# Patient Record
Sex: FEMALE | Race: WHITE | Employment: OTHER | URBAN - METROPOLITAN AREA
[De-identification: names, ages, dates, MRNs, and addresses within clinical notes are randomized per-mention and may not be internally consistent; named-entity substitution may affect disease eponyms.]

---

## 2017-01-30 RX ORDER — DULOXETIN HYDROCHLORIDE 60 MG/1
CAPSULE, DELAYED RELEASE ORAL
Qty: 90 CAPSULE | Refills: 2 | Status: SHIPPED | OUTPATIENT
Start: 2017-01-30 | End: 2017-11-01 | Stop reason: SDUPTHER

## 2017-04-18 DIAGNOSIS — N32.81 OAB (OVERACTIVE BLADDER): ICD-10-CM

## 2017-04-18 RX ORDER — TOLTERODINE 4 MG/1
CAPSULE, EXTENDED RELEASE ORAL
Qty: 30 CAPSULE | Refills: 1 | Status: SHIPPED | OUTPATIENT
Start: 2017-04-18 | End: 2017-06-19 | Stop reason: SDUPTHER

## 2017-06-19 DIAGNOSIS — N32.81 OAB (OVERACTIVE BLADDER): ICD-10-CM

## 2017-06-19 RX ORDER — TOLTERODINE 4 MG/1
CAPSULE, EXTENDED RELEASE ORAL
Qty: 30 CAPSULE | Refills: 3 | Status: SHIPPED | OUTPATIENT
Start: 2017-06-19 | End: 2017-11-06 | Stop reason: SDUPTHER

## 2017-10-23 ENCOUNTER — NURSE ONLY (OUTPATIENT)
Dept: FAMILY MEDICINE CLINIC | Age: 67
End: 2017-10-23

## 2017-10-23 ENCOUNTER — TELEPHONE (OUTPATIENT)
Dept: FAMILY MEDICINE CLINIC | Age: 67
End: 2017-10-23

## 2017-10-23 DIAGNOSIS — R30.0 DYSURIA: Primary | ICD-10-CM

## 2017-10-23 LAB
BILIRUBIN, POC: NORMAL
BLOOD URINE, POC: NORMAL
CLARITY, POC: NORMAL
COLOR, POC: NORMAL
GLUCOSE URINE, POC: NORMAL
KETONES, POC: NORMAL
LEUKOCYTE EST, POC: NORMAL
NITRITE, POC: NORMAL
PH, POC: 5.5
PROTEIN, POC: NORMAL
SPECIFIC GRAVITY, POC: 1.02
UROBILINOGEN, POC: 0.2

## 2017-10-23 PROCEDURE — 81002 URINALYSIS NONAUTO W/O SCOPE: CPT | Performed by: FAMILY MEDICINE

## 2017-10-23 NOTE — TELEPHONE ENCOUNTER
The patient know it's okay for her to drop off a urine sample.  I will make an exception in her case

## 2017-10-25 LAB — URINE CULTURE, ROUTINE: NORMAL

## 2017-11-01 RX ORDER — DULOXETIN HYDROCHLORIDE 60 MG/1
CAPSULE, DELAYED RELEASE ORAL
Qty: 90 CAPSULE | Refills: 0 | Status: SHIPPED | OUTPATIENT
Start: 2017-11-01 | End: 2017-12-01 | Stop reason: SDUPTHER

## 2017-11-06 DIAGNOSIS — N32.81 OAB (OVERACTIVE BLADDER): ICD-10-CM

## 2017-11-06 RX ORDER — TOLTERODINE 4 MG/1
CAPSULE, EXTENDED RELEASE ORAL
Qty: 30 CAPSULE | Refills: 2 | Status: SHIPPED | OUTPATIENT
Start: 2017-11-06 | End: 2018-05-17 | Stop reason: ALTCHOICE

## 2017-11-06 NOTE — TELEPHONE ENCOUNTER
Bud Sarkar is requesting refill(s)   Last OV 11-28-16 (pertaining to medication)  LR 6-19-17 (per medication requested)  Next office visit scheduled or attempted Yes   If no, reason:  12-1-17

## 2017-12-01 ENCOUNTER — OFFICE VISIT (OUTPATIENT)
Dept: FAMILY MEDICINE CLINIC | Age: 67
End: 2017-12-01

## 2017-12-01 VITALS
WEIGHT: 126 LBS | HEIGHT: 65 IN | SYSTOLIC BLOOD PRESSURE: 120 MMHG | DIASTOLIC BLOOD PRESSURE: 70 MMHG | BODY MASS INDEX: 20.99 KG/M2

## 2017-12-01 DIAGNOSIS — G62.9 NEUROPATHY: ICD-10-CM

## 2017-12-01 DIAGNOSIS — G20 PARKINSON'S DISEASE (HCC): ICD-10-CM

## 2017-12-01 DIAGNOSIS — N32.81 OAB (OVERACTIVE BLADDER): ICD-10-CM

## 2017-12-01 DIAGNOSIS — F32.A DEPRESSION, UNSPECIFIED DEPRESSION TYPE: ICD-10-CM

## 2017-12-01 DIAGNOSIS — Z00.00 ROUTINE GENERAL MEDICAL EXAMINATION AT A HEALTH CARE FACILITY: Primary | ICD-10-CM

## 2017-12-01 DIAGNOSIS — R06.02 SOB (SHORTNESS OF BREATH): ICD-10-CM

## 2017-12-01 LAB
A/G RATIO: 1.8 (ref 1.1–2.2)
ALBUMIN SERPL-MCNC: 4.5 G/DL (ref 3.4–5)
ALP BLD-CCNC: 76 U/L (ref 40–129)
ALT SERPL-CCNC: <5 U/L (ref 10–40)
ANION GAP SERPL CALCULATED.3IONS-SCNC: 16 MMOL/L (ref 3–16)
AST SERPL-CCNC: 19 U/L (ref 15–37)
BASOPHILS ABSOLUTE: 0.1 K/UL (ref 0–0.2)
BASOPHILS RELATIVE PERCENT: 1.2 %
BILIRUB SERPL-MCNC: 0.4 MG/DL (ref 0–1)
BUN BLDV-MCNC: 20 MG/DL (ref 7–20)
CALCIUM SERPL-MCNC: 9.9 MG/DL (ref 8.3–10.6)
CHLORIDE BLD-SCNC: 103 MMOL/L (ref 99–110)
CHOLESTEROL, TOTAL: 252 MG/DL (ref 0–199)
CO2: 25 MMOL/L (ref 21–32)
CREAT SERPL-MCNC: 0.7 MG/DL (ref 0.6–1.2)
EOSINOPHILS ABSOLUTE: 0.1 K/UL (ref 0–0.6)
EOSINOPHILS RELATIVE PERCENT: 1.4 %
GFR AFRICAN AMERICAN: >60
GFR NON-AFRICAN AMERICAN: >60
GLOBULIN: 2.5 G/DL
GLUCOSE BLD-MCNC: 95 MG/DL (ref 70–99)
HCT VFR BLD CALC: 43.5 % (ref 36–48)
HDLC SERPL-MCNC: 60 MG/DL (ref 40–60)
HEMOGLOBIN: 14.3 G/DL (ref 12–16)
LDL CHOLESTEROL CALCULATED: 165 MG/DL
LYMPHOCYTES ABSOLUTE: 1.5 K/UL (ref 1–5.1)
LYMPHOCYTES RELATIVE PERCENT: 23.4 %
MCH RBC QN AUTO: 30.3 PG (ref 26–34)
MCHC RBC AUTO-ENTMCNC: 32.8 G/DL (ref 31–36)
MCV RBC AUTO: 92.2 FL (ref 80–100)
MONOCYTES ABSOLUTE: 0.5 K/UL (ref 0–1.3)
MONOCYTES RELATIVE PERCENT: 8.7 %
NEUTROPHILS ABSOLUTE: 4.1 K/UL (ref 1.7–7.7)
NEUTROPHILS RELATIVE PERCENT: 65.3 %
PDW BLD-RTO: 13.5 % (ref 12.4–15.4)
PLATELET # BLD: 230 K/UL (ref 135–450)
PMV BLD AUTO: 9.1 FL (ref 5–10.5)
POTASSIUM SERPL-SCNC: 4.5 MMOL/L (ref 3.5–5.1)
RBC # BLD: 4.71 M/UL (ref 4–5.2)
SODIUM BLD-SCNC: 144 MMOL/L (ref 136–145)
TOTAL PROTEIN: 7 G/DL (ref 6.4–8.2)
TRIGL SERPL-MCNC: 134 MG/DL (ref 0–150)
VLDLC SERPL CALC-MCNC: 27 MG/DL
WBC # BLD: 6.3 K/UL (ref 4–11)

## 2017-12-01 PROCEDURE — 36415 COLL VENOUS BLD VENIPUNCTURE: CPT | Performed by: FAMILY MEDICINE

## 2017-12-01 PROCEDURE — 99397 PER PM REEVAL EST PAT 65+ YR: CPT | Performed by: FAMILY MEDICINE

## 2017-12-01 PROCEDURE — 93000 ELECTROCARDIOGRAM COMPLETE: CPT | Performed by: FAMILY MEDICINE

## 2017-12-01 RX ORDER — DULOXETIN HYDROCHLORIDE 60 MG/1
CAPSULE, DELAYED RELEASE ORAL
Qty: 90 CAPSULE | Refills: 3 | Status: SHIPPED | OUTPATIENT
Start: 2017-12-01 | End: 2018-02-02 | Stop reason: SDUPTHER

## 2017-12-01 RX ORDER — TOLTERODINE 4 MG/1
CAPSULE, EXTENDED RELEASE ORAL
Qty: 30 CAPSULE | Refills: 2 | Status: CANCELLED | OUTPATIENT
Start: 2017-12-01

## 2017-12-01 RX ORDER — LORAZEPAM 1 MG/1
TABLET ORAL
Qty: 3 TABLET | Refills: 0 | Status: SHIPPED | OUTPATIENT
Start: 2017-12-01 | End: 2018-05-17 | Stop reason: ALTCHOICE

## 2017-12-01 NOTE — PROGRESS NOTES
[facility])           Pituitary adenoma [facility])      Past Surgical History:   Procedure Laterality Date    COLONOSCOPY  6/02    due 6/12    COLONOSCOPY  8/12    due 8/17    FINGER TRIGGER RELEASE      right hand    HAMMER TOE SURGERY  2005    LAPAROSCOPY      TONSILLECTOMY AND ADENOIDECTOMY  14 yo     Family History   Problem Relation Age of Onset    Heart Disease Mother     Stroke Father     Cancer Father      prostate    Diabetes Sister     Heart Disease Sister      quadruple bypass     Social History     Social History    Marital status:      Spouse name: Alaina Dotson Number of children: 2    Years of education: N/A     Occupational History    homemaker      retired nurse     Social History Main Topics    Smoking status: Never Smoker    Smokeless tobacco: Never Used    Alcohol use No    Drug use: Unknown    Sexual activity: Not on file     Other Topics Concern    Not on file     Social History Narrative    Retired, cares for grand son, exercises regularly, healthy diet       Review of Systems:  A comprehensive review of systems was negative except for what was noted in the HPI. Physical Exam:   Vitals:    12/01/17 0906 12/01/17 0946   BP: 100/68 120/70   Site: Left Arm    Position: Sitting    Cuff Size: Medium Adult    Weight: 126 lb (57.2 kg)    Height: 5' 5\" (1.651 m)      Body mass index is 20.97 kg/m². Constitutional: She is oriented to person, place, and time. She appears well-developed and well-nourished. No distress. HEENT:   Head: Normocephalic and atraumatic. Right Ear: Cerumen impaction, after irrigation Tympanic membrane, external ear and ear canal normal.   Left Ear: Cerumen impaction, after irrigation Tympanic membrane, external ear and ear canal normal.   Nose: Nose normal.   Mouth/Throat: Oropharynx is clear and moist, and mucous membranes are normal.  There is no cervical adenopathy. Eyes: Conjunctivae  normal. Pupils are equal, round, and reactive to light. Neck: Neck supple. No JVD present. Carotid bruit is not present. No mass and no thyromegaly present. Cardiovascular: Normal rate, regular rhythm, normal heart sounds and intact distal pulses. Exam reveals no gallop and no friction rub. No murmur heard. Pulmonary/Chest: Effort normal and breath sounds normal. No respiratory distress. She has no wheezes, rhonchi or rales. Abdominal: Soft, non-tender. Bowel sounds and aorta are normal. She exhibits no organomegaly, mass or bruit. .Musculoskeletal:  She exhibits no edema. Neurological: She is alert and oriented to person, place, and time. Lova Mean No cranial nerve deficit. Tremor still present  Skin: Skin is warm and dry. There is no rash or erythema. No suspicious lesions noted. Psychiatric: She has a normal mood and affect. Her speech is normal and behavior is normal. Judgment, cognition and memory are normal.       EKG Interpretation: RBBB. Lab Review:   Lab Results   Component Value Date     11/20/2015    K 4.1 11/20/2015     11/20/2015    CO2 30 11/20/2015    BUN 18 11/20/2015    CREATININE 0.7 11/20/2015    GLUCOSE 87 11/20/2015    CALCIUM 9.6 11/20/2015     Lab Results   Component Value Date    WBC 7.4 11/20/2015    HGB 13.5 11/20/2015    HCT 41.9 11/20/2015    MCV 92.3 11/20/2015     11/20/2015     Lab Results   Component Value Date    CHOL 214 11/28/2016    TRIG 187 11/28/2016    HDL 58 11/28/2016        Assessment/Plan:    Natacha was seen today for annual exam.    Diagnoses and all orders for this visit:    Routine general medical examination at a health care facility  -     Lipid Panel  -     Comprehensive Metabolic Panel  -     CBC Auto Differential    OAB (overactive bladder), Given her recent dizziness we will DC her Detrol LA. We will see what happens to the dizziness and also her urinary frequency. She continues to have atrophic vaginitis symptoms, she can see her gynecologist for possible laser treatment.     SOB

## 2017-12-14 ENCOUNTER — HOSPITAL ENCOUNTER (OUTPATIENT)
Dept: MAMMOGRAPHY | Age: 67
Discharge: HOME | End: 2017-12-14
Admitting: FAMILY MEDICINE

## 2017-12-14 DIAGNOSIS — Z12.39 BREAST CANCER SCREENING: ICD-10-CM

## 2017-12-15 ENCOUNTER — OFFICE VISIT (OUTPATIENT)
Dept: ORTHOPEDIC SURGERY | Age: 67
End: 2017-12-15

## 2017-12-15 VITALS — HEIGHT: 65 IN | BODY MASS INDEX: 21.01 KG/M2 | WEIGHT: 126.1 LBS

## 2017-12-15 DIAGNOSIS — S52.531D CLOSED COLLES' FRACTURE OF RIGHT RADIUS WITH ROUTINE HEALING, SUBSEQUENT ENCOUNTER: ICD-10-CM

## 2017-12-15 DIAGNOSIS — M19.031 ARTHRITIS OF WRIST, RIGHT: ICD-10-CM

## 2017-12-15 DIAGNOSIS — M25.531 RIGHT WRIST PAIN: Primary | ICD-10-CM

## 2017-12-15 DIAGNOSIS — M77.8 RIGHT WRIST TENDINITIS: ICD-10-CM

## 2017-12-15 PROCEDURE — 3014F SCREEN MAMMO DOC REV: CPT | Performed by: ORTHOPAEDIC SURGERY

## 2017-12-15 PROCEDURE — G8400 PT W/DXA NO RESULTS DOC: HCPCS | Performed by: ORTHOPAEDIC SURGERY

## 2017-12-15 PROCEDURE — 99213 OFFICE O/P EST LOW 20 MIN: CPT | Performed by: ORTHOPAEDIC SURGERY

## 2017-12-15 PROCEDURE — 1123F ACP DISCUSS/DSCN MKR DOCD: CPT | Performed by: ORTHOPAEDIC SURGERY

## 2017-12-15 PROCEDURE — G8420 CALC BMI NORM PARAMETERS: HCPCS | Performed by: ORTHOPAEDIC SURGERY

## 2017-12-15 PROCEDURE — G8484 FLU IMMUNIZE NO ADMIN: HCPCS | Performed by: ORTHOPAEDIC SURGERY

## 2017-12-15 PROCEDURE — 1090F PRES/ABSN URINE INCON ASSESS: CPT | Performed by: ORTHOPAEDIC SURGERY

## 2017-12-15 PROCEDURE — 1036F TOBACCO NON-USER: CPT | Performed by: ORTHOPAEDIC SURGERY

## 2017-12-15 PROCEDURE — 4040F PNEUMOC VAC/ADMIN/RCVD: CPT | Performed by: ORTHOPAEDIC SURGERY

## 2017-12-15 PROCEDURE — 3017F COLORECTAL CA SCREEN DOC REV: CPT | Performed by: ORTHOPAEDIC SURGERY

## 2017-12-15 PROCEDURE — 20550 NJX 1 TENDON SHEATH/LIGAMENT: CPT | Performed by: ORTHOPAEDIC SURGERY

## 2017-12-15 PROCEDURE — G8427 DOCREV CUR MEDS BY ELIG CLIN: HCPCS | Performed by: ORTHOPAEDIC SURGERY

## 2017-12-15 NOTE — PROGRESS NOTES
Injection administration details:  Date & Time: 12/15/17 10:46 AM  Site & Comments:RIGHT WRIST administered by Dr Ulises Childers    Betamethasone (30mg/mL)  # of cc: 2  [facility]:1335-8801-93   Lot number: [id number]  EXP: 02/2019    1% Lidocaine (10mg/ mL)  # of cc:2  [facility]: 3459-2591-98  Lot number: [id number]  EXP: 9/1/2018
55°    Strength:  Normal    Special Tests:  Positive ECU Synergy sign. DRUJ is stable and minimally tender. Skin: There are no additional worrisome rashes, ulcerations or lesions. Sensation: normal    Circulation normal    Additional Comments:     Additional Examinations:  Left Upper Extremity: Examination of the left upper extremity does not show any tenderness, deformity or injury. Range of motion is unremarkable. There is no gross instability. There are no rashes, ulcerations or lesions. Strength and tone are normal.    Radiology:     X-rays obtained and reviewed in office:  Views 3 views  Location right wrist  Impression healed right distal radius fracture with now perhaps 2 mm positive ulnar variance. There is widespread degenerative change along the wrist mostly centered along the STT joint. Diagnostic Test Findings:        Assessment:  Right wrist arthritis but now some newer signs of ECU tendinopathy as well    Impression:  Encounter Diagnoses   Name Primary?  Right wrist pain Yes    Closed Colles' fracture of right radius with routine healing, subsequent encounter     Right wrist tendinitis     Arthritis of wrist, right        Office Procedures:  Orders Placed This Encounter   Procedures    XR WRIST RIGHT (MIN 3 VIEWS)     12069    ND BETAMETHASONE ACET&SOD PHOSP    ND INJECT TENDON SHEATH/LIGAMENT       Treatment Plan:  I certainly believe she has some widespread arthritic change in the wrist but based on my exam I think there is a new area of specific discomfort along the ECU tendon. I talked with her about topical treatments for the wrist, over-the-counter anti-inflammatories with appropriate precautions, activity changes, and use of her wrist wrap. I also gave her the option of a cortisone injection along this region in addition to some formalized hand therapy.     Under sterile conditions and with 1% plain lidocaine I then injected 2 mL of Celestone along the course of the

## 2017-12-19 ENCOUNTER — HOSPITAL ENCOUNTER (OUTPATIENT)
Dept: OCCUPATIONAL THERAPY | Age: 67
Discharge: HOME | End: 2017-12-31
Admitting: ORTHOPAEDIC SURGERY

## 2018-01-01 ENCOUNTER — HOSPITAL ENCOUNTER (OUTPATIENT)
Dept: OCCUPATIONAL THERAPY | Age: 68
Discharge: HOME | End: 2018-01-31
Attending: ORTHOPAEDIC SURGERY | Admitting: ORTHOPAEDIC SURGERY

## 2018-01-31 ENCOUNTER — TELEPHONE (OUTPATIENT)
Dept: FAMILY MEDICINE CLINIC | Age: 68
End: 2018-01-31

## 2018-01-31 NOTE — TELEPHONE ENCOUNTER
I received a faxed form, from [facility]. They were requesting copies of medical record, etc, and authorization to release it. I have faxed back documents containing recent office visit notes and demographic information. Attached is confirmation of facility receiving information.

## 2018-02-02 DIAGNOSIS — F32.A DEPRESSION, UNSPECIFIED DEPRESSION TYPE: ICD-10-CM

## 2018-02-02 RX ORDER — DULOXETIN HYDROCHLORIDE 60 MG/1
CAPSULE, DELAYED RELEASE ORAL
Qty: 90 CAPSULE | Refills: 0 | Status: SHIPPED | OUTPATIENT
Start: 2018-02-02 | End: 2019-06-25

## 2018-02-02 NOTE — TELEPHONE ENCOUNTER
Ramirez Maurer is requesting refill(s)   Last OV 12/1/17 (pertaining to medication)  LR 12/1/17 (per medication requested)  Next office visit scheduled or attempted No   If no, reason:  Was in recently   Wants refill of this medicine as she is in [place] for awhile and wants refill there.

## 2018-03-30 ENCOUNTER — HOSPITAL ENCOUNTER (OUTPATIENT)
Dept: MAMMOGRAPHY | Age: 68
Discharge: HOME | End: 2018-03-30
Admitting: FAMILY MEDICINE

## 2018-03-30 DIAGNOSIS — M81.0 AGE-RELATED OSTEOPOROSIS WITHOUT CURRENT PATHOLOGICAL FRACTURE: ICD-10-CM

## 2018-03-30 DIAGNOSIS — Z13.820 SCREENING FOR OSTEOPOROSIS: ICD-10-CM

## 2018-05-21 ENCOUNTER — OFFICE VISIT (OUTPATIENT)
Dept: FAMILY MEDICINE CLINIC | Age: 68
End: 2018-05-21

## 2018-05-21 VITALS
OXYGEN SATURATION: 97 % | DIASTOLIC BLOOD PRESSURE: 60 MMHG | HEIGHT: 65 IN | WEIGHT: 127.6 LBS | HEART RATE: 91 BPM | BODY MASS INDEX: 21.26 KG/M2 | SYSTOLIC BLOOD PRESSURE: 120 MMHG

## 2018-05-21 DIAGNOSIS — M54.42 ACUTE LEFT-SIDED LOW BACK PAIN WITH LEFT-SIDED SCIATICA: Primary | ICD-10-CM

## 2018-05-21 PROCEDURE — 1090F PRES/ABSN URINE INCON ASSESS: CPT | Performed by: PHYSICIAN ASSISTANT

## 2018-05-21 PROCEDURE — G8399 PT W/DXA RESULTS DOCUMENT: HCPCS | Performed by: PHYSICIAN ASSISTANT

## 2018-05-21 PROCEDURE — 4040F PNEUMOC VAC/ADMIN/RCVD: CPT | Performed by: PHYSICIAN ASSISTANT

## 2018-05-21 PROCEDURE — G8427 DOCREV CUR MEDS BY ELIG CLIN: HCPCS | Performed by: PHYSICIAN ASSISTANT

## 2018-05-21 PROCEDURE — G8420 CALC BMI NORM PARAMETERS: HCPCS | Performed by: PHYSICIAN ASSISTANT

## 2018-05-21 PROCEDURE — 3017F COLORECTAL CA SCREEN DOC REV: CPT | Performed by: PHYSICIAN ASSISTANT

## 2018-05-21 PROCEDURE — 1123F ACP DISCUSS/DSCN MKR DOCD: CPT | Performed by: PHYSICIAN ASSISTANT

## 2018-05-21 PROCEDURE — 99213 OFFICE O/P EST LOW 20 MIN: CPT | Performed by: PHYSICIAN ASSISTANT

## 2018-05-21 PROCEDURE — 1036F TOBACCO NON-USER: CPT | Performed by: PHYSICIAN ASSISTANT

## 2018-05-21 RX ORDER — ALENDRONATE SODIUM 70 MG/1
70 TABLET ORAL
COMMUNITY
Start: 2018-04-05 | End: 2019-12-18

## 2018-05-21 RX ORDER — HYDROCODONE BITARTRATE AND ACETAMINOPHEN 5; 325 MG/1; MG/1
1 TABLET ORAL EVERY 6 HOURS PRN
Qty: 28 TABLET | Refills: 0 | Status: SHIPPED | OUTPATIENT
Start: 2018-05-21 | End: 2018-05-28

## 2018-05-21 ASSESSMENT — PATIENT HEALTH QUESTIONNAIRE - PHQ9
1. LITTLE INTEREST OR PLEASURE IN DOING THINGS: 0
SUM OF ALL RESPONSES TO PHQ9 QUESTIONS 1 & 2: 0
SUM OF ALL RESPONSES TO PHQ QUESTIONS 1-9: 0
2. FEELING DOWN, DEPRESSED OR HOPELESS: 0

## 2018-05-21 ASSESSMENT — ENCOUNTER SYMPTOMS
RESPIRATORY NEGATIVE: 1
BACK PAIN: 1

## 2018-05-22 ENCOUNTER — TELEPHONE (OUTPATIENT)
Dept: FAMILY MEDICINE CLINIC | Age: 68
End: 2018-05-22

## 2018-05-22 RX ORDER — LORAZEPAM 1 MG/1
TABLET ORAL
Qty: 3 TABLET | Refills: 0 | OUTPATIENT
Start: 2018-05-22 | End: 2018-06-22

## 2018-05-29 ENCOUNTER — OFFICE VISIT (OUTPATIENT)
Dept: FAMILY MEDICINE CLINIC | Age: 68
End: 2018-05-29

## 2018-05-29 VITALS
WEIGHT: 125.2 LBS | SYSTOLIC BLOOD PRESSURE: 112 MMHG | OXYGEN SATURATION: 96 % | DIASTOLIC BLOOD PRESSURE: 70 MMHG | HEART RATE: 101 BPM | BODY MASS INDEX: 20.86 KG/M2 | HEIGHT: 65 IN

## 2018-05-29 DIAGNOSIS — M54.42 ACUTE LEFT-SIDED LOW BACK PAIN WITH LEFT-SIDED SCIATICA: Primary | ICD-10-CM

## 2018-05-29 PROCEDURE — 1123F ACP DISCUSS/DSCN MKR DOCD: CPT | Performed by: PHYSICIAN ASSISTANT

## 2018-05-29 PROCEDURE — 1090F PRES/ABSN URINE INCON ASSESS: CPT | Performed by: PHYSICIAN ASSISTANT

## 2018-05-29 PROCEDURE — G8420 CALC BMI NORM PARAMETERS: HCPCS | Performed by: PHYSICIAN ASSISTANT

## 2018-05-29 PROCEDURE — 4040F PNEUMOC VAC/ADMIN/RCVD: CPT | Performed by: PHYSICIAN ASSISTANT

## 2018-05-29 PROCEDURE — 99213 OFFICE O/P EST LOW 20 MIN: CPT | Performed by: PHYSICIAN ASSISTANT

## 2018-05-29 PROCEDURE — 3017F COLORECTAL CA SCREEN DOC REV: CPT | Performed by: PHYSICIAN ASSISTANT

## 2018-05-29 PROCEDURE — 1036F TOBACCO NON-USER: CPT | Performed by: PHYSICIAN ASSISTANT

## 2018-05-29 PROCEDURE — G8427 DOCREV CUR MEDS BY ELIG CLIN: HCPCS | Performed by: PHYSICIAN ASSISTANT

## 2018-05-29 PROCEDURE — G8399 PT W/DXA RESULTS DOCUMENT: HCPCS | Performed by: PHYSICIAN ASSISTANT

## 2018-05-29 RX ORDER — NABUMETONE 500 MG/1
500 TABLET, FILM COATED ORAL 2 TIMES DAILY
Qty: 30 TABLET | Refills: 0 | Status: SHIPPED | OUTPATIENT
Start: 2018-05-29 | End: 2018-12-06

## 2018-05-29 ASSESSMENT — ENCOUNTER SYMPTOMS
BACK PAIN: 1
RESPIRATORY NEGATIVE: 1

## 2018-06-01 ENCOUNTER — HOSPITAL ENCOUNTER (OUTPATIENT)
Dept: PHYSICAL THERAPY | Age: 68
Discharge: HOME | End: 2018-06-30
Attending: PHYSICIAN ASSISTANT | Admitting: PHYSICIAN ASSISTANT

## 2018-06-05 ENCOUNTER — HOSPITAL ENCOUNTER (OUTPATIENT)
Dept: PHYSICAL THERAPY | Age: 68
Discharge: HOME | End: 2018-06-06
Admitting: PHYSICIAN ASSISTANT

## 2018-06-05 ENCOUNTER — HOSPITAL ENCOUNTER (OUTPATIENT)
Dept: PHYSICAL THERAPY | Age: 68
Discharge: HOME | End: 2018-05-31
Admitting: PHYSICIAN ASSISTANT

## 2018-06-06 ENCOUNTER — TELEPHONE (OUTPATIENT)
Dept: FAMILY MEDICINE CLINIC | Age: 68
End: 2018-06-06

## 2018-06-07 ENCOUNTER — HOSPITAL ENCOUNTER (OUTPATIENT)
Dept: PHYSICAL THERAPY | Age: 68
Discharge: HOME | End: 2018-06-08
Admitting: PHYSICIAN ASSISTANT

## 2018-06-11 ENCOUNTER — OFFICE VISIT (OUTPATIENT)
Dept: ORTHOPEDIC SURGERY | Age: 68
End: 2018-06-11

## 2018-06-11 VITALS
HEART RATE: 97 BPM | DIASTOLIC BLOOD PRESSURE: 64 MMHG | WEIGHT: 125.22 LBS | BODY MASS INDEX: 20.86 KG/M2 | SYSTOLIC BLOOD PRESSURE: 97 MMHG | HEIGHT: 65 IN

## 2018-06-11 DIAGNOSIS — M54.16 LUMBAR RADICULOPATHY: Primary | ICD-10-CM

## 2018-06-11 DIAGNOSIS — M51.36 DDD (DEGENERATIVE DISC DISEASE), LUMBAR: ICD-10-CM

## 2018-06-11 PROCEDURE — 1090F PRES/ABSN URINE INCON ASSESS: CPT | Performed by: PHYSICAL MEDICINE & REHABILITATION

## 2018-06-11 PROCEDURE — 99203 OFFICE O/P NEW LOW 30 MIN: CPT | Performed by: PHYSICAL MEDICINE & REHABILITATION

## 2018-06-11 PROCEDURE — G8420 CALC BMI NORM PARAMETERS: HCPCS | Performed by: PHYSICAL MEDICINE & REHABILITATION

## 2018-06-11 PROCEDURE — 3017F COLORECTAL CA SCREEN DOC REV: CPT | Performed by: PHYSICAL MEDICINE & REHABILITATION

## 2018-06-11 PROCEDURE — G8427 DOCREV CUR MEDS BY ELIG CLIN: HCPCS | Performed by: PHYSICAL MEDICINE & REHABILITATION

## 2018-06-11 RX ORDER — METHYLPREDNISOLONE 4 MG/1
TABLET ORAL
Qty: 1 KIT | Refills: 0 | Status: SHIPPED | OUTPATIENT
Start: 2018-06-11 | End: 2018-06-17

## 2018-06-12 ENCOUNTER — HOSPITAL ENCOUNTER (OUTPATIENT)
Dept: PHYSICAL THERAPY | Age: 68
Discharge: HOME | End: 2018-06-13
Admitting: PHYSICIAN ASSISTANT

## 2018-06-14 ENCOUNTER — HOSPITAL ENCOUNTER (OUTPATIENT)
Dept: PHYSICAL THERAPY | Age: 68
Discharge: HOME | End: 2018-06-15
Admitting: PHYSICIAN ASSISTANT

## 2018-06-19 ENCOUNTER — HOSPITAL ENCOUNTER (OUTPATIENT)
Dept: PHYSICAL THERAPY | Age: 68
Discharge: HOME | End: 2018-06-20
Admitting: PHYSICIAN ASSISTANT

## 2018-06-21 ENCOUNTER — HOSPITAL ENCOUNTER (OUTPATIENT)
Dept: PHYSICAL THERAPY | Age: 68
Discharge: HOME | End: 2018-06-22
Admitting: PHYSICIAN ASSISTANT

## 2018-06-22 DIAGNOSIS — M54.42 ACUTE LEFT-SIDED LOW BACK PAIN WITH LEFT-SIDED SCIATICA: Primary | ICD-10-CM

## 2018-06-26 ENCOUNTER — HOSPITAL ENCOUNTER (OUTPATIENT)
Dept: PHYSICAL THERAPY | Age: 68
Discharge: HOME | End: 2018-06-27
Admitting: PHYSICIAN ASSISTANT

## 2018-06-28 ENCOUNTER — HOSPITAL ENCOUNTER (OUTPATIENT)
Dept: PHYSICAL THERAPY | Age: 68
Discharge: HOME | End: 2018-06-29
Admitting: PHYSICIAN ASSISTANT

## 2018-07-01 ENCOUNTER — HOSPITAL ENCOUNTER (OUTPATIENT)
Dept: PHYSICAL THERAPY | Age: 68
Discharge: HOME | End: 2018-07-01
Attending: PHYSICIAN ASSISTANT | Admitting: PHYSICIAN ASSISTANT

## 2018-07-12 ENCOUNTER — HOSPITAL ENCOUNTER (OUTPATIENT)
Dept: PHYSICAL THERAPY | Age: 68
Discharge: HOME | End: 2018-07-13
Admitting: PHYSICIAN ASSISTANT

## 2018-07-12 NOTE — FLOWSHEET NOTE
[facility], [address] [place] [address]  Phone: [phone]  Fax [phone]        Physical Therapy Daily Treatment Note  Date:  2018    Patient Name:  Feliciano Sow    :  1950  MRN: [id number]       Date of Onset:4 weeks  Date of Evaluation: 18    Medical/Treatment Diagnosis Information:  · Diagnosis: M54.42 (ICD-10-CM) - Acute left-sided low back pain with left-sided sciatica  · Treatment Diagnosis: M54.42 (ICD-10-CM) - Acute left-sided low back pain with left-sided sciatica  Insurance/Certification information:  PT Insurance Information: [facility]  Physician Information:  Referring Practitioner: Monika COHEN  Plan of care signed (Y/N): Y    Date of Patient follow up with Physician: Lindsey Reardon NP 18    G-Code (if applicable):      Date G-Code Applied:  18       POC Due: 18  OP NOTE Due: if seen before 18  10th VISIT NOTE :visit 10       Latex Allergy:  [x]NO      []YES  Preferred Language for Healthcare:   [x]English       []other:     Visit # Insurance Allowable Requires auth   [facility]    [x]no        []yes:      Pain level: 3-4/10     SUBJECTIVE:  No increased soreness after last visit and TDN. OBJECTIVE:   Observation:   Decreased tightness in lumbar paraspinals. Test measurements:  None this visit. RESTRICTIONS/PRECAUTIONS: Parkinson's, osteoporosis      18 PT script for TDN entered in patient's chart under \"Orders\"  18 CONSENT FORM READ, SIGNED, AND SCANNED INTO PATIENT'S CHART.     Exercises/Interventions:      Muscle  Needle Size Technique Notes IES   Site 1 B IC Lumborum 0.30 x 40mm [] Pistoning / [x]  Threading  []  Winding/Coning LTR []    Site 2 B longissimus x2 0.30 x 40mm [] Pistoning / [x]  Threading  []  Winding/Coning  []    Site 3 B GMed 0.35 x 75mm [x] Pistoning / []  Threading  []  Winding/Coning R LTR []    Site 4                    [] Pistoning / []  Threading  []  Winding/Coning  []    Site 5                    [] Pistoning / []  Threading  []  Winding/Coning  []    Site 6                    [] Pistoning / []  Threading  []  Winding/Coning  []    Site 7                    [] Pistoning / []  Threading  []  Winding/Coning  []    Site 8                    [] Pistoning / []  Threading  []  Winding/Coning  []    Site 9                    [] Pistoning / []  Threading  []  Winding/Coning  []    Site 10                    [] Pistoning / []  Threading  []  Winding/Coning  []      **The above techniques were used to restore functional range of motion, reduce muscle spasm, and induce healing in the corresponding musculature by means of intramuscular mobilization. Clean Technique was utilized today while applying the Dry needling treatment. The treatment sites where cleaned with 70% solution of isopropyl alcohol.**          ** Educated patient on anatomy, trigger point etiology, expectations for TDN (bruising, soreness, etc), outcomes, and recommendations for exercise.  **      Therapeutic Ex Sets/Reps/Sec Notes   HEP   As far as able to keep core stable   HEP   HEP   HEP   HEP      SB LTR R/L 15x3\"       SB FLX rolls to hips and lumbar [id number]\" 15x       trustretch Hamstring stretch and lunging stretch R/L 5x15\" each Requires cues   Resume NV   1x30\" each    2x15\" each    10x each    R/L 10x each        2R 20x         AT See AT noteJW ATC   Manual Intervention                 L low back STM , GM STM after TDN 10'    TDN 20'    NMR re-education                                       Therapeutic Exercise and NMR EXR  [x] (29615) Provided verbal/tactile cueing for activities related to strengthening, flexibility, endurance, ROM for improvements in LE, proximal hip, and core control with self care, mobility, lifting, ambulation.  [] (85251) Provided verbal/tactile cueing for activities related to improving balance, coordination, kinesthetic sense, posture, motor skill, proprioception  to assist with LE, proximal hip, and core control in self care, mobility, lifting, ambulation and eccentric single leg control. NMR and Therapeutic Activities:    [] (51900 or 67047) Provided verbal/tactile cueing for activities related to improving balance, coordination, kinesthetic sense, posture, motor skill, proprioception and motor activation to allow for proper function of core, proximal hip and LE with self care and ADLs  [] (06821) Gait Re-education- Provided training and instruction to the patient for proper LE, core and proximal hip recruitment and positioning and eccentric body weight control with ambulation re-education including up and down stairs     Home Exercise Program:    [x] (51672) Reviewed/Progressed HEP activities related to strengthening, flexibility, endurance, ROM of core, proximal hip and LE for functional self-care, mobility, lifting and ambulation/stair navigation   [] (87583)Reviewed/Progressed HEP activities related to improving balance, coordination, kinesthetic sense, posture, motor skill, proprioception of core, proximal hip and LE for self care, mobility, lifting, and ambulation/stair navigation      Manual Treatments:  PROM / STM / Oscillations-Mobs:  G-I, II, III, IV (PA's, Inf., Post.)  [x] (83709) Provided manual therapy to mobilize LE, proximal hip and/or LS spine soft tissue/joints for the purpose of modulating pain, promoting relaxation,  increasing ROM, reducing/eliminating soft tissue swelling/inflammation/restriction, improving soft tissue extensibility and allowing for proper ROM for normal function with self care, mobility, lifting and ambulation.      Modalities:  HP LB prior to TDN x5'    Charges:  Timed Code Treatment Minutes: 42   Total Treatment Minutes: 42     [] EVAL (LOW) 69495 (typically 20 minutes face-to-face)  [] EVAL (MOD) 30052 (typically 30 minutes face-to-face)  [] EVAL (HIGH) 28223 (typically 45 minutes face-to-face)  [] RE-EVAL [x] MT(10537) x  1   [] IONTO  [] NMR (36936) x      [] VASO  [x] Manual (82013) x  2    [] Other:US  [] TA x       [] Mech Traction (39031)  [] ES(attended) (84527)      [] ES (un) (03174):     GOALS:   Patient stated goal: To be \"regular\"     Therapist goals for Patient:   Short Term Goals: To be achieved in: 2 weeks  1. Independent in HEP and progression per patient tolerance, in order to prevent re-injury. 2. Patient will have a decrease in pain to facilitate improvement in movement, function, and ADLs as indicated by Functional Deficits.     Long Term Goals: To be achieved in: 4 weeks  1. Disability index score of 50% or less for the Oswestry  to assist with reaching prior level of function. 2. Patient will demonstrate increased AROM to WNL, good LS mobility, good hip ROM to allow for proper joint functioning as indicated by patients Functional Deficits. 3. Patient will demonstrate an increase in Strength to good proximal hip and core activation to allow for proper functional mobility as indicated by patients Functional Deficits. 4. Patient will return to daily household functional activities without increased symptoms or restriction. 5. Pt will be able to cook a meal by herself by standing long enough to do so. (patient specific functional goal)       Progression Towards Functional goals:  [x] Patient is progressing as expected towards functional goals listed. [] Progression is slowed due to complexities listed. [] Progression has been slowed due to co-morbidities. [] Plan just implemented, too soon to assess goals progression  [] Other:     ASSESSMENT:   Good in clinic response to TDN, good tolerance. Lumbar mm relaxed. Overall more relaxed posture this visit.      Treatment/Activity Tolerance:  [x] Patient tolerated treatment well [] Patient limited by fatique  [] Patient limited by pain  [] Patient limited by other medical complications  [] Other:     Prognosis: [x] Good [] Fair  []

## 2018-07-17 ENCOUNTER — HOSPITAL ENCOUNTER (OUTPATIENT)
Dept: PHYSICAL THERAPY | Age: 68
Setting detail: THERAPIES SERIES
Discharge: HOME | End: 2018-07-17
Payer: MEDICARE

## 2018-07-17 PROCEDURE — 97140 MANUAL THERAPY 1/> REGIONS: CPT | Performed by: PHYSICAL THERAPIST

## 2018-07-17 PROCEDURE — 97110 THERAPEUTIC EXERCISES: CPT | Performed by: PHYSICAL THERAPIST

## 2018-07-17 NOTE — FLOWSHEET NOTE
Winding/Coning  []    Site 3 R GMed x2 0.35 x 75mm [x] Pistoning / []  Threading  []  Winding/Coning LTRx1 []    Site 4 R TFL x2 0.30 x 75mm [x] Pistoning / []  Threading  []  Winding/Coning Very tight and sore []    Site 5 R VL x4 0.30 x 60mm [x] Pistoning / []  Threading  []  Winding/Coning LTR x2 very tight, TTP []    Site 6                    [] Pistoning / []  Threading  []  Winding/Coning  []    Site 7                    [] Pistoning / []  Threading  []  Winding/Coning  []    Site 8                    [] Pistoning / []  Threading  []  Winding/Coning  []    Site 9                    [] Pistoning / []  Threading  []  Winding/Coning  []    Site 10                    [] Pistoning / []  Threading  []  Winding/Coning  []      **The above techniques were used to restore functional range of motion, reduce muscle spasm, and induce healing in the corresponding musculature by means of intramuscular mobilization. Clean Technique was utilized today while applying the Dry needling treatment. The treatment sites where cleaned with 70% solution of isopropyl alcohol.**          ** Educated patient on anatomy, trigger point etiology, expectations for TDN (bruising, soreness, etc), outcomes, and recommendations for exercise.  **      Therapeutic Ex Sets/Reps/Sec Notes   HEP   As far as able to keep core stable   HEP   HEP   HEP   HEP      SB LTR R/L assisted 20x3\"       SB FLX rolls to hips and lumbar [id number]\" 15x       trustretch Hamstring stretch and lunging stretch R/L 5x15\" each Requires cues   Resume NV   1x30\" each    2x15\" each    10x each    R/L 10x each        2R 20x    Recumbent Bike Full ROM 5'    AT See AT noteJW ATC   Manual Intervention              PROM B hips supine 6'    L low back STM , GM STM after TDN 10'    TDN 20'    NMR re-education                                       Therapeutic Exercise and NMR EXR  [x] (86599) Provided verbal/tactile cueing for activities related to strengthening, flexibility, endurance, ROM for improvements in LE, proximal hip, and core control with self care, mobility, lifting, ambulation.  [] (92593) Provided verbal/tactile cueing for activities related to improving balance, coordination, kinesthetic sense, posture, motor skill, proprioception  to assist with LE, proximal hip, and core control in self care, mobility, lifting, ambulation and eccentric single leg control. NMR and Therapeutic Activities:    [] (34972 or 20217) Provided verbal/tactile cueing for activities related to improving balance, coordination, kinesthetic sense, posture, motor skill, proprioception and motor activation to allow for proper function of core, proximal hip and LE with self care and ADLs  [] (88579) Gait Re-education- Provided training and instruction to the patient for proper LE, core and proximal hip recruitment and positioning and eccentric body weight control with ambulation re-education including up and down stairs     Home Exercise Program:    [x] (81821) Reviewed/Progressed HEP activities related to strengthening, flexibility, endurance, ROM of core, proximal hip and LE for functional self-care, mobility, lifting and ambulation/stair navigation   [] (74321)Reviewed/Progressed HEP activities related to improving balance, coordination, kinesthetic sense, posture, motor skill, proprioception of core, proximal hip and LE for self care, mobility, lifting, and ambulation/stair navigation      Manual Treatments:  PROM / STM / Oscillations-Mobs:  G-I, II, III, IV (PA's, Inf., Post.)  [x] (17701) Provided manual therapy to mobilize LE, proximal hip and/or LS spine soft tissue/joints for the purpose of modulating pain, promoting relaxation,  increasing ROM, reducing/eliminating soft tissue swelling/inflammation/restriction, improving soft tissue extensibility and allowing for proper ROM for normal function with self care, mobility, lifting and ambulation.      Modalities:  HP LB/R ITB post to TDN

## 2018-07-17 NOTE — FLOWSHEET NOTE
[facility], [address] [place]  Phone: [phone]  Fax [phone]    ATHLETIC TRAINING [address]   Date:  2018    Patient Name:  Delicia Willis    :  1950  MRN: [id number]  Restrictions/Precautions:  Parkinson's, Osteoporosis  Medical/Treatment Diagnosis Information:  ·  Acute L sided LBP w/L sided sciatica   ·  LBP w/sciatica  Physician Information:   VICKI Amezquita                                 Activity Log                                                               DOS/DOI:                                                             Date: 18   ATC Commun Difficulty isolating core  Gave info for [employer] for PD patients Feels like she has less endurance today. Tremors are more apparent today Better today, try tandem or wide tandem stance NV for trunk rotations. Fatigued at end of session   Bike      Airdyne      Elliptical      Treadmill            Hamstring stretch      Quad stretch      Hip flexor stretch      Piriformis stretch      Gastroc stretch      Soleus stretch            SLS  RTB B ext w/R/L slow march 10x RTB B ext w/R/L slow march 10x RTB B ext w/R/L slow march 10x         Leg press  Bilat. Unilat. Knee ext. Knee flex. Squats   Mini                     Wall                     [facility]            Step   Up                 Up and Over                 Lat. Down                       [facility] [place]  Flex. ABd Gliders R/L 10x ea Gliders R/L 10x ea Gliders R/L 10x ea             ADd                Ext Gliders R/L 10x ea Gliders R/L 10x ea Gliders R/L 10x ea         Cable Column/Theraband    Rows                                                    Ext.                                                     Lat. pulldown                                                    Chops Trunk Rot.  YTB R/L stand feet together 15x ea RTB R/L stand feet together 10x ea RTB R/L stand feet together 15x ea         Modality MHP 15' declined declined   [facility]   Time spent with PT assistant

## 2018-07-19 ENCOUNTER — HOSPITAL ENCOUNTER (OUTPATIENT)
Dept: PHYSICAL THERAPY | Age: 68
Setting detail: THERAPIES SERIES
Discharge: HOME | End: 2018-07-19
Payer: MEDICARE

## 2018-07-19 PROCEDURE — 97110 THERAPEUTIC EXERCISES: CPT | Performed by: PHYSICAL THERAPIST

## 2018-07-19 PROCEDURE — 97140 MANUAL THERAPY 1/> REGIONS: CPT | Performed by: PHYSICAL THERAPIST

## 2018-07-19 PROCEDURE — G8979 MOBILITY GOAL STATUS: HCPCS | Performed by: PHYSICAL THERAPIST

## 2018-07-19 PROCEDURE — G8978 MOBILITY CURRENT STATUS: HCPCS | Performed by: PHYSICAL THERAPIST

## 2018-07-19 NOTE — FLOWSHEET NOTE
patient's chart under \"Orders\"  6/28/18 CONSENT FORM READ, SIGNED, AND SCANNED INTO PATIENT'S CHART. Exercises/Interventions:      Muscle  Needle Size Technique Notes IES   Site 1 R L5-S1 multifidusx1 0.30 x 60mm [] Pistoning / []  Threading  [x]  Winding/Coning Unable to get to lamina due to tightness, relaxation response []    Site 2 R longissimusx1 0.30 x 40mm [] Pistoning / [x]  Threading  []  Winding/Coning No sig response []    Site 3 L L5-S1x1 0.30 x 60mm [] Pistoning / []  Threading  [x]  Winding/Coning Unable to get to lamina due to tightness, LTR x3 and good relaxation []    Site 4 L longissimus x2 0.30 x 40mm [] Pistoning / [x]  Threading  []  Winding/Coning LTR x1 []    Site 5 R ITB x4 0.35 x 75mm [x] Pistoning / []  Threading  []  Winding/Coning LTR x 4 painful throughout []    Site 6                    [] Pistoning / []  Threading  []  Winding/Coning  []    Site 7                    [] Pistoning / []  Threading  []  Winding/Coning  []    Site 8                    [] Pistoning / []  Threading  []  Winding/Coning  []    Site 9                    [] Pistoning / []  Threading  []  Winding/Coning  []    Site 10                    [] Pistoning / []  Threading  []  Winding/Coning  []      **The above techniques were used to restore functional range of motion, reduce muscle spasm, and induce healing in the corresponding musculature by means of intramuscular mobilization. Clean Technique was utilized today while applying the Dry needling treatment. The treatment sites where cleaned with 70% solution of isopropyl alcohol.**          ** Educated patient on anatomy, trigger point etiology, expectations for TDN (bruising, soreness, etc), outcomes, and recommendations for exercise.  **      Therapeutic Ex Sets/Reps/Sec Notes                                                                             AT See AT noteJW ATC  reformer   Manual Intervention     TDN (see above chart) 25'    STM following TDN 15' NMR re-education                                       Therapeutic Exercise and NMR EXR  [x] (94191) Provided verbal/tactile cueing for activities related to strengthening, flexibility, endurance, ROM for improvements in LE, proximal hip, and core control with self care, mobility, lifting, ambulation.  [] (06023) Provided verbal/tactile cueing for activities related to improving balance, coordination, kinesthetic sense, posture, motor skill, proprioception  to assist with LE, proximal hip, and core control in self care, mobility, lifting, ambulation and eccentric single leg control.      NMR and Therapeutic Activities:    [] (08001 or 21722) Provided verbal/tactile cueing for activities related to improving balance, coordination, kinesthetic sense, posture, motor skill, proprioception and motor activation to allow for proper function of core, proximal hip and LE with self care and ADLs  [] (64455) Gait Re-education- Provided training and instruction to the patient for proper LE, core and proximal hip recruitment and positioning and eccentric body weight control with ambulation re-education including up and down stairs     Home Exercise Program:    [x] (70220) Reviewed/Progressed HEP activities related to strengthening, flexibility, endurance, ROM of core, proximal hip and LE for functional self-care, mobility, lifting and ambulation/stair navigation   [] (13252)Reviewed/Progressed HEP activities related to improving balance, coordination, kinesthetic sense, posture, motor skill, proprioception of core, proximal hip and LE for self care, mobility, lifting, and ambulation/stair navigation      Manual Treatments:  PROM / STM / Oscillations-Mobs:  G-I, II, III, IV (PA's, Inf., Post.)  [x] (97429) Provided manual therapy to mobilize LE, proximal hip and/or LS spine soft tissue/joints for the purpose of modulating pain, promoting relaxation,  increasing ROM, reducing/eliminating soft tissue

## 2018-07-19 NOTE — PLAN OF CARE
[facility], [address]     Physical Therapy 10th Visit Progress Note    Date: 2018        Patient Name:  Delicia Willis    :  1950  MRN: [id number]  Referring Physician: Vivek COHEN  Diagnosis:Acute L sided LBP with L sided sciatica             ICD Code:M54.42  Therapy Diagnosis/Practice Pattern:   Acute L sided LBP with L sided sciatica  M54.42/F  Total number of visits: 10   Reporting Period:   Beginning Date:18   End Date:18    OBJECTIVE  Test used Initial score Current Score   Pain Summary 0-10/10 2-8/10 3-4/10   Functional questionnaire Oswestry 70% 35%   Functional Testing            ROM Lumbar FLX 25% 25-50%    RSB   LSB 10%/50% 25%/50%   Strength KE R   L 4+/5   4+/5 4+/5   4+/5    HF R   L 4+/5   4+/5 4+/5   4/5    KF R   L 4-/5   4+/5 4+/5   4/5        Functional Limitation G-Code (if applicable):         PT G-Codes  Functional Assessment Tool Used: Oswestry  Score: (17) 35%  Functional Limitation: Mobility: Walking and moving around  Mobility: Walking and Moving Around Current Status (): At least 20 percent but less than 40 percent impaired, limited or restricted  Mobility: Walking and Moving Around Goal Status ():  At least 40 percent but less than 60 percent impaired, limited or restricted   Test/tests used to determine % limitation:Oswestry score  Actual Score used to drive % [id number]:(08)    Treatment to date:  [x] Therapeutic Exercise    [] Modalities:  [] Therapeutic Activity             []Ultrasound            []Electrical Stimulation  [] Gait Training     []Cervical Traction    [] Lumbar Traction  [x] Neuromuscular Re-education [x] Cold/hotpack         []Iontophoresis  [x] Instruction in HEP      Other:  [x] Manual Therapy                   []                       ?           []  Assessment:  [x] Improvement noted relative to goals:  Pt is responding very well to TDN with good release and overall pain and tightness decrease. Recommend that patient see Kaylie Roach for focused balance and exercises and be seen 1x week for dry needling.      [] No Improvement noted related to goals:/Patient's response to treatment/Additional assessment:    New or Updated Goals (if applicable):  [x] No change to goals established upon initial eval/last progress note:  New Goals:    Electronically signed by:  ZACKERY [id number]

## 2018-07-26 ENCOUNTER — HOSPITAL ENCOUNTER (OUTPATIENT)
Dept: PHYSICAL THERAPY | Age: 68
Setting detail: THERAPIES SERIES
Discharge: HOME | End: 2018-07-26
Payer: MEDICARE

## 2018-07-26 PROCEDURE — 97140 MANUAL THERAPY 1/> REGIONS: CPT | Performed by: PHYSICAL THERAPIST

## 2018-07-26 PROCEDURE — 97110 THERAPEUTIC EXERCISES: CPT | Performed by: PHYSICAL THERAPIST

## 2018-07-26 NOTE — FLOWSHEET NOTE
[facility], [address] [place] [address]  Phone: [phone]  Fax [phone]        Physical Therapy Daily Treatment Note  Date:  2018    Patient Name:  Rayne Donovan    :  1950  MRN: [id number]       Date of Onset:4 weeks  Date of Evaluation: 18    Medical/Treatment Diagnosis Information:  · Diagnosis: M54.42 (ICD-10-CM) - Acute left-sided low back pain with left-sided sciatica  · Treatment Diagnosis: M54.42 (ICD-10-CM) - Acute left-sided low back pain with left-sided sciatica  Insurance/Certification information:  PT Insurance Information: [facility]  Physician Information:  Referring Practitioner: Corina COHEN  Plan of care signed (Y/N): Y    Date of Patient follow up with Physician: none scheduled, did not FU with Dr Javi Jacques for NP appt    G-Code (if applicable):      Date G-Code Applied:  18       POC Due: 18  OP NOTE Due: if seen before 18  10th VISIT NOTE :visit 20 (10th visit done 18)       Latex Allergy:  [x]NO      []YES  Preferred Language for Healthcare:   [x]English       []other:     Visit # Insurance Allowable Requires auth   [place]    [x]no        []yes:      Pain level: 3/10     SUBJECTIVE:  \"I am seeing [employer] 1x week when she gets back from vacation. I feel better overall, some soreness in my R back. \"    OBJECTIVE:    Observation:  Overall decreased tightness. R lumbar paraspinals L4-S1 tight, R GMed and TFL tight. (All muscles tight comparatively today due to significant uncontrolled movement)   Test measurements:  None this visit     RESTRICTIONS/PRECAUTIONS: Parkinson's, osteoporosis      18 PT script for TDN entered in patient's chart under \"Orders\"  18 CONSENT FORM READ, SIGNED, AND SCANNED INTO PATIENT'S CHART.     Exercises/Interventions:      Muscle  Needle Size Technique Notes IES   Site 1 R L5-S1 multifidusx1 0.30 x 60mm [] Pistoning / []  Threading  [x]  Winding/Coning Unable to get to lamina due to tightness, relaxation response []    Site 2 R longissimusx1 0.30 x 40mm [] Pistoning / [x]  Threading  []  Winding/Coning Painful []    Site 3                   [] Pistoning / []  Threading  []  Winding/Coning  []    Site 4  0.30 x 40mm [] Pistoning / []  Threading  []  Winding/Coning  []    Site 5                    [x] Pistoning / []  Threading  []  Winding/Coning  []    Site 6 R TFL x4 0.30 x 60mm [x] Pistoning / []  Threading  []  Winding/Coning Painful throughout causing raidating pain  []    Site 7                    [] Pistoning / []  Threading  []  Winding/Coning  []    Site 8                    [] Pistoning / []  Threading  []  Winding/Coning  []    Site 9                    [] Pistoning / []  Threading  []  Winding/Coning  []    Site 10                    [] Pistoning / []  Threading  []  Winding/Coning  []      **The above techniques were used to restore functional range of motion, reduce muscle spasm, and induce healing in the corresponding musculature by means of intramuscular mobilization. Clean Technique was utilized today while applying the Dry needling treatment. The treatment sites where cleaned with 70% solution of isopropyl alcohol.**          ** Educated patient on anatomy, trigger point etiology, expectations for TDN (bruising, soreness, etc), outcomes, and recommendations for exercise.  **      Therapeutic Ex Sets/Reps/Sec Notes                                                                        recumbent 6'    Manual Intervention     TDN (see above chart) 25'    STM following TDN 10'                      NMR re-education                                       Therapeutic Exercise and NMR EXR  [x] (91536) Provided verbal/tactile cueing for activities related to strengthening, flexibility, endurance, ROM for improvements in LE, proximal hip, and core control with self care, mobility, lifting, ambulation.  [] (58578) Provided (MOD) 38792 (typically 30 minutes face-to-face)  [] EVAL (HIGH) 93638 (typically 45 minutes face-to-face)  [] RE-EVAL     [x] TORIBIO(88148) x  1   [] IONTO  [] NMR (40550) x      [] VASO  [x] Manual (11835) x  2    [] Other:US  [] TA x       [] Mech Traction (74430)  [] ES(attended) (15790)      [] ES (un) (98016):     GOALS:   Patient stated goal: To be \"regular\"     Therapist goals for Patient:   Short Term Goals: To be achieved in: 2 weeks  1. Independent in HEP and progression per patient tolerance, in order to prevent re-injury. MET   2. Patient will have a decrease in pain to facilitate improvement in movement, function, and ADLs as indicated by Functional Deficits. MET     Long Term Goals: To be achieved in: 4 weeks  1. Disability index score of 50% or less for the Oswestry  to assist with reaching prior level of function. MET  2. Patient will demonstrate increased AROM to WNL, good LS mobility, good hip ROM to allow for proper joint functioning as indicated by patients Functional Deficits. 50% met  3. Patient will demonstrate an increase in Strength to good proximal hip and core activation to allow for proper functional mobility as indicated by patients Functional Deficits. 75% met  4. Patient will return to daily household functional activities without increased symptoms or restriction. 5. Pt will be able to cook a meal by herself by standing long enough to do so. (patient specific functional goal)  Pt able to cook and prepare most of a meal.     Progression Towards Functional goals:  [x] Patient is progressing as expected towards functional goals listed. [] Progression is slowed due to complexities listed. [] Progression has been slowed due to co-morbidities. [] Plan just implemented, too soon to assess goals progression  [] Other:     ASSESSMENT:   Very difficult to needle this visit as patient was moving constantly and having spasms.   This also cause TDN to be more painful for the

## 2018-07-30 ENCOUNTER — HOSPITAL ENCOUNTER (OUTPATIENT)
Dept: PHYSICAL THERAPY | Age: 68
Setting detail: THERAPIES SERIES
Discharge: HOME | End: 2018-07-30
Payer: MEDICARE

## 2018-07-30 PROCEDURE — 97140 MANUAL THERAPY 1/> REGIONS: CPT | Performed by: PHYSICAL THERAPIST

## 2018-07-30 PROCEDURE — 97110 THERAPEUTIC EXERCISES: CPT | Performed by: PHYSICAL THERAPIST

## 2018-07-30 NOTE — FLOWSHEET NOTE
[facility], [address] [facility]  [address] [place] [address]  Phone: [phone]  Fax [phone]        Physical Therapy Daily Treatment Note  Date:  2018    Patient Name:  Judie Castleman    :  1950  MRN: [id number]       Date of Onset:4 weeks  Date of Evaluation: 18    Medical/Treatment Diagnosis Information:  · Diagnosis: M54.42 (ICD-10-CM) - Acute left-sided low back pain with left-sided sciatica  · Treatment Diagnosis: M54.42 (ICD-10-CM) - Acute left-sided low back pain with left-sided sciatica  Insurance/Certification information:  PT Insurance Information: [facility]  Physician Information:  Referring Practitioner: Coby COHEN  Plan of care signed (Y/N): Y    Date of Patient follow up with Physician: none scheduled, did not FU with Dr Brianna Painter for NP appt    G-Code (if applicable):      Date G-Code Applied:  18       POC Due: 18  OP NOTE Due: if seen before 18  10th VISIT NOTE :visit 20 (10th visit done 18)       Latex Allergy:  [x]NO      []YES  Preferred Language for Healthcare:   [x]English       []other:     Visit # Insurance Allowable Requires auth   [address] [place] [address] Nw    [x]no        []yes:      Pain level: 3-4/10     SUBJECTIVE:  \"Still tight on the right but better. I was sore for only the rest of the day last time from needling. \"    OBJECTIVE:    Observation:  Overall decreased tightness. R lumbar paraspinals L4-S1 tight, R quadratus, R GMed and TFL tight. (All muscles tight comparatively today due to significant uncontrolled movement) Multiple TPs present in R VL/ITB area. Test measurements:  None this visit     RESTRICTIONS/PRECAUTIONS: Parkinson's, osteoporosis      18 PT script for TDN entered in patient's chart under \"Orders\"  18 CONSENT FORM READ, SIGNED, AND SCANNED INTO PATIENT'S CHART.     Exercises/Interventions:      Muscle  Needle Size Technique Notes IES   Site 1 0.30 x spasms 5'              NMR re-education                                       Therapeutic Exercise and NMR EXR  [x] (79185) Provided verbal/tactile cueing for activities related to strengthening, flexibility, endurance, ROM for improvements in LE, proximal hip, and core control with self care, mobility, lifting, ambulation.  [] (92800) Provided verbal/tactile cueing for activities related to improving balance, coordination, kinesthetic sense, posture, motor skill, proprioception  to assist with LE, proximal hip, and core control in self care, mobility, lifting, ambulation and eccentric single leg control.      NMR and Therapeutic Activities:    [x] (13620 or 40504) Provided verbal/tactile cueing for activities related to improving balance, coordination, kinesthetic sense, posture, motor skill, proprioception and motor activation to allow for proper function of core, proximal hip and LE with self care and ADLs  [] (48594) Gait Re-education- Provided training and instruction to the patient for proper LE, core and proximal hip recruitment and positioning and eccentric body weight control with ambulation re-education including up and down stairs     Home Exercise Program:    [x] (02980) Reviewed/Progressed HEP activities related to strengthening, flexibility, endurance, ROM of core, proximal hip and LE for functional self-care, mobility, lifting and ambulation/stair navigation   [] (78520)Reviewed/Progressed HEP activities related to improving balance, coordination, kinesthetic sense, posture, motor skill, proprioception of core, proximal hip and LE for self care, mobility, lifting, and ambulation/stair navigation      Manual Treatments:  PROM / STM / Oscillations-Mobs:  G-I, II, III, IV (PA's, Inf., Post.)  [x] (32836) Provided manual therapy to mobilize LE, proximal hip and/or LS spine soft tissue/joints for the purpose of modulating pain, promoting relaxation,  increasing ROM, reducing/eliminating soft tissue slowed due to complexities listed. [] Progression has been slowed due to co-morbidities. [] Plan just implemented, too soon to assess goals progression  [] Other:     ASSESSMENT:   Very difficult to needle this visit as patient was moving constantly and having spasms. This also cause TDN to be more painful for the patient. Pt had to stop treatment before getting to the lower VL/ITB. We will start there next time as it is tight and has many TPs. Had to utilize quick needling technique with minimal pistons this visit. Treatment/Activity Tolerance:  [x] Patient tolerated treatment well [] Patient limited by fatique  [] Patient limited by pain  [] Patient limited by other medical complications  [] Other:     Prognosis: [x] Good [] Fair  [] Poor    Patient Requires Follow-up: [x] Yes  [] No    PLAN: See eval for full POC. Cont 1x week for TDN, 1x week with [employer]. If a \"calm\" day follow with exercises, if not TDN and STM only.      _ Continue per plan of care [] Alter current plan (see comments)  [] Plan of care initiated [] Hold pending MD visit [] Discharge    Electronically signed by: Berry Lawrence, Renu Bernal

## 2018-09-19 ENCOUNTER — OFFICE VISIT (OUTPATIENT)
Dept: ORTHOPEDIC SURGERY | Age: 68
End: 2018-09-19

## 2018-09-19 VITALS
DIASTOLIC BLOOD PRESSURE: 70 MMHG | SYSTOLIC BLOOD PRESSURE: 111 MMHG | HEART RATE: 88 BPM | BODY MASS INDEX: 20.83 KG/M2 | HEIGHT: 65 IN | WEIGHT: 125 LBS

## 2018-09-19 DIAGNOSIS — M47.816 SPONDYLOSIS WITHOUT MYELOPATHY OR RADICULOPATHY, LUMBAR REGION: Primary | ICD-10-CM

## 2018-09-19 PROCEDURE — G8399 PT W/DXA RESULTS DOCUMENT: HCPCS | Performed by: PHYSICAL MEDICINE & REHABILITATION

## 2018-09-19 PROCEDURE — 1090F PRES/ABSN URINE INCON ASSESS: CPT | Performed by: PHYSICAL MEDICINE & REHABILITATION

## 2018-09-19 PROCEDURE — 4040F PNEUMOC VAC/ADMIN/RCVD: CPT | Performed by: PHYSICAL MEDICINE & REHABILITATION

## 2018-09-19 PROCEDURE — 1036F TOBACCO NON-USER: CPT | Performed by: PHYSICAL MEDICINE & REHABILITATION

## 2018-09-19 PROCEDURE — G8427 DOCREV CUR MEDS BY ELIG CLIN: HCPCS | Performed by: PHYSICAL MEDICINE & REHABILITATION

## 2018-09-19 PROCEDURE — 1101F PT FALLS ASSESS-DOCD LE1/YR: CPT | Performed by: PHYSICAL MEDICINE & REHABILITATION

## 2018-09-19 PROCEDURE — 3017F COLORECTAL CA SCREEN DOC REV: CPT | Performed by: PHYSICAL MEDICINE & REHABILITATION

## 2018-09-19 PROCEDURE — 99214 OFFICE O/P EST MOD 30 MIN: CPT | Performed by: PHYSICAL MEDICINE & REHABILITATION

## 2018-09-19 PROCEDURE — G8420 CALC BMI NORM PARAMETERS: HCPCS | Performed by: PHYSICAL MEDICINE & REHABILITATION

## 2018-09-19 PROCEDURE — 1123F ACP DISCUSS/DSCN MKR DOCD: CPT | Performed by: PHYSICAL MEDICINE & REHABILITATION

## 2018-09-19 NOTE — LETTER
[facility]  Surgery Precert & Billing Form:    DEMOGRAPHICS:                                                                                                       Patient Name:  Yolanda Moore  Patient :  1950   Patient SS#:      Patient Phone:  [phone] (home) [phone] (work) Alt.  Patient Phone:    Patient Address:  [address] [place] [address] [address] [address]    PCP:  Cam Marquez MD  Insurance: [facility]    DIAGNOSIS & PROCEDURE:                                                                                      Diagnosis: M47.816  Operation: right L3, L4, L5 MBB    SURGERY  INFORMATION  Date of Surgery:   10/1/18  Location:    [facility]  Type:    Outpatient  23 hour hold:  No  Surgeon:          Sumi Hayes MD  18     BILLING INFORMATION:                                                                                                Physician Procedure                                            CPT Codes                      PA, or Fellow Procedure                                      CPT Codes                      Precert information:

## 2018-09-19 NOTE — PROGRESS NOTES
extension. Mild left foraminal narrowing. Similar to    09/22/2009 MRI. 4. L1-2 shallow posterior disc protrusion with left inferior foraminal extension; near to left    L2 nerve root. Displaced disc mildly larger than per 09/22/2009 MRI. 5. L5-S1 trace retrolisthesis; shallow protrusion near to right-greater-than-left S1 nerve    roots. Similar to 09/22/2009 MRI.       She has disc degeneration L4 5 and bulging disc L3 4 and L2-3    Impression:    4 months right mechanical back pain , Clinical course not improved  History of L4 5 laminectomy Dr. Kylee Boudreaux  Parkinson's, pituitary tumor    Plan:     Right L3 4 5 medial branch blocks #1 of 2, discussed radiofrequency neurotomy     She reports a one time history of reaction after lidocaine dental injection but has had several injections including her hand which I reviewed the medical record with lidocaine without incident

## 2018-09-19 NOTE — LETTER
PLEASE READ COMPLETE INSTRUCTIONS     Your outpatient injection is scheduled for 10/1/18 with Dr. Stephenie Ziegler at the [facility]. **PLEASE ARRIVE AT: 815AM     **EPIDURAL STEROID INJECTIONS MAY TAKE UP TO 2 WEEKS TO PROVIDE RELIEF AND CAN CAUSE INCREASED PAIN FOR THE FIRST FEW DAYS AFTER THE INJECTION**    1. Please do not have anything to eat or drink for 2 hours prior to your injection time. 2.  For cervical (neck) injections you are receiving planned sedation, please do not eat or drink for 3 hours prior to your injection time. 3. **Please continue taking any daily routine prescribed medications as directed by your physician. **    4. PLEASE HAVE A  AVAILABLE TO TAKE YOU HOME. Please have an adult stay with you for 4-6 hours following your procedure. 5. If you develop a fever or any type of infections prior to your scheduled injection, please contact our office. 6. If you are on antibiotics (i.e. For urinary tract infection, bronchitis, etc.) the antibiotic must be completed and must be symptom free prior to your scheduled procedure. 7.  If you are taking Aspirin, please stop and anti-inflammatory medication, i.e. Advil, Aleve, Ibuprofen, Celebrex, Meloxicam  or Naprosyn, please stop for 3 DAYS FOR LUMBAR PROCEDURES AND 5 DAYS FOR CERVICAL PROCEDURES. 8. If you are taking any blood thinners, you must obtain clearance for your prescribing physician prior to stopping and have a note faxed to our office to fax# [phone]: Coumadin: 6 days, Plavix: 7 days, Xarelto: 4 days, Pradaxa: 72 hours, Trental: 4 days, Eliquis: 3 days, Brillinta 5 days, Pletal 2 days, & Effient 7 days.      9. Please advise our office if you have Glaucoma, you will need to obtain clearance from your eye doctor prior to having an Epidural Steroid Injection AND HAVE A NOTE FAXED [phone]. **PLEASE BRING A LIST OF YOUR CURRENT MEDICATIONS TO YOUR PROCEDURE**    Insurance Information: Our office will contact your insurance company to complete any prior authorization notification that needs to be completed prior to your procedure. Please make sure we are notified of any insurance changes prior to your procedure. If you have any questions, please feel free to contact me at [phone] ext. [phone]    Thank you,  Dahiana Stearns LPN    **YOUR FOLLOW UP APPOINTMENT FOR AFTER YOU PROCEDURE WITH AS JOSÉ IS SCHEDULED AT [facility] OFFICE IN [place] [address] ON [place] [address] . [address]  YISSEL AtwoodAntoine Kim [address]  [phone] [facility]                     ________________________________________________________________________________________      [address] [place]      1. Admit to preop. 2. Start IV 1000 ml LR at [facility] or _____ml/hr for planned conscious sedation     3. May inject 1 % Lidocaine 0.1 ml Intradermal to numb IV site     4. Protime/INR if patient is on Coumadin     5. Urine Pregnancy Test (females only) - 12 -50 years     6. Accu Check Glucose if diabetic. Notify physician if <80 or >250.      7. Sedate all neurotomies        ________________________________________________________________________________________      POST-OPERATIVE ORDERS - DR. ULLOA      1. Admit to Post Op Phase 2     2. Implement Standards of Care for Phase 2 Post Op     3. Check Site - May discharge when site is free of bleeding     4. Discharge to home after meets Phase 2 criteria     5. Discharge cervical patients after 30 minutes and when meets Phase 2 criteria. 6. Give discharge instruction sheet     7. For Diabetic patient, if blood sugar less than 80 in preop,          Recheck blood sugar in Post Op.      8. Discontinue IV

## 2018-09-19 NOTE — LETTER
[facility]    Please Schedule the following with: Dr. Carlotta Knutson    Date:  09/19/18     Patient: Travon Kelley     YOB: 1950    Patient Home Phone: [phone] (home)    Diagnosis: Lumbar spondylosis without radiculopathy M47.816    []LT     [x]RT     []JENSEN     []Midline    Levels: Right L3 4 5 medial branch blocks #1 of 2    []Cervical STACY 04911, 30726  [x]L-MBB 46112, 99772  []SI Joint 21136   []C-FACET 70100, 84547, 82897  []L-FACET 58539, 32842  []Interlaminar STACY 12106     []HIP 27204    []C-MBB 44912, 19847, 34390  []Transforaminal STACY 47423  []Neurotomy 50619, 13606, 47837    Attending Physician: Nicole Brooks    Injection Schedule for: At: [facility]    First Insurance: [address] #:  Second Insurance:                 Pre-cert #:    Comments: Reports lidocaine allergy but has had various lidocaine injection since then without incident        [] Blood Thinner:                 []Diabetic           []Antibiotic:               []Glaucoma:    [] Current Open Wounds, Lacerations or Sores     Allergies:    Allergies   Allergen Reactions    Anesthetics, Tracy Other (See Comments)     Hypotension episode with ER visit    Celecoxib Other (See Comments)     Abdominal discomfort    Lidocaine Hcl      hypotension       Past Medical History:   Diagnosis Date    Corticobasal degeneration     Dyskinesia     Hyperreflexia     Neuropathy     corticobasal degeneration    Parkinson's disease (HCC)           [facility]           Current Outpatient Prescriptions:     nabumetone (RELAFEN) 500 MG tablet, Take 1 tablet by mouth 2 times daily, Disp: 30 tablet, Rfl: 0    alendronate (FOSAMAX) 70 MG tablet, Take 70 mg by mouth, Disp: , Rfl:     levETIRAcetam (KEPPRA) 500 MG tablet, Take 500 mg by mouth, Disp: , Rfl:     DULoxetine (CYMBALTA) 60 MG extended release capsule, TAKE ONE CAPSULE BY MOUTH DAILY, Disp: 90 capsule, Rfl: 0    Omega-3 Fatty Acids (FISH OIL PO), Take by mouth, Disp: , Rfl:     Multiple Vitamins-Minerals (THERAPEUTIC MULTIVITAMIN-MINERALS) tablet, Take 1 tablet by mouth daily, Disp: , Rfl:     carbidopa-levodopa (SINEMET)  MG per tablet, Take 3 tablets by mouth 4 times daily.  (Patient taking differently: Take 2 tablets by mouth 3 times daily.), Disp: 360 tablet, Rfl: 0

## 2018-09-20 ENCOUNTER — OFFICE VISIT (OUTPATIENT)
Dept: ORTHOPEDIC SURGERY | Age: 68
End: 2018-09-20

## 2018-09-20 VITALS — BODY MASS INDEX: 19.84 KG/M2 | HEIGHT: 67 IN | WEIGHT: 126.4 LBS

## 2018-09-20 DIAGNOSIS — M77.8 LEFT WRIST TENDINITIS: ICD-10-CM

## 2018-09-20 DIAGNOSIS — M19.032 PRIMARY OSTEOARTHRITIS OF LEFT WRIST: ICD-10-CM

## 2018-09-20 DIAGNOSIS — M25.532 LEFT WRIST PAIN: Primary | ICD-10-CM

## 2018-09-20 PROCEDURE — L3918 METACARP FX ORTHOSIS PRE OTS: HCPCS | Performed by: ORTHOPAEDIC SURGERY

## 2018-09-20 PROCEDURE — 1101F PT FALLS ASSESS-DOCD LE1/YR: CPT | Performed by: ORTHOPAEDIC SURGERY

## 2018-09-20 PROCEDURE — 99213 OFFICE O/P EST LOW 20 MIN: CPT | Performed by: ORTHOPAEDIC SURGERY

## 2018-09-20 PROCEDURE — 20550 NJX 1 TENDON SHEATH/LIGAMENT: CPT | Performed by: ORTHOPAEDIC SURGERY

## 2018-09-20 PROCEDURE — G8420 CALC BMI NORM PARAMETERS: HCPCS | Performed by: ORTHOPAEDIC SURGERY

## 2018-09-20 PROCEDURE — 1036F TOBACCO NON-USER: CPT | Performed by: ORTHOPAEDIC SURGERY

## 2018-09-20 PROCEDURE — 1090F PRES/ABSN URINE INCON ASSESS: CPT | Performed by: ORTHOPAEDIC SURGERY

## 2018-09-20 PROCEDURE — 3017F COLORECTAL CA SCREEN DOC REV: CPT | Performed by: ORTHOPAEDIC SURGERY

## 2018-09-20 PROCEDURE — 1123F ACP DISCUSS/DSCN MKR DOCD: CPT | Performed by: ORTHOPAEDIC SURGERY

## 2018-09-20 PROCEDURE — G8399 PT W/DXA RESULTS DOCUMENT: HCPCS | Performed by: ORTHOPAEDIC SURGERY

## 2018-09-20 PROCEDURE — 4040F PNEUMOC VAC/ADMIN/RCVD: CPT | Performed by: ORTHOPAEDIC SURGERY

## 2018-09-20 PROCEDURE — G8427 DOCREV CUR MEDS BY ELIG CLIN: HCPCS | Performed by: ORTHOPAEDIC SURGERY

## 2018-09-20 NOTE — PROGRESS NOTES
INJECTION ADMINISTRATION DETAILS:    Date & Time:  9/20/18 1:42 PM  Site & Comments: LEFT WRIST  Administered by Dr Gregory Neff    Betamethasone (30mg/mL)  #of CC:2  NDC #: 7462-7158-69  Lot #: [id number]  EXP: 2/20    1% Lidocaine ( 10mg/mL)  # of CC : 2  NDC #: 9271-2808-80  LOT# :[id number]  EXP :9/21

## 2018-09-28 ENCOUNTER — TELEPHONE (OUTPATIENT)
Dept: ORTHOPEDIC SURGERY | Age: 68
End: 2018-09-28

## 2018-12-06 ENCOUNTER — OFFICE VISIT (OUTPATIENT)
Dept: FAMILY MEDICINE CLINIC | Age: 68
End: 2018-12-06
Payer: MEDICARE

## 2018-12-06 VITALS
DIASTOLIC BLOOD PRESSURE: 72 MMHG | BODY MASS INDEX: 22.06 KG/M2 | OXYGEN SATURATION: 97 % | HEART RATE: 91 BPM | WEIGHT: 132.4 LBS | HEIGHT: 65 IN | TEMPERATURE: 97.3 F | SYSTOLIC BLOOD PRESSURE: 102 MMHG

## 2018-12-06 VITALS
HEIGHT: 65 IN | BODY MASS INDEX: 22.13 KG/M2 | OXYGEN SATURATION: 98 % | WEIGHT: 132.8 LBS | SYSTOLIC BLOOD PRESSURE: 102 MMHG | DIASTOLIC BLOOD PRESSURE: 72 MMHG | HEART RATE: 91 BPM | TEMPERATURE: 97.3 F

## 2018-12-06 DIAGNOSIS — N32.81 OAB (OVERACTIVE BLADDER): Primary | ICD-10-CM

## 2018-12-06 DIAGNOSIS — R35.0 URINARY FREQUENCY: ICD-10-CM

## 2018-12-06 DIAGNOSIS — Z00.00 ROUTINE GENERAL MEDICAL EXAMINATION AT A HEALTH CARE FACILITY: ICD-10-CM

## 2018-12-06 DIAGNOSIS — Z12.12 SCREENING FOR COLORECTAL CANCER: ICD-10-CM

## 2018-12-06 DIAGNOSIS — Z12.11 SCREENING FOR COLORECTAL CANCER: ICD-10-CM

## 2018-12-06 LAB
APPEARANCE FLUID: NORMAL
BILIRUBIN, POC: NORMAL
BLOOD URINE, POC: NORMAL
CLARITY, POC: CLEAR
COLOR, POC: YELLOW
GLUCOSE URINE, POC: NORMAL
KETONES, POC: NORMAL
LEUKOCYTE EST, POC: NORMAL
NITRITE, POC: NORMAL
PH, POC: 5.5
PROTEIN, POC: NORMAL
SPECIFIC GRAVITY, POC: 1.01
UROBILINOGEN, POC: 0.2

## 2018-12-06 PROCEDURE — G8482 FLU IMMUNIZE ORDER/ADMIN: HCPCS | Performed by: PHYSICIAN ASSISTANT

## 2018-12-06 PROCEDURE — 4040F PNEUMOC VAC/ADMIN/RCVD: CPT | Performed by: INTERNAL MEDICINE

## 2018-12-06 PROCEDURE — 1101F PT FALLS ASSESS-DOCD LE1/YR: CPT | Performed by: PHYSICIAN ASSISTANT

## 2018-12-06 PROCEDURE — G0438 PPPS, INITIAL VISIT: HCPCS | Performed by: INTERNAL MEDICINE

## 2018-12-06 PROCEDURE — G8399 PT W/DXA RESULTS DOCUMENT: HCPCS | Performed by: PHYSICIAN ASSISTANT

## 2018-12-06 PROCEDURE — 4040F PNEUMOC VAC/ADMIN/RCVD: CPT | Performed by: PHYSICIAN ASSISTANT

## 2018-12-06 PROCEDURE — G8420 CALC BMI NORM PARAMETERS: HCPCS | Performed by: PHYSICIAN ASSISTANT

## 2018-12-06 PROCEDURE — 1090F PRES/ABSN URINE INCON ASSESS: CPT | Performed by: PHYSICIAN ASSISTANT

## 2018-12-06 PROCEDURE — 1123F ACP DISCUSS/DSCN MKR DOCD: CPT | Performed by: PHYSICIAN ASSISTANT

## 2018-12-06 PROCEDURE — 1036F TOBACCO NON-USER: CPT | Performed by: PHYSICIAN ASSISTANT

## 2018-12-06 PROCEDURE — G8427 DOCREV CUR MEDS BY ELIG CLIN: HCPCS | Performed by: PHYSICIAN ASSISTANT

## 2018-12-06 PROCEDURE — 99213 OFFICE O/P EST LOW 20 MIN: CPT | Performed by: PHYSICIAN ASSISTANT

## 2018-12-06 PROCEDURE — 3017F COLORECTAL CA SCREEN DOC REV: CPT | Performed by: PHYSICIAN ASSISTANT

## 2018-12-06 PROCEDURE — 81002 URINALYSIS NONAUTO W/O SCOPE: CPT | Performed by: PHYSICIAN ASSISTANT

## 2018-12-06 PROCEDURE — G8482 FLU IMMUNIZE ORDER/ADMIN: HCPCS | Performed by: INTERNAL MEDICINE

## 2018-12-06 RX ORDER — TOLTERODINE 4 MG/1
4 CAPSULE, EXTENDED RELEASE ORAL DAILY
Qty: 30 CAPSULE | Refills: 5 | Status: SHIPPED | OUTPATIENT
Start: 2018-12-06 | End: 2019-12-18

## 2018-12-06 ASSESSMENT — LIFESTYLE VARIABLES: HOW OFTEN DO YOU HAVE A DRINK CONTAINING ALCOHOL: 0

## 2018-12-06 ASSESSMENT — ANXIETY QUESTIONNAIRES: GAD7 TOTAL SCORE: 2

## 2018-12-06 ASSESSMENT — PATIENT HEALTH QUESTIONNAIRE - PHQ9
SUM OF ALL RESPONSES TO PHQ QUESTIONS 1-9: 1
SUM OF ALL RESPONSES TO PHQ QUESTIONS 1-9: 1

## 2018-12-06 NOTE — PATIENT INSTRUCTIONS
example, this would mean 60 grams of fat or less per day. Saturated fat and trans fat in your diet raises your blood cholesterol the most, much more than dietary cholesterol does. For this reason, on a heart-healthy diet, less than 7% of your calories should come from saturated fat and ideally 0% from trans fat. On an 1800-calorie diet, this translates into less than 14 grams of saturated fat per day, leaving 46 grams of fat to come from mono- and polyunsaturated fats.    Food Choices on a Heart Healthy Diet   Food Category   Foods Recommended   Foods to Avoid   Grains   Breads and rolls without salted tops Most dry and cooked cereals Unsalted crackers and breadsticks Low-sodium or homemade breadcrumbs or stuffing All rice and pastas   Breads, rolls, and crackers with salted tops High-fat baked goods (eg, muffins, donuts, pastries) Quick breads, self-rising flour, and biscuit mixes Regular bread crumbs Instant hot cereals Commercially prepared rice, pasta, or stuffing mixes   Vegetables   Most fresh, frozen, and low-sodium canned vegetables Low-sodium and salt-free vegetable juices Canned vegetables if unsalted or rinsed   Regular canned vegetables and juices, including sauerkraut and pickled vegetables Frozen vegetables with sauces Commercially prepared potato and vegetable mixes   Fruits   Most fresh, frozen, and canned fruits All fruit juices   Fruits processed with salt or sodium   Milk   Nonfat or low-fat (1%) milk Nonfat or low-fat yogurt Cottage cheese, low-fat ricotta, cheeses labeled as low-fat and low-sodium   Whole milk Reduced-fat (2%) milk Malted and chocolate milk Full fat yogurt Most cheeses (unless low-fat and low salt) Buttermilk (no more than 1 cup per week)   Meats and Beans   Lean cuts of fresh or frozen beef, veal, lamb, or pork (look for the word loin) Fresh or frozen poultry without the skin Fresh or frozen fish and some shellfish Egg whites and egg substitutes (Limit whole eggs to three per and cholesterol amounts. For products low in sodium, look for sodium free, very low sodium, low sodium, no added salt, and unsalted   Skip the salt when cooking or at the table; if food needs more flavor, get creative and try out different herbs and spices. Garlic and onion also add substantial flavor to foods. Trim any visible fat off meat and poultry before cooking, and drain the fat off after castro. Use cooking methods that require little or no added fat, such as grilling, boiling, baking, poaching, broiling, roasting, steaming, stir-frying, and sauting. Avoid fast food and convenience food. They tend to be high in saturated and trans fat and have a lot of added salt. Talk to a registered dietitian for individualized diet advice. Last Reviewed: March 2011 Justine Maddox MS, MPH, RD   Updated: 3/29/2011   ·     Preventing Osteoporosis: After Your Visit  Your Care Instructions  Osteoporosis means the bones are weak and thin enough that they can break easily. The older you are, the more likely you are to get osteoporosis. But with plenty of calcium, vitamin D, and exercise, you can help prevent osteoporosis. The preteen and teen years are a key time for bone building. With the help of calcium, vitamin D, and exercise in those early years and beyond, the bones reach their peak density and strength by age 27. After age 27, your bones naturally start to thin and weaken. The stronger your bones are at around age 27, the lower your risk for osteoporosis. But no matter what your age and risk are, your bones still need calcium, vitamin D, and exercise to stay strong. Also avoid smoking, and limit alcohol. Smoking and heavy alcohol use can make your bones thinner. Talk to your doctor about any special risks you might have, such as having a close relative with osteoporosis or taking a medicine that can weaken bones. Your doctor can tell you the best ways to protect your bones from thinning.   Follow-up fireplace and other fuel-burning appliances yearly. Helping Hands   Baby boomers entering the kincaid years will continue to see the development of new products to help older adults live safely and independently in spite of age-related changes. Making Life More Livable  , by Magdiel Amin, lists over 1,000 products for \"living well in the mature years,\" such as bathing and mobility aids, household security devices, ergonomically designed knives and peelers, and faucet valves and knobs for temperature control. Medical supply stores and organizations are good sources of information about products that improve your quality of life and insure your safety.      Last Reviewed: November 2009 Edgar Dutton MD   Updated: 3/7/2011     ·

## 2018-12-06 NOTE — PROGRESS NOTES
[facility] COMPLAINT  Chief Complaint   Patient presents with    Urinary Tract Infection       HISTORY OF PRESENT  ILLNESS  Napoleon Trotter is a 70 y.o.  female   C/o 3 months of increased freq and urgency to urinate. No incontinence yet. Able to completely evacuate. Urinates 8-10x per waking day and about 2-3x per night. Has parkinson's disease, diagnosed 15 years ago. Unchanged pituitary adenoma unchanged in over 10 years. Known to have trace hematuia for years, has been evaluated. Denies GYN issues and is scheduled for appt soon. No GI issues. No fever or abd pains. Has used Detrol LA and it worked but taken off med due to dizziness last year. The dizziness remains. Pregnant with her anything that was surgically related defer to her surgeon so I called the person back on Dr. Ely Re care of her pelvis and so called Dr. Demario Ceballos as HOURS as extended while living  ROS    Remaining are reviewed and otherwise negative for other constitutional, neurologic, EENT, cardiac, pulmonary, GI, , musculoskeletal or extremity complaints. PAST MEDICAL/SURGICAL, SOCIAL, &  FAMILY HISTORY:  Reviewed and updated accordingly. ALLERGIES :   Anesthetics, suzanne; Celecoxib; and Lidocaine hcl    MEDICATIONS:  Current Outpatient Prescriptions   Medication Sig Dispense Refill    tolterodine (DETROL LA) 4 MG extended release capsule Take 1 capsule by mouth daily 30 capsule 5    alendronate (FOSAMAX) 70 MG tablet Take 70 mg by mouth      DULoxetine (CYMBALTA) 60 MG extended release capsule TAKE ONE CAPSULE BY MOUTH DAILY 90 capsule 0    Omega-3 Fatty Acids (FISH OIL PO) Take by mouth      Multiple Vitamins-Minerals (THERAPEUTIC MULTIVITAMIN-MINERALS) tablet Take 1 tablet by mouth daily      carbidopa-levodopa (SINEMET)  MG per tablet Take 3 tablets by mouth 4 times daily.  (Patient taking differently: Take 2 tablets by mouth 3 times daily.) 360 tablet 0     No

## 2019-03-18 ENCOUNTER — HOSPITAL ENCOUNTER (OUTPATIENT)
Dept: MAMMOGRAPHY | Age: 69
Discharge: HOME | End: 2019-03-18
Payer: MEDICARE

## 2019-03-18 DIAGNOSIS — Z12.39 BREAST CANCER SCREENING: ICD-10-CM

## 2019-03-18 PROCEDURE — 77067 SCR MAMMO BI INCL CAD: CPT

## 2019-04-04 ENCOUNTER — OFFICE VISIT (OUTPATIENT)
Dept: ORTHOPEDIC SURGERY | Age: 69
End: 2019-04-04
Payer: MEDICARE

## 2019-04-04 VITALS — WEIGHT: 132.5 LBS | BODY MASS INDEX: 22.08 KG/M2 | HEIGHT: 65 IN

## 2019-04-04 DIAGNOSIS — M25.562 PAIN IN JOINT OF LEFT KNEE: ICD-10-CM

## 2019-04-04 DIAGNOSIS — M17.12 PRIMARY OSTEOARTHRITIS OF LEFT KNEE: Primary | ICD-10-CM

## 2019-04-04 PROCEDURE — G8427 DOCREV CUR MEDS BY ELIG CLIN: HCPCS | Performed by: ORTHOPAEDIC SURGERY

## 2019-04-04 PROCEDURE — 3017F COLORECTAL CA SCREEN DOC REV: CPT | Performed by: ORTHOPAEDIC SURGERY

## 2019-04-04 PROCEDURE — 1090F PRES/ABSN URINE INCON ASSESS: CPT | Performed by: ORTHOPAEDIC SURGERY

## 2019-04-04 PROCEDURE — L1812 KO ELASTIC W/JOINTS PRE OTS: HCPCS | Performed by: ORTHOPAEDIC SURGERY

## 2019-04-04 PROCEDURE — 4040F PNEUMOC VAC/ADMIN/RCVD: CPT | Performed by: ORTHOPAEDIC SURGERY

## 2019-04-04 PROCEDURE — 1123F ACP DISCUSS/DSCN MKR DOCD: CPT | Performed by: ORTHOPAEDIC SURGERY

## 2019-04-04 PROCEDURE — 99213 OFFICE O/P EST LOW 20 MIN: CPT | Performed by: ORTHOPAEDIC SURGERY

## 2019-04-04 PROCEDURE — 1036F TOBACCO NON-USER: CPT | Performed by: ORTHOPAEDIC SURGERY

## 2019-04-04 PROCEDURE — G8420 CALC BMI NORM PARAMETERS: HCPCS | Performed by: ORTHOPAEDIC SURGERY

## 2019-04-04 PROCEDURE — G8399 PT W/DXA RESULTS DOCUMENT: HCPCS | Performed by: ORTHOPAEDIC SURGERY

## 2019-04-04 NOTE — PROGRESS NOTES
SURGERY  2005    LAPAROSCOPY      TONSILLECTOMY AND ADENOIDECTOMY  14 yo     Current Medications:     Current Outpatient Medications:     DULoxetine (CYMBALTA) 60 MG extended release capsule, TAKE ONE CAPSULE BY MOUTH DAILY, Disp: 90 capsule, Rfl: 1    alendronate (FOSAMAX) 70 MG tablet, Take 70 mg by mouth, Disp: , Rfl:     DULoxetine (CYMBALTA) 60 MG extended release capsule, TAKE ONE CAPSULE BY MOUTH DAILY, Disp: 90 capsule, Rfl: 0    Omega-3 Fatty Acids (FISH OIL PO), Take by mouth, Disp: , Rfl:     Multiple Vitamins-Minerals (THERAPEUTIC MULTIVITAMIN-MINERALS) tablet, Take 1 tablet by mouth daily, Disp: , Rfl:     carbidopa-levodopa (SINEMET)  MG per tablet, Take 3 tablets by mouth 4 times daily. (Patient taking differently: Take 2 tablets by mouth 3 times daily.), Disp: 360 tablet, Rfl: 0    tolterodine (DETROL LA) 4 MG extended release capsule, Take 1 capsule by mouth daily, Disp: 30 capsule, Rfl: 5  Allergies:  Anesthetics, suzanne; Celecoxib; and Lidocaine hcl  Social History:    reports that she has never smoked. She has never used smokeless tobacco. She reports that she does not drink alcohol. Family History:   Family History   Problem Relation Age of Onset    Heart Disease Mother     Stroke Father     Cancer Father         prostate    Diabetes Sister     Heart Disease Sister         quadruple bypass       REVIEW OF SYSTEMS:   For new problems, a full review of systems will be found scanned in the patient's chart. CONSTITUTIONAL: Denies unexplained weight loss, fevers, chills   NEUROLOGICAL: Denies unsteady gait or progressive weakness  SKIN: Denies skin changes, delayed healing, rash, itching       PHYSICAL EXAM:    Vitals: Height 5' 5\" (1.651 m), weight 132 lb 7.9 oz (60.1 kg), not currently breastfeeding. GENERAL EXAM:  · General Apparence: Patient is adequately groomed with no evidence of malnutrition. · Orientation: The patient is oriented to time, place and person.    · Mood & Affect:The patient's mood and affect are appropriate       Left knee PHYSICAL EXAMINATION:  · Inspection:  No visible deformity. No significant edema, erythema or ecchymosis. · Palpation:  Tenderness to palpation along the medial aspect of the knee and medial tibial plateau      · Range of Motion: No significant limitations with range of motion however, terminal extension and extreme flexion are painful    · Strength: No gross strength deficits are noted    · Special Tests:  Varus and valgus stress testing are normal.  Negative Lockman testing. Negative Homans testing. · Skin:  There are no rashes, ulcerations or lesions. · There are no dysvascular changes     Gait & station: She is no wheelchair today      Additional Examinations:        Right Lower Extremity: Examination of the right lower extremity does not show any tenderness, deformity or injury. Range of motion is unremarkable. There is no gross instability. There are no rashes, ulcerations or lesions. Strength and tone are normal.      Diagnostic Testing: The following x rays were read and interpreted by myself      1.  3 x-rays of the left knee were taken today and reveal significant hypertrophic department osteoarthritis with advanced pseudogout formation to the medial and lateral compartments    Orders     Orders Placed This Encounter   Procedures    XR KNEE LEFT (3 VIEWS)     Standing Status:   Future     Number of Occurrences:   1     Standing Expiration Date:   5/4/2019    US ARTHR/ASP/INJ MAJOR JNT/BURSA LEFT     Order Specific Question:   Reason for exam:     Answer:   pain    OR METHYLPREDNISOLONE 40 MG INJ    Economy Hinged Knee Wrap Around     Patient was prescribed a Breg Economy Hinged Wrap Around Knee Brace. The left knee will require stabilization / immobilization from this semi-rigid / rigid orthosis to improve their function.   The orthosis will assist in protecting the affected area, provide functional support and facilitate healing. The patient was educated and fit by a healthcare professional with expert knowledge and specialization in brace application while under the direct supervision of the treating physician. Verbal and written instructions for the use of and application of this item were provided. They were instructed to contact the office immediately should the brace result in increased pain, decreased sensation, increased swelling or worsening of the condition. Assessment / Treatment Plan:     1. Left knee osteoarthritis with pseudogout    I discussed with the patient the nature of osteoarthritis of the knee. We talked about treatment of arthritis and the various options that are involved with this. The patient understands that the treatments can vary from essentially doing nothing to a total joint replacement arthroplasty for arthritis. I then went on to describe the utilization of glucosamine and chondroitin sulfate as a joint nutrition product. We talked about the fact that this is essentially a joint vitamin with typically minimal side effects. We also talked about utilization of prescription or over-the-counter anti-inflammatory medications as the next option. We also discussed the possibility of brace wear or orthotic wear if the patient has significant varus alignment. We then went on to discuss the possibility of Visco supplementation with hyaluronate products. We talked about the typical course of this type of treatment and the fact that often times in the treatment for significant arthritis, this is successful less than half the time. We also talked about the corticosteroid injections and the fact that this can give a brief window of relief, but does not cure the problem; in fact, the pain often has a rebound effect in 6-10 weeks after the steroid has worn off.  We also discussed arthroscopy surgery in attempts to debride the joint, but the fact that this is relatively unreliable treatment in patient and greater than 50% of that time was spent counseling/coordinating care for the above-stated diagnosis and treatment. I have personally performed and/or participated in the history, exam and medical decision making and agree with all pertinent clinical information. I have also reviewed and agree with the past medical, family and social history unless otherwise noted. This dictation was performed with a verbal recognition program (DRAGON) and it was checked for errors. It is possible that there are still dictated errors within this office note. If so, please bring any errors to my attention for an addendum. All efforts were made to ensure that this office note is accurate.           Malaika Harris MD

## 2019-05-16 ENCOUNTER — OFFICE VISIT (OUTPATIENT)
Dept: ORTHOPEDIC SURGERY | Age: 69
End: 2019-05-16
Payer: MEDICARE

## 2019-05-16 ENCOUNTER — HOSPITAL ENCOUNTER (OUTPATIENT)
Dept: OCCUPATIONAL THERAPY | Age: 69
Setting detail: THERAPIES SERIES
Discharge: HOME | End: 2019-05-16
Payer: MEDICARE

## 2019-05-16 VITALS — HEIGHT: 65 IN | WEIGHT: 132.5 LBS | BODY MASS INDEX: 22.08 KG/M2 | RESPIRATION RATE: 16 BRPM

## 2019-05-16 DIAGNOSIS — M79.641 RIGHT HAND PAIN: Primary | ICD-10-CM

## 2019-05-16 DIAGNOSIS — S62.646A CLOSED NONDISPLACED FRACTURE OF PROXIMAL PHALANX OF RIGHT LITTLE FINGER, INITIAL ENCOUNTER: ICD-10-CM

## 2019-05-16 PROCEDURE — 4040F PNEUMOC VAC/ADMIN/RCVD: CPT | Performed by: ORTHOPAEDIC SURGERY

## 2019-05-16 PROCEDURE — G8427 DOCREV CUR MEDS BY ELIG CLIN: HCPCS | Performed by: ORTHOPAEDIC SURGERY

## 2019-05-16 PROCEDURE — 1123F ACP DISCUSS/DSCN MKR DOCD: CPT | Performed by: ORTHOPAEDIC SURGERY

## 2019-05-16 PROCEDURE — 99214 OFFICE O/P EST MOD 30 MIN: CPT | Performed by: ORTHOPAEDIC SURGERY

## 2019-05-16 PROCEDURE — G8420 CALC BMI NORM PARAMETERS: HCPCS | Performed by: ORTHOPAEDIC SURGERY

## 2019-05-16 PROCEDURE — 3017F COLORECTAL CA SCREEN DOC REV: CPT | Performed by: ORTHOPAEDIC SURGERY

## 2019-05-16 PROCEDURE — 1036F TOBACCO NON-USER: CPT | Performed by: ORTHOPAEDIC SURGERY

## 2019-05-16 PROCEDURE — 1090F PRES/ABSN URINE INCON ASSESS: CPT | Performed by: ORTHOPAEDIC SURGERY

## 2019-05-16 PROCEDURE — L3913 HFO W/O JOINTS CF: HCPCS | Performed by: OCCUPATIONAL THERAPIST

## 2019-05-16 PROCEDURE — G8399 PT W/DXA RESULTS DOCUMENT: HCPCS | Performed by: ORTHOPAEDIC SURGERY

## 2019-05-16 RX ORDER — DIAZEPAM 5 MG/1
TABLET ORAL
COMMUNITY
Start: 2019-03-25 | End: 2020-03-11

## 2019-05-16 RX ORDER — CARBIDOPA 25 MG/1
1 TABLET ORAL
COMMUNITY
End: 2019-06-25

## 2019-05-16 NOTE — PROGRESS NOTES
pain Yes    Closed nondisplaced fracture of proximal phalanx of right little finger, initial encounter          Treatment Plan:  I talked with the patient about this injury and I would like to try to treat this with conservative measures if at all reasonable. Today we will have her see the hand therapy team for a hand-based ulnar gutter thermoplastic splint involving the ring and small fingers out to the tips. This will be in the safe position. She can come out of the splint for hand washing and bathing and completely pain-free gentle range of motion. I will see her back in about 7-10 days for repeat evaluation and x-rays. If at any juncture during the healing process she fails to have satisfactory clinical or radiographic alignment, surgical fixation can always be discussed. Please note that this transcription was created using voice recognition software. Any errors are unintentional and may be due to voice recognition transcription.

## 2019-05-16 NOTE — PLAN OF CARE
[place] [address] [facility], [address] [place]  Phone: [phone]  Fax [phone]    Patient: Jakub Chilel   : 1950  MRN: [id number]  Referring Physician: Referring Practitioner: Tash Hurd MD    Evaluation Date: 2019      Medical Diagnosis Information:  Diagnosis: M79.641 (ICD-10-CM) - Right hand pain; needs R hand based ulnar gutter ring and small included, fell on 19 in her home, tripped over a rug         Occupational Therapy Splint Certification Form  Dear Referring Practitioner: Tash Hurd MD ,  The following patient has been evaluated for occupational therapy services for fabrication of a custom orthosis. Insurance requires the referring physician to review the treatment plan. Please review the attached evaluation and/or summary of the patient's plan of care, and verify that you agree by signing the attached document and sending it back to our office. Plan of Care/Treatment to date:  [x] Fabrication of custom orthosis-  ( custom hand finger orthosis, ring and small fingers included out to tips)  [] Instruction on splint use, care and wearing schedule      [] Follow up as needed for splint modifications          Frequency/Duration:  [] One time visit for splint fabrication and instructions. Follow up as needed for splint modifcations      [] Splint fabricated, patient to return for full evaluation.     Rehab Potential: [] good [] fair  [] poor     SPLINT EVALUATION    Date: 2019  Name: Jakub Chilel            : 1950      Medical/Treatment Diagnosis Information:  · Diagnosis: M79.641 (ICD-10-CM) - Right hand pain; needs R hand based ulnar gutter ring and small included, 15 yr history of parkinsons  Insurance/Certification information:  OT Insurance Information: [employer]  Physician Information:  Referring Practitioner: Tash Hudr MD       Next MD Appointment: 7 -10 days    Subjective  History of Injury/ Mechanism of Injury: fell on 5/11/19 in her home, tripped over a rug, has parkinson's and that impacts her balance. Onset/Surgery Date: 5/11/19-non operative  Dominant Hand:    [x] Right []Left  Occupational/Vocational Status: retired  Progress of any previous OT/PT: the patient []has/ [x]has not received OT/PT previously for this diagnosis. Pain: 1/10    Objective Findings as appropriate:  ROM, strength, edema, wound/ scar appearance, function:  Moderate edema noted, stiffness into finger flex per patient report, did not have her flex fingers    Type of splint:   custom hand finger orthosis, ring and small fingers included out to tips)  Splint protocol utilization:   Full time, off for skin care  Splint Purpose: [x]Immobilize or protect [x]Promote healing of P1 fx   []Relieve pain  []Provide support for improved hand function []Maximize joint motion    Treatment:   [x]Splint provided ([x]Customized/ []Prefabricated), and splint rationale explained. [x]Patient instructed in [x]wear/ [x]care of splint and educated regarding diagnosis. []Patient instructed in symptom reduction techniques   []HEP instruction    []Discussed ADL assistive device    Written Information Distributed: []HEP  [x]Splint care and wearing protocol    Patient response to evaluation and instructions:  [x]Attentive/interested   []Asked questions/ retained info  []Appeared disinterested  []Poor retention of information  []Appeared anxious/ fearful    Assessment and Plan:  Goals: [x]Patient will be able to verbalize rationale for, and demonstrate proper wearing     of splint. [x]Splint will provide proper fit and function. []Patient will be able to verbalize 2-3 ways to prevent further symptoms. []Patient will be able to don and doff independently. []Patient will be independent with HEP    Goals met:  [x]yes []no    Plan:  [x]Splint completed with good fit and function.   Hand Therapy to follow up for     splint modifications as needed    []Splint completed; OT/PT evaluation initiated. Patient to return for further     treatment.     Luana Suero, OTR/L, [facility]

## 2019-05-23 ENCOUNTER — OFFICE VISIT (OUTPATIENT)
Dept: ORTHOPEDIC SURGERY | Age: 69
End: 2019-05-23
Payer: MEDICARE

## 2019-05-23 VITALS — HEIGHT: 65 IN | BODY MASS INDEX: 22.08 KG/M2 | RESPIRATION RATE: 16 BRPM | WEIGHT: 132.5 LBS

## 2019-05-23 DIAGNOSIS — S62.646D CLOSED NONDISPLACED FRACTURE OF PROXIMAL PHALANX OF RIGHT LITTLE FINGER WITH ROUTINE HEALING, SUBSEQUENT ENCOUNTER: ICD-10-CM

## 2019-05-23 DIAGNOSIS — M79.641 RIGHT HAND PAIN: Primary | ICD-10-CM

## 2019-05-23 PROCEDURE — 1090F PRES/ABSN URINE INCON ASSESS: CPT | Performed by: ORTHOPAEDIC SURGERY

## 2019-05-23 PROCEDURE — 1036F TOBACCO NON-USER: CPT | Performed by: ORTHOPAEDIC SURGERY

## 2019-05-23 PROCEDURE — G8427 DOCREV CUR MEDS BY ELIG CLIN: HCPCS | Performed by: ORTHOPAEDIC SURGERY

## 2019-05-23 PROCEDURE — 3017F COLORECTAL CA SCREEN DOC REV: CPT | Performed by: ORTHOPAEDIC SURGERY

## 2019-05-23 PROCEDURE — 99213 OFFICE O/P EST LOW 20 MIN: CPT | Performed by: ORTHOPAEDIC SURGERY

## 2019-05-23 PROCEDURE — G8420 CALC BMI NORM PARAMETERS: HCPCS | Performed by: ORTHOPAEDIC SURGERY

## 2019-05-23 PROCEDURE — 4040F PNEUMOC VAC/ADMIN/RCVD: CPT | Performed by: ORTHOPAEDIC SURGERY

## 2019-05-23 PROCEDURE — G8399 PT W/DXA RESULTS DOCUMENT: HCPCS | Performed by: ORTHOPAEDIC SURGERY

## 2019-05-23 PROCEDURE — 1123F ACP DISCUSS/DSCN MKR DOCD: CPT | Performed by: ORTHOPAEDIC SURGERY

## 2019-05-23 NOTE — PROGRESS NOTES
Chief Complaint:  Hand Pain (CK RT HAND: RT SMALL FINGER FX DOI: 5/11/19, NEW XR TODAY)      History of Present of Illness: The patient is now about 2 weeks out from her right small finger proximal phalanx fracture which we have been immobilizing in a thermoplastic splint. She finds the splint helpful as it protects her from repeat injury. Review of Systems  Pertinent items are noted in HPI  Denies fever, chills, confusion, bowel/bladder active change. Review of systems reviewed from Patient History Form dated on 4/4/2019 and available in the patient's chart under the Media tab. Examination:  On exam today again she is in a wheelchair and she has a windswept ulnar deviation of all of her fingers to the right hand. However, at rest there is no clinical malalignment of the digits. She has good perfusion and good baseline sensation and with gentle flexion of the fingers her fingers are able to demonstrate an overall very satisfactory alignment without critical crossover. Radiology:     X-rays obtained and reviewed in office:  Views 3  Location right hand  Impression x-rays demonstrate ulnar deviation at the MP level of all fingers and on the lateral view there is a very slightly displaced fracture of the proximal phalanx at the MP joint remains congruent and there is a slight apex volar angulation but without significant change in position    Orders Placed This Encounter   Procedures    XR HAND RIGHT (MIN 3 VIEWS)     Standing Status:   Future     Number of Occurrences:   1     Standing Expiration Date:   5/23/2020       Impression:  Encounter Diagnoses   Name Primary?  Right hand pain Yes    Closed nondisplaced fracture of proximal phalanx of right little finger with routine healing, subsequent encounter          Treatment Plan:  I believe we can continue to treat her fracture with nonsurgical management. She will continue with the current thermoplastic splint.   She may come out of the splint for very careful hand washing and bathing and simple motions within her comfort level. I will see her back in 2 weeks. At that juncture we will have new x-rays of the hand including a good lateral view of the small finger. If there is further healing and clinical improvement we will consider transitioning at times to simple yaritza taping. Please note that this transcription was created using voice recognition software. Any errors are unintentional and may be due to voice recognition transcription.

## 2019-06-06 ENCOUNTER — OFFICE VISIT (OUTPATIENT)
Dept: ORTHOPEDIC SURGERY | Age: 69
End: 2019-06-06
Payer: MEDICARE

## 2019-06-06 VITALS — RESPIRATION RATE: 17 BRPM | BODY MASS INDEX: 22.08 KG/M2 | WEIGHT: 132.5 LBS | HEIGHT: 65 IN

## 2019-06-06 DIAGNOSIS — S62.646D CLOSED NONDISPLACED FRACTURE OF PROXIMAL PHALANX OF RIGHT LITTLE FINGER WITH ROUTINE HEALING, SUBSEQUENT ENCOUNTER: ICD-10-CM

## 2019-06-06 DIAGNOSIS — M79.644 PAIN OF FINGER OF RIGHT HAND: Primary | ICD-10-CM

## 2019-06-06 PROCEDURE — G8399 PT W/DXA RESULTS DOCUMENT: HCPCS | Performed by: ORTHOPAEDIC SURGERY

## 2019-06-06 PROCEDURE — 4040F PNEUMOC VAC/ADMIN/RCVD: CPT | Performed by: ORTHOPAEDIC SURGERY

## 2019-06-06 PROCEDURE — G8427 DOCREV CUR MEDS BY ELIG CLIN: HCPCS | Performed by: ORTHOPAEDIC SURGERY

## 2019-06-06 PROCEDURE — 1036F TOBACCO NON-USER: CPT | Performed by: ORTHOPAEDIC SURGERY

## 2019-06-06 PROCEDURE — 1123F ACP DISCUSS/DSCN MKR DOCD: CPT | Performed by: ORTHOPAEDIC SURGERY

## 2019-06-06 PROCEDURE — G8420 CALC BMI NORM PARAMETERS: HCPCS | Performed by: ORTHOPAEDIC SURGERY

## 2019-06-06 PROCEDURE — 1090F PRES/ABSN URINE INCON ASSESS: CPT | Performed by: ORTHOPAEDIC SURGERY

## 2019-06-06 PROCEDURE — 99213 OFFICE O/P EST LOW 20 MIN: CPT | Performed by: ORTHOPAEDIC SURGERY

## 2019-06-06 PROCEDURE — 3017F COLORECTAL CA SCREEN DOC REV: CPT | Performed by: ORTHOPAEDIC SURGERY

## 2019-06-06 NOTE — PROGRESS NOTES
Chief Complaint:  Hand Pain (CK RT HAND: RT SMALL FINGER FX DOI: 5/11/19, NEW XR TODAY)      History of Present of Illness: The patient returns for close follow-up of her right small finger proximal phalanx fracture. She reports that she has been using the thermoplastic splint for protection. She does not report any other recent new injury. Review of Systems  Pertinent items are noted in HPI  Denies fever, chills, confusion, bowel/bladder active change. Review of systems reviewed from Patient History Form dated on 4/4/2019 and available in the patient's chart under the Media tab. Examination:  On exam today there is some very mild tenderness over the proximal phalanx but much less swelling. As she initiates gentle flexion, she is able to demonstrate an overall congruent alignment of the fingers. She does have a somewhat ulnar deviated or windswept posture to the digits, but she initiates a fist there is no critical malalignment compared to the other digits. There may be very slight overlap of the small and ring fingers but similar to the opposite left hand. Fingers are nicely perfused and with baseline sensation. Radiology:     X-rays obtained and reviewed in office:  Views 3  Location right hand  Impression x-rays demonstrate maintained overall alignment with slight impaction and apex volar angulation but congruent MP joint. Orders Placed This Encounter   Procedures    XR HAND RIGHT (MIN 3 VIEWS)     Standing Status:   Future     Number of Occurrences:   1     Standing Expiration Date:   7/6/2019       Impression:  Encounter Diagnoses   Name Primary?  Pain of finger of right hand Yes    Closed nondisplaced fracture of proximal phalanx of right little finger with routine healing, subsequent encounter          Treatment Plan:  I have recommended that we continue with nonsurgical care is I believe that with this injury she will go on to have excellent functional and clinical outcome.   She will now start coming out of the splint and working on gentle range of motion activities. She will use the splint when she is getting around in her wheelchair her at risk for significant injury. I will plan to see her back in 4 weeks with x-rays. If at that juncture she is having considerable problems with stiffness formalized hand therapy could be considered. Please note that this transcription was created using voice recognition software. Any errors are unintentional and may be due to voice recognition transcription.

## 2019-06-25 ENCOUNTER — OFFICE VISIT (OUTPATIENT)
Dept: FAMILY MEDICINE CLINIC | Age: 69
End: 2019-06-25
Payer: MEDICARE

## 2019-06-25 VITALS
DIASTOLIC BLOOD PRESSURE: 58 MMHG | HEIGHT: 65 IN | OXYGEN SATURATION: 96 % | HEART RATE: 93 BPM | WEIGHT: 130 LBS | BODY MASS INDEX: 21.66 KG/M2 | SYSTOLIC BLOOD PRESSURE: 108 MMHG

## 2019-06-25 DIAGNOSIS — R35.0 URINARY FREQUENCY: ICD-10-CM

## 2019-06-25 DIAGNOSIS — G20 PARKINSON'S DISEASE (HCC): ICD-10-CM

## 2019-06-25 DIAGNOSIS — D35.2 PITUITARY ADENOMA (HCC): ICD-10-CM

## 2019-06-25 DIAGNOSIS — N32.81 OAB (OVERACTIVE BLADDER): Primary | ICD-10-CM

## 2019-06-25 DIAGNOSIS — F32.A DEPRESSION, UNSPECIFIED DEPRESSION TYPE: ICD-10-CM

## 2019-06-25 LAB
BILIRUBIN, POC: NEGATIVE
BLOOD URINE, POC: NEGATIVE
CLARITY, POC: NORMAL
COLOR, POC: NORMAL
GLUCOSE URINE, POC: NEGATIVE
KETONES, POC: NORMAL
LEUKOCYTE EST, POC: NEGATIVE
NITRITE, POC: NEGATIVE
PH, POC: 6
PROTEIN, POC: NORMAL
SPECIFIC GRAVITY, POC: 1.02
UROBILINOGEN, POC: 0.2

## 2019-06-25 PROCEDURE — 1090F PRES/ABSN URINE INCON ASSESS: CPT | Performed by: FAMILY MEDICINE

## 2019-06-25 PROCEDURE — 81002 URINALYSIS NONAUTO W/O SCOPE: CPT | Performed by: FAMILY MEDICINE

## 2019-06-25 PROCEDURE — 1123F ACP DISCUSS/DSCN MKR DOCD: CPT | Performed by: FAMILY MEDICINE

## 2019-06-25 PROCEDURE — 4040F PNEUMOC VAC/ADMIN/RCVD: CPT | Performed by: FAMILY MEDICINE

## 2019-06-25 PROCEDURE — G8420 CALC BMI NORM PARAMETERS: HCPCS | Performed by: FAMILY MEDICINE

## 2019-06-25 PROCEDURE — G8399 PT W/DXA RESULTS DOCUMENT: HCPCS | Performed by: FAMILY MEDICINE

## 2019-06-25 PROCEDURE — 3017F COLORECTAL CA SCREEN DOC REV: CPT | Performed by: FAMILY MEDICINE

## 2019-06-25 PROCEDURE — G8427 DOCREV CUR MEDS BY ELIG CLIN: HCPCS | Performed by: FAMILY MEDICINE

## 2019-06-25 PROCEDURE — 99214 OFFICE O/P EST MOD 30 MIN: CPT | Performed by: FAMILY MEDICINE

## 2019-06-25 PROCEDURE — 1036F TOBACCO NON-USER: CPT | Performed by: FAMILY MEDICINE

## 2019-06-25 ASSESSMENT — ENCOUNTER SYMPTOMS: ABDOMINAL PAIN: 0

## 2019-06-25 NOTE — PROGRESS NOTES
medications for this visit. Patients past medical history, surgical history, family history, medications and allergies were all reviewed and updated as appropriate today. Review of Systems   Constitutional: Negative for fever. Gastrointestinal: Negative for abdominal pain. Genitourinary: Positive for frequency and urgency. Negative for difficulty urinating, dysuria, flank pain and hematuria. Neurological: Positive for tremors. Psychiatric/Behavioral: Negative for dysphoric mood. Physical Exam   Cardiovascular: Normal rate and regular rhythm. Pulmonary/Chest: Effort normal and breath sounds normal.   Neurological: She is alert. She exhibits abnormal muscle tone. Coordination abnormal.   Vitals reviewed. Vitals:    06/25/19 1105   BP: (!) 108/58   Pulse: 93   SpO2: 96%   Weight: 130 lb (59 kg)   Height: 5' 5\" (1.651 m)       Assessment/Plan:   Natacha was seen today for urinary frequency. Diagnoses and all orders for this visit:    OAB (overactive bladder), patient will continue with Detrol LA 4 mg. This is the maximum dose. I called and spoke with her nurse practitioner at [facility] neurology. I asked them for a reference for urology. Chet Alvarado was recommended. He specializes in neurogenic bladder and Parkinson's disease.   Patient was given the phone number to call she will call today and schedule an appointment    Urinary frequency urinalysis was unremarkable no evidence of UTI    Pituitary adenoma [facility]), continue surveillance by neurosurgery    [facility] per neurology    Depression, unspecified depression type, stable on Cymbalta
Information could not be obtained

## 2019-07-08 ENCOUNTER — OFFICE VISIT (OUTPATIENT)
Dept: ORTHOPEDIC SURGERY | Age: 69
End: 2019-07-08
Payer: MEDICARE

## 2019-07-08 VITALS — HEIGHT: 65 IN | BODY MASS INDEX: 21.67 KG/M2 | WEIGHT: 130.07 LBS | RESPIRATION RATE: 11 BRPM

## 2019-07-08 DIAGNOSIS — S62.646D CLOSED NONDISPLACED FRACTURE OF PROXIMAL PHALANX OF RIGHT LITTLE FINGER WITH ROUTINE HEALING, SUBSEQUENT ENCOUNTER: Primary | ICD-10-CM

## 2019-07-08 PROCEDURE — G8427 DOCREV CUR MEDS BY ELIG CLIN: HCPCS | Performed by: ORTHOPAEDIC SURGERY

## 2019-07-08 PROCEDURE — G8399 PT W/DXA RESULTS DOCUMENT: HCPCS | Performed by: ORTHOPAEDIC SURGERY

## 2019-07-08 PROCEDURE — 1123F ACP DISCUSS/DSCN MKR DOCD: CPT | Performed by: ORTHOPAEDIC SURGERY

## 2019-07-08 PROCEDURE — 1036F TOBACCO NON-USER: CPT | Performed by: ORTHOPAEDIC SURGERY

## 2019-07-08 PROCEDURE — 99213 OFFICE O/P EST LOW 20 MIN: CPT | Performed by: ORTHOPAEDIC SURGERY

## 2019-07-08 PROCEDURE — 1090F PRES/ABSN URINE INCON ASSESS: CPT | Performed by: ORTHOPAEDIC SURGERY

## 2019-07-08 PROCEDURE — 3017F COLORECTAL CA SCREEN DOC REV: CPT | Performed by: ORTHOPAEDIC SURGERY

## 2019-07-08 PROCEDURE — 4040F PNEUMOC VAC/ADMIN/RCVD: CPT | Performed by: ORTHOPAEDIC SURGERY

## 2019-07-08 PROCEDURE — G8420 CALC BMI NORM PARAMETERS: HCPCS | Performed by: ORTHOPAEDIC SURGERY

## 2019-07-08 NOTE — PROGRESS NOTES
Chief Complaint:  Follow-up      History of Present of Illness: The patient returns for close follow-up of her right small finger proximal phalanx fracture. She is now approximately 2 months out from the original injury and has been using her hand fairly freely without any significant complaints. Review of Systems  Pertinent items are noted in HPI  Denies fever, chills, confusion, bowel/bladder active change. Review of systems reviewed from Patient History Form dated on 4/4/2019 and available in the patient's chart under the Media tab. Examination:  On exam today she demonstrates a fairly free and full range of motion of the finger with no signs of any crossover or malalignment. She has a slight windswept posture of her hand at rest but the finger completes the normal cascade of the digits. She has good perfusion and good sensation. There is no tenderness if I palpate firmly over the proximal phalanx. Radiology:     X-rays obtained and reviewed in office:  Views 3 views  Location right small finger  Impression x-rays today demonstrate a healed fracture with congruent MP joint alignment. There is no sign of interval collapse    Orders Placed This Encounter   Procedures    XR FINGER RIGHT (MIN 2 VIEWS)     Standing Status:   Future     Number of Occurrences:   1     Standing Expiration Date:   8/8/2019       Impression:  Encounter Diagnosis   Name Primary?  Closed nondisplaced fracture of proximal phalanx of right little finger with routine healing, subsequent encounter Yes         Treatment Plan:  I am very pleased with the functional and clinical improvement. She may continue to advance her activity level and use of the hand as comfort allows. I will see her back as needed    Please note that this transcription was created using voice recognition software. Any errors are unintentional and may be due to voice recognition transcription.

## 2019-07-10 ENCOUNTER — HOSPITAL ENCOUNTER (OUTPATIENT)
Dept: PHYSICAL THERAPY | Age: 69
Setting detail: THERAPIES SERIES
Discharge: HOME | End: 2019-07-10
Payer: MEDICARE

## 2019-07-10 PROCEDURE — 97162 PT EVAL MOD COMPLEX 30 MIN: CPT

## 2019-07-10 PROCEDURE — 97110 THERAPEUTIC EXERCISES: CPT

## 2019-07-10 NOTE — PROGRESS NOTES
Physical Therapy  Initial Assessment  Date: 7/10/2019  Patient Name: Elenita Kelly  MRN: [id number]  : 1950         Subjective   General  Chart Reviewed: Yes  Patient assessed for rehabilitation services?: Yes  Additional Pertinent Hx: Parkinson's, hypotension, back surgery 4 years ago, R 5th finger Fx, R wrist Fx  Family / Caregiver Present: No  Referring Practitioner: Soha Espinal MD  Referral Date : 19  Diagnosis: M41.9 scoliosis, M54.16 R lumbar radiculopathy  General Comment  Comments: PLOF:  chronic pain. Tries to stay active. PT Visit Information  Onset Date: 10/01/18  PT Insurance Information: [employer] Gold Plus  Subjective  Subjective: Pt c/o 9 mos of LBP and RLE pain. Thought it was getting better until she pulled a weed last spring, which exacerbated her pain. Had B STACY twice (last April and May) without relief. No relief with meds either. Has a Hx of LBP and RLE pain and had surgery 4 years ago (discectomy?) which \"didn't make it worse\". RLE pain radiates to lateral lower LE, and is intermittent. Also gets this pain on her LLE intermittently. Rates pain as 0-9/10. Pain is worse with standing (10 minute standing rajiv). Carries SPC in community and uses it when pain inhibits her walking. No AD in home. States stairs are \"scary\" as she has a section without a HR. Amb stairs with a reciprocating gt unless she hasn't \"loosened up yet\" in the morning, during which she will scoot down while seating. States she has a Hx of falls, with her most recent occuring 3 mos ago and resulting in her fracturing her R 5th finger. Had PT for her back pain around a year ago and continues with a HEP. Objective     Observation/Palpation  Posture: Fair  Palpation: TTP under B iliac crests, B QL, and B sacral base  Observation: scoliosis with L concavity. Significant R rotation with forward flexion. R scapula elevated and retracted.     AROM RLE (degrees)  RLE AROM:

## 2019-07-10 NOTE — FLOWSHEET NOTE
Physical Therapy Daily Treatment Note    Date:  7/10/2019    Patient Name:  Hunter Rodriguez    :  1950  MRN: [id number]  Restrictions/Precautions:  Parkinson's, hypotension  Medical/Treatment Diagnosis Information:  Diagnosis: M41.9 scoliosis, M54.16 R lumbar radiculopathy  Insurance/Certification information:  PT Insurance Information: [employer] Gold Plus ($40 copay)  Physician Information:  Referring Practitioner: Natacha Dasilva MD  Plan of care signed (Y/N):  sent  Visit# / total visits:      G-Code (if applicable): Modified Oswestry:  60% disability at eval    Medicare Cap (if applicable):   = total amount used, updated 7/10/2019    Time in:   8:41      Timed Treatment: 15 Total Treatment Time:  49  ________________________________________________________________________________________    Pain Level:   0-9 /10  SUBJECTIVE:  See eval    OBJECTIVE: see eval    Exercise/Equipment Resistance/Repetitions Other comments     TG  x5'             Ab stabilization with cuff Unable to move needle despite max cues Issued hand-knee isometric for HEP instead.      Magnetic click with SB nv      Hip stacking nv      Multifidus punch nv                                                                                                    Other Therapeutic Activities:  eval completed, HEP issued and practiced    HEP:  Clamshells, hand-knee isometrics, quadruped multifidus (plus prior HEP of bridging, cat/camel, SLR, quadruped hip ext, LTR)    Manual Treatments:       R hip mobs nv  DTM to B QL nv  Sacral PA mobs if rajiv nv  RLE distraction nv    Modalities:      Test/Measurements:         ASSESSMENT:     See eval    Treatment/Activity Tolerance:   [x]Patient tolerated treatment well [] Patient limited by fatique  []Patient limited by pain [] Patient limited by other medical complications  [] Other:     Goals:    Short term goals  Time Frame for Short term goals: 3 wks   Short term goal 1: Pt will decrease

## 2019-07-10 NOTE — PROGRESS NOTES
Outpatient Physical Therapy  Phone: [phone] Fax: [phone]     To: Carlotta Byrd MD       From: Patrizia Martinez PT     Patient: Imelda Medina         : 1950  Diagnosis: M41.9, M54.16       Date: 7/10/2019  Treatment Diagnosis:      Physical Therapy Certification/Re-Certification Form  Dear Dr. Denise Harvey,  The following patient has been evaluated for physical therapy services and for therapy to continue, Medicare requires monthly physician review of the treatment plan. Please review the attached evaluation and/or summary of the patient's plan of care, and verify that you agree therapy should continue by signing the attached document and sending it back to our office. Plan of Care/Treatment to date:  [x] Therapeutic Exercise   [x] Modalities:  [] Therapeutic Activity     [] Ultrasound  [] Electrical Stimulation   [x] Gait Training      [] Cervical Traction [] Lumbar Traction  [x] Neuromuscular Re-education  [x] Hot/Coldpack [] Iontophoresis    [x] Instruction in HEP      Other:  [x] Manual Therapy       []                        [] Aquatic Therapy       []                      ? Frequency/Duration:  # Days per week: [] 1 day # Weeks: [] 1 week [] 5 weeks      [x] 2 days? [] 2 weeks [x] 6 weeks     [] 3 days   [] 3 weeks [] 7 weeks     [] 4 days   [] 4 weeks [] 8 weeks    Rehab Potential: [] Excellent [] Good [x] Fair  [] Poor       Electronically signed by:  Patrizia Martinez PT      If you have any questions or concerns, please don't hesitate to call.   Thank you for your referral.    Physician Signature:________________________________Date:__________________  By signing above, therapists plan is approved by physician

## 2019-07-12 ENCOUNTER — HOSPITAL ENCOUNTER (OUTPATIENT)
Dept: PHYSICAL THERAPY | Age: 69
Setting detail: THERAPIES SERIES
Discharge: HOME | End: 2019-07-12
Payer: MEDICARE

## 2019-07-12 PROCEDURE — 97140 MANUAL THERAPY 1/> REGIONS: CPT

## 2019-07-12 PROCEDURE — 97110 THERAPEUTIC EXERCISES: CPT

## 2019-07-16 ENCOUNTER — HOSPITAL ENCOUNTER (OUTPATIENT)
Dept: PHYSICAL THERAPY | Age: 69
Setting detail: THERAPIES SERIES
Discharge: HOME | End: 2019-07-16
Payer: MEDICARE

## 2019-07-18 ENCOUNTER — HOSPITAL ENCOUNTER (OUTPATIENT)
Dept: PHYSICAL THERAPY | Age: 69
Setting detail: THERAPIES SERIES
Discharge: HOME | End: 2019-07-18
Payer: MEDICARE

## 2019-07-18 PROCEDURE — 97110 THERAPEUTIC EXERCISES: CPT

## 2019-07-18 PROCEDURE — 97140 MANUAL THERAPY 1/> REGIONS: CPT

## 2019-07-23 ENCOUNTER — HOSPITAL ENCOUNTER (OUTPATIENT)
Dept: PHYSICAL THERAPY | Age: 69
Setting detail: THERAPIES SERIES
Discharge: HOME | End: 2019-07-23
Payer: MEDICARE

## 2019-07-23 PROCEDURE — 97110 THERAPEUTIC EXERCISES: CPT

## 2019-07-23 NOTE — FLOWSHEET NOTE
Physical Therapy Daily Treatment Note    Date:  2019    Patient Name:  Nando Barrera    :  1950  MRN: [id number]  Restrictions/Precautions:  Parkinson's, hypotension  Medical/Treatment Diagnosis Information:  Diagnosis: M41.9 scoliosis, M54.16 R lumbar radiculopathy  Insurance/Certification information:  PT Insurance Information: [employer] Gold Plus ($40 copay)  Physician Information:  Referring Practitioner: Kirstin Rodriguez MD  Plan of care signed (Y/N):  sent  Visit# / total visits:      G-Code (if applicable): Modified Oswestry:  60% disability at eval Medicare Cap (if applicable):   = total amount used, updated 2019    Time in:   3:00     Timed Treatment: 30 Total Treatment Time:  35  ________________________________________________________________________________________    Pain Level:   0-9 /10  SUBJECTIVE:  Pt reports that working on her back/hip last time really helped    OBJECTIVE:     Exercise/Equipment Resistance/Repetitions Other comments     TG  x5'             Ab stabilization with cuff       Magnetic click with SB nv      Hip stacking nv      Multifidus punch x10 B Maroon TB   Ball compression 10x5\"    Supine clams  SL hip ER with stabilization x15  x10 B Red TB   Bridge x15 Feet on ball   Supine lumbopelvic stabilization 2 min    Shoulder extension  x15 Green TB                                                             Other Therapeutic Activities:  eval completed, HEP issued and practiced    HEP:  Clamshells, hand-knee isometrics, quadruped multifidus (plus prior HEP of bridging, cat/camel, SLR, quadruped hip ext, LTR)    Manual Treatments:       Breaking bread to lumbar spine    Modalities:      Test/Measurements:         ASSESSMENT:     Pt tolerated session well. Progressed well with exercise.     Treatment/Activity Tolerance:   [x]Patient tolerated treatment well [] Patient limited by fatique  []Patient limited by pain [] Patient limited by other medical complications  [] Other:     Goals:    Short term goals  Time Frame for Short term goals: 3 wks   Short term goal 1: Pt will decrease pain by 50% in frequency or intensity  Short term goal 2: Pt will be ind and compliant with HEP  Short term goal 3: Pt will bathe and dress without modifications  Short term goal 4: Pt will deny a functional limitation with standing     Long term goals  Time Frame for Long term goals : 6 wks  Long term goal 1: Pt will decrease pain by % in frequency or intensity  Long term goal 2: Pt will increase standing rajiv to 30 mins  Long term goal 3: Pt will increase PGM strength to 4/5 B  Long term goal 4: Pt will amb stairs with reciprocating gt 100% of the time     Plan: [x] Continue per plan of care [] Alter current plan (see comments)   [] Plan of care initiated [] Hold pending MD visit [] Discharge      Plan for Next Session:      Re-Certification Due Date:         Signature:  Vianney Parker PT

## 2019-07-25 ENCOUNTER — HOSPITAL ENCOUNTER (OUTPATIENT)
Dept: PHYSICAL THERAPY | Age: 69
Setting detail: THERAPIES SERIES
Discharge: HOME | End: 2019-07-25
Payer: MEDICARE

## 2019-07-25 PROCEDURE — 97110 THERAPEUTIC EXERCISES: CPT

## 2019-07-30 ENCOUNTER — HOSPITAL ENCOUNTER (OUTPATIENT)
Dept: PHYSICAL THERAPY | Age: 69
Setting detail: THERAPIES SERIES
Discharge: HOME | End: 2019-07-30
Payer: MEDICARE

## 2019-07-30 PROCEDURE — 97110 THERAPEUTIC EXERCISES: CPT

## 2019-07-30 NOTE — FLOWSHEET NOTE
Physical Therapy Daily Treatment Note    Date:  2019    Patient Name:  Ashanti Benitez    :  1950  MRN: [id number]  Restrictions/Precautions:  Parkinson's, hypotension  Medical/Treatment Diagnosis Information:  Diagnosis: M41.9 scoliosis, M54.16 R lumbar radiculopathy  Insurance/Certification information:  PT Insurance Information: [employer] Gold Plus ($40 copay)  Physician Information:  Referring Practitioner: Natividad Norris MD  Plan of care signed (Y/N):  sent  Visit# / total visits:      G-Code (if applicable): Modified Oswestry:  60% disability at eval Medicare Cap (if applicable):   = total amount used, updated 2019    Time in:   4:30     Timed Treatment: 30 Total Treatment Time:  30  ________________________________________________________________________________________    Pain Level:   /10  SUBJECTIVE:  Nothing new to report. OBJECTIVE:     Exercise/Equipment Resistance/Repetitions Other comments     TG  x5'             Ab stabilization with cuff       Magnetic click with SB nv      Hip stacking nv      Multifidus punch x10 B Maroon TB   Ball compression 10x5\"    Supine clams  SL hip ER with stabilization  Red TB   Bridge x15 Feet on ball   Supine lumbopelvic stabilization 2 min    Shoulder extension  x15 Green TB   Standing hip abduction x15 BLE 10#                                                      Other Therapeutic Activities:  eval completed, HEP issued and practiced    HEP:  Clamshells, hand-knee isometrics, quadruped multifidus (plus prior HEP of bridging, cat/camel, SLR, quadruped hip ext, LTR)    Manual Treatments:       Breaking bread to lumbar spine    Modalities:      Test/Measurements:         ASSESSMENT:     Pt tolerated session well. Progressed well with exercise.     Treatment/Activity Tolerance:   [x]Patient tolerated treatment well [] Patient limited by fatique  []Patient limited by pain [] Patient limited by other medical complications  [] Other:     Goals:    Short term goals  Time Frame for Short term goals: 3 wks   Short term goal 1: Pt will decrease pain by 50% in frequency or intensity  Short term goal 2: Pt will be ind and compliant with HEP  Short term goal 3: Pt will bathe and dress without modifications  Short term goal 4: Pt will deny a functional limitation with standing     Long term goals  Time Frame for Long term goals : 6 wks  Long term goal 1: Pt will decrease pain by % in frequency or intensity  Long term goal 2: Pt will increase standing rajiv to 30 mins  Long term goal 3: Pt will increase PGM strength to 4/5 B  Long term goal 4: Pt will amb stairs with reciprocating gt 100% of the time     Plan: [x] Continue per plan of care [] Alter current plan (see comments)   [] Plan of care initiated [] Hold pending MD visit [] Discharge      Plan for Next Session:      Re-Certification Due Date:         Signature:  Rachel Herring PT

## 2019-08-01 ENCOUNTER — HOSPITAL ENCOUNTER (OUTPATIENT)
Dept: PHYSICAL THERAPY | Age: 69
Setting detail: THERAPIES SERIES
Discharge: HOME | End: 2019-08-01
Payer: MEDICARE

## 2019-08-01 PROCEDURE — 97110 THERAPEUTIC EXERCISES: CPT

## 2019-08-05 ENCOUNTER — HOSPITAL ENCOUNTER (OUTPATIENT)
Dept: PHYSICAL THERAPY | Age: 69
Setting detail: THERAPIES SERIES
Discharge: HOME | End: 2019-08-05
Payer: MEDICARE

## 2019-08-05 PROCEDURE — 97110 THERAPEUTIC EXERCISES: CPT

## 2019-08-08 ENCOUNTER — HOSPITAL ENCOUNTER (OUTPATIENT)
Dept: PHYSICAL THERAPY | Age: 69
Setting detail: THERAPIES SERIES
Discharge: HOME | End: 2019-08-08
Payer: MEDICARE

## 2019-08-08 PROCEDURE — 97110 THERAPEUTIC EXERCISES: CPT

## 2019-08-08 NOTE — FLOWSHEET NOTE
Physical Therapy Daily Treatment Note    Date:  2019    Patient Name:  Hudson Santiago    :  1950  MRN: [id number]  Restrictions/Precautions:  Parkinson's, hypotension  Medical/Treatment Diagnosis Information:  Diagnosis: M41.9 scoliosis, M54.16 R lumbar radiculopathy  Insurance/Certification information:  PT Insurance Information: [employer] Medicare Gold Plus ($40 copay)  Physician Information:  Referring Practitioner: Kenyon Henriquez MD  Plan of care signed (Y/N):  sent  Visit# / total visits:      G-Code (if applicable): Modified Oswestry:  60% disability at eval Medicare Cap (if applicable):   = total amount used, updated 2019    Time in:   9:30     Timed Treatment: 30 Total Treatment Time:  30  ________________________________________________________________________________________    Pain Level:   /10  SUBJECTIVE:  Nothing new to report. No new changes. OBJECTIVE:     Exercise/Equipment Resistance/Repetitions Other comments     TG  x5'             Ab stabilization with cuff       Magnetic click with SB nv      Hip stacking nv      Multifidus punch x10 B Maroon TB   Ball compression 10x10\"    Supine clams  SL hip ER with stabilization  Red TB   Bridge x15 Feet on ball   Supine lumbopelvic stabilization 2 min    Shoulder extension  x15 Green TB   Standing hip abduction x15 BLE 10#   SC walkout                                                 Other Therapeutic Activities:  eval completed, HEP issued and practiced    HEP:  Clamshells, hand-knee isometrics, quadruped multifidus (plus prior HEP of bridging, cat/camel, SLR, quadruped hip ext, LTR)    Manual Treatments:       Breaking bread to lumbar spine    Modalities:      Test/Measurements:         ASSESSMENT:     Pt tolerated session well. Progressed well with exercise.     Treatment/Activity Tolerance:   [x]Patient tolerated treatment well [] Patient limited by fatique  []Patient limited by pain [] Patient limited by other

## 2019-08-13 ENCOUNTER — HOSPITAL ENCOUNTER (OUTPATIENT)
Dept: PHYSICAL THERAPY | Age: 69
Setting detail: THERAPIES SERIES
Discharge: HOME | End: 2019-08-13
Payer: MEDICARE

## 2019-08-13 PROCEDURE — 97110 THERAPEUTIC EXERCISES: CPT

## 2019-08-15 ENCOUNTER — HOSPITAL ENCOUNTER (OUTPATIENT)
Dept: PHYSICAL THERAPY | Age: 69
Setting detail: THERAPIES SERIES
Discharge: HOME | End: 2019-08-15
Payer: MEDICARE

## 2019-08-15 PROCEDURE — 97110 THERAPEUTIC EXERCISES: CPT

## 2019-08-15 NOTE — DISCHARGE SUMMARY
Physical Therapy Discharge Note    Date:  8/15/2019    Patient Name:  Mellissa Voung    :  1950  MRN: [id number]  Restrictions/Precautions:  Parkinson's, hypotension  Medical/Treatment Diagnosis Information:  Diagnosis: M41.9 scoliosis, M54.16 R lumbar radiculopathy  Insurance/Certification information:  PT Insurance Information: [employer] Gold Plus ($40 copay)  Physician Information:  Referring Practitioner: Asiya Wall MD  Plan of care signed (Y/N):  sent  Visit# / total visits:      G-Code (if applicable): Modified Oswestry:  68% (60% disability at eval)  ________________________________________________________________________________________    Pain Level:   /10  SUBJECTIVE:  Pt reports that overall she is feeling better. \"[It helped] some. \" Confident with continuing on with HEP at this time. ASSESSMENT:     Pt progressed well with course of physical therapy, and displays lessened symptoms from initial evaluation. Pt demonstrated equal strength and ROM B and has been able to return to all necessary ADLs. Pt with no complaints during sessions. Pt has met all established goals, and feels confident continuing with HEP. Will discharge pt from formal PT at this time.      Treatment/Activity Tolerance:   [x]Patient tolerated treatment well [] Patient limited by fatique  []Patient limited by pain [] Patient limited by other medical complications  [] Other:     Goals:    Short term goals  Time Frame for Short term goals: 3 wks   Short term goal 1: Pt will decrease pain by 50% in frequency or intensity - NOT MET  Short term goal 2: Pt will be ind and compliant with HEP - MET  Short term goal 3: Pt will bathe and dress without modifications - NOT MET  Short term goal 4: Pt will deny a functional limitation with standing - NOT MET    Long term goals  Time Frame for Long term goals : 6 wks  Long term goal 1: Pt will decrease pain by % in frequency or intensity - NOT MET  Long term

## 2019-08-15 NOTE — FLOWSHEET NOTE
Physical Therapy Daily Treatment Note    Date:  8/15/2019    Patient Name:  Dionisio West    :  1950  MRN: [id number]  Restrictions/Precautions:  Parkinson's, hypotension  Medical/Treatment Diagnosis Information:  Diagnosis: M41.9 scoliosis, M54.16 R lumbar radiculopathy  Insurance/Certification information:  PT Insurance Information: [employer] Gold Plus ($40 copay)  Physician Information:  Referring Practitioner: Amanda Salgado MD  Plan of care signed (Y/N):  sent  Visit# / total visits:      G-Code (if applicable): Modified Oswestry:  68% (60% disability at eval)    Medicare Cap (if applicable):   = total amount used, updated 8/15/2019    Time in:  9:30     Timed Treatment: 30 Total Treatment Time:  32  ________________________________________________________________________________________    Pain Level:   /10  SUBJECTIVE:  Pt reports that overall she is feeling better. \"[It helped] some. \" Confident with continuing on with HEP at this time. OBJECTIVE:     Exercise/Equipment Resistance/Repetitions Other comments     TG  x5'             Ab stabilization with cuff       Magnetic click with SB nv      Hip stacking nv      Multifidus punch x10 B Maroon TB   Ball compression 10x10\"    Supine clams  SL hip ER with stabilization  Red TB   Bridge x15 Feet on ball   Supine lumbopelvic stabilization 2 min    Shoulder extension  x15 Green TB   Standing hip abduction x15 BLE 10#   SC walkout                                                 Other Therapeutic Activities:  eval completed, HEP issued and practiced    HEP:  Clamshells, hand-knee isometrics, quadruped multifidus (plus prior HEP of bridging, cat/camel, SLR, quadruped hip ext, LTR)    Manual Treatments:       Breaking bread to lumbar spine    Modalities:      Test/Measurements:         ASSESSMENT:     Pt progressed well with course of physical therapy, and displays lessened symptoms from initial evaluation.  Pt demonstrated equal strength and ROM B and has been able to return to all necessary ADLs. Pt with no complaints during sessions. Pt has met all established goals, and feels confident continuing with HEP. Will discharge pt from formal PT at this time.      Treatment/Activity Tolerance:   [x]Patient tolerated treatment well [] Patient limited by fatique  []Patient limited by pain [] Patient limited by other medical complications  [] Other:     Goals:    Short term goals  Time Frame for Short term goals: 3 wks   Short term goal 1: Pt will decrease pain by 50% in frequency or intensity  Short term goal 2: Pt will be ind and compliant with HEP  Short term goal 3: Pt will bathe and dress without modifications  Short term goal 4: Pt will deny a functional limitation with standing     Long term goals  Time Frame for Long term goals : 6 wks  Long term goal 1: Pt will decrease pain by % in frequency or intensity  Long term goal 2: Pt will increase standing rajiv to 30 mins  Long term goal 3: Pt will increase PGM strength to 4/5 B  Long term goal 4: Pt will amb stairs with reciprocating gt 100% of the time     Plan: [x] Continue per plan of care [] Alter current plan (see comments)   [] Plan of care initiated [] Hold pending MD visit [] Discharge      Plan for Next Session:      Re-Certification Due Date:         Signature:  Andrez Hickman PT

## 2019-09-24 LAB
BILIRUBIN URINE: NEGATIVE
ERYTHROCYTES URINE: ABNORMAL
GLUCOSE URINE: NEGATIVE MG/DL
KETONES, URINE: ABNORMAL MG/DL
LEUKOCYTE ESTERASE, URINE: NEGATIVE
NITRITE, URINE: NEGATIVE
POC PH ARTERIAL: 5 (ref 5–8)
PROTEIN UA: NEGATIVE MG/DL
SPECIFIC GRAVITY UA: 1.02 (ref 1–1.03)
UROBILINOGEN, URINE: 0.2 MG/DL (ref 0.2–1)

## 2019-10-28 ENCOUNTER — OFFICE VISIT (OUTPATIENT)
Dept: FAMILY MEDICINE CLINIC | Age: 69
End: 2019-10-28
Payer: MEDICARE

## 2019-10-28 VITALS
DIASTOLIC BLOOD PRESSURE: 62 MMHG | OXYGEN SATURATION: 96 % | SYSTOLIC BLOOD PRESSURE: 102 MMHG | HEIGHT: 65 IN | WEIGHT: 124.4 LBS | HEART RATE: 100 BPM | BODY MASS INDEX: 20.73 KG/M2

## 2019-10-28 DIAGNOSIS — M54.41 CHRONIC BILATERAL LOW BACK PAIN WITH RIGHT-SIDED SCIATICA: Primary | ICD-10-CM

## 2019-10-28 DIAGNOSIS — Z01.818 PREOP EXAMINATION: ICD-10-CM

## 2019-10-28 DIAGNOSIS — G89.29 CHRONIC BILATERAL LOW BACK PAIN WITH RIGHT-SIDED SCIATICA: Primary | ICD-10-CM

## 2019-10-28 PROCEDURE — 90653 IIV ADJUVANT VACCINE IM: CPT | Performed by: PHYSICIAN ASSISTANT

## 2019-10-28 PROCEDURE — 1090F PRES/ABSN URINE INCON ASSESS: CPT | Performed by: PHYSICIAN ASSISTANT

## 2019-10-28 PROCEDURE — G8427 DOCREV CUR MEDS BY ELIG CLIN: HCPCS | Performed by: PHYSICIAN ASSISTANT

## 2019-10-28 PROCEDURE — 1123F ACP DISCUSS/DSCN MKR DOCD: CPT | Performed by: PHYSICIAN ASSISTANT

## 2019-10-28 PROCEDURE — 1036F TOBACCO NON-USER: CPT | Performed by: PHYSICIAN ASSISTANT

## 2019-10-28 PROCEDURE — 4040F PNEUMOC VAC/ADMIN/RCVD: CPT | Performed by: PHYSICIAN ASSISTANT

## 2019-10-28 PROCEDURE — G8399 PT W/DXA RESULTS DOCUMENT: HCPCS | Performed by: PHYSICIAN ASSISTANT

## 2019-10-28 PROCEDURE — G0008 ADMIN INFLUENZA VIRUS VAC: HCPCS | Performed by: PHYSICIAN ASSISTANT

## 2019-10-28 PROCEDURE — 99213 OFFICE O/P EST LOW 20 MIN: CPT | Performed by: PHYSICIAN ASSISTANT

## 2019-10-28 PROCEDURE — G8484 FLU IMMUNIZE NO ADMIN: HCPCS | Performed by: PHYSICIAN ASSISTANT

## 2019-10-28 PROCEDURE — 3017F COLORECTAL CA SCREEN DOC REV: CPT | Performed by: PHYSICIAN ASSISTANT

## 2019-10-28 PROCEDURE — G8420 CALC BMI NORM PARAMETERS: HCPCS | Performed by: PHYSICIAN ASSISTANT

## 2019-12-18 ENCOUNTER — OFFICE VISIT (OUTPATIENT)
Dept: FAMILY MEDICINE CLINIC | Age: 69
End: 2019-12-18
Payer: MEDICARE

## 2019-12-18 VITALS
HEART RATE: 83 BPM | WEIGHT: 131.4 LBS | RESPIRATION RATE: 14 BRPM | HEIGHT: 65 IN | OXYGEN SATURATION: 92 % | DIASTOLIC BLOOD PRESSURE: 80 MMHG | BODY MASS INDEX: 21.89 KG/M2 | SYSTOLIC BLOOD PRESSURE: 110 MMHG

## 2019-12-18 DIAGNOSIS — Z00.00 ROUTINE GENERAL MEDICAL EXAMINATION AT A HEALTH CARE FACILITY: Primary | ICD-10-CM

## 2019-12-18 PROCEDURE — G8482 FLU IMMUNIZE ORDER/ADMIN: HCPCS | Performed by: FAMILY MEDICINE

## 2019-12-18 PROCEDURE — G0439 PPPS, SUBSEQ VISIT: HCPCS | Performed by: FAMILY MEDICINE

## 2019-12-18 PROCEDURE — 1123F ACP DISCUSS/DSCN MKR DOCD: CPT | Performed by: FAMILY MEDICINE

## 2019-12-18 PROCEDURE — 3017F COLORECTAL CA SCREEN DOC REV: CPT | Performed by: FAMILY MEDICINE

## 2019-12-18 PROCEDURE — 4040F PNEUMOC VAC/ADMIN/RCVD: CPT | Performed by: FAMILY MEDICINE

## 2019-12-18 ASSESSMENT — PATIENT HEALTH QUESTIONNAIRE - PHQ9
SUM OF ALL RESPONSES TO PHQ QUESTIONS 1-9: 1
SUM OF ALL RESPONSES TO PHQ QUESTIONS 1-9: 1

## 2019-12-18 ASSESSMENT — LIFESTYLE VARIABLES: HOW OFTEN DO YOU HAVE A DRINK CONTAINING ALCOHOL: 0

## 2020-02-17 RX ORDER — DULOXETIN HYDROCHLORIDE 60 MG/1
CAPSULE, DELAYED RELEASE ORAL
Qty: 90 CAPSULE | Refills: 1 | Status: SHIPPED | OUTPATIENT
Start: 2020-02-17 | End: 2020-08-12 | Stop reason: SDUPTHER

## 2020-03-11 ENCOUNTER — HOSPITAL ENCOUNTER (OUTPATIENT)
Dept: GENERAL RADIOLOGY | Age: 70
Discharge: HOME | End: 2020-03-11
Payer: MEDICARE

## 2020-03-11 ENCOUNTER — OFFICE VISIT (OUTPATIENT)
Dept: FAMILY MEDICINE CLINIC | Age: 70
End: 2020-03-11
Payer: MEDICARE

## 2020-03-11 VITALS
DIASTOLIC BLOOD PRESSURE: 52 MMHG | HEART RATE: 99 BPM | HEIGHT: 65 IN | WEIGHT: 116 LBS | BODY MASS INDEX: 19.33 KG/M2 | OXYGEN SATURATION: 98 % | SYSTOLIC BLOOD PRESSURE: 90 MMHG

## 2020-03-11 PROCEDURE — 36415 COLL VENOUS BLD VENIPUNCTURE: CPT | Performed by: FAMILY MEDICINE

## 2020-03-11 PROCEDURE — G8399 PT W/DXA RESULTS DOCUMENT: HCPCS | Performed by: FAMILY MEDICINE

## 2020-03-11 PROCEDURE — 1123F ACP DISCUSS/DSCN MKR DOCD: CPT | Performed by: FAMILY MEDICINE

## 2020-03-11 PROCEDURE — 1036F TOBACCO NON-USER: CPT | Performed by: FAMILY MEDICINE

## 2020-03-11 PROCEDURE — 74019 RADEX ABDOMEN 2 VIEWS: CPT

## 2020-03-11 PROCEDURE — 1090F PRES/ABSN URINE INCON ASSESS: CPT | Performed by: FAMILY MEDICINE

## 2020-03-11 PROCEDURE — 4040F PNEUMOC VAC/ADMIN/RCVD: CPT | Performed by: FAMILY MEDICINE

## 2020-03-11 PROCEDURE — G8482 FLU IMMUNIZE ORDER/ADMIN: HCPCS | Performed by: FAMILY MEDICINE

## 2020-03-11 PROCEDURE — 3017F COLORECTAL CA SCREEN DOC REV: CPT | Performed by: FAMILY MEDICINE

## 2020-03-11 PROCEDURE — G8420 CALC BMI NORM PARAMETERS: HCPCS | Performed by: FAMILY MEDICINE

## 2020-03-11 PROCEDURE — 99214 OFFICE O/P EST MOD 30 MIN: CPT | Performed by: FAMILY MEDICINE

## 2020-03-11 PROCEDURE — G8427 DOCREV CUR MEDS BY ELIG CLIN: HCPCS | Performed by: FAMILY MEDICINE

## 2020-03-11 ASSESSMENT — ENCOUNTER SYMPTOMS
NAUSEA: 0
RECTAL PAIN: 0
ANAL BLEEDING: 0
ABDOMINAL PAIN: 1
BLOOD IN STOOL: 0
VOMITING: 0
DIARRHEA: 0
CONSTIPATION: 1
ABDOMINAL DISTENTION: 0

## 2020-03-11 NOTE — PROGRESS NOTES
Patient:  Jaun Dillard a 78 y.o. femalewho presents today with the following Chief Complaint(s):  Chief Complaint   Patient presents with    Abdominal Pain     Right side low abdominal pain. Right side pain behind knee. Unsure if it is connected        Patient has a history of Parkinson's and is on heavy doses of Sinemet. She says for the last 1 month she has had problems with abdominal pain primarily in the right lower quadrant area, the pain is not constant it does not keep her up at night. There is been no fever, no nausea or vomiting, no melena no medications yet. She took a Dulcolax pill and that caused a bigger bowel movement followed by liquid stool. That did seem to improve her pain, however her pain recurs. She does not feel ill. The pain is gradually increasing though. She denies any bladder symptoms of burning with urination or frequency. Patiently typically has a bowel movement every other day, however it is not at full BM. Current Outpatient Medications   Medication Sig Dispense Refill    carbidopa-levodopa (SINEMET)  MG per tablet Take 1 tablet by mouth 3 times daily      DULoxetine (CYMBALTA) 60 MG extended release capsule TAKE ONE CAPSULE BY MOUTH DAILY 90 capsule 1    carbidopa-levodopa (RYTARY) 61. MG CPCR per extended release capsule Take 2 capsules by mouth 5 times daily      Multiple Vitamins-Minerals (THERAPEUTIC MULTIVITAMIN-MINERALS) tablet Take 1 tablet by mouth daily       No current facility-administered medications for this visit. Patients past medical history, surgical history, family history, medications and allergies were all reviewed and updated as appropriate today. Review of Systems   Constitutional: Positive for fatigue. Negative for fever. Gastrointestinal: Positive for abdominal pain and constipation. Negative for abdominal distention, anal bleeding, blood in stool, diarrhea, nausea, rectal pain and vomiting.    Genitourinary: Negative for difficulty urinating, dysuria, flank pain and frequency. Physical Exam  Vitals signs reviewed. Constitutional:       General: She is not in acute distress. Appearance: She is not ill-appearing or toxic-appearing. Cardiovascular:      Rate and Rhythm: Normal rate and regular rhythm. Pulmonary:      Effort: Pulmonary effort is normal.      Breath sounds: Normal breath sounds. No wheezing, rhonchi or rales. Abdominal:      General: Abdomen is flat. Bowel sounds are normal. There is no distension. Palpations: Abdomen is soft. There is no mass. Tenderness: There is abdominal tenderness. There is no guarding or rebound. Hernia: No hernia is present. Comments: Patient is diffusely tender throughout her abdomen more so in the right upper and right lower quadrants no rebound or rigidity, no palpable masses   Neurological:      Mental Status: She is alert. Mental status is at baseline. Vitals:    03/11/20 1443   BP: (!) 90/52   Pulse: 99   SpO2: 98%   Weight: 116 lb (52.6 kg)   Height: 5' 5\" (1.651 m)       Assessment/Plan:   Natacha was seen today for abdominal pain. Diagnoses and all orders for this visit:    Abdominal pain, unspecified abdominal location patient does not appear ill and her symptoms have been going on for a month. This makes diverticulitis or appendicitis less likely. Plan is below. We will check labs and sent her for flatplate and upright of her abdomen. I think this may be more constipation related. -     Comprehensive Metabolic Panel  -     CBC Auto Differential  -     XR ABDOMEN (2 VIEWS); Future    Drug-induced constipation  -     XR ABDOMEN (2 VIEWS);  Future    Pituitary adenoma [facility]) follow-up with neurosurgery    Parkinson's disease [facility]) appointment tomorrow with neurology

## 2020-03-12 LAB
A/G RATIO: 1.9 (ref 1.1–2.2)
ALBUMIN SERPL-MCNC: 4.4 G/DL (ref 3.4–5)
ALP BLD-CCNC: 75 U/L (ref 40–129)
ALT SERPL-CCNC: <5 U/L (ref 10–40)
ANION GAP SERPL CALCULATED.3IONS-SCNC: 12 MMOL/L (ref 3–16)
AST SERPL-CCNC: 16 U/L (ref 15–37)
BASOPHILS ABSOLUTE: 0 K/UL (ref 0–0.2)
BASOPHILS RELATIVE PERCENT: 0.8 %
BILIRUB SERPL-MCNC: 0.4 MG/DL (ref 0–1)
BUN BLDV-MCNC: 23 MG/DL (ref 7–20)
CALCIUM SERPL-MCNC: 9.9 MG/DL (ref 8.3–10.6)
CHLORIDE BLD-SCNC: 98 MMOL/L (ref 99–110)
CO2: 26 MMOL/L (ref 21–32)
CREAT SERPL-MCNC: 0.8 MG/DL (ref 0.6–1.2)
EOSINOPHILS ABSOLUTE: 0 K/UL (ref 0–0.6)
EOSINOPHILS RELATIVE PERCENT: 0.4 %
GFR AFRICAN AMERICAN: >60
GFR NON-AFRICAN AMERICAN: >60
GLOBULIN: 2.3 G/DL
GLUCOSE BLD-MCNC: 148 MG/DL (ref 70–99)
HCT VFR BLD CALC: 40.1 % (ref 36–48)
HEMOGLOBIN: 13.3 G/DL (ref 12–16)
LYMPHOCYTES ABSOLUTE: 1.3 K/UL (ref 1–5.1)
LYMPHOCYTES RELATIVE PERCENT: 21.1 %
MCH RBC QN AUTO: 31 PG (ref 26–34)
MCHC RBC AUTO-ENTMCNC: 33.2 G/DL (ref 31–36)
MCV RBC AUTO: 93.6 FL (ref 80–100)
MONOCYTES ABSOLUTE: 0.4 K/UL (ref 0–1.3)
MONOCYTES RELATIVE PERCENT: 6.5 %
NEUTROPHILS ABSOLUTE: 4.4 K/UL (ref 1.7–7.7)
NEUTROPHILS RELATIVE PERCENT: 71.2 %
PDW BLD-RTO: 13.7 % (ref 12.4–15.4)
PLATELET # BLD: 251 K/UL (ref 135–450)
PMV BLD AUTO: 8.9 FL (ref 5–10.5)
POTASSIUM SERPL-SCNC: 4.2 MMOL/L (ref 3.5–5.1)
RBC # BLD: 4.28 M/UL (ref 4–5.2)
SODIUM BLD-SCNC: 136 MMOL/L (ref 136–145)
TOTAL PROTEIN: 6.7 G/DL (ref 6.4–8.2)
WBC # BLD: 6.2 K/UL (ref 4–11)

## 2020-06-11 ENCOUNTER — HOSPITAL ENCOUNTER (OUTPATIENT)
Dept: MAMMOGRAPHY | Age: 70
Discharge: HOME | End: 2020-06-11
Payer: MEDICARE

## 2020-06-11 PROCEDURE — 77067 SCR MAMMO BI INCL CAD: CPT

## 2020-06-30 ENCOUNTER — OFFICE VISIT (OUTPATIENT)
Dept: FAMILY MEDICINE CLINIC | Age: 70
End: 2020-06-30
Payer: MEDICARE

## 2020-06-30 VITALS
DIASTOLIC BLOOD PRESSURE: 68 MMHG | BODY MASS INDEX: 18.66 KG/M2 | WEIGHT: 112 LBS | SYSTOLIC BLOOD PRESSURE: 118 MMHG | HEIGHT: 65 IN | TEMPERATURE: 98.2 F | OXYGEN SATURATION: 98 % | HEART RATE: 79 BPM

## 2020-06-30 PROCEDURE — G8427 DOCREV CUR MEDS BY ELIG CLIN: HCPCS | Performed by: PHYSICIAN ASSISTANT

## 2020-06-30 PROCEDURE — 99213 OFFICE O/P EST LOW 20 MIN: CPT | Performed by: PHYSICIAN ASSISTANT

## 2020-06-30 PROCEDURE — 1090F PRES/ABSN URINE INCON ASSESS: CPT | Performed by: PHYSICIAN ASSISTANT

## 2020-06-30 PROCEDURE — 3017F COLORECTAL CA SCREEN DOC REV: CPT | Performed by: PHYSICIAN ASSISTANT

## 2020-06-30 PROCEDURE — 4040F PNEUMOC VAC/ADMIN/RCVD: CPT | Performed by: PHYSICIAN ASSISTANT

## 2020-06-30 PROCEDURE — G8399 PT W/DXA RESULTS DOCUMENT: HCPCS | Performed by: PHYSICIAN ASSISTANT

## 2020-06-30 PROCEDURE — 1123F ACP DISCUSS/DSCN MKR DOCD: CPT | Performed by: PHYSICIAN ASSISTANT

## 2020-06-30 PROCEDURE — 1036F TOBACCO NON-USER: CPT | Performed by: PHYSICIAN ASSISTANT

## 2020-06-30 PROCEDURE — G8420 CALC BMI NORM PARAMETERS: HCPCS | Performed by: PHYSICIAN ASSISTANT

## 2020-06-30 ASSESSMENT — ENCOUNTER SYMPTOMS: RESPIRATORY NEGATIVE: 1

## 2020-08-06 ENCOUNTER — TELEPHONE (OUTPATIENT)
Dept: FAMILY MEDICINE CLINIC | Age: 70
End: 2020-08-06

## 2020-08-12 ENCOUNTER — OFFICE VISIT (OUTPATIENT)
Dept: FAMILY MEDICINE CLINIC | Age: 70
End: 2020-08-12
Payer: MEDICARE

## 2020-08-12 VITALS
HEART RATE: 67 BPM | TEMPERATURE: 98 F | WEIGHT: 113.2 LBS | BODY MASS INDEX: 18.84 KG/M2 | OXYGEN SATURATION: 97 % | DIASTOLIC BLOOD PRESSURE: 62 MMHG | SYSTOLIC BLOOD PRESSURE: 100 MMHG

## 2020-08-12 PROBLEM — S62.646A CLOSED NONDISPLACED FRACTURE OF PROXIMAL PHALANX OF RIGHT LITTLE FINGER: Status: RESOLVED | Noted: 2019-05-16 | Resolved: 2020-08-12

## 2020-08-12 PROBLEM — M25.531 RIGHT WRIST PAIN: Status: RESOLVED | Noted: 2017-12-15 | Resolved: 2020-08-12

## 2020-08-12 LAB — HBA1C MFR BLD: 5.6 %

## 2020-08-12 PROCEDURE — 99213 OFFICE O/P EST LOW 20 MIN: CPT | Performed by: FAMILY MEDICINE

## 2020-08-12 PROCEDURE — 83036 HEMOGLOBIN GLYCOSYLATED A1C: CPT | Performed by: FAMILY MEDICINE

## 2020-08-12 PROCEDURE — 1090F PRES/ABSN URINE INCON ASSESS: CPT | Performed by: FAMILY MEDICINE

## 2020-08-12 PROCEDURE — 4040F PNEUMOC VAC/ADMIN/RCVD: CPT | Performed by: FAMILY MEDICINE

## 2020-08-12 PROCEDURE — G8420 CALC BMI NORM PARAMETERS: HCPCS | Performed by: FAMILY MEDICINE

## 2020-08-12 PROCEDURE — G8427 DOCREV CUR MEDS BY ELIG CLIN: HCPCS | Performed by: FAMILY MEDICINE

## 2020-08-12 PROCEDURE — G8399 PT W/DXA RESULTS DOCUMENT: HCPCS | Performed by: FAMILY MEDICINE

## 2020-08-12 PROCEDURE — 3017F COLORECTAL CA SCREEN DOC REV: CPT | Performed by: FAMILY MEDICINE

## 2020-08-12 PROCEDURE — 1123F ACP DISCUSS/DSCN MKR DOCD: CPT | Performed by: FAMILY MEDICINE

## 2020-08-12 PROCEDURE — 1036F TOBACCO NON-USER: CPT | Performed by: FAMILY MEDICINE

## 2020-08-12 RX ORDER — OMEGA-3S/DHA/EPA/FISH OIL/D3 300MG-1000
2000 CAPSULE ORAL DAILY
COMMUNITY

## 2020-08-12 RX ORDER — DULOXETIN HYDROCHLORIDE 60 MG/1
CAPSULE, DELAYED RELEASE ORAL
Qty: 90 CAPSULE | Refills: 1 | Status: SHIPPED | OUTPATIENT
Start: 2020-08-12 | End: 2021-02-01

## 2020-08-12 RX ORDER — OMEGA-3 FATTY ACIDS/FISH OIL 300-1000MG
1 CAPSULE ORAL
COMMUNITY
End: 2021-02-05

## 2020-08-12 RX ORDER — LANOLIN ALCOHOL/MO/W.PET/CERES
3 CREAM (GRAM) TOPICAL NIGHTLY PRN
COMMUNITY

## 2020-08-12 RX ORDER — CALCIUM CARBONATE 500(1250)
500 TABLET ORAL DAILY
COMMUNITY

## 2020-08-12 ASSESSMENT — ENCOUNTER SYMPTOMS: BACK PAIN: 1

## 2020-08-12 NOTE — PROGRESS NOTES
General: She is not in acute distress. Cardiovascular:      Rate and Rhythm: Normal rate and regular rhythm. Heart sounds: Normal heart sounds. Pulmonary:      Effort: Pulmonary effort is normal.      Breath sounds: Normal breath sounds. No wheezing or rales. Neurological:      Mental Status: She is alert. Mental status is at baseline. Psychiatric:         Mood and Affect: Mood normal.         Behavior: Behavior normal.         Thought Content: Thought content normal.         Judgment: Judgment normal.           Vitals:    08/12/20 1405   BP: 100/62   Pulse: 67   Temp: 98 °F (36.7 °C)   SpO2: 97%   Weight: 113 lb 3.2 oz (51.3 kg)       Assessment/Plan:   Natacha was seen today for medication refill and depression. Diagnoses and all orders for this visit:    Depression, unspecified depression type  -     DULoxetine (CYMBALTA) 60 MG extended release capsule; TAKE ONE CAPSULE BY MOUTH DAILY    Elevated blood sugar, 5.6 normal patient reassured  -     POCT glycosylated hemoglobin (Hb A1C)    rto 1 year or sooner if issues.   Patient was instructed to get her second shingles shot at the pharmacy  Continue follow-up with neurology

## 2020-09-30 ENCOUNTER — OFFICE VISIT (OUTPATIENT)
Dept: FAMILY MEDICINE CLINIC | Age: 70
End: 2020-09-30
Payer: MEDICARE

## 2020-09-30 VITALS
BODY MASS INDEX: 18 KG/M2 | OXYGEN SATURATION: 95 % | DIASTOLIC BLOOD PRESSURE: 62 MMHG | HEIGHT: 66 IN | HEART RATE: 103 BPM | SYSTOLIC BLOOD PRESSURE: 102 MMHG | TEMPERATURE: 97.6 F | WEIGHT: 112 LBS

## 2020-09-30 LAB
BILIRUBIN, POC: NORMAL
BLOOD URINE, POC: NORMAL
CLARITY, POC: NORMAL
COLOR, POC: NORMAL
GLUCOSE URINE, POC: NEGATIVE
KETONES, POC: NORMAL
LEUKOCYTE EST, POC: NEGATIVE
NITRITE, POC: NEGATIVE
PH, POC: 5
PROTEIN, POC: NORMAL
SPECIFIC GRAVITY, POC: 1.02
UROBILINOGEN, POC: 0.2

## 2020-09-30 PROCEDURE — 3017F COLORECTAL CA SCREEN DOC REV: CPT | Performed by: FAMILY MEDICINE

## 2020-09-30 PROCEDURE — 81002 URINALYSIS NONAUTO W/O SCOPE: CPT | Performed by: FAMILY MEDICINE

## 2020-09-30 PROCEDURE — 1123F ACP DISCUSS/DSCN MKR DOCD: CPT | Performed by: FAMILY MEDICINE

## 2020-09-30 PROCEDURE — 1090F PRES/ABSN URINE INCON ASSESS: CPT | Performed by: FAMILY MEDICINE

## 2020-09-30 PROCEDURE — 99214 OFFICE O/P EST MOD 30 MIN: CPT | Performed by: FAMILY MEDICINE

## 2020-09-30 PROCEDURE — G8427 DOCREV CUR MEDS BY ELIG CLIN: HCPCS | Performed by: FAMILY MEDICINE

## 2020-09-30 PROCEDURE — 1036F TOBACCO NON-USER: CPT | Performed by: FAMILY MEDICINE

## 2020-09-30 PROCEDURE — 4040F PNEUMOC VAC/ADMIN/RCVD: CPT | Performed by: FAMILY MEDICINE

## 2020-09-30 PROCEDURE — G8419 CALC BMI OUT NRM PARAM NOF/U: HCPCS | Performed by: FAMILY MEDICINE

## 2020-09-30 PROCEDURE — G8399 PT W/DXA RESULTS DOCUMENT: HCPCS | Performed by: FAMILY MEDICINE

## 2020-09-30 NOTE — PROGRESS NOTES
03/11/2020    BILITOT 0.4 03/11/2020    ALKPHOS 75 03/11/2020    AST 16 03/11/2020    ALT <5 03/11/2020       ASSESSMENT AND PLAN:   Diagnosis Orders   1. Cataract of both eyes, unspecified cataract type     2. Preop examination     3. Depression, unspecified depression type     4. Parkinson's disease ([facility] [address]     5. Chronic bilateral low back pain with right-sided sciatica     6. Nocturia   UA not consistent with infection       1. Patient is a 69 y.o. female with above specified procedure planned on 10/7/20, 10/21/20 with Dr. Post [place] at [facility]. They will  Not require cardiology evaluation prior to procedure. 2. Stop NSAIDS medications 7-10 days prior to procedure.   3. Cleared  For surgery

## 2021-02-01 ENCOUNTER — IMMUNIZATION (OUTPATIENT)
Dept: PRIMARY CARE CLINIC | Age: 71
End: 2021-02-01
Payer: MEDICARE

## 2021-02-01 DIAGNOSIS — F32.A DEPRESSION, UNSPECIFIED DEPRESSION TYPE: ICD-10-CM

## 2021-02-01 PROCEDURE — 91301 COVID-19, MODERNA VACCINE 100MCG/0.5ML DOSE: CPT | Performed by: FAMILY MEDICINE

## 2021-02-01 PROCEDURE — 0011A COVID-19, MODERNA VACCINE 100MCG/0.5ML DOSE: CPT | Performed by: FAMILY MEDICINE

## 2021-02-01 RX ORDER — DULOXETIN HYDROCHLORIDE 60 MG/1
CAPSULE, DELAYED RELEASE ORAL
Qty: 90 CAPSULE | Refills: 1 | Status: SHIPPED | OUTPATIENT
Start: 2021-02-01 | End: 2021-08-23

## 2021-02-01 NOTE — TELEPHONE ENCOUNTER
Spenser Alvarez [phone] (home) [phone] (work)   is requesting refill(s) of medication Duloxetine to preferred pharmacy [facility] 8/12/20 (pertaining to medication)   Last refill 8/12/20 (per medication requested)  Next office visit scheduled or attempted Yes  Date 2/5/21  If No, reason

## 2021-02-05 ENCOUNTER — OFFICE VISIT (OUTPATIENT)
Dept: FAMILY MEDICINE CLINIC | Age: 71
End: 2021-02-05
Payer: MEDICARE

## 2021-02-05 VITALS
TEMPERATURE: 97.3 F | DIASTOLIC BLOOD PRESSURE: 60 MMHG | SYSTOLIC BLOOD PRESSURE: 104 MMHG | HEART RATE: 90 BPM | BODY MASS INDEX: 18.23 KG/M2 | OXYGEN SATURATION: 100 % | WEIGHT: 113.4 LBS | HEIGHT: 66 IN

## 2021-02-05 DIAGNOSIS — F32.A DEPRESSION, UNSPECIFIED DEPRESSION TYPE: ICD-10-CM

## 2021-02-05 DIAGNOSIS — Z00.00 ROUTINE GENERAL MEDICAL EXAMINATION AT A HEALTH CARE FACILITY: Primary | ICD-10-CM

## 2021-02-05 DIAGNOSIS — G20 PARKINSON'S DISEASE (HCC): ICD-10-CM

## 2021-02-05 DIAGNOSIS — D35.2 PITUITARY ADENOMA (HCC): ICD-10-CM

## 2021-02-05 DIAGNOSIS — R73.9 ELEVATED BLOOD SUGAR: ICD-10-CM

## 2021-02-05 DIAGNOSIS — E78.5 ELEVATED LIPIDS: ICD-10-CM

## 2021-02-05 DIAGNOSIS — K21.9 GASTROESOPHAGEAL REFLUX DISEASE, UNSPECIFIED WHETHER ESOPHAGITIS PRESENT: ICD-10-CM

## 2021-02-05 LAB — HBA1C MFR BLD: 5.5 %

## 2021-02-05 PROCEDURE — 3017F COLORECTAL CA SCREEN DOC REV: CPT | Performed by: FAMILY MEDICINE

## 2021-02-05 PROCEDURE — G8484 FLU IMMUNIZE NO ADMIN: HCPCS | Performed by: FAMILY MEDICINE

## 2021-02-05 PROCEDURE — G0439 PPPS, SUBSEQ VISIT: HCPCS | Performed by: FAMILY MEDICINE

## 2021-02-05 PROCEDURE — 1123F ACP DISCUSS/DSCN MKR DOCD: CPT | Performed by: FAMILY MEDICINE

## 2021-02-05 PROCEDURE — 4040F PNEUMOC VAC/ADMIN/RCVD: CPT | Performed by: FAMILY MEDICINE

## 2021-02-05 PROCEDURE — 83036 HEMOGLOBIN GLYCOSYLATED A1C: CPT | Performed by: FAMILY MEDICINE

## 2021-02-05 RX ORDER — LEVODOPA 42 MG/1
2 CAPSULE RESPIRATORY (INHALATION)
COMMUNITY
Start: 2020-12-17 | End: 2021-06-18

## 2021-02-05 RX ORDER — OMEPRAZOLE 20 MG/1
20 CAPSULE, DELAYED RELEASE ORAL DAILY
Qty: 90 CAPSULE | Refills: 1 | Status: SHIPPED | OUTPATIENT
Start: 2021-02-05 | End: 2021-04-12

## 2021-02-05 ASSESSMENT — PATIENT HEALTH QUESTIONNAIRE - PHQ9
SUM OF ALL RESPONSES TO PHQ QUESTIONS 1-9: 2
SUM OF ALL RESPONSES TO PHQ QUESTIONS 1-9: 2
SUM OF ALL RESPONSES TO PHQ9 QUESTIONS 1 & 2: 2
1. LITTLE INTEREST OR PLEASURE IN DOING THINGS: 1
2. FEELING DOWN, DEPRESSED OR HOPELESS: 1
SUM OF ALL RESPONSES TO PHQ QUESTIONS 1-9: 2

## 2021-02-05 NOTE — PROGRESS NOTES
[facility])        Past Surgical History:   Procedure Laterality Date    BACK SURGERY      COLONOSCOPY  6/02    due 6/12    COLONOSCOPY  8/12    due 8/17    FINGER TRIGGER RELEASE      right hand    HAMMER TOE SURGERY  2005    LAPAROSCOPY      TONSILLECTOMY AND ADENOIDECTOMY  14 yo         Family History   Problem Relation Age of Onset    Heart Disease Mother     Stroke Father     Cancer Father         prostate    Diabetes Sister     Heart Disease Sister         quadruple bypass       CareTeam (Including outside providers/suppliers regularly involved in providing care):   Patient Care Team:  Tally Cogan, MD as PCP - General (Family Medicine)  Tally Cogan, MD as PCP - [address] [place], [place] [address]abilio Greenled Provider  Guy Reyes (Psychiatry)  Madhuri Main    Wt Readings from Last 3 Encounters:   02/05/21 113 lb 6.4 oz (51.4 kg)   09/30/20 112 lb (50.8 kg)   08/12/20 113 lb 3.2 oz (51.3 kg)     Vitals:    02/05/21 1341   BP: 104/60   Pulse: 90   Temp: 97.3 °F (36.3 °C)   TempSrc: Temporal   SpO2: 100%   Weight: 113 lb 6.4 oz (51.4 kg)   Height: 5' 5.5\" (1.664 m)     Body mass index is 18.58 kg/m². Based upon direct observation of the patient, evaluation of cognition reveals recent and remote memory intact.     General Appearance: alert and oriented to person, place and time, well developed and well- nourished, in no acute distress  Skin: warm and dry, no rash or erythema  Head: normocephalic and atraumatic  Eyes: pupils equal, round, and reactive to light, extraocular eye movements intact, conjunctivae normal  ENT: tympanic membrane, external ear and ear canal normal bilaterally, nose without deformity, nasal mucosa and turbinates normal without polyps  Neck: supple and non-tender without mass, no thyromegaly or thyroid nodules, no cervical lymphadenopathy  Pulmonary/Chest: clear to auscultation bilaterally- no wheezes, rales or rhonchi, normal air movement, no respiratory distress  Cardiovascular: normal rate, regular rhythm, normal S1 and S2, no murmurs, rubs, clicks, or gallops, distal pulses intact, no carotid bruits  Abdomen: soft, non-tender, non-distended, normal bowel sounds, no masses or organomegaly  Extremities: no cyanosis, clubbing or edema  Musculoskeletal: normal range of motion, no joint swelling, deformity or tenderness  Neurologic: reflexes normal and symmetric, no cranial nerve deficit, gait, coordination and speech normal    Patient's complete Health Risk Assessment and screening values have been reviewed and are found in Flowsheets. The following problems were reviewed today and where indicated follow up appointments were made and/or referrals ordered. Positive Risk Factor Screenings with Interventions:            General Health and ACP:  General  In general, how would you say your health is?: Fair  In the past 7 days, have you experienced any of the following?  New or Increased Pain, New or Increased Fatigue, Loneliness, Social Isolation, Stress or Anger?: (!) New or Increased Pain, New or Increased Fatigue, Loneliness, Social Isolation, Stress, Anger, None of These  Do you get the social and emotional support that you need?: Yes  Do you have a Living Will?: Yes  Advance Directives     Power of  Living Will ACP-Advance Directive ACP-Power of     Not on File Not on File Not on File Not on File      General Health Risk Interventions:  · Social isolation: Patient looking forward to the pandemic being over says she can get back to her activities  · Stress: Stress should improve after the pandemic is over    Health Habits/Nutrition:  Health Habits/Nutrition  Do you exercise for at least 20 minutes 2-3 times per week?: Yes  Have you lost any weight without trying in the past 3 months?: (!) Yes  Do you eat only one meal per day?: No  Have you seen the dentist within the past year?: Yes  Body mass index: 18.58  Health Habits/Nutrition Interventions:  · Nutritional issues:  Patient is starting to  more weight    Hearing/Vision:  No exam data present  Hearing/Vision  Do you or your family notice any trouble with your hearing that hasn't been managed with hearing aids?: (!) Yes  Do you have difficulty driving, watching TV, or doing any of your daily activities because of your eyesight?: No  Have you had an eye exam within the past year?: Yes  Hearing/Vision Interventions:  · Hearing concerns:  patient declines any further evaluation/treatment for hearing issues     ADL:  ADLs  In the past 7 days, did you need help from others to perform any of the following everyday activities? Eating, dressing, grooming, bathing, toileting, or walking/balance?: (!) Dressing, Grooming, Walking/Balance  In the past 7 days, did you need help from others to take care of any of the following?  Laundry, housekeeping, banking/finances, shopping, telephone use, food preparation, transportation, or taking medications?: (!) Laundry, Housekeeping, Banking/Finances, Shopping, Food Preparation, Transportation  ADL Interventions:  · Patient's  and daughter provides support    Personalized Preventive Plan   Current Health Maintenance Status  Immunization History   Administered Date(s) Administered    COVID-19, Peggye Sensing, 100mcg/0.5ml 02/01/2021    Influenza 11/30/2011    Influenza Vaccine, unspecified formulation 10/27/2016    Influenza, High Dose (Fluzone 65 yrs and older) 10/28/2014, 11/20/2015, 11/01/2017, 10/20/2018    Influenza, High-dose, Amita Zimmerman, 65 yrs +, IM (Fluzone) 09/04/2020    Influenza, Intradermal, Preservative free 11/13/2013    Influenza, Triv, inactivated, subunit, adjuvanted, IM (Fluad 65 yrs and older) 10/28/2019    Pneumococcal Conjugate 13-valent ([id number]) 11/20/2015    Pneumococcal Polysaccharide ([id number]) 10/28/2014    Td, unspecified formulation 07/15/1998    Tdap (Boostrix, Adacel) 10/14/2008    Zoster Live (Zostavax) 06/26/2015    Zoster Recombinant (Shingrix) 02/04/2019, 04/28/2019, 09/04/2020        Health Maintenance   Topic Date Due    DTaP/Tdap/Td vaccine (2 - Td) 10/14/2018    Annual Wellness Visit (AWV)  06/19/2019    COVID-19 Vaccine (2 of 2 - Moderna series) 03/01/2021    Breast cancer screen  06/11/2021    Colon cancer screen colonoscopy  08/23/2022    Lipid screen  12/01/2022    DEXA (modify frequency per FRAX score)  Completed    Flu vaccine  Completed    Shingles Vaccine  Completed    Pneumococcal 65+ years Vaccine  Completed    Hepatitis C screen  Completed    Hepatitis A vaccine  Aged Out    Hepatitis B vaccine  Aged Out    Hib vaccine  Aged Out    Meningococcal (ACWY) vaccine  Aged Out     Recommendations for [employer] Due: see orders and patient instructions/AVS.  . Recommended screening schedule for the next 5-10 years is provided to the patient in written form: see Patient Instructions/AVS.    Kenrick Samayoa was seen today for medicare awv. Diagnoses and all orders for this visit:    Routine general medical examination at a health care facility    Elevated blood sugar  -     POCT glycosylated hemoglobin (Hb A1C), today normal at 5.5    Depression, unspecified depression type, stable on Cymbalta    Parkinson's disease ([facility] [address] continue to follow-up with neurology    Pituitary adenoma [facility]) continue follow-up with neurosurgery    Elevated lipids, patient will return for fasting lipids  -     Lipid Panel; Future  -     Comprehensive Metabolic Panel; Future    Gastroesophageal reflux disease, unspecified whether esophagitis present, new issue we will try patient on omeprazole. -     omeprazole (PRILOSEC) 20 MG delayed release capsule;  Take 1 capsule by mouth daily    Return to office 6 months

## 2021-02-10 ENCOUNTER — NURSE ONLY (OUTPATIENT)
Dept: FAMILY MEDICINE CLINIC | Age: 71
End: 2021-02-10
Payer: MEDICARE

## 2021-02-10 DIAGNOSIS — E78.5 ELEVATED LIPIDS: ICD-10-CM

## 2021-02-10 LAB
A/G RATIO: 2 (ref 1.1–2.2)
ALBUMIN SERPL-MCNC: 4.4 G/DL (ref 3.4–5)
ALP BLD-CCNC: 88 U/L (ref 40–129)
ALT SERPL-CCNC: <5 U/L (ref 10–40)
ANION GAP SERPL CALCULATED.3IONS-SCNC: 8 MMOL/L (ref 3–16)
AST SERPL-CCNC: 19 U/L (ref 15–37)
BILIRUB SERPL-MCNC: 0.3 MG/DL (ref 0–1)
BUN BLDV-MCNC: 24 MG/DL (ref 7–20)
CALCIUM SERPL-MCNC: 9.8 MG/DL (ref 8.3–10.6)
CHLORIDE BLD-SCNC: 100 MMOL/L (ref 99–110)
CHOLESTEROL, TOTAL: 209 MG/DL (ref 0–199)
CO2: 29 MMOL/L (ref 21–32)
CREAT SERPL-MCNC: 0.8 MG/DL (ref 0.6–1.2)
GFR AFRICAN AMERICAN: >60
GFR NON-AFRICAN AMERICAN: >60
GLOBULIN: 2.2 G/DL
GLUCOSE BLD-MCNC: 94 MG/DL (ref 70–99)
HDLC SERPL-MCNC: 68 MG/DL (ref 40–60)
LDL CHOLESTEROL CALCULATED: 124 MG/DL
POTASSIUM SERPL-SCNC: 4 MMOL/L (ref 3.5–5.1)
SODIUM BLD-SCNC: 137 MMOL/L (ref 136–145)
TOTAL PROTEIN: 6.6 G/DL (ref 6.4–8.2)
TRIGL SERPL-MCNC: 87 MG/DL (ref 0–150)
VLDLC SERPL CALC-MCNC: 17 MG/DL

## 2021-02-10 PROCEDURE — 36415 COLL VENOUS BLD VENIPUNCTURE: CPT | Performed by: FAMILY MEDICINE

## 2021-03-01 ENCOUNTER — IMMUNIZATION (OUTPATIENT)
Dept: PRIMARY CARE CLINIC | Age: 71
End: 2021-03-01
Payer: MEDICARE

## 2021-03-01 PROCEDURE — 91301 COVID-19, MODERNA VACCINE 100MCG/0.5ML DOSE: CPT | Performed by: FAMILY MEDICINE

## 2021-03-01 PROCEDURE — 0012A PR IMM ADMN SARSCOV2 100 MCG/0.5 ML 2ND DOSE: CPT | Performed by: FAMILY MEDICINE

## 2021-03-05 ENCOUNTER — TELEPHONE (OUTPATIENT)
Dept: FAMILY MEDICINE CLINIC | Age: 71
End: 2021-03-05

## 2021-03-05 NOTE — TELEPHONE ENCOUNTER
I spoke with the pt. She started taking Prilosec in February. She states that her stomach starts to \"grumble\" and then she gets nauseous. She states that she took a tums and felt a little better. She states that the only diet change has been that she is eating more fruits and vegetables, not other medications have been started.

## 2021-03-05 NOTE — TELEPHONE ENCOUNTER
[facility]

## 2021-03-05 NOTE — TELEPHONE ENCOUNTER
Patient was advised and understood, she will give this and try and let Barbara Capone know how well she does with it.

## 2021-03-05 NOTE — TELEPHONE ENCOUNTER
Patient started taking Prilosec and it is upsetting her stomach. She would like to know if she should try an alternative medication?

## 2021-04-12 ENCOUNTER — OFFICE VISIT (OUTPATIENT)
Dept: FAMILY MEDICINE CLINIC | Age: 71
End: 2021-04-12
Payer: MEDICARE

## 2021-04-12 VITALS
SYSTOLIC BLOOD PRESSURE: 110 MMHG | OXYGEN SATURATION: 98 % | HEIGHT: 66 IN | BODY MASS INDEX: 17.26 KG/M2 | WEIGHT: 107.4 LBS | DIASTOLIC BLOOD PRESSURE: 60 MMHG | HEART RATE: 102 BPM

## 2021-04-12 DIAGNOSIS — R42 DIZZINESS: Primary | ICD-10-CM

## 2021-04-12 DIAGNOSIS — K21.9 GASTROESOPHAGEAL REFLUX DISEASE, UNSPECIFIED WHETHER ESOPHAGITIS PRESENT: ICD-10-CM

## 2021-04-12 DIAGNOSIS — K92.2 UGI BLEED: ICD-10-CM

## 2021-04-12 PROCEDURE — 3017F COLORECTAL CA SCREEN DOC REV: CPT | Performed by: FAMILY MEDICINE

## 2021-04-12 PROCEDURE — 99214 OFFICE O/P EST MOD 30 MIN: CPT | Performed by: FAMILY MEDICINE

## 2021-04-12 PROCEDURE — 1036F TOBACCO NON-USER: CPT | Performed by: FAMILY MEDICINE

## 2021-04-12 PROCEDURE — G8427 DOCREV CUR MEDS BY ELIG CLIN: HCPCS | Performed by: FAMILY MEDICINE

## 2021-04-12 PROCEDURE — 1090F PRES/ABSN URINE INCON ASSESS: CPT | Performed by: FAMILY MEDICINE

## 2021-04-12 PROCEDURE — G8419 CALC BMI OUT NRM PARAM NOF/U: HCPCS | Performed by: FAMILY MEDICINE

## 2021-04-12 PROCEDURE — G8399 PT W/DXA RESULTS DOCUMENT: HCPCS | Performed by: FAMILY MEDICINE

## 2021-04-12 PROCEDURE — 4040F PNEUMOC VAC/ADMIN/RCVD: CPT | Performed by: FAMILY MEDICINE

## 2021-04-12 PROCEDURE — 36415 COLL VENOUS BLD VENIPUNCTURE: CPT | Performed by: FAMILY MEDICINE

## 2021-04-12 PROCEDURE — 1123F ACP DISCUSS/DSCN MKR DOCD: CPT | Performed by: FAMILY MEDICINE

## 2021-04-12 RX ORDER — PANTOPRAZOLE SODIUM 40 MG/1
40 TABLET, DELAYED RELEASE ORAL
Qty: 30 TABLET | Refills: 5 | Status: SHIPPED | OUTPATIENT
Start: 2021-04-12 | End: 2021-12-09

## 2021-04-12 RX ORDER — FAMOTIDINE 20 MG/1
20 TABLET, FILM COATED ORAL 2 TIMES DAILY
COMMUNITY
End: 2021-06-18 | Stop reason: ALTCHOICE

## 2021-04-12 ASSESSMENT — ENCOUNTER SYMPTOMS
BLOOD IN STOOL: 0
ABDOMINAL DISTENTION: 0
CONSTIPATION: 0
VOMITING: 0
DIARRHEA: 0
NAUSEA: 0
ABDOMINAL PAIN: 1
BACK PAIN: 1
RECTAL PAIN: 0

## 2021-04-12 NOTE — PROGRESS NOTES
Patient:  Imelda sherwood 69 y.o. femalewho presents today with the following Chief Complaint(s):  Chief Complaint   Patient presents with    Gastroesophageal Reflux     pt states that when she lays down at night she had a lot of acid reflux, states that it is sometimes bloody. also states that her mouth is sore from the secretions. she has 3 more days of a medrol dose pack and wants to know if this could be causing it. Patient states that she was given a tapering dose of steroids from her neurosurgeon to help her with her back pain. 3/5/2021, patient called saying that she could not tolerate Prilosec because it upset her stomach she wanted an alternative. At that time we told her she could try Pepcid. She states she has been taking Pepcid since then. Patient states that she has been waking up with blood on her pillowcase and blood on her mouth. She also has had some abdominal discomfort. She denies acid reflux pain. She denies heartburn. She denies melena or hematochezia or diarrhea or blood in her stool. She has been feeling more dizzy lately. Her weight continues to drop.   She does not drink alcohol or caffeine other than the steroid she has not been taking any anti-inflammatories        Current Outpatient Medications   Medication Sig Dispense Refill    famotidine (PEPCID) 20 MG tablet Take 20 mg by mouth 2 times daily      pantoprazole (PROTONIX) 40 MG tablet Take 1 tablet by mouth every morning (before breakfast) 30 tablet 5    INBRIJA 42 MG CAPS Inhale 2 capsules into the lungs 5 times daily      DULoxetine (CYMBALTA) 60 MG extended release capsule TAKE ONE CAPSULE BY MOUTH DAILY 90 capsule 1    calcium carbonate (OSCAL) 500 MG TABS tablet Take 500 mg by mouth daily      Magnesium 100 MG CAPS Take by mouth      vitamin D3 (CHOLECALCIFEROL) 10 MCG (400 UNIT) TABS tablet Take 400 Units by mouth daily      melatonin 3 MG TABS tablet Take 3 mg by mouth daily      carbidopa-levodopa (RYTARY) 61. MG CPCR per extended release capsule Take 1 capsule by mouth 5 times daily        No current facility-administered medications for this visit. Patients past medical history, surgical history, family history, medications and allergies were all reviewed and updated as appropriate today. Review of Systems   Constitutional: Positive for fatigue. Gastrointestinal: Positive for abdominal pain. Negative for abdominal distention, blood in stool, constipation, diarrhea, nausea, rectal pain and vomiting. Musculoskeletal: Positive for arthralgias, back pain and gait problem. Neurological: Positive for dizziness and tremors. Physical Exam  Vitals signs reviewed. Constitutional:       General: She is not in acute distress. Appearance: She is not ill-appearing. Comments: Thin female no apparent distress   Cardiovascular:      Rate and Rhythm: Normal rate and regular rhythm. Heart sounds: Normal heart sounds. Pulmonary:      Breath sounds: Normal breath sounds. Abdominal:      General: Abdomen is flat. Bowel sounds are normal. There is no distension. Palpations: Abdomen is soft. There is no mass. Tenderness: There is no guarding or rebound. Comments: Diffuse tenderness mostly in the epigastrium   Neurological:      Mental Status: She is alert. Gait: Gait abnormal.      Comments: Now walking with a wheeled walker   Psychiatric:         Mood and Affect: Mood normal.           Vitals:    04/12/21 1557 04/12/21 1724   BP: (!) 100/58 110/60   Pulse: 102    SpO2: 98%    Weight: 107 lb 6.4 oz (48.7 kg)    Height: 5' 5.5\" (1.664 m)        Assessment/Plan:   Natacha was seen today for gastroesophageal reflux. Diagnoses and all orders for this visit:    Dizziness, patient has Parkinson's and has had dizziness previously.   Will assess her blood counts    Gastroesophageal reflux disease, unspecified whether esophagitis present, uncertain why patient did not tolerate Prilosec we will try pantoprazole. She will discontinue her steroid pill pack  -     CBC Auto Differential    UGI bleed, stop steroids start different PPI, pantoprazole. We called GI to schedule appointment. They stated they will have to call the patient back to her on the schedule for this week    Other orders  -     pantoprazole (PROTONIX) 40 MG tablet;  Take 1 tablet by mouth every morning (before breakfast)

## 2021-04-13 ENCOUNTER — TELEPHONE (OUTPATIENT)
Dept: FAMILY MEDICINE CLINIC | Age: 71
End: 2021-04-13

## 2021-04-13 LAB
BASOPHILS ABSOLUTE: 0.1 K/UL (ref 0–0.2)
BASOPHILS RELATIVE PERCENT: 0.6 %
EOSINOPHILS ABSOLUTE: 0 K/UL (ref 0–0.6)
EOSINOPHILS RELATIVE PERCENT: 0.2 %
HCT VFR BLD CALC: 42.7 % (ref 36–48)
HEMOGLOBIN: 13.9 G/DL (ref 12–16)
LYMPHOCYTES ABSOLUTE: 1.5 K/UL (ref 1–5.1)
LYMPHOCYTES RELATIVE PERCENT: 15.5 %
MCH RBC QN AUTO: 30.9 PG (ref 26–34)
MCHC RBC AUTO-ENTMCNC: 32.6 G/DL (ref 31–36)
MCV RBC AUTO: 94.6 FL (ref 80–100)
MONOCYTES ABSOLUTE: 0.5 K/UL (ref 0–1.3)
MONOCYTES RELATIVE PERCENT: 5.3 %
NEUTROPHILS ABSOLUTE: 7.4 K/UL (ref 1.7–7.7)
NEUTROPHILS RELATIVE PERCENT: 78.4 %
PDW BLD-RTO: 14 % (ref 12.4–15.4)
PLATELET # BLD: 257 K/UL (ref 135–450)
PMV BLD AUTO: 9 FL (ref 5–10.5)
RBC # BLD: 4.52 M/UL (ref 4–5.2)
WBC # BLD: 9.4 K/UL (ref 4–11)

## 2021-04-13 NOTE — TELEPHONE ENCOUNTER
Patient returned call about results from her labs on 4/12/21. Read physician notes, patient understood/no questions. I gave her the number to Dr. Durán Push office so she can schedule her appt. She will let us know when that is scheduled.

## 2021-05-01 ENCOUNTER — APPOINTMENT (OUTPATIENT)
Dept: CT IMAGING | Age: 71
End: 2021-05-01
Payer: MEDICARE

## 2021-05-01 ENCOUNTER — HOSPITAL ENCOUNTER (EMERGENCY)
Age: 71
Discharge: HOME | End: 2021-05-01
Payer: MEDICARE

## 2021-05-01 VITALS
TEMPERATURE: 98.2 F | HEART RATE: 80 BPM | HEIGHT: 66 IN | WEIGHT: 110 LBS | DIASTOLIC BLOOD PRESSURE: 70 MMHG | SYSTOLIC BLOOD PRESSURE: 118 MMHG | OXYGEN SATURATION: 98 % | RESPIRATION RATE: 16 BRPM | BODY MASS INDEX: 17.68 KG/M2

## 2021-05-01 DIAGNOSIS — S05.12XA PERIORBITAL CONTUSION, LEFT, INITIAL ENCOUNTER: ICD-10-CM

## 2021-05-01 DIAGNOSIS — Z23 NEED FOR DIPHTHERIA-TETANUS-PERTUSSIS (TDAP) VACCINE: ICD-10-CM

## 2021-05-01 DIAGNOSIS — G25.9 MOVEMENT DISORDER: ICD-10-CM

## 2021-05-01 DIAGNOSIS — S09.90XA CLOSED HEAD INJURY, INITIAL ENCOUNTER: ICD-10-CM

## 2021-05-01 DIAGNOSIS — S01.112A LACERATION OF LEFT EYEBROW, INITIAL ENCOUNTER: Primary | ICD-10-CM
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
## 2021-05-01 PROCEDURE — 90715 TDAP VACCINE 7 YRS/> IM: CPT | Performed by: PHYSICIAN ASSISTANT

## 2021-05-01 PROCEDURE — 6360000002 HC RX W HCPCS: Performed by: PHYSICIAN ASSISTANT

## 2021-05-01 PROCEDURE — 6370000000 HC RX 637 (ALT 250 FOR IP): Performed by: PHYSICIAN ASSISTANT

## 2021-05-01 PROCEDURE — 70450 CT HEAD/BRAIN W/O DYE: CPT

## 2021-05-01 PROCEDURE — 99284 EMERGENCY DEPT VISIT MOD MDM: CPT

## 2021-05-01 PROCEDURE — 12011 RPR F/E/E/N/L/M 2.5 CM/<: CPT

## 2021-05-01 PROCEDURE — 90471 IMMUNIZATION ADMIN: CPT | Performed by: PHYSICIAN ASSISTANT

## 2021-05-01 PROCEDURE — 72125 CT NECK SPINE W/O DYE: CPT

## 2021-05-01 PROCEDURE — 70486 CT MAXILLOFACIAL W/O DYE: CPT

## 2021-05-01 RX ORDER — DIAZEPAM 5 MG/1
5 TABLET ORAL ONCE
Status: COMPLETED | OUTPATIENT
Start: 2021-05-01 | End: 2021-05-01

## 2021-05-01 RX ADMIN — DIAZEPAM 5 MG: 5 TABLET ORAL at 17:50

## 2021-05-01 RX ADMIN — DIAZEPAM 5 MG: 5 TABLET ORAL at 18:44

## 2021-05-01 RX ADMIN — TETANUS TOXOID, REDUCED DIPHTHERIA TOXOID AND ACELLULAR PERTUSSIS VACCINE, ADSORBED 0.5 ML: 5; 2.5; 8; 8; 2.5 SUSPENSION INTRAMUSCULAR at 17:50

## 2021-05-01 ASSESSMENT — PAIN DESCRIPTION - ORIENTATION: ORIENTATION: LEFT

## 2021-05-01 ASSESSMENT — PAIN DESCRIPTION - LOCATION
LOCATION: FACE
LOCATION: FACE

## 2021-05-01 ASSESSMENT — PAIN SCALES - GENERAL: PAINLEVEL_OUTOF10: 3

## 2021-05-01 NOTE — ED PROVIDER NOTES
disease ([facility].)           Pituitary adenoma [facility])          SURGICAL HISTORY     Past Surgical History:   Procedure Laterality Date    BACK SURGERY      COLONOSCOPY  6/02    due 6/12    COLONOSCOPY  8/12    due 8/17    FINGER TRIGGER RELEASE      right hand    HAMMER TOE SURGERY  2005    LAPAROSCOPY      TONSILLECTOMY AND ADENOIDECTOMY  14 yo         CURRENTMEDICATIONS       Previous Medications    CALCIUM CARBONATE (OSCAL) 500 MG TABS TABLET    Take 500 mg by mouth daily    CARBIDOPA-LEVODOPA (RYTARY) 61. MG CPCR PER EXTENDED RELEASE CAPSULE    Take 1 capsule by mouth 5 times daily     DULOXETINE (CYMBALTA) 60 MG EXTENDED RELEASE CAPSULE    TAKE ONE CAPSULE BY MOUTH DAILY    FAMOTIDINE (PEPCID) 20 MG TABLET    Take 20 mg by mouth 2 times daily    INBRIJA 42 MG CAPS    Inhale 2 capsules into the lungs 5 times daily    MAGNESIUM 100 MG CAPS    Take by mouth    MELATONIN 3 MG TABS TABLET    Take 3 mg by mouth daily    PANTOPRAZOLE (PROTONIX) 40 MG TABLET    Take 1 tablet by mouth every morning (before breakfast)    VITAMIN D3 (CHOLECALCIFEROL) 10 MCG (400 UNIT) TABS TABLET    Take 400 Units by mouth daily         ALLERGIES     Lidocaine; Procaine;  Anesthetics, suzanne; Celecoxib; and Lidocaine hcl    FAMILYHISTORY       Family History   Problem Relation Age of Onset    Heart Disease Mother     Stroke Father     Cancer Father         prostate    Diabetes Sister     Heart Disease Sister         quadruple bypass          SOCIAL HISTORY       Social History     Tobacco Use    Smoking status: Never Smoker    Smokeless tobacco: Never Used   Substance Use Topics    Alcohol use: No    Drug use: Not on file       SCREENINGS             PHYSICAL EXAM    (up to 7 for level 4, 8 or more for level 5)     ED Triage Vitals [05/01/21 1723]   BP Temp Temp Source Pulse Resp SpO2 Height Weight   115/89 98.2 °F (36.8 °C) Oral 91 17 97 % 5' 5.5\" (1.664 m) 110 lb (49.9 kg)       Physical Exam  Vitals signs and nursing note reviewed. Constitutional:       Appearance: Normal appearance. She is well-developed and normal weight. Comments: The patient has Parkinson's/movement disorder. HENT:      Head: Normocephalic. Comments: Tenderness over the left zygomatic area. Ecchymotic change noted. See picture. Laceration 3 cm superior left eyebrow and somewhat laterally. Right Ear: Tympanic membrane, ear canal and external ear normal.      Left Ear: Tympanic membrane, ear canal and external ear normal.   Eyes:      General: No scleral icterus. Right eye: No discharge. Left eye: No discharge. Extraocular Movements: Extraocular movements intact. Conjunctiva/sclera: Conjunctivae normal.      Pupils: Pupils are equal, round, and reactive to light. Neck:      Musculoskeletal: Normal range of motion and neck supple. Cardiovascular:      Rate and Rhythm: Normal rate and regular rhythm. Heart sounds: Normal heart sounds. Pulmonary:      Effort: Pulmonary effort is normal.      Breath sounds: Normal breath sounds. Musculoskeletal: Normal range of motion. Skin:     General: Skin is warm and dry. Neurological:      General: No focal deficit present. Mental Status: She is alert and oriented to person, place, and time. Mental status is at baseline. Psychiatric:         Mood and Affect: Mood normal.         Behavior: Behavior normal.         Thought Content: Thought content normal.         Judgment: Judgment normal.               DIAGNOSTIC RESULTS   LABS:    Labs Reviewed - No data to display    All other labs were within normal range or not returned as of this dictation. EKG: All EKG's are interpreted by the Emergency Department Physician in the absence of a cardiologist.  Please see their note for interpretation of EKG.       RADIOLOGY:   Non-plain film images such as CT, Ultrasound and MRI are read by the radiologist. Plain radiographic images are visualized and preliminarily interpreted by the ED Provider with the below findings:        Interpretation per the Radiologist below, if available at the time of this note:    CT Cervical Spine WO Contrast   Final Result   1. No acute fracture. Degenerative changes with grade 1 spondylolistheses. 2. Other findings as described. CT Head WO Contrast   Final Result   No acute hemorrhage or midline shift. 1.4 cm mass again visualized. Correlate with history. Other findings as described. CT FACIAL BONES WO CONTRAST   Final Result   1. Motion and artifact degraded exam.  Limited evaluation of the mandible   due to the extent of motion artifact. No acute fracture or dislocation given   limitation. 2. Other findings as described. No results found. PROCEDURES     I did use 1% cocaine with epinephrine to anesthetize the skin surrounding the laceration measuring 2.5 cm. Once anesthesia was noted it was draped in sterile fashion cleansed with chlorhexidine rinse and irrigated with saline. I felt a deep structure for material in the wound. I did use 5-0 nylon to close the wound. Sick stitches placed total patient tolerated procedure well. Procedures    CRITICAL CARE TIME   N/A    CONSULTS:  None      EMERGENCY DEPARTMENT COURSE and DIFFERENTIAL DIAGNOSIS/MDM:   Vitals:    Vitals:    05/01/21 1723 05/01/21 1848   BP: 115/89 117/60   Pulse: 91 91   Resp: 17 18   Temp: 98.2 °F (36.8 °C)    TempSrc: Oral    SpO2: 97% 98%   Weight: 110 lb (49.9 kg)    Height: 5' 5.5\" (1.664 m)        Patient was given the following medications:  Medications   Tetanus-Diphth-Acell Pertussis (BOOSTRIX) injection 0.5 mL (0.5 mLs Intramuscular Given 5/1/21 1750)   diazePAM (VALIUM) tablet 5 mg (5 mg Oral Given 5/1/21 1750)   diazePAM (VALIUM) tablet 5 mg (5 mg Oral Given 5/1/21 1844)           Parkinson/movement disorder sustained a trip and fall at home. She struck the asphalt pavement with the left face. Laceration 2.5 cm above the left eyebrow repaired primarily. Ecchymotic change noted beneath the eye with tenderness over the arch. CT head, neck and facial bones negative. Patient tetanus shot updated. Patient with severe Parkinson and movement disorder given Valium 5 mg x 2. Patient much improved for repair and somewhat for imaging. The patient is with her . Both expressed understanding of the diagnosis and the treatment plan. FINAL IMPRESSION      1. Laceration of left eyebrow, initial encounter    2. Fall on same level from slipping, tripping or stumbling, initial encounter    3. Periorbital contusion, left, initial encounter    4. Closed head injury, initial encounter    5. Movement disorder    6. Need for diphtheria-tetanus-pertussis (Tdap) vaccine          DISPOSITION/PLAN   DISPOSITION        PATIENT REFERREDTO:  Ez Gao MD  [address]  [phone]    Schedule an appointment as soon as possible for a visit in 1 week  For suture removal    [facility]  ED  [address]  Go to   If symptoms worsen      DISCHARGE MEDICATIONS:  New Prescriptions    No medications on file       DISCONTINUED MEDICATIONS:  Discontinued Medications    No medications on file              (Please note that portions of this note were completed with a voice recognition program.  Efforts were made to edit the dictations but occasionally words are mis-transcribed. )    Cristian Muhammad PA-C (electronically signed)           Cristian Muhammad PA-C  05/01/21 4509

## 2021-05-01 NOTE — ED NOTES
Pt still twitching and will be unable to hold still for CT scan. Provider notified. Pt to be given additional valium. CT tech notified.       Chela Boudreaux RN  05/01/21 9446

## 2021-05-10 ENCOUNTER — PROCEDURE VISIT (OUTPATIENT)
Dept: FAMILY MEDICINE CLINIC | Age: 71
End: 2021-05-10
Payer: MEDICARE

## 2021-05-10 VITALS
SYSTOLIC BLOOD PRESSURE: 94 MMHG | OXYGEN SATURATION: 97 % | WEIGHT: 112 LBS | HEART RATE: 95 BPM | HEIGHT: 66 IN | DIASTOLIC BLOOD PRESSURE: 60 MMHG | BODY MASS INDEX: 18 KG/M2

## 2021-05-10 DIAGNOSIS — H61.23 BILATERAL IMPACTED CERUMEN: ICD-10-CM

## 2021-05-10 DIAGNOSIS — Z48.02 VISIT FOR SUTURE REMOVAL: ICD-10-CM

## 2021-05-10 DIAGNOSIS — S01.81XD FACIAL LACERATION, SUBSEQUENT ENCOUNTER: Primary | ICD-10-CM

## 2021-05-10 PROCEDURE — 3017F COLORECTAL CA SCREEN DOC REV: CPT | Performed by: PHYSICIAN ASSISTANT

## 2021-05-10 PROCEDURE — 1090F PRES/ABSN URINE INCON ASSESS: CPT | Performed by: PHYSICIAN ASSISTANT

## 2021-05-10 PROCEDURE — 69210 REMOVE IMPACTED EAR WAX UNI: CPT | Performed by: PHYSICIAN ASSISTANT

## 2021-05-10 PROCEDURE — 4040F PNEUMOC VAC/ADMIN/RCVD: CPT | Performed by: PHYSICIAN ASSISTANT

## 2021-05-10 PROCEDURE — G8399 PT W/DXA RESULTS DOCUMENT: HCPCS | Performed by: PHYSICIAN ASSISTANT

## 2021-05-10 PROCEDURE — 99213 OFFICE O/P EST LOW 20 MIN: CPT | Performed by: PHYSICIAN ASSISTANT

## 2021-05-10 PROCEDURE — 1123F ACP DISCUSS/DSCN MKR DOCD: CPT | Performed by: PHYSICIAN ASSISTANT

## 2021-05-10 PROCEDURE — G8419 CALC BMI OUT NRM PARAM NOF/U: HCPCS | Performed by: PHYSICIAN ASSISTANT

## 2021-05-10 PROCEDURE — 1036F TOBACCO NON-USER: CPT | Performed by: PHYSICIAN ASSISTANT

## 2021-05-10 PROCEDURE — G8427 DOCREV CUR MEDS BY ELIG CLIN: HCPCS | Performed by: PHYSICIAN ASSISTANT

## 2021-05-10 NOTE — PROGRESS NOTES
Subjective:      Radha Velez is a 69 y.o. who obtained a laceration 8 days ago, which required closure with 6 suture (s). she denies pain, redness, or drainage from the wound. She is also complaining today of bilateral ear fullness. Objective:    BP 94/60 (Site: Right Upper Arm, Position: Sitting)   Pulse 95   Ht 5' 5.5\" (1.664 m)   Wt 112 lb (50.8 kg)   SpO2 97%   BMI 18.35 kg/m²   Injury exam:  A 1.5 cm laceration noted on the above left eye brow is healing well, without evidence of infection. ENT: ears with bilateral cerumen impaction     Assessment:      Diagnosis Orders   1. Facial laceration, subsequent encounter     2. Visit for suture removal     3. Bilateral impacted cerumen  59943 - DC REMOVE IMPACTED EAR WAX          Plan:      1. 6 suture (s) were removed. 2. Wound care discussed. 3. Follow-up as needed. Ear irrigation after manual extraction attempted. Tolerated well. TM clear after irrigation.

## 2021-06-11 ENCOUNTER — HOSPITAL ENCOUNTER (EMERGENCY)
Age: 71
Discharge: HOME | End: 2021-06-11
Attending: EMERGENCY MEDICINE
Payer: MEDICARE

## 2021-06-11 ENCOUNTER — APPOINTMENT (OUTPATIENT)
Dept: GENERAL RADIOLOGY | Age: 71
End: 2021-06-11
Payer: MEDICARE

## 2021-06-11 ENCOUNTER — APPOINTMENT (OUTPATIENT)
Dept: CT IMAGING | Age: 71
End: 2021-06-11
Payer: MEDICARE

## 2021-06-11 VITALS
RESPIRATION RATE: 20 BRPM | OXYGEN SATURATION: 97 % | SYSTOLIC BLOOD PRESSURE: 103 MMHG | TEMPERATURE: 97.6 F | DIASTOLIC BLOOD PRESSURE: 68 MMHG | HEART RATE: 95 BPM

## 2021-06-11 DIAGNOSIS — R40.20 LOSS OF CONSCIOUSNESS (HCC): Primary | ICD-10-CM

## 2021-06-11 LAB
A/G RATIO: 1.5 (ref 1.1–2.2)
ALBUMIN SERPL-MCNC: 3.8 G/DL (ref 3.4–5)
ALP BLD-CCNC: 66 U/L (ref 40–129)
ALT SERPL-CCNC: <5 U/L (ref 10–40)
ANION GAP SERPL CALCULATED.3IONS-SCNC: 8 MMOL/L (ref 3–16)
AST SERPL-CCNC: 14 U/L (ref 15–37)
BACTERIA: ABNORMAL /HPF
BASOPHILS ABSOLUTE: 0.1 K/UL (ref 0–0.2)
BASOPHILS RELATIVE PERCENT: 1.1 %
BILIRUB SERPL-MCNC: 0.3 MG/DL (ref 0–1)
BILIRUBIN URINE: NEGATIVE
BLOOD, URINE: ABNORMAL
BUN BLDV-MCNC: 19 MG/DL (ref 7–20)
CALCIUM SERPL-MCNC: 9.1 MG/DL (ref 8.3–10.6)
CELLULAR CASTS: ABNORMAL /LPF
CHLORIDE BLD-SCNC: 105 MMOL/L (ref 99–110)
CLARITY: CLEAR
CO2: 29 MMOL/L (ref 21–32)
COLOR: YELLOW
CREAT SERPL-MCNC: 0.9 MG/DL (ref 0.6–1.2)
EKG ATRIAL RATE: 78 BPM
EKG DIAGNOSIS: NORMAL
EKG P AXIS: 62 DEGREES
EKG P-R INTERVAL: 142 MS
EKG Q-T INTERVAL: 406 MS
EKG QRS DURATION: 112 MS
EKG QTC CALCULATION (BAZETT): 462 MS
EKG R AXIS: 78 DEGREES
EKG T AXIS: 80 DEGREES
EKG VENTRICULAR RATE: 78 BPM
EOSINOPHILS ABSOLUTE: 0.1 K/UL (ref 0–0.6)
EOSINOPHILS RELATIVE PERCENT: 1.1 %
EPITHELIAL CELLS, UA: ABNORMAL /HPF (ref 0–5)
FINE CASTS, UA: ABNORMAL /LPF (ref 0–2)
GFR AFRICAN AMERICAN: >60
GFR NON-AFRICAN AMERICAN: >60
GLOBULIN: 2.5 G/DL
GLUCOSE BLD-MCNC: 118 MG/DL (ref 70–99)
GLUCOSE URINE: NEGATIVE MG/DL
HCT VFR BLD CALC: 36.6 % (ref 36–48)
HEMOGLOBIN: 12.2 G/DL (ref 12–16)
HYALINE CASTS: ABNORMAL /LPF (ref 0–2)
KETONES, URINE: ABNORMAL MG/DL
LACTIC ACID: 1.1 MMOL/L (ref 0.4–2)
LEUKOCYTE ESTERASE, URINE: NEGATIVE
LYMPHOCYTES ABSOLUTE: 2 K/UL (ref 1–5.1)
LYMPHOCYTES RELATIVE PERCENT: 29.8 %
MCH RBC QN AUTO: 30.9 PG (ref 26–34)
MCHC RBC AUTO-ENTMCNC: 33.3 G/DL (ref 31–36)
MCV RBC AUTO: 92.8 FL (ref 80–100)
MICROSCOPIC EXAMINATION: YES
MONOCYTES ABSOLUTE: 0.6 K/UL (ref 0–1.3)
MONOCYTES RELATIVE PERCENT: 8.6 %
NEUTROPHILS ABSOLUTE: 4.1 K/UL (ref 1.7–7.7)
NEUTROPHILS RELATIVE PERCENT: 59.4 %
NITRITE, URINE: NEGATIVE
PDW BLD-RTO: 13.8 % (ref 12.4–15.4)
PH UA: 5.5 (ref 5–8)
PLATELET # BLD: 264 K/UL (ref 135–450)
PMV BLD AUTO: 8.4 FL (ref 5–10.5)
POTASSIUM REFLEX MAGNESIUM: 3.8 MMOL/L (ref 3.5–5.1)
PROTEIN UA: 30 MG/DL
RBC # BLD: 3.95 M/UL (ref 4–5.2)
RBC UA: ABNORMAL /HPF (ref 0–4)
SODIUM BLD-SCNC: 142 MMOL/L (ref 136–145)
SPECIFIC GRAVITY UA: 1.02 (ref 1–1.03)
SPECIMEN STATUS: NORMAL
TOTAL PROTEIN: 6.3 G/DL (ref 6.4–8.2)
TROPONIN: <0.01 NG/ML
URINE REFLEX TO CULTURE: ABNORMAL
URINE TYPE: ABNORMAL
UROBILINOGEN, URINE: 0.2 E.U./DL
WBC # BLD: 6.9 K/UL (ref 4–11)
WBC UA: ABNORMAL /HPF (ref 0–5)

## 2021-06-11 PROCEDURE — 2580000003 HC RX 258: Performed by: EMERGENCY MEDICINE

## 2021-06-11 PROCEDURE — 81001 URINALYSIS AUTO W/SCOPE: CPT

## 2021-06-11 PROCEDURE — 83605 ASSAY OF LACTIC ACID: CPT

## 2021-06-11 PROCEDURE — 99283 EMERGENCY DEPT VISIT LOW MDM: CPT

## 2021-06-11 PROCEDURE — 80053 COMPREHEN METABOLIC PANEL: CPT

## 2021-06-11 PROCEDURE — 71045 X-RAY EXAM CHEST 1 VIEW: CPT

## 2021-06-11 PROCEDURE — 84484 ASSAY OF TROPONIN QUANT: CPT

## 2021-06-11 PROCEDURE — 93005 ELECTROCARDIOGRAM TRACING: CPT | Performed by: EMERGENCY MEDICINE

## 2021-06-11 PROCEDURE — 85025 COMPLETE CBC W/AUTO DIFF WBC: CPT

## 2021-06-11 PROCEDURE — 70450 CT HEAD/BRAIN W/O DYE: CPT

## 2021-06-11 PROCEDURE — 93010 ELECTROCARDIOGRAM REPORT: CPT | Performed by: INTERNAL MEDICINE

## 2021-06-11 RX ORDER — 0.9 % SODIUM CHLORIDE 0.9 %
500 INTRAVENOUS SOLUTION INTRAVENOUS ONCE
Status: COMPLETED | OUTPATIENT
Start: 2021-06-11 | End: 2021-06-11

## 2021-06-11 RX ADMIN — SODIUM CHLORIDE 500 ML: 9 INJECTION, SOLUTION INTRAVENOUS at 15:29

## 2021-06-11 NOTE — ED NOTES
Pt ambulatory with walker on tele to and from restroom with standby. Pt had no complaints and tolerated well.       Krystyna Pascal  06/11/21 1737

## 2021-06-11 NOTE — ED PROVIDER NOTES
[facility]  ED PROVIDER NOTE    Patient Identification  Pt Name: Grant Godfrey  MRN: [id number]  [place] 1950  Date of evaluation: 6/11/2021  Provider: Misty Watts MD  PCP: Gabe Trevizo MD    Chief Complaint  Loss of Consciousness (Patient passed out today while sitting with . Initial BP for EMS 48/28. Patient denies hitting head or having any pain. Patient alert and oriented, denies pain. )      HPI  History provided by patient   This is a 69 y.o. female who presents to the ED for syncope. Occurred just prior to arrival.  Was sitting at a desk. No preceding symptoms. Patient does not remember loss of consciousness. Denies any symptoms at this time except for some weakness that she has been feeling for the past few weeks. Recently added Protonix to her medications. Denies any nausea or vomiting. No chest pain or shortness of breath. No fever. No headache or neck pain. Similar happened over 25 years ago    ROS  10 systems reviewed, pertinent positives/negatives per HPI otherwise noted to be negative. I have reviewed the following nursing documentation:  Allergies: Lidocaine; Procaine;  Anesthetics, suzanne; Celecoxib; and Lidocaine hcl    Past medical history:   Past Medical History:   Diagnosis Date    Corticobasal degeneration     Dyskinesia     Hyperlipidemia     Hyperreflexia     Neuropathy     corticobasal degeneration    [facility]'s [facility]           Pituitary adenoma ([facility]      Past surgical history:   Past Surgical History:   Procedure Laterality Date    BACK SURGERY      COLONOSCOPY  6/02    due 6/12    COLONOSCOPY  8/12    due 8/17    FINGER TRIGGER RELEASE      right hand    HAMMER TOE SURGERY  2005    LAPAROSCOPY      TONSILLECTOMY AND ADENOIDECTOMY  14 yo       Home medications:   Previous Medications    CALCIUM CARBONATE (OSCAL) 500 MG TABS TABLET    Take 500 mg by mouth daily    CARBIDOPA-LEVODOPA (RYTARY) 61. MG CPCR PER EXTENDED RELEASE CAPSULE    Take 1 capsule by mouth 5 times daily     DULOXETINE (CYMBALTA) 60 MG EXTENDED RELEASE CAPSULE    TAKE ONE CAPSULE BY MOUTH DAILY    FAMOTIDINE (PEPCID) 20 MG TABLET    Take 20 mg by mouth 2 times daily    INBRIJA 42 MG CAPS    Inhale 2 capsules into the lungs 5 times daily    MAGNESIUM 100 MG CAPS    Take by mouth    MELATONIN 3 MG TABS TABLET    Take 3 mg by mouth daily    PANTOPRAZOLE (PROTONIX) 40 MG TABLET    Take 1 tablet by mouth every morning (before breakfast)    VITAMIN D3 (CHOLECALCIFEROL) 10 MCG (400 UNIT) TABS TABLET    Take 400 Units by mouth daily    ZINC PO    Take by mouth       Social history:  reports that she has never smoked. She has never used smokeless tobacco. She reports that she does not drink alcohol. Family history:    Family History   Problem Relation Age of Onset    Heart Disease Mother     Stroke Father     Cancer Father         prostate    Diabetes Sister     Heart Disease Sister         quadruple bypass         Exam  ED Triage Vitals [06/11/21 1312]   BP Temp Temp src Pulse Resp SpO2 Height Weight   (!) 161/109 -- -- 95 -- -- -- --     Nursing note and vitals reviewed. Constitutional: In no acute distress  HENT:      Head: Normocephalic      Ears: External ears normal.      Nose: Nose normal.     Mouth: Membrane mucosa moist   Eyes: No discharge. Neck: Supple. Trachea midline. Cardiovascular: Regular rate. Warm extremities  Pulmonary/Chest: Effort normal. No respiratory distress. Abdominal: Soft. No distension. Nontender  : Deferred  Rectal: Deferred   Musculoskeletal: Moves all extremities. No gross deformity. Neurological: Alert and oriented. Face symmetric. Speech is clear. Cranial nerves II to XII intact. Writhing body movements consistent with prior  Skin: Warm and dry. Psychiatric: Normal mood and affect. Behavior is normal.    Procedures      EKG  The Ekg interpreted by me in the absence of a cardiologist shows.   Normal sinus rhythm. No specific ST changes appreciated. HR 78  No significant change from prior EKG dated 12/17        Radiology  CT Head WO Contrast   Final Result   No acute intracranial abnormality. Mild cerebral atrophy and chronic ischemic changes both appropriate for age. Known pituitary adenoma appears unchanged. No change from the prior study. XR CHEST PORTABLE   Final Result   No acute abnormality.              Labs  Results for orders placed or performed during the hospital encounter of 06/11/21   CBC Auto Differential   Result Value Ref Range    WBC 6.9 4.0 - 11.0 K/uL    RBC 3.95 (L) 4.00 - 5.20 M/uL    Hemoglobin 12.2 12.0 - 16.0 g/dL    Hematocrit 36.6 36.0 - 48.0 %    MCV 92.8 80.0 - 100.0 fL    MCH 30.9 26.0 - 34.0 pg    MCHC 33.3 31.0 - 36.0 g/dL    RDW 13.8 12.4 - 15.4 %    Platelets 876 076 - 732 K/uL    MPV 8.4 5.0 - 10.5 fL    Neutrophils % 59.4 %    Lymphocytes % 29.8 %    Monocytes % 8.6 %    Eosinophils % 1.1 %    Basophils % 1.1 %    Neutrophils Absolute 4.1 1.7 - 7.7 K/uL    Lymphocytes Absolute 2.0 1.0 - 5.1 K/uL    Monocytes Absolute 0.6 0.0 - 1.3 K/uL    Eosinophils Absolute 0.1 0.0 - 0.6 K/uL    Basophils Absolute 0.1 0.0 - 0.2 K/uL   Comprehensive Metabolic Panel w/ Reflex to MG   Result Value Ref Range    Sodium 142 136 - 145 mmol/L    Potassium reflex Magnesium 3.8 3.5 - 5.1 mmol/L    Chloride 105 99 - 110 mmol/L    CO2 29 21 - 32 mmol/L    Anion Gap 8 3 - 16    Glucose 118 (H) 70 - 99 mg/dL    BUN 19 7 - 20 mg/dL    CREATININE 0.9 0.6 - 1.2 mg/dL    GFR Non-African American >60 >60    GFR African American >60 >60    Calcium 9.1 8.3 - 10.6 mg/dL    Total Protein 6.3 (L) 6.4 - 8.2 g/dL    Albumin 3.8 3.4 - 5.0 g/dL    Albumin/Globulin Ratio 1.5 1.1 - 2.2    Total Bilirubin 0.3 0.0 - 1.0 mg/dL    Alkaline Phosphatase 66 40 - 129 U/L    ALT <5 (L) 10 - 40 U/L    AST 14 (L) 15 - 37 U/L    Globulin 2.5 g/dL   Troponin   Result Value Ref Range    Troponin <0.01 <0.01 ng/mL Urinalysis Reflex to Culture    Specimen: Urine, clean catch   Result Value Ref Range    Color, UA Yellow Straw/Yellow    Clarity, UA Clear Clear    Glucose, Ur Negative Negative mg/dL    Bilirubin Urine Negative Negative    Ketones, Urine TRACE (A) Negative mg/dL    Specific Gravity, UA 1.025 1.005 - 1.030    Blood, Urine TRACE-INTACT (A) Negative    pH, UA 5.5 5.0 - 8.0    Protein, UA 30 (A) Negative mg/dL    Urobilinogen, Urine 0.2 <2.0 E.U./dL    Nitrite, Urine Negative Negative    Leukocyte Esterase, Urine Negative Negative    Microscopic Examination YES     Urine Type NotGiven     Urine Reflex to Culture Not Indicated    Lactic Acid, Plasma   Result Value Ref Range    Lactic Acid 1.1 0.4 - 2.0 mmol/L   Microscopic Urinalysis   Result Value Ref Range    Hyaline Casts, UA 0-2 0 - 2 /LPF    Fine Casts, UA 0-2 0 - 2 /LPF    Cellular Cast, UA 1-3 MixCells (A) None Seen /LPF    WBC, UA 3-5 0 - 5 /HPF    RBC, UA 3-4 0 - 4 /HPF    Epithelial Cells, UA 6-10 (A) 0 - 5 /HPF    Bacteria, UA 1+ (A) None Seen /HPF   EKG 12 Lead   Result Value Ref Range    Ventricular Rate 78 BPM    Atrial Rate 78 BPM    P-R Interval 142 ms    QRS Duration 112 ms    Q-T Interval 406 ms    QTc Calculation (Bazett) 462 ms    P Axis 62 degrees    R Axis 78 degrees    T Axis 80 degrees    Diagnosis       Normal sinus rhythmIncomplete right bundle branch blockBorderline ECGNo previous ECGs available       Screenings           MDM and ED Course  This is a 69 y.o. female who presents to the ED for syncopal episode. Per EMS was apparently systolics in the 45Q. Here she is normotensive. Has no complaints other than mild weakness for the past few weeks. Unclear etiology at this time. Will check for infectious, metabolic causes. Obtaining CT head to evaluate for bleed although low suspicion given lack of headache. No focal neuro deficits. Will also investigate cardiogenic causes    Patient was reassessed. They are feeling well.   Lab work and imaging unremarkable. Blood pressures have been at patient's baseline. Patient has been asymptomatic since she has been here. Monitor reviewed and no arrhythmia noted. Low suspicion for arrhythmia causing the patient's symptoms. Discussed the risks and benefits of admission for syncope work-up at this time and patient as well as her  prefer outpatient management. Will place order for Holter monitor. Strict return precautions were given. . Objectively they appear to be in no acute distress. All questions were answered. [unfilled]    Final Impression  1. Loss of consciousness (HCC)        Blood pressure 103/68, pulse 95, temperature 97.6 °F (36.4 °C), temperature source Oral, resp. rate 20, SpO2 97 %, not currently breastfeeding. Disposition:  DISPOSITION        Patient Referrals:  Nimisha Epps MD  [address]  [phone]    In 1 day        Discharge Medications:  New Prescriptions    No medications on file       Discontinued Medications:  Discontinued Medications    No medications on file       This chart was generated using the [address] [place] [address] dictation system. I created this record but it may contain dictation errors given the limitations of this technology.         Fermin Giordano MD  06/11/21 3155

## 2021-06-14 ENCOUNTER — HOSPITAL ENCOUNTER (OUTPATIENT)
Dept: NON INVASIVE DIAGNOSTICS | Age: 71
Discharge: HOME | End: 2021-06-14
Payer: MEDICARE

## 2021-06-14 DIAGNOSIS — R40.20 LOSS OF CONSCIOUSNESS (HCC): ICD-10-CM

## 2021-06-14 PROCEDURE — 93225 XTRNL ECG REC<48 HRS REC: CPT

## 2021-06-14 PROCEDURE — 93226 XTRNL ECG REC<48 HR SCAN A/R: CPT

## 2021-06-16 ENCOUNTER — HOSPITAL ENCOUNTER (OUTPATIENT)
Dept: CT IMAGING | Age: 71
Discharge: HOME | End: 2021-06-16
Payer: MEDICARE

## 2021-06-16 ENCOUNTER — HOSPITAL ENCOUNTER (OUTPATIENT)
Age: 71
Discharge: HOME | End: 2021-06-16
Payer: MEDICARE

## 2021-06-16 DIAGNOSIS — R63.4 LOSS OF WEIGHT: ICD-10-CM

## 2021-06-16 LAB
CREAT SERPL-MCNC: 0.8 MG/DL (ref 0.6–1.2)
GFR AFRICAN AMERICAN: >60
GFR NON-AFRICAN AMERICAN: >60

## 2021-06-16 PROCEDURE — 82565 ASSAY OF CREATININE: CPT

## 2021-06-16 PROCEDURE — 36415 COLL VENOUS BLD VENIPUNCTURE: CPT

## 2021-06-16 PROCEDURE — 74177 CT ABD & PELVIS W/CONTRAST: CPT

## 2021-06-16 PROCEDURE — 6360000004 HC RX CONTRAST MEDICATION: Performed by: INTERNAL MEDICINE

## 2021-06-16 RX ADMIN — IOHEXOL 50 ML: 240 INJECTION, SOLUTION INTRATHECAL; INTRAVASCULAR; INTRAVENOUS; ORAL at 10:11

## 2021-06-16 RX ADMIN — IOPAMIDOL 75 ML: 755 INJECTION, SOLUTION INTRAVENOUS at 10:13

## 2021-06-18 ENCOUNTER — OFFICE VISIT (OUTPATIENT)
Dept: FAMILY MEDICINE CLINIC | Age: 71
End: 2021-06-18
Payer: MEDICARE

## 2021-06-18 VITALS
DIASTOLIC BLOOD PRESSURE: 60 MMHG | SYSTOLIC BLOOD PRESSURE: 110 MMHG | HEIGHT: 66 IN | BODY MASS INDEX: 17.94 KG/M2 | WEIGHT: 111.6 LBS | HEART RATE: 91 BPM | OXYGEN SATURATION: 98 %

## 2021-06-18 DIAGNOSIS — R55 SYNCOPE, UNSPECIFIED SYNCOPE TYPE: Primary | ICD-10-CM

## 2021-06-18 DIAGNOSIS — R73.9 ELEVATED BLOOD SUGAR: ICD-10-CM

## 2021-06-18 LAB
ACQUISITION DURATION: NORMAL S
AVERAGE HEART RATE: 90 BPM
EKG DIAGNOSIS: NORMAL
HBA1C MFR BLD: 5.5 %
HOLTER MAX HEART RATE: 126 BPM
HOOKUP DATE: NORMAL
HOOKUP TIME: NORMAL
MAX HEART RATE TIME/DATE: NORMAL
MIN HEART RATE TIME/DATE: NORMAL
MIN HEART RATE: 67 BPM
NUMBER OF QRS COMPLEXES: NORMAL
NUMBER OF SUPRAVENTRICULAR COUPLETS: 2
NUMBER OF SUPRAVENTRICULAR ECTOPICS: 539
NUMBER OF SUPRAVENTRICULAR ISOLATED BEATS: 535
NUMBER OF VENTRICULAR BIGEMINAL CYCLES: 0
NUMBER OF VENTRICULAR COUPLETS: 0
NUMBER OF VENTRICULAR ECTOPICS: 116

## 2021-06-18 PROCEDURE — G8419 CALC BMI OUT NRM PARAM NOF/U: HCPCS | Performed by: FAMILY MEDICINE

## 2021-06-18 PROCEDURE — 83036 HEMOGLOBIN GLYCOSYLATED A1C: CPT | Performed by: FAMILY MEDICINE

## 2021-06-18 PROCEDURE — 4040F PNEUMOC VAC/ADMIN/RCVD: CPT | Performed by: FAMILY MEDICINE

## 2021-06-18 PROCEDURE — 1036F TOBACCO NON-USER: CPT | Performed by: FAMILY MEDICINE

## 2021-06-18 PROCEDURE — 1123F ACP DISCUSS/DSCN MKR DOCD: CPT | Performed by: FAMILY MEDICINE

## 2021-06-18 PROCEDURE — G8399 PT W/DXA RESULTS DOCUMENT: HCPCS | Performed by: FAMILY MEDICINE

## 2021-06-18 PROCEDURE — 3017F COLORECTAL CA SCREEN DOC REV: CPT | Performed by: FAMILY MEDICINE

## 2021-06-18 PROCEDURE — 1090F PRES/ABSN URINE INCON ASSESS: CPT | Performed by: FAMILY MEDICINE

## 2021-06-18 PROCEDURE — G8427 DOCREV CUR MEDS BY ELIG CLIN: HCPCS | Performed by: FAMILY MEDICINE

## 2021-06-18 PROCEDURE — 99214 OFFICE O/P EST MOD 30 MIN: CPT | Performed by: FAMILY MEDICINE

## 2021-06-18 ASSESSMENT — ENCOUNTER SYMPTOMS: ABDOMINAL PAIN: 0

## 2021-06-18 NOTE — PROGRESS NOTES
calcium carbonate (OSCAL) 500 MG TABS tablet Take 500 mg by mouth daily      Magnesium 100 MG CAPS Take by mouth      vitamin D3 (CHOLECALCIFEROL) 10 MCG (400 UNIT) TABS tablet Take 2,000 Units by mouth daily       melatonin 3 MG TABS tablet Take 3 mg by mouth daily      carbidopa-levodopa (RYTARY) 61. MG CPCR per extended release capsule Take 1 capsule by mouth 5 times daily        No current facility-administered medications for this visit. Patients past medical history, surgical history, family history, medications and allergies were all reviewed and updated as appropriate today. Review of Systems   Constitutional: Negative for fever. Gastrointestinal: Negative for abdominal pain. Neurological: Positive for tremors. Negative for dizziness, syncope and light-headedness. Physical Exam  Vitals reviewed. Constitutional:       General: She is not in acute distress. Appearance: She is not ill-appearing. Cardiovascular:      Rate and Rhythm: Normal rate and regular rhythm. Heart sounds: Normal heart sounds. No murmur heard. Pulmonary:      Breath sounds: Normal breath sounds. No wheezing or rales. Neurological:      Mental Status: She is alert. Mental status is at baseline. Vitals:    06/18/21 1342 06/18/21 1412   BP: 120/60 110/60   Pulse: 91    SpO2: 98%    Weight: 111 lb 9.6 oz (50.6 kg)    Height: 5' 5.5\" (1.664 m)        Assessment/Plan:   Natacha was seen today for follow-up from hospital.    Diagnoses and all orders for this visit:    Syncope, unspecified syncope type, we will continue to try to track down her Holter monitor. I am not sure if it was dehydration or medication reaction to the Rytary. Patient has become increasingly forgetful and has shown some cognitive decline. Her  has as well. I did speak with their daughter, her daughter is on vacation.     Elevated blood sugar, A1c normal at 5.5-     POCT glycosylated hemoglobin (Hb A1C)

## 2021-06-30 ENCOUNTER — TELEPHONE (OUTPATIENT)
Dept: FAMILY MEDICINE CLINIC | Age: 71
End: 2021-06-30

## 2021-06-30 NOTE — TELEPHONE ENCOUNTER
Patient said she got a Holter monitor test a couple weeks ago and she has not heard the results yet. She is wondering if Dr. Rashad Arana could give her those results. I asked patient if she had a cardiologist but she said she doesn't \"yet\".

## 2021-08-21 DIAGNOSIS — F32.A DEPRESSION, UNSPECIFIED DEPRESSION TYPE: ICD-10-CM

## 2021-08-23 RX ORDER — DULOXETIN HYDROCHLORIDE 60 MG/1
CAPSULE, DELAYED RELEASE ORAL
Qty: 90 CAPSULE | Refills: 1 | Status: SHIPPED | OUTPATIENT
Start: 2021-08-23 | End: 2022-02-16

## 2021-08-23 NOTE — TELEPHONE ENCOUNTER
Refill Request     Last Seen: 6/18/2021    Last Written: 2/1/21    Next Appointment:   No future appointments.           Requested Prescriptions     Pending Prescriptions Disp Refills    DULoxetine (CYMBALTA) 60 MG extended release capsule [Pharmacy Med Name: DULOXETINE HCL DR 60 MG CAPSULE] 90 capsule 1     Sig: TAKE ONE CAPSULE BY MOUTH DAILY

## 2021-09-24 ENCOUNTER — TELEPHONE (OUTPATIENT)
Dept: FAMILY MEDICINE CLINIC | Age: 71
End: 2021-09-24

## 2021-09-24 NOTE — TELEPHONE ENCOUNTER
Pt states that she does not need a refill on this, she just wants to know if this is a long term medication or not.

## 2021-09-24 NOTE — TELEPHONE ENCOUNTER
Patient is wondering if Dr. Paula Harvey wanted patient to continue the Protonix or not. She said it seems to be working well for her but she wasn't sure if Dr. Paula Harvey wanted her to continue that. Please advise.

## 2021-10-15 ENCOUNTER — TELEPHONE (OUTPATIENT)
Dept: FAMILY MEDICINE CLINIC | Age: 71
End: 2021-10-15

## 2021-10-15 ENCOUNTER — HOSPITAL ENCOUNTER (OUTPATIENT)
Dept: GENERAL RADIOLOGY | Age: 71
Discharge: HOME | End: 2021-10-15
Payer: MEDICARE

## 2021-10-15 ENCOUNTER — OFFICE VISIT (OUTPATIENT)
Dept: FAMILY MEDICINE CLINIC | Age: 71
End: 2021-10-15
Payer: MEDICARE

## 2021-10-15 VITALS
WEIGHT: 112.6 LBS | SYSTOLIC BLOOD PRESSURE: 100 MMHG | BODY MASS INDEX: 18.09 KG/M2 | OXYGEN SATURATION: 98 % | HEART RATE: 88 BPM | HEIGHT: 66 IN | DIASTOLIC BLOOD PRESSURE: 70 MMHG

## 2021-10-15 DIAGNOSIS — M79.642 HAND PAIN, LEFT: ICD-10-CM

## 2021-10-15 DIAGNOSIS — M79.642 HAND PAIN, LEFT: Primary | ICD-10-CM

## 2021-10-15 PROCEDURE — 1123F ACP DISCUSS/DSCN MKR DOCD: CPT | Performed by: PHYSICIAN ASSISTANT

## 2021-10-15 PROCEDURE — G8399 PT W/DXA RESULTS DOCUMENT: HCPCS | Performed by: PHYSICIAN ASSISTANT

## 2021-10-15 PROCEDURE — G8427 DOCREV CUR MEDS BY ELIG CLIN: HCPCS | Performed by: PHYSICIAN ASSISTANT

## 2021-10-15 PROCEDURE — 4040F PNEUMOC VAC/ADMIN/RCVD: CPT | Performed by: PHYSICIAN ASSISTANT

## 2021-10-15 PROCEDURE — 1090F PRES/ABSN URINE INCON ASSESS: CPT | Performed by: PHYSICIAN ASSISTANT

## 2021-10-15 PROCEDURE — G8484 FLU IMMUNIZE NO ADMIN: HCPCS | Performed by: PHYSICIAN ASSISTANT

## 2021-10-15 PROCEDURE — 1036F TOBACCO NON-USER: CPT | Performed by: PHYSICIAN ASSISTANT

## 2021-10-15 PROCEDURE — 3017F COLORECTAL CA SCREEN DOC REV: CPT | Performed by: PHYSICIAN ASSISTANT

## 2021-10-15 PROCEDURE — G8419 CALC BMI OUT NRM PARAM NOF/U: HCPCS | Performed by: PHYSICIAN ASSISTANT

## 2021-10-15 PROCEDURE — 73130 X-RAY EXAM OF HAND: CPT

## 2021-10-15 PROCEDURE — 99213 OFFICE O/P EST LOW 20 MIN: CPT | Performed by: PHYSICIAN ASSISTANT

## 2021-10-15 RX ORDER — SODIUM CHLORIDE 1000 MG
1 TABLET, SOLUBLE MISCELLANEOUS
COMMUNITY
End: 2022-04-02

## 2021-10-15 ASSESSMENT — ENCOUNTER SYMPTOMS: RESPIRATORY NEGATIVE: 1

## 2021-10-15 NOTE — PROGRESS NOTES
Subjective:      Patient ID: Mann Mac is a 66 y.o. female. HPI     Patient presents with left hand pain after a fall on Wednesday. Has noted some abrasions and edema on top of the hand and pain over 3/4 fingers. Review of Systems   Respiratory: Negative. Cardiovascular: Negative. Musculoskeletal:        Left hand pain/abrasion       Objective:   Physical Exam  Constitutional:       Appearance: Normal appearance. Musculoskeletal:      Comments: Left hand with edema over 3/4 knuckles and into base of fingers, ttp along both fingers, small abrasion on 3-5 fingers  Able to move but with some discomfort   Neurological:      General: No focal deficit present. Mental Status: She is alert and oriented to person, place, and time. Assessment / Plan:       Diagnosis Orders   1. Hand pain, left  XR HAND LEFT (MIN 3 VIEWS)      Ice and tylenol.  Will check xray

## 2021-10-15 NOTE — TELEPHONE ENCOUNTER
----- Message from Armida Cao sent at 10/14/2021  4:18 PM EDT -----  Subject: Appointment Request    Reason for Call: Urgent (Patient Request) No Script    QUESTIONS  Type of Appointment? Established Patient  Reason for appointment request? No appointments available during search  Additional Information for Provider? PT fell down today 10/14/21 and her   left hand is swollen with some pain. She has been putting ice on it. Could   you please call her and let her know when is a good time to come in.  ---------------------------------------------------------------------------  --------------  [employer]  What is the best way for the office to contact you? OK to leave message on   voicemail  Preferred Call Back Phone Number? [phone]  ---------------------------------------------------------------------------  --------------  SCRIPT ANSWERS  Relationship to Patient? Self  (Is the patient requesting to see the provider for a procedure?)? No  (Is the patient requesting to see the provider urgently  today or   tomorrow. )? Yes  Have you been diagnosed with, awaiting test results for, or told that you   are suspected of having COVID-19 (Coronavirus)? (If patient has tested   negative or was tested as a requirement for work, school, or travel and   not based on symptoms, answer no)? No  Within the past two weeks have you developed any of the following symptoms   (answer no if symptoms have been present longer than 2 weeks or began   more than 2 weeks ago)? Fever or Chills, Cough, Shortness of breath or   difficulty breathing, Loss of taste or smell, Sore throat, Nasal   congestion, Sneezing or runny nose, Fatigue or generalized body aches   (answer no if pain is specific to a body part e.g. back pain), Diarrhea,   Headache? No  Have you had close contact with someone with COVID-19 in the last 14 days? No  (Service Expert  click yes below to proceed with [email or web address] As Usual   Scheduling)?  Yes

## 2021-10-29 ENCOUNTER — OFFICE VISIT (OUTPATIENT)
Dept: FAMILY MEDICINE CLINIC | Age: 71
End: 2021-10-29
Payer: MEDICARE

## 2021-10-29 VITALS
HEART RATE: 93 BPM | WEIGHT: 108 LBS | HEIGHT: 65 IN | BODY MASS INDEX: 17.99 KG/M2 | OXYGEN SATURATION: 98 % | DIASTOLIC BLOOD PRESSURE: 42 MMHG | SYSTOLIC BLOOD PRESSURE: 90 MMHG

## 2021-10-29 DIAGNOSIS — I95.9 HYPOTENSION, UNSPECIFIED HYPOTENSION TYPE: Primary | ICD-10-CM

## 2021-10-29 DIAGNOSIS — R10.30 LOWER ABDOMINAL PAIN: ICD-10-CM

## 2021-10-29 PROCEDURE — 3017F COLORECTAL CA SCREEN DOC REV: CPT | Performed by: PHYSICIAN ASSISTANT

## 2021-10-29 PROCEDURE — 1090F PRES/ABSN URINE INCON ASSESS: CPT | Performed by: PHYSICIAN ASSISTANT

## 2021-10-29 PROCEDURE — 99213 OFFICE O/P EST LOW 20 MIN: CPT | Performed by: PHYSICIAN ASSISTANT

## 2021-10-29 PROCEDURE — 1123F ACP DISCUSS/DSCN MKR DOCD: CPT | Performed by: PHYSICIAN ASSISTANT

## 2021-10-29 PROCEDURE — 1036F TOBACCO NON-USER: CPT | Performed by: PHYSICIAN ASSISTANT

## 2021-10-29 PROCEDURE — G8419 CALC BMI OUT NRM PARAM NOF/U: HCPCS | Performed by: PHYSICIAN ASSISTANT

## 2021-10-29 PROCEDURE — G8399 PT W/DXA RESULTS DOCUMENT: HCPCS | Performed by: PHYSICIAN ASSISTANT

## 2021-10-29 PROCEDURE — 4040F PNEUMOC VAC/ADMIN/RCVD: CPT | Performed by: PHYSICIAN ASSISTANT

## 2021-10-29 PROCEDURE — G8484 FLU IMMUNIZE NO ADMIN: HCPCS | Performed by: PHYSICIAN ASSISTANT

## 2021-10-29 PROCEDURE — G8427 DOCREV CUR MEDS BY ELIG CLIN: HCPCS | Performed by: PHYSICIAN ASSISTANT

## 2021-10-29 ASSESSMENT — ENCOUNTER SYMPTOMS
CHEST TIGHTNESS: 1
ABDOMINAL PAIN: 1

## 2021-11-15 ENCOUNTER — OFFICE VISIT (OUTPATIENT)
Dept: FAMILY MEDICINE CLINIC | Age: 71
End: 2021-11-15
Payer: MEDICARE

## 2021-11-15 VITALS
BODY MASS INDEX: 17.83 KG/M2 | SYSTOLIC BLOOD PRESSURE: 100 MMHG | OXYGEN SATURATION: 98 % | HEART RATE: 98 BPM | DIASTOLIC BLOOD PRESSURE: 60 MMHG | HEIGHT: 65 IN | WEIGHT: 107 LBS

## 2021-11-15 DIAGNOSIS — R63.4 WEIGHT LOSS: ICD-10-CM

## 2021-11-15 DIAGNOSIS — R10.31 RIGHT LOWER QUADRANT ABDOMINAL PAIN: Primary | ICD-10-CM

## 2021-11-15 LAB
BILIRUBIN, POC: 0
BLOOD URINE, POC: NORMAL
CLARITY, POC: CLEAR
COLOR, POC: YELLOW
GLUCOSE URINE, POC: 0
KETONES, POC: NORMAL
LEUKOCYTE EST, POC: 0
NITRITE, POC: 0
PH, POC: 5.5
PROTEIN, POC: 0
SPECIFIC GRAVITY, POC: 1.03
UROBILINOGEN, POC: 0.2

## 2021-11-15 PROCEDURE — 1090F PRES/ABSN URINE INCON ASSESS: CPT | Performed by: FAMILY MEDICINE

## 2021-11-15 PROCEDURE — G8484 FLU IMMUNIZE NO ADMIN: HCPCS | Performed by: FAMILY MEDICINE

## 2021-11-15 PROCEDURE — 4040F PNEUMOC VAC/ADMIN/RCVD: CPT | Performed by: FAMILY MEDICINE

## 2021-11-15 PROCEDURE — G8427 DOCREV CUR MEDS BY ELIG CLIN: HCPCS | Performed by: FAMILY MEDICINE

## 2021-11-15 PROCEDURE — 36415 COLL VENOUS BLD VENIPUNCTURE: CPT | Performed by: FAMILY MEDICINE

## 2021-11-15 PROCEDURE — G8399 PT W/DXA RESULTS DOCUMENT: HCPCS | Performed by: FAMILY MEDICINE

## 2021-11-15 PROCEDURE — 81002 URINALYSIS NONAUTO W/O SCOPE: CPT | Performed by: FAMILY MEDICINE

## 2021-11-15 PROCEDURE — G8419 CALC BMI OUT NRM PARAM NOF/U: HCPCS | Performed by: FAMILY MEDICINE

## 2021-11-15 PROCEDURE — 3017F COLORECTAL CA SCREEN DOC REV: CPT | Performed by: FAMILY MEDICINE

## 2021-11-15 PROCEDURE — 99214 OFFICE O/P EST MOD 30 MIN: CPT | Performed by: FAMILY MEDICINE

## 2021-11-15 PROCEDURE — 1036F TOBACCO NON-USER: CPT | Performed by: FAMILY MEDICINE

## 2021-11-15 PROCEDURE — 1123F ACP DISCUSS/DSCN MKR DOCD: CPT | Performed by: FAMILY MEDICINE

## 2021-11-15 ASSESSMENT — ENCOUNTER SYMPTOMS
NAUSEA: 1
VOMITING: 0
CONSTIPATION: 0
ABDOMINAL PAIN: 1
DIARRHEA: 0
BLOOD IN STOOL: 0

## 2021-11-15 NOTE — PROGRESS NOTES
Patient:  Pippa sherwood 66 y.o. femalewho presents today with the following Chief Complaint(s):  Chief Complaint   Patient presents with    Abdominal Pain     pt states that she has been having pain in right lower side that goes to her back. she states that she feels the pain about 40 minutes after eating. she states that this has been happening off and on for the past month or so.  Hypotension     pt states that she took her BP this morning and she states that it was 100/60 but her  stated that her BP when he checked it was 161/71. he states that her BP has been flucuating here recently       Patient is here with her . She is complaining of pain in her right lower quadrant area. She says her pain is been there for about 5 days. She states the pain occurs about 40 minutes after she eats, does not matter what she eats. Cannot really describe the pain, she says it is not sharp and stabbing. She says it is about a 7 out of 10 when it happens. Sitting in the exam room she is not having pain. The pain will last for about 40 minutes and then spontaneously resolved. She might feel little nauseous, but there is no fever no vomiting no diarrhea. She says that her bowels move daily. She has been losing weight, however she states she has a good appetite. So far today she had a bowl of cereal and a slice of peanut butter toast.  It is down to in the afternoon. Patient was seen on 10/29/2021 by Electa Boxer for hypotension and abdominal pain. Patient's chart was reviewed she was seen by GI, Dr. Melia Johnson in June and July of this year. She had an esophagogastroduodenoscopy. Esophagogastroduodenoscopy and biopsies were reviewed and unremarkable. She has been on a PPI. She had a CAT scan of the abdomen and pelvis that was unremarkable. She also has a pain in her right lower back area. She says this pain is separate and distinct from her abdominal pain.   She has been working with neurosurgery and they are considering doing an epidural injection. Patient was seen by her neurologist 1 month ago. That note was reviewed. At that time the patient's blood pressure was running low and they told her to take salt tablets. Patient says that she just feels extremely weak and tired and has very little energy. After the patient left the exam room and went to the phlebotomy station. When she stood up to move to the phlebotomy chair she had acute dizziness and weakness and had to lay back down. He was given some orange juice. She says she did feel little better. Her blood pressure was 100/60 pulse 98        Current Outpatient Medications   Medication Sig Dispense Refill    sodium chloride 1 g tablet Take 1 g by mouth 3 times daily (with meals)      DULoxetine (CYMBALTA) 60 MG extended release capsule TAKE ONE CAPSULE BY MOUTH DAILY 90 capsule 1    ZINC PO Take by mouth      pantoprazole (PROTONIX) 40 MG tablet Take 1 tablet by mouth every morning (before breakfast) 30 tablet 5    calcium carbonate (OSCAL) 500 MG TABS tablet Take 500 mg by mouth daily      Magnesium 100 MG CAPS Take by mouth      vitamin D3 (CHOLECALCIFEROL) 10 MCG (400 UNIT) TABS tablet Take 2,000 Units by mouth daily       melatonin 3 MG TABS tablet Take 3 mg by mouth daily       carbidopa-levodopa (RYTARY) 61. MG CPCR per extended release capsule Take 1 capsule by mouth 5 times daily        No current facility-administered medications for this visit. Patients past medical history, surgical history, family history, medications and allergies were all reviewed and updated as appropriate today. Review of Systems   Constitutional: Positive for fatigue. Gastrointestinal: Positive for abdominal pain and nausea. Negative for blood in stool, constipation, diarrhea and vomiting. Neurological: Positive for dizziness, weakness and light-headedness. Physical Exam  Vitals reviewed.    Constitutional:       Appearance: She is not toxic-appearing. Comments: Thin cachectic   Cardiovascular:      Rate and Rhythm: Regular rhythm. Tachycardia present. Pulmonary:      Breath sounds: Normal breath sounds. No wheezing or rales. Abdominal:      General: Abdomen is flat. Bowel sounds are normal.      Palpations: Abdomen is soft. There is no mass. Tenderness: There is abdominal tenderness. There is no guarding. Musculoskeletal:      Right lower leg: No edema. Left lower leg: No edema. Neurological:      Motor: Weakness present. Vitals:    11/15/21 1327   BP: 100/60   Pulse: 98   SpO2: 98%   Weight: 107 lb (48.5 kg)   Height: 5' 5\" (1.651 m)       Assessment/Plan:   Natacha was seen today for abdominal pain and hypotension. Diagnoses and all orders for this visit:    Right lower quadrant abdominal pain  -     POCT Urinalysis no Micro  -     Comprehensive Metabolic Panel  -     CBC Auto Differential  -     CT ABDOMEN PELVIS W IV CONTRAST Additional Contrast? Oral; Future    Weight loss      Offered to take patient to the emergency room. She seemed to improve after sipping a little juice. We will see what her labs look like and her CAT scan. She was referred back to neurology for management of her hypotension.   Unclear if autonomic dysfunction is playing a role with her Parkinson's and medications and hypotension

## 2021-11-16 ENCOUNTER — TELEPHONE (OUTPATIENT)
Dept: FAMILY MEDICINE CLINIC | Age: 71
End: 2021-11-16

## 2021-11-16 LAB
A/G RATIO: 2 (ref 1.1–2.2)
ALBUMIN SERPL-MCNC: 4.7 G/DL (ref 3.4–5)
ALP BLD-CCNC: 85 U/L (ref 40–129)
ALT SERPL-CCNC: 7 U/L (ref 10–40)
ANION GAP SERPL CALCULATED.3IONS-SCNC: 18 MMOL/L (ref 3–16)
AST SERPL-CCNC: 16 U/L (ref 15–37)
BASOPHILS ABSOLUTE: 0 K/UL (ref 0–0.2)
BASOPHILS RELATIVE PERCENT: 0.5 %
BILIRUB SERPL-MCNC: 0.3 MG/DL (ref 0–1)
BUN BLDV-MCNC: 25 MG/DL (ref 7–20)
CALCIUM SERPL-MCNC: 10.2 MG/DL (ref 8.3–10.6)
CHLORIDE BLD-SCNC: 102 MMOL/L (ref 99–110)
CO2: 22 MMOL/L (ref 21–32)
CREAT SERPL-MCNC: 0.8 MG/DL (ref 0.6–1.2)
EOSINOPHILS ABSOLUTE: 0 K/UL (ref 0–0.6)
EOSINOPHILS RELATIVE PERCENT: 0.6 %
GFR AFRICAN AMERICAN: >60
GFR NON-AFRICAN AMERICAN: >60
GLUCOSE BLD-MCNC: 100 MG/DL (ref 70–99)
HCT VFR BLD CALC: 41.8 % (ref 36–48)
HEMOGLOBIN: 13.6 G/DL (ref 12–16)
LYMPHOCYTES ABSOLUTE: 1.3 K/UL (ref 1–5.1)
LYMPHOCYTES RELATIVE PERCENT: 21.5 %
MCH RBC QN AUTO: 30 PG (ref 26–34)
MCHC RBC AUTO-ENTMCNC: 32.5 G/DL (ref 31–36)
MCV RBC AUTO: 92.4 FL (ref 80–100)
MONOCYTES ABSOLUTE: 0.6 K/UL (ref 0–1.3)
MONOCYTES RELATIVE PERCENT: 9.2 %
NEUTROPHILS ABSOLUTE: 4.1 K/UL (ref 1.7–7.7)
NEUTROPHILS RELATIVE PERCENT: 68.2 %
PDW BLD-RTO: 13.6 % (ref 12.4–15.4)
PLATELET # BLD: 228 K/UL (ref 135–450)
PMV BLD AUTO: 8.6 FL (ref 5–10.5)
POTASSIUM SERPL-SCNC: 4.9 MMOL/L (ref 3.5–5.1)
RBC # BLD: 4.53 M/UL (ref 4–5.2)
SODIUM BLD-SCNC: 142 MMOL/L (ref 136–145)
TOTAL PROTEIN: 7 G/DL (ref 6.4–8.2)
WBC # BLD: 6 K/UL (ref 4–11)

## 2021-11-18 ENCOUNTER — HOSPITAL ENCOUNTER (OUTPATIENT)
Dept: CT IMAGING | Age: 71
Discharge: HOME | End: 2021-11-18
Payer: MEDICARE

## 2021-11-18 DIAGNOSIS — R10.31 RIGHT LOWER QUADRANT ABDOMINAL PAIN: ICD-10-CM

## 2021-11-18 PROCEDURE — 6360000004 HC RX CONTRAST MEDICATION: Performed by: FAMILY MEDICINE

## 2021-11-18 PROCEDURE — 74177 CT ABD & PELVIS W/CONTRAST: CPT

## 2021-11-18 RX ADMIN — IOPAMIDOL 75 ML: 755 INJECTION, SOLUTION INTRAVENOUS at 09:53

## 2021-11-18 RX ADMIN — IOHEXOL 50 ML: 240 INJECTION, SOLUTION INTRATHECAL; INTRAVASCULAR; INTRAVENOUS; ORAL at 09:53

## 2021-12-09 RX ORDER — PANTOPRAZOLE SODIUM 40 MG/1
TABLET, DELAYED RELEASE ORAL
Qty: 30 TABLET | Refills: 5 | Status: SHIPPED
Start: 2021-12-09 | End: 2022-05-02 | Stop reason: ALTCHOICE

## 2021-12-09 NOTE — TELEPHONE ENCOUNTER
Hillary Renee is requesting refill(s) pantoprazole  Last OV 11/15/21 (pertaining to medication)  LR 4/12/21 (per medication requested)  Next office visit scheduled or attempted No    If no, reason:

## 2021-12-14 ENCOUNTER — TELEPHONE (OUTPATIENT)
Dept: FAMILY MEDICINE CLINIC | Age: 71
End: 2021-12-14

## 2021-12-14 NOTE — TELEPHONE ENCOUNTER
I called the pt to schedule a telephone AWV with the pt. She states that she does not feel these are necessary and does not want to schedule.

## 2022-02-01 ENCOUNTER — HOSPITAL ENCOUNTER (OUTPATIENT)
Dept: WOMENS IMAGING | Age: 72
Discharge: HOME | End: 2022-02-01
Payer: MEDICARE

## 2022-02-01 VITALS — BODY MASS INDEX: 17.99 KG/M2 | WEIGHT: 108 LBS | HEIGHT: 65 IN

## 2022-02-01 DIAGNOSIS — Z12.31 ENCOUNTER FOR SCREENING MAMMOGRAM FOR BREAST CANCER: ICD-10-CM

## 2022-02-01 PROCEDURE — 77067 SCR MAMMO BI INCL CAD: CPT

## 2022-02-02 RX ORDER — FAMOTIDINE 40 MG/1
40 TABLET, FILM COATED ORAL EVERY EVENING
Qty: 30 TABLET | Refills: 5 | Status: SHIPPED | OUTPATIENT
Start: 2022-02-02 | End: 2022-04-02

## 2022-02-02 RX ORDER — FAMOTIDINE 40 MG/1
40 TABLET, FILM COATED ORAL EVERY EVENING
Qty: 30 TABLET | Refills: 5 | Status: CANCELLED | OUTPATIENT
Start: 2022-02-02

## 2022-02-02 NOTE — TELEPHONE ENCOUNTER
I called the pt to verify what medication she is wanting. She states that she is wanting to take the [facility] on a regular basis to see if it starts working faster for her.

## 2022-02-02 NOTE — TELEPHONE ENCOUNTER
----- Message from Alyson Vela sent at 2/2/2022  2:57 PM EST -----  Subject: Medication Problem    QUESTIONS  Name of Medication? pantoprazole (PROTONIX) 40 MG tablet  Patient-reported dosage and instructions? 40mg  What question or problem do you have with the medication? Protonix is not   working, she found that [employer] works, but it takes an hour to set in. Requesting new medication to treat stomach issues. Preferred Pharmacy? [facility] [address] [place] [address] Prashanthmandy Dukes [phone]  Pharmacy phone number (if available)? [phone]  Additional Information for Provider?   ---------------------------------------------------------------------------  --------------  CALL BACK INFO  What is the best way for the office to contact you? OK to leave message on   voicemail  Preferred Call Back Phone Number? [phone]  ---------------------------------------------------------------------------  --------------  SCRIPT ANSWERS  Relationship to Patient?  Self

## 2022-02-16 DIAGNOSIS — F32.A DEPRESSION, UNSPECIFIED DEPRESSION TYPE: ICD-10-CM

## 2022-02-16 RX ORDER — DULOXETIN HYDROCHLORIDE 60 MG/1
CAPSULE, DELAYED RELEASE ORAL
Qty: 90 CAPSULE | Refills: 1 | Status: SHIPPED | OUTPATIENT
Start: 2022-02-16 | End: 2022-05-20

## 2022-02-16 NOTE — TELEPHONE ENCOUNTER
Kev Beltran is requesting refill(s)   Last OV 11/15/2021 (pertaining to medication)  LR 08/23/2021 (per medication requested)  Next office visit scheduled or attempted 02/22/2022 STEVENV

## 2022-02-22 ENCOUNTER — TELEMEDICINE (OUTPATIENT)
Dept: FAMILY MEDICINE CLINIC | Age: 72
End: 2022-02-22
Payer: MEDICARE

## 2022-02-22 DIAGNOSIS — Z00.00 MEDICARE ANNUAL WELLNESS VISIT, SUBSEQUENT: Primary | ICD-10-CM

## 2022-02-22 PROCEDURE — G0439 PPPS, SUBSEQ VISIT: HCPCS | Performed by: FAMILY MEDICINE

## 2022-02-22 PROCEDURE — 3017F COLORECTAL CA SCREEN DOC REV: CPT | Performed by: FAMILY MEDICINE

## 2022-02-22 PROCEDURE — 4040F PNEUMOC VAC/ADMIN/RCVD: CPT | Performed by: FAMILY MEDICINE

## 2022-02-22 PROCEDURE — 1123F ACP DISCUSS/DSCN MKR DOCD: CPT | Performed by: FAMILY MEDICINE

## 2022-02-22 RX ORDER — MIRABEGRON 25 MG/1
25 TABLET, FILM COATED, EXTENDED RELEASE ORAL DAILY
COMMUNITY
Start: 2022-02-02

## 2022-02-22 SDOH — ECONOMIC STABILITY: FOOD INSECURITY: WITHIN THE PAST 12 MONTHS, YOU WORRIED THAT YOUR FOOD WOULD RUN OUT BEFORE YOU GOT MONEY TO BUY MORE.: NEVER TRUE

## 2022-02-22 SDOH — ECONOMIC STABILITY: FOOD INSECURITY: WITHIN THE PAST 12 MONTHS, THE FOOD YOU BOUGHT JUST DIDN'T LAST AND YOU DIDN'T HAVE MONEY TO GET MORE.: NEVER TRUE

## 2022-02-22 ASSESSMENT — PATIENT HEALTH QUESTIONNAIRE - PHQ9
1. LITTLE INTEREST OR PLEASURE IN DOING THINGS: 1
SUM OF ALL RESPONSES TO PHQ QUESTIONS 1-9: 2
SUM OF ALL RESPONSES TO PHQ9 QUESTIONS 1 & 2: 2
SUM OF ALL RESPONSES TO PHQ QUESTIONS 1-9: 2
SUM OF ALL RESPONSES TO PHQ QUESTIONS 1-9: 2
2. FEELING DOWN, DEPRESSED OR HOPELESS: 1
SUM OF ALL RESPONSES TO PHQ QUESTIONS 1-9: 2

## 2022-02-22 ASSESSMENT — SOCIAL DETERMINANTS OF HEALTH (SDOH): HOW HARD IS IT FOR YOU TO PAY FOR THE VERY BASICS LIKE FOOD, HOUSING, MEDICAL CARE, AND HEATING?: NOT HARD AT ALL

## 2022-02-22 ASSESSMENT — LIFESTYLE VARIABLES: HOW OFTEN DO YOU HAVE A DRINK CONTAINING ALCOHOL: NEVER

## 2022-02-22 NOTE — PATIENT INSTRUCTIONS
Personalized Preventive Plan for Shalom Baldwin - 2/22/2022  Medicare offers a range of preventive health benefits. Some of the tests and screenings are paid in full while other may be subject to a deductible, co-insurance, and/or copay. Some of these benefits include a comprehensive review of your medical history including lifestyle, illnesses that may run in your family, and various assessments and screenings as appropriate. After reviewing your medical record and screening and assessments performed today your provider may have ordered immunizations, labs, imaging, and/or referrals for you. A list of these orders (if applicable) as well as your Preventive Care list are included within your After Visit Summary for your review. Other Preventive Recommendations:    · A preventive eye exam performed by an eye specialist is recommended every 1-2 years to screen for glaucoma; cataracts, macular degeneration, and other eye disorders. · A preventive dental visit is recommended every 6 months. · Try to get at least 150 minutes of exercise per week or 10,000 steps per day on a pedometer . · Order or download the FREE \"Exercise & Physical Activity: Your Everyday Guide\" from [employer]. Call 7-439.310.6885 or search [employer] online. · You need 5155-9595 mg of calcium and 1646-6832 IU of vitamin D per day. It is possible to meet your calcium requirement with diet alone, but a vitamin D supplement is usually necessary to meet this goal.  · When exposed to the sun, use a sunscreen that protects against both UVA and UVB radiation with an SPF of 30 or greater. Reapply every 2 to 3 hours or after sweating, drying off with a towel, or swimming. · Always wear a seat belt when traveling in a car. Always wear a helmet when riding a bicycle or motorcycle.   Patient Education        Preventing Falls: Care Instructions  Your Care Instructions     Getting around your home safely can be a challenge if you have injuries or health problems that make it easy for you to fall. Loose rugs and furniture in walkways are among the dangers for many older people who have problems walking or who have poor eyesight. People who have conditions such as arthritis, osteoporosis, or dementia also have to be careful not to fall. You can make your home safer with a few simple measures. Follow-up care is a key part of your treatment and safety. Be sure to make and go to all appointments, and call your doctor if you are having problems. It's also a good idea to know your test results and keep a list of the medicines you take. How can you care for yourself at home? Taking care of yourself  You may get dizzy if you do not drink enough water. To prevent dehydration, drink plenty of fluids. Choose water and other clear liquids. If you have kidney, heart, or liver disease and have to limit fluids, talk with your doctor before you increase the amount of fluids you drink. Exercise regularly to improve your strength, muscle tone, and balance. Walk if you can. Swimming may be a good choice if you cannot walk easily. Have your vision and hearing checked each year or any time you notice a change. If you have trouble seeing and hearing, you might not be able to avoid objects and could lose your balance. Know the side effects of the medicines you take. Ask your doctor or pharmacist whether the medicines you take can affect your balance. Sleeping pills or sedatives can affect your balance. Limit the amount of alcohol you drink. Alcohol can impair your balance and other senses. Ask your doctor whether calluses or corns on your feet need to be removed. If you wear loose-fitting shoes because of calluses or corns, you can lose your balance and fall. Talk to your doctor if you have numbness in your feet. Preventing falls at home  Remove raised doorway thresholds, throw rugs, and clutter.  Repair loose carpet or raised areas in the floor. Move furniture and electrical cords to keep them out of walking paths. Use nonskid floor wax, and wipe up spills right away, especially on ceramic tile floors. If you use a walker or cane, put rubber tips on it. If you use crutches, clean the bottoms of them regularly with an abrasive pad, such as steel wool. Keep your house well lit, especially stairways, porches, and outside walkways. Use night-lights in areas such as hallways and bathrooms. Add extra light switches or use remote switches (such as switches that go on or off when you clap your hands) to make it easier to turn lights on if you have to get up during the night. Install sturdy handrails on stairways. Move items in your cabinets so that the things you use a lot are on the lower shelves (about waist level). Keep a cordless phone and a flashlight with new batteries by your bed. If possible, put a phone in each of the main rooms of your house, or carry a cell phone in case you fall and cannot reach a phone. Or, you can wear a device around your neck or wrist. You push a button that sends a signal for help. Wear low-heeled shoes that fit well and give your feet good support. Use footwear with nonskid soles. Check the heels and soles of your shoes for wear. Repair or replace worn heels or soles. Do not wear socks without shoes on wood floors. Walk on the grass when the sidewalks are slippery. If you live in an area that gets snow and ice in the winter, sprinkle salt on slippery steps and sidewalks. Preventing falls in the bath  Install grab bars and nonskid mats inside and outside your shower or tub and near the toilet and sinks. Use shower chairs and bath benches. Use a hand-held shower head that will allow you to sit while showering.   Get into a tub or shower by putting the weaker leg in first. Get out of a tub or shower with your strong side first.  Repair loose toilet seats and consider installing a raised toilet seat to make getting on and off the toilet easier. Keep your bathroom door unlocked while you are in the shower. Where can you learn more? Go to https://chpepiceweb.[email or web address] org and sign in to your [employer] account. Enter [id number] [id number] [phone] in the [facility] box to learn more about \"Preventing Falls: Care Instructions. \"     If you do not have an account, please click on the \"Sign Up Now\" link. Current as of: September 8, 2021               Content Version: 13.1  © 0861-4161 [employer]. Care instructions adapted under license by [facility] ([facility]). If you have questions about a medical condition or this instruction, always ask your healthcare professional. [address] any warranty or liability for your use of this information. Patient Education        Nutrition for Older Adults: Care Instructions  Your Care Instructions     Good nutrition is important at any age. But it is especially important for older adults. Eating a healthy diet helps keep your body strong. And it can help lower your risk for disease. As you get older, your nutrition needs change. Your body needs more of certain nutrients. These include vitamin B12, calcium, and vitamin D. But it may be harder for you to get these and other important nutrients. This could be for many reasons. You may not feel as hungry as you used to. Or you could have problems with your teeth or mouth that make it hard to chew. Or you may not enjoy planning and preparing meals, especially if you live alone. Now that you need to get all your nutrients from less food, it is important to plan what you eat. The suggestions below can help you get the nutrition you need. If you still need help, talk with your doctor. He or she may recommend that you work with a dietitian. A dietitian can help you plan meals. Follow-up care is a key part of your treatment and safety.  Be sure to make and go to all appointments, and call your doctor if you are having problems. It's also a good idea to know your test results and keep a list of the medicines you take. How can you care for yourself at home? To stay healthy  Eat a variety of foods. The more you vary the foods you eat, the more vitamins, minerals, and other nutrients you get. Take a multivitamin every day. Choose one with about 100% of the daily value (DV) for vitamins and minerals. Do not take more than 100% of the daily value for any vitamin or mineral unless your doctor tells you to. Talk with your doctor if you are not sure which multivitamin is right for you. Eat lots of fruits and vegetables. Fresh, frozen, or no-salt canned vegetables and fruits in their own juice or light syrup are good choices. Include foods that are high in vitamin B12 in your diet. Good choices are fortified breakfast cereal, nonfat or low-fat milk and other dairy products, meat, poultry, fish, and eggs. Get enough calcium and vitamin D. Good choices include nonfat or low-fat milk, cheese, and yogurt. Other good options are tofu, orange juice with added calcium, and some leafy green vegetables, such as magalis greens and kale. If you don't use milk products, talk to your doctor about calcium and vitamin D supplements. Eat protein foods every day. Good choices include lean meat, fish, poultry, eggs, and cheese. Other good options are cooked beans, peanut butter, and nuts and seeds. Choose whole grains for half of the grains you eat. Look for 100% whole wheat bread, whole-grain cereals, brown rice, and other whole grains. If you have constipation  Eat high-fiber foods every day. These include fruits, vegetables, cooked dried beans, and whole grains. Drink plenty of fluids. If you have kidney, heart, or liver disease and have to limit fluids, talk with your doctor before you increase the amount of fluids you drink. Ask your doctor if stool softeners may help keep your bowels regular.   If you have mouth problems that make chewing hard  Pick canned or cooked fruits and vegetables. These are often softer. Chop or shred meat, poultry, and fish. Add sauce or gravy to the meat to help keep it moist.  Pick other protein foods that are soft. These include cheese, peanut butter, cooked beans, cottage cheese, and eggs. If you have trouble shopping for yourself  Ask a local food store to deliver groceries to your home. Contact a volunteer center and ask for help. Ask a family member or neighbor to help you. If you have trouble preparing meals  If you are able, take a cooking class. Use a microwave oven to cook TV dinners and other frozen or prepared foods. Take part in group meal programs. You can find these through senior citizen programs. Have meals brought to your home. Your community may offer programs that deliver meals, such as Meals on Wheels. If your appetite is poor  Try eating smaller amounts of food more often. For example, eat 4 or 5 small meals a day instead of 1 or 2 large meals. Eat with family and friends. Or take part in group meal programs offered through volunteer programs. Eating with others may help your appetite. And it helps you be more social.  Ask your doctor if your medicines could cause appetite or taste problems. If so, ask about changing medicines. Add spices and herbs to increase the flavor of food. If you think you are depressed, ask your doctor for help. Depression can affect your appetite. And it can make it hard to do everyday activities like grocery shopping and making meals. Treatment can help. When should you call for help? Watch closely for changes in your health, and be sure to contact your doctor if you have any problems. Where can you learn more? Go to https://citlaly.Arav. org and sign in to your [employer] account. Enter C191 in the [employer] box to learn more about \"Nutrition for Older Adults: Care Instructions. \"     If you do not have an account, please click on the \"Sign Up Now\" link. Current as of: September 8, 2021               Content Version: 13.1  © 2006-2021 Healthwise[employer] Incorporated. Care instructions adapted under license by [facility] ([facility]). If you have questions about a medical condition or this instruction, always ask your healthcare professional. [place] [address] any warranty or liability for your use of this information. Pfizer dose 1, 2, and 3

## 2022-02-22 NOTE — PROGRESS NOTES
Medicare Annual Wellness Visit    Ricky Esteves is here for Medicare [address] Smallwood was seen today for medicare awv. Diagnoses and all orders for this visit:    Medicare annual wellness visit, subsequent         Recommendations for Preventive Services Due: see orders and patient instructions/AVS.  Recommended screening schedule for the next 5-10 years is provided to the patient in written form: see Patient Instructions/AVS.     No follow-ups on file. Reviewed and updated this visit by clinical staff:  Tobacco  Allergies  Med Hx  Surg Hx  Soc Hx  Fam Hx      Subjective       Patient's complete Health Risk Assessment and screening values have been reviewed and are found in Flowsheets. The following problems were reviewed today and where indicated follow up appointments were made and/or referrals ordered.     Positive Risk Factor Screenings with Interventions:    Fall Risk:  Do you feel unsteady or are you worried about falling? : (!) yes  2 or more falls in past year?: (!) yes  Fall with injury in past year?: no     Fall Risk Interventions:    · Home safety tips provided            General Health and ACP:  General  In general, how would you say your health is?: Fair  In the past 7 days, have you experienced any of the following: New or Increased Pain, New or Increased Fatigue, Loneliness, Social Isolation, Stress or Anger?: No  Select all that apply: (!) Loneliness,Social Isolation,Stress,Anger  Do you have a Living Will?: Yes    Advance Directives     Power of  Living Will ACP-Advance Directive ACP-Power of     Not on File Not on File Not on File Not on File      General Health Risk Interventions:  · No Living Will: ACP documents already completed- patient asked to provide copy to the office    Health Habits/Nutrition:     Physical Activity: Sufficiently Active    Days of Exercise per Week: 7 days    Minutes of Exercise per Session: 30 min     Have you lost any (PROTONIX) 40 MG tablet TAKE ONE TABLET BY MOUTH EVERY MORNING BEFORE BREAKFAST Yes Zach Peterson MD   sodium chloride 1 g tablet Take 1 g by mouth 3 times daily (with meals) Yes Historical Provider, MD   ZINC PO Take by mouth Yes Historical Provider, MD   calcium carbonate (OSCAL) 500 MG TABS tablet Take 500 mg by mouth daily Yes Historical Provider, MD   Magnesium 100 MG CAPS Take by mouth Yes Historical Provider, MD   vitamin D3 (CHOLECALCIFEROL) 10 MCG (400 UNIT) TABS tablet Take 2,000 Units by mouth daily  Yes Historical Provider, MD   melatonin 3 MG TABS tablet Take 3 mg by mouth daily  Yes Historical Provider, MD   carbidopa-levodopa (RYTARY) 61. MG CPCR per extended release capsule Take 1 capsule by mouth 5 times daily  Yes Historical Provider, MD   [address]   Historical Provider, MD Hugo (Including outside providers/suppliers regularly involved in providing care):   Patient Care Team:  Zach Peterson MD as PCP - General (Family Medicine)  Zach Peterson MD as PCP - [facility] Empaneled Provider  Leny Nixon (Psychiatry)  Shaka Crespo, was evaluated through a synchronous (real-time) audio-video encounter. The patient (or guardian if applicable) is aware that this is a billable service, which includes applicable co-pays. This Virtual Visit was conducted with patient's (and/or legal guardian's) consent. The visit was conducted pursuant to the emergency declaration under the [facility], [address] [place] [address] authority and the [employer] XGIM[employer] and [place] General Act. Patient identification was verified, and a caregiver was present when appropriate. The patient was located at home in a state where the provider was licensed to provide care. Raina Oates LPN, 2/30/1901, performed the documented evaluation under the direct supervision of the attending physician.     This encounter was performed under my, Juan Gonzalez, direct supervision, 2/22/2022.

## 2022-02-23 ENCOUNTER — OFFICE VISIT (OUTPATIENT)
Dept: FAMILY MEDICINE CLINIC | Age: 72
End: 2022-02-23
Payer: MEDICARE

## 2022-02-23 VITALS
HEART RATE: 90 BPM | OXYGEN SATURATION: 98 % | SYSTOLIC BLOOD PRESSURE: 100 MMHG | HEIGHT: 65 IN | DIASTOLIC BLOOD PRESSURE: 60 MMHG | BODY MASS INDEX: 17.79 KG/M2 | WEIGHT: 106.8 LBS

## 2022-02-23 DIAGNOSIS — Z01.818 PREOP EXAMINATION: ICD-10-CM

## 2022-02-23 DIAGNOSIS — G20 PARKINSON'S DISEASE (HCC): ICD-10-CM

## 2022-02-23 DIAGNOSIS — G24.9 DYSKINESIA: ICD-10-CM

## 2022-02-23 DIAGNOSIS — M48.00 SPINAL STENOSIS, UNSPECIFIED SPINAL REGION: Primary | ICD-10-CM

## 2022-02-23 PROCEDURE — 4040F PNEUMOC VAC/ADMIN/RCVD: CPT | Performed by: FAMILY MEDICINE

## 2022-02-23 PROCEDURE — G8484 FLU IMMUNIZE NO ADMIN: HCPCS | Performed by: FAMILY MEDICINE

## 2022-02-23 PROCEDURE — 3017F COLORECTAL CA SCREEN DOC REV: CPT | Performed by: FAMILY MEDICINE

## 2022-02-23 PROCEDURE — 1123F ACP DISCUSS/DSCN MKR DOCD: CPT | Performed by: FAMILY MEDICINE

## 2022-02-23 PROCEDURE — 1090F PRES/ABSN URINE INCON ASSESS: CPT | Performed by: FAMILY MEDICINE

## 2022-02-23 PROCEDURE — G8427 DOCREV CUR MEDS BY ELIG CLIN: HCPCS | Performed by: FAMILY MEDICINE

## 2022-02-23 PROCEDURE — 1036F TOBACCO NON-USER: CPT | Performed by: FAMILY MEDICINE

## 2022-02-23 PROCEDURE — G8419 CALC BMI OUT NRM PARAM NOF/U: HCPCS | Performed by: FAMILY MEDICINE

## 2022-02-23 PROCEDURE — G8399 PT W/DXA RESULTS DOCUMENT: HCPCS | Performed by: FAMILY MEDICINE

## 2022-02-23 PROCEDURE — 99215 OFFICE O/P EST HI 40 MIN: CPT | Performed by: FAMILY MEDICINE

## 2022-02-23 NOTE — PROGRESS NOTES
[facility] Medicine   Pre-operative History and Physical      DIAGNOSIS:  Spinal stenosis    PROCEDURE: Laminectomy with arthrodesis, posterior segmental instrumentation      History Obtained From:  patient    HISTORY OF PRESENT ILLNESS:    The patient is a 66 y.o. female who presents for pre-operative evaluation.   Dr. Juliana Carver sends patient for preop clearance due to her history of Parkinson's    Past Medical History:    Past Medical History:   Diagnosis Date    Corticobasal degeneration     Dyskinesia     Hyperlipidemia     Hyperreflexia     Neuropathy     corticobasal degeneration    [facility]'s [facility] ([facility] [address]           Pituitary adenoma ([facility] [address]      Past Surgical History:    Past Surgical History:   Procedure Laterality Date    BACK SURGERY      COLONOSCOPY  6/02    due 6/12    COLONOSCOPY  8/12    due 8/17    FINGER TRIGGER RELEASE      right hand    HAMMER TOE SURGERY  2005    LAPAROSCOPY      TONSILLECTOMY AND ADENOIDECTOMY  14 yo     Current Medication  Current Outpatient Medications   Medication Sig Dispense Refill    carbidopa-levodopa (SINEMET)  MG per tablet Take 0.5 tablets by mouth in the morning and at bedtime      MYRBETRIQ 25 MG TB24       DULoxetine (CYMBALTA) 60 MG extended release capsule TAKE ONE CAPSULE BY MOUTH DAILY 90 capsule 1    famotidine (PEPCID) 40 MG tablet Take 1 tablet by mouth every evening 30 tablet 5    pantoprazole (PROTONIX) 40 MG tablet TAKE ONE TABLET BY MOUTH EVERY MORNING BEFORE BREAKFAST 30 tablet 5    sodium chloride 1 g tablet Take 1 g by mouth 3 times daily (with meals)      ZINC PO Take by mouth      calcium carbonate (OSCAL) 500 MG TABS tablet Take 500 mg by mouth daily      Magnesium 100 MG CAPS Take by mouth      vitamin D3 (CHOLECALCIFEROL) 10 MCG (400 UNIT) TABS tablet Take 2,000 Units by mouth daily       melatonin 3 MG TABS tablet Take 3 mg by mouth daily       carbidopa-levodopa (RYTARY) 61. MG CPCR per extended release capsule Take 1 capsule by mouth 5 times daily        No current facility-administered medications for this visit. Allergies:  Lidocaine; Procaine; Anesthetics, suzanne; Celecoxib; Lidocaine hcl; and Methylprednisolone  History of allergic reaction to anesthesia:  Yes  Teeth: yes    Social History:   Social History     Tobacco Use   Smoking Status Never Smoker   Smokeless Tobacco Never Used     The patient states she drinks 0 per week.     Family History:   Family History   Problem Relation Age of Onset    Heart Disease Mother     Stroke Father     Cancer Father         prostate    Diabetes Sister     Breast Cancer Sister 55    Heart Disease Sister         quadruple bypass    Breast Cancer Sister 37       REVIEW OF SYSTEMS:    CONSTITUTIONAL:  negative  EYES:  negative  HEENT:  negative  RESPIRATORY:  negative  CARDIOVASCULAR:  negative  GASTROINTESTINAL:  negative  GENITOURINARY:  positive for nocturia  INTEGUMENT/BREAST:  negative  HEMATOLOGIC/LYMPHATIC:  negative  ALLERGIC/IMMUNOLOGIC:  negative  ENDOCRINE:  negative  MUSCULOSKELETAL:  positive for  pain  NEUROLOGICAL:  positive for coordination problems, gait problems and tremor    PHYSICAL EXAM:      /60   Pulse 90   Ht 5' 5\" (1.651 m)   Wt 106 lb 12.8 oz (48.4 kg)   SpO2 98%   BMI 17.77 kg/m²       Eyes:  Lids and lashes normal, pupils equal, round and reactive to light, extra ocular muscles intact, sclera clear, conjunctiva normal    Head/ENT:  Normocephalic, without obvious abnormality, atraumatic,     Neck:  Supple, symmetrical, trachea midline, no adenopathy, thyroid symmetric, not enlarged and no tenderness, skin normal    Heart:  Normal apical impulse, regular rate and rhythm, normal S1 and S2, no S3 or S4, and no murmur noted    Lungs:  No increased work of breathing, good air exchange, clear to auscultation bilaterally, no crackles or wheezing    Abdomen:  normal bowel sounds, soft, non-distended, non-tender, no masses palpated, no hepatosplenomegally    Extremities:  No clubbing, cyanosis, or edema    Neuro: Excessive dyskinesia, patient in almost constant motion    DATA:      ASSESSMENT AND PLAN:  Encounter Diagnoses   Name Primary?  Spinal stenosis, unspecified spinal region Yes    Preop examination     [facility]'s disease ([facility] [address]     Dyskinesia          1. Patient is a 66 y.o. female with above specified procedure planned on 3/11/22 with Dr. Haja Miranda at [facility]. They will not require cardiology evaluation prior to procedure. 2. Stop NSAIDs medications 7-10 days prior to procedure. 3.  Patient is not cleared for surgery  I need to speak with neurosurgery before giving patient clearance. Phone call was placed on 2/23/2022    I spent a total of 45* minutes with the patient, reviewing previous notes, consults, test results, imaging ,discussing chronic and acute conditions    Today I spoke with VICKI Thornton with [facility] neurosurgery and Dianna Starr NP with [facility] neurology. After speaking with them and then again with Olga, my recommendation would be not to proceed with this surgery. Patient would like to try in person physical therapy again through [facility] and patient has appointment with neurology on March 8.   Hopefully neurology will be able to diminish her dyskinesia with medication adjustments

## 2022-03-02 ENCOUNTER — TELEPHONE (OUTPATIENT)
Dept: FAMILY MEDICINE CLINIC | Age: 72
End: 2022-03-02

## 2022-03-02 DIAGNOSIS — G24.9 DYSKINESIA: Primary | ICD-10-CM

## 2022-03-02 NOTE — TELEPHONE ENCOUNTER
Please call [facility] neurology on patient's behalf. Danish Gardner is the PA that the patient has worked with. Find out what she needs to do from here since she is not cleared for surgery. Does she need to make an appointment to see him, or can he just order physical therapy for her.   The therapy order most likely needs to come from them, not me

## 2022-03-02 NOTE — TELEPHONE ENCOUNTER
Spoke to the patient she is willing to do the physical therapy, she would like a referral to be sent to [place] for physical therapy.

## 2022-04-02 ENCOUNTER — HOSPITAL ENCOUNTER (OUTPATIENT)
Age: 72
Setting detail: OBSERVATION
Discharge: HOME | End: 2022-04-03
Attending: STUDENT IN AN ORGANIZED HEALTH CARE EDUCATION/TRAINING PROGRAM | Admitting: INTERNAL MEDICINE
Payer: MEDICARE

## 2022-04-02 DIAGNOSIS — R26.81 UNSTABLE GAIT: ICD-10-CM

## 2022-04-02 DIAGNOSIS — G20 PARKINSON'S DISEASE (TREMOR, STIFFNESS, SLOW MOTION, UNSTABLE POSTURE) (HCC): ICD-10-CM

## 2022-04-02 DIAGNOSIS — M25.552 BILATERAL HIP PAIN: Primary | ICD-10-CM

## 2022-04-02 DIAGNOSIS — M25.551 BILATERAL HIP PAIN: Primary | ICD-10-CM

## 2022-04-02 PROBLEM — G20.A1 PARKINSON DISEASE: Status: ACTIVE | Noted: 2022-04-02

## 2022-04-02 LAB
ANION GAP SERPL CALCULATED.3IONS-SCNC: 9 MMOL/L (ref 3–16)
BASOPHILS ABSOLUTE: 0 K/UL (ref 0–0.2)
BASOPHILS RELATIVE PERCENT: 0.2 %
BILIRUBIN URINE: NEGATIVE
BLOOD, URINE: ABNORMAL
BUN BLDV-MCNC: 26 MG/DL (ref 7–20)
CALCIUM SERPL-MCNC: 9.9 MG/DL (ref 8.3–10.6)
CHLORIDE BLD-SCNC: 104 MMOL/L (ref 99–110)
CLARITY: CLEAR
CO2: 29 MMOL/L (ref 21–32)
COLOR: YELLOW
CREAT SERPL-MCNC: 0.6 MG/DL (ref 0.6–1.2)
EOSINOPHILS ABSOLUTE: 0.1 K/UL (ref 0–0.6)
EOSINOPHILS RELATIVE PERCENT: 2.1 %
GFR AFRICAN AMERICAN: >60
GFR NON-AFRICAN AMERICAN: >60
GLUCOSE BLD-MCNC: 109 MG/DL (ref 70–99)
GLUCOSE URINE: NEGATIVE MG/DL
HCT VFR BLD CALC: 41 % (ref 36–48)
HEMOGLOBIN: 13.5 G/DL (ref 12–16)
KETONES, URINE: NEGATIVE MG/DL
LEUKOCYTE ESTERASE, URINE: NEGATIVE
LYMPHOCYTES ABSOLUTE: 1.1 K/UL (ref 1–5.1)
LYMPHOCYTES RELATIVE PERCENT: 21.2 %
MCH RBC QN AUTO: 29.9 PG (ref 26–34)
MCHC RBC AUTO-ENTMCNC: 32.9 G/DL (ref 31–36)
MCV RBC AUTO: 90.8 FL (ref 80–100)
MICROSCOPIC EXAMINATION: YES
MONOCYTES ABSOLUTE: 0.5 K/UL (ref 0–1.3)
MONOCYTES RELATIVE PERCENT: 9.8 %
NEUTROPHILS ABSOLUTE: 3.5 K/UL (ref 1.7–7.7)
NEUTROPHILS RELATIVE PERCENT: 66.7 %
NITRITE, URINE: NEGATIVE
PDW BLD-RTO: 13.6 % (ref 12.4–15.4)
PH UA: 7.5 (ref 5–8)
PLATELET # BLD: 256 K/UL (ref 135–450)
PMV BLD AUTO: 8.1 FL (ref 5–10.5)
POTASSIUM REFLEX MAGNESIUM: 4.2 MMOL/L (ref 3.5–5.1)
PROTEIN UA: NEGATIVE MG/DL
RBC # BLD: 4.51 M/UL (ref 4–5.2)
RBC UA: NORMAL /HPF (ref 0–4)
SODIUM BLD-SCNC: 142 MMOL/L (ref 136–145)
SPECIFIC GRAVITY UA: 1.01 (ref 1–1.03)
TOTAL CK: 290 U/L (ref 26–192)
URINE REFLEX TO CULTURE: ABNORMAL
URINE TYPE: ABNORMAL
UROBILINOGEN, URINE: 0.2 E.U./DL
WBC # BLD: 5.3 K/UL (ref 4–11)
WBC UA: NORMAL /HPF (ref 0–5)

## 2022-04-02 PROCEDURE — 6370000000 HC RX 637 (ALT 250 FOR IP): Performed by: INTERNAL MEDICINE

## 2022-04-02 PROCEDURE — 80048 BASIC METABOLIC PNL TOTAL CA: CPT

## 2022-04-02 PROCEDURE — 99285 EMERGENCY DEPT VISIT HI MDM: CPT

## 2022-04-02 PROCEDURE — 85025 COMPLETE CBC W/AUTO DIFF WBC: CPT

## 2022-04-02 PROCEDURE — 82550 ASSAY OF CK (CPK): CPT

## 2022-04-02 PROCEDURE — 2580000003 HC RX 258: Performed by: INTERNAL MEDICINE

## 2022-04-02 PROCEDURE — 6370000000 HC RX 637 (ALT 250 FOR IP): Performed by: STUDENT IN AN ORGANIZED HEALTH CARE EDUCATION/TRAINING PROGRAM

## 2022-04-02 PROCEDURE — 2580000003 HC RX 258: Performed by: STUDENT IN AN ORGANIZED HEALTH CARE EDUCATION/TRAINING PROGRAM

## 2022-04-02 PROCEDURE — G0378 HOSPITAL OBSERVATION PER HR: HCPCS

## 2022-04-02 PROCEDURE — 99222 1ST HOSP IP/OBS MODERATE 55: CPT | Performed by: PSYCHIATRY & NEUROLOGY

## 2022-04-02 PROCEDURE — 81001 URINALYSIS AUTO W/SCOPE: CPT

## 2022-04-02 RX ORDER — PANTOPRAZOLE SODIUM 40 MG/1
40 TABLET, DELAYED RELEASE ORAL
Status: DISCONTINUED | OUTPATIENT
Start: 2022-04-03 | End: 2022-04-03 | Stop reason: HOSPADM

## 2022-04-02 RX ORDER — OXYCODONE HYDROCHLORIDE AND ACETAMINOPHEN 5; 325 MG/1; MG/1
1 TABLET ORAL ONCE
Status: COMPLETED | OUTPATIENT
Start: 2022-04-02 | End: 2022-04-02

## 2022-04-02 RX ORDER — ONDANSETRON 4 MG/1
4 TABLET, ORALLY DISINTEGRATING ORAL EVERY 8 HOURS PRN
Status: DISCONTINUED | OUTPATIENT
Start: 2022-04-02 | End: 2022-04-03 | Stop reason: HOSPADM

## 2022-04-02 RX ORDER — SODIUM CHLORIDE 0.9 % (FLUSH) 0.9 %
5-40 SYRINGE (ML) INJECTION PRN
Status: DISCONTINUED | OUTPATIENT
Start: 2022-04-02 | End: 2022-04-03 | Stop reason: HOSPADM

## 2022-04-02 RX ORDER — IBUPROFEN 600 MG/1
600 TABLET ORAL ONCE
Status: COMPLETED | OUTPATIENT
Start: 2022-04-02 | End: 2022-04-02

## 2022-04-02 RX ORDER — ONDANSETRON 2 MG/ML
4 INJECTION INTRAMUSCULAR; INTRAVENOUS EVERY 6 HOURS PRN
Status: DISCONTINUED | OUTPATIENT
Start: 2022-04-02 | End: 2022-04-03 | Stop reason: HOSPADM

## 2022-04-02 RX ORDER — SODIUM CHLORIDE 9 MG/ML
INJECTION, SOLUTION INTRAVENOUS PRN
Status: DISCONTINUED | OUTPATIENT
Start: 2022-04-02 | End: 2022-04-03 | Stop reason: HOSPADM

## 2022-04-02 RX ORDER — OMEGA-3 FATTY ACIDS/FISH OIL 300-1000MG
2 CAPSULE ORAL 3 TIMES DAILY PRN
COMMUNITY

## 2022-04-02 RX ORDER — DULOXETIN HYDROCHLORIDE 60 MG/1
60 CAPSULE, DELAYED RELEASE ORAL DAILY
Status: DISCONTINUED | OUTPATIENT
Start: 2022-04-02 | End: 2022-04-03 | Stop reason: HOSPADM

## 2022-04-02 RX ORDER — ACETAMINOPHEN 650 MG/1
650 SUPPOSITORY RECTAL EVERY 6 HOURS PRN
Status: DISCONTINUED | OUTPATIENT
Start: 2022-04-02 | End: 2022-04-03 | Stop reason: HOSPADM

## 2022-04-02 RX ORDER — ACETAMINOPHEN 325 MG/1
650 TABLET ORAL EVERY 6 HOURS PRN
Status: DISCONTINUED | OUTPATIENT
Start: 2022-04-02 | End: 2022-04-03 | Stop reason: HOSPADM

## 2022-04-02 RX ORDER — TROSPIUM CHLORIDE 20 MG/1
20 TABLET, FILM COATED ORAL
Status: DISCONTINUED | OUTPATIENT
Start: 2022-04-02 | End: 2022-04-03 | Stop reason: HOSPADM

## 2022-04-02 RX ORDER — OXYCODONE HYDROCHLORIDE AND ACETAMINOPHEN 5; 325 MG/1; MG/1
1 TABLET ORAL EVERY 4 HOURS PRN
Status: DISCONTINUED | OUTPATIENT
Start: 2022-04-02 | End: 2022-04-03 | Stop reason: HOSPADM

## 2022-04-02 RX ORDER — SODIUM CHLORIDE 0.9 % (FLUSH) 0.9 %
5-40 SYRINGE (ML) INJECTION EVERY 12 HOURS SCHEDULED
Status: DISCONTINUED | OUTPATIENT
Start: 2022-04-02 | End: 2022-04-03 | Stop reason: HOSPADM

## 2022-04-02 RX ORDER — 0.9 % SODIUM CHLORIDE 0.9 %
1000 INTRAVENOUS SOLUTION INTRAVENOUS ONCE
Status: COMPLETED | OUTPATIENT
Start: 2022-04-02 | End: 2022-04-02

## 2022-04-02 RX ORDER — POLYETHYLENE GLYCOL 3350 17 G/17G
17 POWDER, FOR SOLUTION ORAL DAILY PRN
Status: DISCONTINUED | OUTPATIENT
Start: 2022-04-02 | End: 2022-04-03 | Stop reason: HOSPADM

## 2022-04-02 RX ADMIN — OXYCODONE AND ACETAMINOPHEN 1 TABLET: 5; 325 TABLET ORAL at 05:48

## 2022-04-02 RX ADMIN — ACETAMINOPHEN 650 MG: 325 TABLET ORAL at 12:02

## 2022-04-02 RX ADMIN — SODIUM CHLORIDE 1000 ML: 9 INJECTION, SOLUTION INTRAVENOUS at 03:20

## 2022-04-02 RX ADMIN — SODIUM CHLORIDE, PRESERVATIVE FREE 10 ML: 5 INJECTION INTRAVENOUS at 21:06

## 2022-04-02 RX ADMIN — IBUPROFEN 600 MG: 600 TABLET ORAL at 04:37

## 2022-04-02 RX ADMIN — OXYCODONE AND ACETAMINOPHEN 1 TABLET: 5; 325 TABLET ORAL at 21:05

## 2022-04-02 RX ADMIN — SODIUM CHLORIDE, PRESERVATIVE FREE 10 ML: 5 INJECTION INTRAVENOUS at 11:40

## 2022-04-02 RX ADMIN — DULOXETINE HYDROCHLORIDE 60 MG: 60 CAPSULE, DELAYED RELEASE ORAL at 11:39

## 2022-04-02 RX ADMIN — TROSPIUM CHLORIDE 20 MG: 20 TABLET, FILM COATED ORAL at 16:54

## 2022-04-02 ASSESSMENT — PAIN DESCRIPTION - LOCATION
LOCATION: HIP
LOCATION: HIP

## 2022-04-02 ASSESSMENT — PAIN SCALES - GENERAL
PAINLEVEL_OUTOF10: 9
PAINLEVEL_OUTOF10: 0
PAINLEVEL_OUTOF10: 7
PAINLEVEL_OUTOF10: 9
PAINLEVEL_OUTOF10: 7
PAINLEVEL_OUTOF10: 7
PAINLEVEL_OUTOF10: 6

## 2022-04-02 ASSESSMENT — ENCOUNTER SYMPTOMS
VOMITING: 0
COUGH: 0
SHORTNESS OF BREATH: 0
BACK PAIN: 0
ABDOMINAL PAIN: 0
NAUSEA: 0
EYE PAIN: 0
DIARRHEA: 0
SORE THROAT: 0

## 2022-04-02 ASSESSMENT — PAIN DESCRIPTION - PAIN TYPE
TYPE: ACUTE PAIN
TYPE: ACUTE PAIN

## 2022-04-02 ASSESSMENT — PAIN - FUNCTIONAL ASSESSMENT: PAIN_FUNCTIONAL_ASSESSMENT: 0-10

## 2022-04-02 ASSESSMENT — PAIN DESCRIPTION - ORIENTATION
ORIENTATION: RIGHT
ORIENTATION: RIGHT;LEFT

## 2022-04-02 NOTE — ED PROVIDER NOTES
[facility]  ED  EMERGENCY DEPARTMENT ENCOUNTER      Pt Name: Dana Escobar  MRN: [id number]  [place] 1950  Date of evaluation: 4/2/2022  Provider: Anna Storey MD    [address] [place] [address]       Chief Complaint   Patient presents with    Hip Pain     bilateral, woke up with \"what feels like charley horses in both legs\",  states she cannot stand      Bilateral hip pain    HISTORY OF PRESENT ILLNESS   (Location/Symptom, Timing/Onset,Context/Setting, Quality, Duration, Modifying Factors, Severity)  Note limiting factors. Dana Escobar is a 66 y.o. female who presents to the ED with a chief complaint of bilateral hip pain since approximately 2 AM.  She woke up from her sleep with what feels like muscle cramping in the bilateral lower extremities. She states that she cannot move her bilateral hips secondary to the pain. Denies falls, trauma, yesterday had physical therapy and Occupational Therapy see her and was feeling fine at that time. Denies fevers, urinary symptoms, abdominal pain, nausea, vomiting, lateralizing weakness, sensory loss. Symptoms not otherwise alleviated or exacerbated by other factors. NursingNotes were reviewed. REVIEW OF SYSTEMS    (2-9 systems for level 4, 10 or more for level 5)     Review of Systems   Constitutional: Negative for chills and fever. HENT: Negative for congestion and sore throat. Eyes: Negative for pain and visual disturbance. Respiratory: Negative for cough and shortness of breath. Cardiovascular: Negative for chest pain and palpitations. Gastrointestinal: Negative for abdominal pain, diarrhea, nausea and vomiting. Genitourinary: Negative for dysuria and frequency. Musculoskeletal: Positive for arthralgias. Negative for back pain and neck pain. Skin: Negative for rash and wound. Neurological: Positive for tremors and weakness (Generalized). Negative for dizziness and light-headedness.         PAST MEDICAL HISTORY     Past Medical History:   Diagnosis Date    Corticobasal degeneration     Dyskinesia     Hyperlipidemia     Hyperreflexia     Neuropathy     corticobasal degeneration    Parkinson's disease ([facility] [address]           Pituitary adenoma ([facility]          SURGICALHISTORY       Past Surgical History:   Procedure Laterality Date    BACK SURGERY      COLONOSCOPY  6/02    due 6/12    COLONOSCOPY  8/12    due 8/17    FINGER TRIGGER RELEASE      right hand    HAMMER TOE SURGERY  2005    LAPAROSCOPY      TONSILLECTOMY AND ADENOIDECTOMY  14 yo         CURRENT MEDICATIONS       Previous Medications    CALCIUM CARBONATE (OSCAL) 500 MG TABS TABLET    Take 500 mg by mouth daily    CARBIDOPA-LEVODOPA (RYTARY) 61. MG CPCR PER EXTENDED RELEASE CAPSULE    Take 1 capsule by mouth 5 times daily     DULOXETINE (CYMBALTA) 60 MG EXTENDED RELEASE CAPSULE    TAKE ONE CAPSULE BY MOUTH DAILY    IBUPROFEN (ADVIL) 200 MG CAPS    Take 2 capsules by mouth 3 times daily as needed for Fever    MAGNESIUM 100 MG CAPS    Take 100 mg by mouth daily     MELATONIN 3 MG TABS TABLET    Take 3 mg by mouth nightly as needed     MYRBETRIQ 25 MG TB24    Take 25 mg by mouth daily     PANTOPRAZOLE (PROTONIX) 40 MG TABLET    TAKE ONE TABLET BY MOUTH EVERY MORNING BEFORE BREAKFAST    VITAMIN D3 (CHOLECALCIFEROL) 10 MCG (400 UNIT) TABS TABLET    Take 2,000 Units by mouth daily     ZINC PO    Take by mouth       ALLERGIES     Lidocaine; Procaine; Anesthetics, suzanne; Celecoxib;  Lidocaine hcl; and Methylprednisolone    FAMILY HISTORY       Family History   Problem Relation Age of Onset    Heart Disease Mother     Stroke Father     Cancer Father         prostate    Diabetes Sister     Breast Cancer Sister 55    Heart Disease Sister         quadruple bypass    Breast Cancer Sister 37          SOCIAL HISTORY       Social History     Socioeconomic History    Marital status:      Spouse name: Juan C Lira Number of children: 2    Years of education: None    Highest education level: None   Occupational History    Occupation: homemaker     Comment: retired nurse   Tobacco Use    Smoking status: Never Smoker    Smokeless tobacco: Never Used   Vaping Use    Vaping Use: Never used   Substance and Sexual Activity    Alcohol use: No    Drug use: None    Sexual activity: None   Other Topics Concern    None   Social History Narrative    Retired, cares for grand son, exercises regularly, healthy diet     Social Determinants of Health     Financial Resource Strain: Low Risk     Difficulty of Paying Living Expenses: Not hard at all   Food Insecurity: No Food Insecurity    Worried About [address] in the Last Year: Never true    [address] in the Last Year: Never true   Transportation Needs:     Lack of Transportation (Medical): Not on file    Lack of Transportation (Non-Medical):  Not on file   Physical Activity: Sufficiently Active    Days of Exercise per Week: 7 days    Minutes of Exercise per Session: 30 min   Stress:     Feeling of Stress : Not on file   Social Connections:     Frequency of Communication with Friends and Family: Not on file    Frequency of Social Gatherings with Friends and Family: Not on file    Attends Christianity Services: Not on file    Active Member of Clubs or Organizations: Not on file    Attends Club or Organization Meetings: Not on file    Marital Status: Not on file   Intimate Partner Violence:     Fear of Current or Ex-Partner: Not on file    Emotionally Abused: Not on file    Physically Abused: Not on file    Sexually Abused: Not on file   Housing Stability:     Unable to Pay for Housing in the Last Year: Not on file    Number of [place] in the Last Year: Not on file    Unstable Housing in the Last Year: Not on file       SCREENINGS    [place] Coma Scale  Eye Opening: Spontaneous  Best Verbal Response: Oriented  Best Motor Response: Obeys commands  [place] Coma Scale Score: 15        PHYSICAL EXAM    (up to 7 for level 4, 8 or more for level 5)     ED Triage Vitals [04/02/22 0205]   BP Temp Temp src Pulse Resp SpO2 Height Weight   (!) 167/78 98.1 °F (36.7 °C) -- 78 16 100 % 5' 5\" (1.651 m) 105 lb (47.6 kg)       General: Alert and oriented appropriately for age, No acute distress. Eye: Normal conjunctiva. Sclera anicteric. HENT: Oral mucosa is moist.  Respiratory: Respirations even and non-labored. Clear to auscultation bilaterally. Cardiovascular: Normal rate, Regular rhythm. Intact peripheral pulses. Gastrointestinal: Soft, Non-tender, Non-distended. : deferred. Musculoskeletal: No swelling. No tenderness to palpation of the bilateral hips. Allows passive range of motion of bilateral hips without difficulty. No deformities. Patient with significant muscle atrophy, wasting. Integumentary: Warm, Dry. Neurologic: Alert and appropriate for age. Intact strength in the bilateral lower extremities but with difficulty initiating movements, demonstrating rigidity. Psychiatric: Cooperative. DIAGNOSTIC RESULTS       LABS:  Labs Reviewed   BASIC METABOLIC PANEL W/ REFLEX TO MG FOR LOW K - Abnormal; Notable for the following components:       Result Value    Glucose 109 (*)     BUN 26 (*)     All other components within normal limits   CK - Abnormal; Notable for the following components: Total  (*)     All other components within normal limits   URINALYSIS WITH REFLEX TO CULTURE - Abnormal; Notable for the following components:    Blood, Urine SMALL (*)     All other components within normal limits   CBC WITH AUTO DIFFERENTIAL   MICROSCOPIC URINALYSIS       All other labs were within normal range or not returned as of this dictation.     EMERGENCY DEPARTMENT COURSE and DIFFERENTIAL DIAGNOSIS/MDM:   Vitals:    Vitals:    04/02/22 0205 04/02/22 0319 04/02/22 0420   BP: (!) 167/78 (!) 151/79 (!) 148/82   Pulse: 78 88    Resp: 16 16    Temp: 98.1 °F (36.7 °C)     SpO2: 100% 97% 98%   Weight: 105 lb (47.6 kg)     Height: 5' 5\" (1.651 m)           Medical decision making:  Jean-Pierre Locke is a 65 yo F with PMHx of Parkinson's dz who woke up from sleep last night with bilateral hip pain and stiffness, feeling as though she can't move, similar episodes in the past have resolved after several hours but this has persisted. No h/o falls or trauma. allows full ROM of the bilateral hips without pain, doubt structural problem, likely exacerbation of her PD with stiffness and motor slowing. Patient not ambulated after IV fluids, medications for pain control, dosing of prior home dose Sinemet. Likely will need neuro eval, PT/OT. Admitting to the hospitalist.    Medications   carbidopa-levodopa (SINEMET)  MG per tablet 0.5 tablet (0.5 tablets Oral Not Given 4/2/22 9832)   0.9 % sodium chloride bolus (1,000 mLs IntraVENous New Bag 4/2/22 0320)   ibuprofen (ADVIL;MOTRIN) tablet 600 mg (600 mg Oral Given 4/2/22 4517)   oxyCODONE-acetaminophen (PERCOCET) 5-325 MG per tablet 1 tablet (1 tablet Oral Given 4/2/22 7422)          FINAL IMPRESSION      1. Bilateral hip pain    2. Parkinson's disease (tremor, stiffness, slow motion, unstable posture) ([address]    3.  Unstable gait          DISPOSITION/PLAN   DISPOSITION Decision To Admit 04/02/2022 05:30:07 AM      (Please note that portions of this note were completed with a voice recognition program.Efforts were made to edit the dictations but occasionally words are mis-transcribed.)    Tangela Keith MD (electronically signed)  Attending Emergency Physician          Tangela Keith MD  04/02/22 5978

## 2022-04-02 NOTE — ED NOTES
Attempt to give floor report and floor unable to give report. RN state she will call back.      Geraldine Parisi RN  04/02/22 9605

## 2022-04-02 NOTE — ED NOTES
Pt stood and able to get to bedside commode with one asst and walker. Pt very anxious about and had to be reinsured that she would not fall.      Amauri Benjamin RN  04/02/22 2566

## 2022-04-02 NOTE — ED NOTES
Patient identified as a positive fall risk on the ED triage fall screening. Patient placed in fall precautions which includes:  yellow fall risk bracelet on wrist and yellow socks on feet. Patient instructed on importance of not getting out of bed or ambulating without assistance for safety. Pt verbalized understanding.      Osiris Elias RN  04/02/22 8651

## 2022-04-02 NOTE — PROGRESS NOTES
Pt brought to the floor via transport. Pt currently resting in bed with call light and bedside table within reach.

## 2022-04-02 NOTE — H&P
Hospital Medicine History & Physical      PCP: Samaria Castaneda MD    Date of Admission: 4/2/2022    Date of Service: Pt seen/examined on 04/02/22 and Placed in Observation. Chief Complaint:  Hip pain      History Of Present Illness:    66 y.o. female who presented to [facility] with hip pain. Patient has advanced parkinson's disease. Her neurologist changed her home regimen recently due to increased tremors. She initially had improvement in her symptoms but is now having increasing stiffness. She has greatly reduced mobility and has been unable to ambulate since yesterday. This has been associated with bilateral hip pain that is worse in the right. Past Medical History:          Diagnosis Date    Corticobasal degeneration     Dyskinesia     Hyperlipidemia     Hyperreflexia     Neuropathy     corticobasal degeneration    Parkinson's disease ([facility] [address]           Pituitary adenoma ([facility] [address]        Past Surgical History:          Procedure Laterality Date    BACK SURGERY      COLONOSCOPY  6/02    due 6/12    COLONOSCOPY  8/12    due 8/17    FINGER TRIGGER RELEASE      right hand    HAMMER TOE SURGERY  2005    LAPAROSCOPY      TONSILLECTOMY AND ADENOIDECTOMY  14 yo       Medications Prior to Admission:      Prior to Admission medications    Medication Sig Start Date End Date Taking?  Authorizing Provider   ibuprofen (ADVIL) 200 MG CAPS Take 2 capsules by mouth 3 times daily as needed for Fever   Yes Historical Provider, MD   MYRBETRIQ 25 MG TB24 Take 25 mg by mouth daily  2/2/22   Historical Provider, MD   DULoxetine (CYMBALTA) 60 MG extended release capsule TAKE ONE CAPSULE BY MOUTH DAILY 2/16/22   Samaria Castaneda MD   pantoprazole (PROTONIX) 40 MG tablet TAKE ONE TABLET BY MOUTH EVERY MORNING BEFORE BREAKFAST 12/9/21   Samaria Castaneda MD   ZINC PO Take by mouth    Historical Provider, MD   calcium carbonate (OSCAL) 500 MG TABS tablet Take 500 mg by mouth daily    Historical Provider, MD Magnesium 100 MG CAPS Take 100 mg by mouth daily     Historical Provider, MD   vitamin D3 (CHOLECALCIFEROL) 10 MCG (400 UNIT) TABS tablet Take 2,000 Units by mouth daily     Historical Provider, MD   melatonin 3 MG TABS tablet Take 3 mg by mouth nightly as needed     Historical Provider, MD   carbidopa-levodopa (RYTARY) 61. MG CPCR per extended release capsule Take 1 capsule by mouth 5 times daily     Historical Provider, MD       Allergies:  Lidocaine; Procaine; Anesthetics, suzanne; Celecoxib; Lidocaine hcl; and Methylprednisolone    Social History:      The patient currently lives at home    TOBACCO:   reports that she has never smoked. She has never used smokeless tobacco.  ETOH:   reports no history of alcohol use. E-Cigarettes/Vaping Use     Questions Responses    E-Cigarette/Vaping Use Never User    Start Date     Passive Exposure     Quit Date     Counseling Given     Comments             Family History:      Reviewed in detail. Positive as follows:        Problem Relation Age of Onset    Heart Disease Mother     Stroke Father     Cancer Father         prostate    Diabetes Sister     Breast Cancer Sister 55    Heart Disease Sister         quadruple bypass    Breast Cancer Sister 37       REVIEW OF SYSTEMS COMPLETED:   Pertinent positives as noted in the HPI. All other systems reviewed and negative. PHYSICAL EXAM PERFORMED:    BP (!) 150/78   Pulse 86   Temp 98.6 °F (37 °C) (Oral)   Resp 17   Ht 5' 5\" (1.651 m)   Wt 105 lb (47.6 kg)   SpO2 96%   BMI 17.47 kg/m²     General appearance:  No apparent distress, appears stated age and cooperative. HEENT:  Normal cephalic, atraumatic without obvious deformity. Pupils equal, round, and reactive to light. Extra ocular muscles intact. Conjunctivae/corneas clear. Neck: Supple, with full range of motion. No jugular venous distention. Trachea midline. Respiratory:  Normal respiratory effort.  Clear to auscultation, bilaterally without Rales/Wheezes/Rhonchi. Cardiovascular:  Regular rate and rhythm with normal S1/S2 without murmurs, rubs or gallops. Abdomen: Soft, non-tender, non-distended with normal bowel sounds. Musculoskeletal:  No clubbing, cyanosis or edema bilaterally. Full range of motion without deformity. Skin: Skin color, texture, turgor normal.  No rashes or lesions. Neurologic:  Neurovascularly intact without any focal sensory/motor deficits. Cranial nerves: II-XII intact, grossly non-focal. Diffuse rigidity  Psychiatric:  Alert and oriented, thought content appropriate, normal insight  Capillary Refill: Brisk,3 seconds, normal  Peripheral Pulses: +2 palpable, equal bilaterally       Labs:     Recent Labs     04/02/22  0320   WBC 5.3   HGB 13.5   HCT 41.0        Recent Labs     04/02/22  0320      K 4.2      CO2 29   BUN 26*   CREATININE 0.6   CALCIUM 9.9     No results for input(s): AST, ALT, BILIDIR, BILITOT, ALKPHOS in the last 72 hours. No results for input(s): INR in the last 72 hours. Recent Labs     04/02/22  0320   CKTOTAL 290*       Urinalysis:      Lab Results   Component Value Date    NITRU Negative 04/02/2022    WBCUA 0-2 04/02/2022    BACTERIA 1+ 06/11/2021    RBCUA 3-4 04/02/2022    BLOODU SMALL 04/02/2022    SPECGRAV 1.010 04/02/2022    GLUCOSEU Negative 04/02/2022       Radiology:     CXR: I have reviewed the CXR with the following interpretation: none  EKG:  I have reviewed the EKG with the following interpretation: none    No orders to display       ASSESSMENT:    Active Hospital Problems    Diagnosis Date Noted    Parkinson disease ([facility] [address] Facundoramakrishnacharles Hernandezjericho 04/02/2022         PLAN:  Hip pain  - likely due to parkinsons  - pain control ordered    Parkinsons disease  - continue home rytary  - neurology consulted  - SLP eval    GERD  - on PPI    DVT Prophylaxis: lovenox  Diet: ADULT DIET;  Regular  Code Status: Full Code    PT/OT Eval Status: ordered    Dispo - home in 1-2 days       Armando Lerma MD    Thank you Mikala England MD for the opportunity to be involved in this patient's care. If you have any questions or concerns please feel free to contact me at [phone].

## 2022-04-02 NOTE — CONSULTS
In patient Neurology consult        [facility] Neurology      Mike Nicole MD      Natacha Melendez  1950    Date of Service: 4/2/2022    Referring Physician: Jessica Phelps MD      Reason for the consult and CC: Parkinson disease and adjustment of medication. HPI:   The patient is a 66y.o.  years old female with longstanding history of Parkinson disease, sees  for management, who was admitted to [facility] for hip pain. Neurology was consulted for increased stiffness and medication adjustment. She is currently on Rytary 245 mg-61.255 times daily. She was receiving IR dose of Sinemet which was recently discontinued due to increased dyskinesia. Patient has been feeling somewhat stiffness in her hand joints and arms. Degree is moderate persistent. No recent fall or injury. Pain is mild to moderate. No other evaluating factors patient denies any dysphagia dysarthria or active hallucination. No recent fever. Other review of system was unremarkable.       Family History   Problem Relation Age of Onset    Heart Disease Mother     Stroke Father     Cancer Father         prostate    Diabetes Sister     Breast Cancer Sister 55    Heart Disease Sister         quadruple bypass    Breast Cancer Sister 37     Past Surgical History:   Procedure Laterality Date    BACK SURGERY      COLONOSCOPY  6/02    due 6/12    COLONOSCOPY  8/12    due 8/17    FINGER TRIGGER RELEASE      right hand    HAMMER TOE SURGERY  2005    LAPAROSCOPY      TONSILLECTOMY AND ADENOIDECTOMY  14 yo        Past Medical History:   Diagnosis Date    Corticobasal degeneration     Dyskinesia     Hyperlipidemia     Hyperreflexia     Neuropathy     corticobasal degeneration    [facility]           Pituitary adenoma ([facility]      Social History     Tobacco Use    Smoking status: Never Smoker    Smokeless tobacco: Never Used   Vaping Use    Vaping Use: Never used   Substance Use Topics    Alcohol use: No    Drug use: Not on file     Allergies   Allergen Reactions    Lidocaine     Procaine     Anesthetics, Tracy Other (See Comments)     Hypotension episode with ER visit    Celecoxib Other (See Comments)     Abdominal discomfort    Lidocaine Hcl      hypotension    Methylprednisolone Other (See Comments)     Dose pack caused hallucinations     Current Facility-Administered Medications   Medication Dose Route Frequency Provider Last Rate Last Admin    carbidopa-levodopa (SINEMET)  MG per tablet 0.5 tablet  0.5 tablet Oral Once Tangela Keith MD        carbidopa-levodopa Fabien Humphries) 07. MG per extended release (pt supplied)  1 capsule Oral 5x Daily Juan Antonio Villalpando MD   1 capsule at 04/02/22 1231    DULoxetine (CYMBALTA) extended release capsule 60 mg  60 mg Oral Daily Juan Antonio Villalpando MD   60 mg at 04/02/22 1139    trospium (SANCTURA) tablet 20 mg  20 mg Oral BID AC Juan Antonio Villalpando MD        [START ON 4/3/2022] pantoprazole (PROTONIX) tablet 40 mg  40 mg Oral QAM AC Juan Antonio Villalpando MD        sodium chloride flush 0.9 % injection 5-40 mL  5-40 mL IntraVENous 2 times per day Juan Antonio Villalpando MD   10 mL at 04/02/22 1140    sodium chloride flush 0.9 % injection 5-40 mL  5-40 mL IntraVENous PRN Juan Antonio Villalpando MD        0.9 % sodium chloride infusion   IntraVENous PRN Juan Antonio Villalpando MD        enoxaparin (LOVENOX) injection 30 mg  30 mg SubCUTAneous Daily Juan Antonio Villalpando MD        ondansetron (ZOFRAN-ODT) disintegrating tablet 4 mg  4 mg Oral Q8H PRN Juan Antonio Villalpando MD        Or    ondansetron [facility]) injection 4 mg  4 mg IntraVENous Q6H PRN Juan Antonio Villalpando MD        polyethylene glycol [facility]) packet 17 g  17 g Oral Daily PRN Juan Antonio Villalpando MD        acetaminophen (TYLENOL) tablet 650 mg  650 mg Oral Q6H PRN Juan Antonio Villalpando MD   650 mg at 04/02/22 1202    Or    acetaminophen (TYLENOL) suppository 650 mg  650 mg Rectal Q6H PRN Juan Antonio Villalpando MD        oxyCODONE-acetaminophen (PERCOCET) 5-325 MG per tablet 1 tablet  1 tablet Oral Q4H PRN Shreya Bernal MD           ROS : A 10-14 system review of constitutional, cardiovascular, respiratory, eyes, musculoskeletal, endocrine, GI, ENT, skin, hematological, genitourinary, psychiatric and neurologic systems was obtained and updated today and is unremarkable except as mentioned in my HPI      Exam:     Constitutional:   Vitals:    04/02/22 0420 04/02/22 0610 04/02/22 0820 04/02/22 0854   BP: (!) 148/82 130/80 130/80 (!) 150/78   Pulse:  88 88 86   Resp:  16 16 17   Temp:  98 °F (36.7 °C) 98 °F (36.7 °C) 98.6 °F (37 °C)   TempSrc:    Oral   SpO2: 98% 100% 98% 96%   Weight:       Height:           General appearance:  Normal development and appear in no acute distress. Eye:  Fundus of the eye: Funduscopic examination cannot be performed due to COVID19 restrictions and precautions. Neck: supple  Cardiovascular: No lower leg edema with good pulsation. Mental Status:   Oriented to person, place, problem, and time. Memory: Good immediate recall. Intact remote memory  Normal attention span and concentration. Language: intact naming, repeating and fluency   Good fund of Knowledge. Aware of current events and vocabulary   Cranial Nerves:   II: Visual fields: Full. Pupils: equal, round, reactive to light  III,IV,VI: Extra Ocular Movements are intact. No nystagmus  V: Facial sensation is intact   VII: Facial strength and movements: intact and symmetric  VIII: Hearing: Intact  IX: Palate elevation is symmetric  XI: Shoulder shrug is intact  XII: Tongue movements are normal  Musculoskeletal: No focal weakness or resting tremors today. No apparent dyskinesia.   Posturing and dystonia are seen bilaterally in both hands otherwise mild to moderate cogwheeling rigidity and decreased REM  Diminished DTRs throughout  Plantars equivocal  No sensory disturbance  Gait cannot be tested  Data:  LABS:   Lab Results   Component Value Date     04/02/2022    K 4.2 04/02/2022  04/02/2022    CO2 29 04/02/2022    BUN 26 04/02/2022    CREATININE 0.6 04/02/2022    GFRAA >60 04/02/2022    GFRAA >60 04/17/2013    LABGLOM >60 04/02/2022    GLUCOSE 109 04/02/2022    CALCIUM 9.9 04/02/2022     Lab Results   Component Value Date    WBC 5.3 04/02/2022    RBC 4.51 04/02/2022    HGB 13.5 04/02/2022    HCT 41.0 04/02/2022    MCV 90.8 04/02/2022    RDW 13.6 04/02/2022     04/02/2022   No results found for: INR, PROTIME      Reviewed notes from different physicians  Reviewed lab and blood testing  Reviewed outside records from : Copy of such records  She is currently on Rytary 245 mg five times a day (every 3 hours). She also takes 0.5 tab of Sinemet IR every 3rd dose. Her dyskinesia has decreased over the past two weeks. Some days she has none, other days she has more. Today she has none in the office. Her  says it can be stressed induced. She is not having the motor fluctuations on Rytary that she was having on Sinemet IR. She has minimal wearing off. Her  has taken over control of her medications and set alarms on their Iphones to help with medication compliance. This has improved her overall condition. Impression:  Advanced Parkinson disease with atypical features. I do not feel we need to restart her Sinemet IR dose at this point or increase due to high risk of dyskinesia with such medication. Continue Rytary the same dose for now. Discussed risk of on and off phenomenon with the patient and her  in addition to the risk of drug-induced dyskinesia. Continue current management for hip pain. Continue home SSRI  PT and OT  DVT and GI prophylaxis  Can be discharged from neurology once medically stable and follow-up with  neurology  No further recommendation           Thank you for referring such patient. If you have any questions regarding my consult note, please don't hesitate to call me.      Pedro Coleman MD  [phone]    This dictation was generated by voice recognition computer software.  Although all attempts are made to edit the dictation for accuracy, there may be errors in the  transcription that are not intended

## 2022-04-02 NOTE — PROGRESS NOTES
4 Eyes Skin Assessment     The patient is being assess for   Admission    I agree that 2 RN's have performed a thorough Head to Toe Skin Assessment on the patient. ALL assessment sites listed below have been assessed. Areas assessed for pressure by both nurses:   [x]   Head, Face, and Ears   [x]   Shoulders, Back, and Chest, Abdomen  [x]   Arms, Elbows, and Hands   [x]   Coccyx, Sacrum, and Ischium  [x]   Legs, Feet, and Heels        Skin Assessed Under all Medical Devices by both nurses:  N/A              All Mepilex Borders were peeled back and area peeked at by both nurses:  No: N/A  Please list where Mepilex Borders are located:             **SHARE this note so that the co-signing nurse is able to place an eSignature**    Co-signer eSignature: Electronically signed by Rafael Barton RN on 4/2/22 at 3:37 PM EDT    Does the Patient have Skin Breakdown related to pressure?   No     (Insert Photo here)         Rachid Prevention initiated:  NA   Wound Care Orders initiated:  NA      [facility] nurse consulted for Pressure Injury (Stage 3,4, Unstageable, DTI, NWPT, Complex wounds)and New or Established Ostomies:  NA      Primary Nurse eSignature: Electronically signed by Severiano Good, RN on 4/2/22 at 2:01 PM EDT

## 2022-04-03 VITALS
WEIGHT: 105 LBS | HEIGHT: 65 IN | SYSTOLIC BLOOD PRESSURE: 135 MMHG | OXYGEN SATURATION: 98 % | TEMPERATURE: 97.5 F | DIASTOLIC BLOOD PRESSURE: 65 MMHG | RESPIRATION RATE: 16 BRPM | HEART RATE: 81 BPM | BODY MASS INDEX: 17.49 KG/M2

## 2022-04-03 LAB
ANION GAP SERPL CALCULATED.3IONS-SCNC: 11 MMOL/L (ref 3–16)
BASOPHILS ABSOLUTE: 0.1 K/UL (ref 0–0.2)
BASOPHILS RELATIVE PERCENT: 0.9 %
BUN BLDV-MCNC: 24 MG/DL (ref 7–20)
CALCIUM SERPL-MCNC: 9.2 MG/DL (ref 8.3–10.6)
CHLORIDE BLD-SCNC: 104 MMOL/L (ref 99–110)
CO2: 26 MMOL/L (ref 21–32)
CREAT SERPL-MCNC: 0.8 MG/DL (ref 0.6–1.2)
EOSINOPHILS ABSOLUTE: 0.1 K/UL (ref 0–0.6)
EOSINOPHILS RELATIVE PERCENT: 0.7 %
GFR AFRICAN AMERICAN: >60
GFR NON-AFRICAN AMERICAN: >60
GLUCOSE BLD-MCNC: 81 MG/DL (ref 70–99)
HCT VFR BLD CALC: 38.5 % (ref 36–48)
HEMOGLOBIN: 12.6 G/DL (ref 12–16)
LYMPHOCYTES ABSOLUTE: 1.2 K/UL (ref 1–5.1)
LYMPHOCYTES RELATIVE PERCENT: 15.4 %
MCH RBC QN AUTO: 30.4 PG (ref 26–34)
MCHC RBC AUTO-ENTMCNC: 32.7 G/DL (ref 31–36)
MCV RBC AUTO: 92.9 FL (ref 80–100)
MONOCYTES ABSOLUTE: 0.7 K/UL (ref 0–1.3)
MONOCYTES RELATIVE PERCENT: 8.6 %
NEUTROPHILS ABSOLUTE: 6 K/UL (ref 1.7–7.7)
NEUTROPHILS RELATIVE PERCENT: 74.4 %
PDW BLD-RTO: 13.4 % (ref 12.4–15.4)
PLATELET # BLD: 232 K/UL (ref 135–450)
PMV BLD AUTO: 8.4 FL (ref 5–10.5)
POTASSIUM REFLEX MAGNESIUM: 3.8 MMOL/L (ref 3.5–5.1)
RBC # BLD: 4.14 M/UL (ref 4–5.2)
SODIUM BLD-SCNC: 141 MMOL/L (ref 136–145)
WBC # BLD: 8 K/UL (ref 4–11)

## 2022-04-03 PROCEDURE — 97116 GAIT TRAINING THERAPY: CPT

## 2022-04-03 PROCEDURE — 80048 BASIC METABOLIC PNL TOTAL CA: CPT

## 2022-04-03 PROCEDURE — G0378 HOSPITAL OBSERVATION PER HR: HCPCS

## 2022-04-03 PROCEDURE — 85025 COMPLETE CBC W/AUTO DIFF WBC: CPT

## 2022-04-03 PROCEDURE — 97162 PT EVAL MOD COMPLEX 30 MIN: CPT

## 2022-04-03 PROCEDURE — 2580000003 HC RX 258: Performed by: INTERNAL MEDICINE

## 2022-04-03 PROCEDURE — 97165 OT EVAL LOW COMPLEX 30 MIN: CPT

## 2022-04-03 PROCEDURE — 97535 SELF CARE MNGMENT TRAINING: CPT

## 2022-04-03 PROCEDURE — 6370000000 HC RX 637 (ALT 250 FOR IP): Performed by: INTERNAL MEDICINE

## 2022-04-03 PROCEDURE — 36415 COLL VENOUS BLD VENIPUNCTURE: CPT

## 2022-04-03 RX ORDER — OXYCODONE HYDROCHLORIDE AND ACETAMINOPHEN 5; 325 MG/1; MG/1
1 TABLET ORAL EVERY 6 HOURS PRN
Qty: 12 TABLET | Refills: 0 | Status: SHIPPED | OUTPATIENT
Start: 2022-04-03 | End: 2022-04-06

## 2022-04-03 RX ADMIN — SODIUM CHLORIDE, PRESERVATIVE FREE 10 ML: 5 INJECTION INTRAVENOUS at 08:59

## 2022-04-03 RX ADMIN — DULOXETINE HYDROCHLORIDE 60 MG: 60 CAPSULE, DELAYED RELEASE ORAL at 08:57

## 2022-04-03 RX ADMIN — PANTOPRAZOLE SODIUM 40 MG: 40 TABLET, DELAYED RELEASE ORAL at 05:30

## 2022-04-03 RX ADMIN — TROSPIUM CHLORIDE 20 MG: 20 TABLET, FILM COATED ORAL at 05:30

## 2022-04-03 RX ADMIN — OXYCODONE AND ACETAMINOPHEN 1 TABLET: 5; 325 TABLET ORAL at 05:30

## 2022-04-03 ASSESSMENT — PAIN DESCRIPTION - ORIENTATION: ORIENTATION: RIGHT

## 2022-04-03 ASSESSMENT — PAIN DESCRIPTION - LOCATION: LOCATION: HIP;LEG

## 2022-04-03 ASSESSMENT — PAIN SCALES - GENERAL
PAINLEVEL_OUTOF10: 6
PAINLEVEL_OUTOF10: 3

## 2022-04-03 ASSESSMENT — PAIN DESCRIPTION - PAIN TYPE: TYPE: ACUTE PAIN

## 2022-04-03 NOTE — PROGRESS NOTES
Secure message sent to Dr. Ruma Fall:    pt wanted me to let you know that she feels weak again and like she is floating. she also thought she had a bp prob - just checked it and her bp is 135/65.  thanks

## 2022-04-03 NOTE — DISCHARGE INSTR - COC
Continuity of Care Form    Patient Name: Robbie Carranza   :  1950  MRN:  [id number]    Admit date:  2022  Discharge date:  4/3/2022    Code Status Order: Full Code   Advance Directives:      Admitting Physician:  Johny Chandra MD  PCP: Jorje Dong MD    Discharging Nurse: [facility] Unit/Room#: 7038/7884-86  Discharging Unit Phone Number: [phone]    Emergency Contact:   Extended Emergency Contact Information  Primary Emergency Contact: [facility]  Address: [address] [place] [address], [address] [place] [address] Phone: [phone]  Mobile Phone: [phone]  Relation: Spouse  Secondary Emergency Contact: [address] [place] [address] Phone: [phone]  Relation: Child    Past Surgical History:  Past Surgical History:   Procedure Laterality Date    BACK SURGERY      COLONOSCOPY      due     COLONOSCOPY      due     FINGER TRIGGER RELEASE      right hand    HAMMER TOE SURGERY      LAPAROSCOPY      TONSILLECTOMY AND ADENOIDECTOMY  14 yo       Immunization History:   Immunization History   Administered Date(s) Administered    COVID-19, Donald Caldwellst, Primary or Immunocompromised, PF, 100mcg/0.5mL 2021, 2021, 2021    Influenza 2011    Influenza Vaccine, unspecified formulation 10/27/2016    Influenza, High Dose (Fluzone 65 yrs and older) 10/28/2014, 2015, 2017, 10/20/2018    Influenza, High-dose, Quadv, 65 yrs +, IM (Fluzone) 2020, 10/14/2021    Influenza, Intradermal, Preservative free 2013    Influenza, Triv, inactivated, subunit, adjuvanted, IM (Fluad 65 yrs and older) 10/28/2019    Pneumococcal Conjugate 13-valent ([id number]) 2015    Pneumococcal Polysaccharide ([id number]) 10/28/2014    Td, unspecified formulation 07/15/1998    Tdap (Boostrix, Adacel) 10/14/2008, 2021    Zoster Live (Zostavax) 2015    Zoster Recombinant (Shingrix) 2019, 2019, 09/04/2020       Active Problems:  Patient Active Problem List   Diagnosis Code    Neuropathy G62.9    Pituitary adenoma ([facility] D35.2    Lumbar radicular pain M54.16    Depression F32. A    Parkinson's disease ([facility] G20    Dyskinesia G24.9    Hyperreflexia R29.2    Right wrist tendinitis M77.8    Arthritis of wrist, right M19.031    Primary osteoarthritis of left wrist M19.032    Left wrist tendinitis M77.8    OAB (overactive bladder) N32.81    Parkinson's disease (tremor, stiffness, slow motion, unstable posture) ([facility]) G20    Bilateral hip pain M25.551, M25.552       Isolation/Infection:   Isolation            No Isolation          Patient Infection Status       None to display            Nurse Assessment:  Last Vital Signs: /74   Pulse 81   Temp 97.5 °F (36.4 °C) (Oral)   Resp 16   Ht 5' 5\" (1.651 m)   Wt 105 lb (47.6 kg)   SpO2 98%   BMI 17.47 kg/m²     Last documented pain score (0-10 scale): Pain Level: 3  Last Weight:   Wt Readings from Last 1 Encounters:   04/02/22 105 lb (47.6 kg)     Mental Status:  oriented and alert    IV Access:  - None    Nursing Mobility/ADLs:  Walking   Assisted  Transfer  Assisted  Bathing  Assisted  Dressing  Assisted  Toileting  Assisted  Feeding  Assisted  Med Admin  Assisted  Med Delivery   whole    Wound Care Documentation and Therapy:        Elimination:  Continence: Bowel: Yes  Bladder: Yes  Urinary Catheter: None   Colostomy/Ileostomy/Ileal Conduit: No       Date of Last BM: 4/2/2022    Intake/Output Summary (Last 24 hours) at 4/3/2022 1038  Last data filed at 4/3/2022 0605  Gross per 24 hour   Intake 240 ml   Output 1200 ml   Net -960 ml     I/O last 3 completed shifts: In: 240 [P.O.:240]  Out: 1200 [Urine:1200]    Safety Concerns:      At Risk for Falls    Impairments/Disabilities:      None    Nutrition Therapy:  Current Nutrition Therapy:   - Oral Diet:  General    Routes of Feeding: Oral  Liquids: No Restrictions  Daily Fluid Restriction: no  Last Modified Barium Swallow with Video (Video Swallowing Test): not done    Treatments at the Time of Hospital Discharge:   Respiratory Treatments:   Oxygen Therapy:  is not on home oxygen therapy. Ventilator:    - No ventilator support    Rehab Therapies: Physical Therapy and Occupational Therapy  Weight Bearing Status/Restrictions: No weight bearing restrictions  Other Medical Equipment (for information only, NOT a DME order):  walker  Other Treatments:     Patient's personal belongings (please select all that are sent with patient):  Denise Freitas RN SIGNATURE:  Electronically signed by Kris Hood RN on 4/3/22 at 1:28 PM EDT    CASE MANAGEMENT/SOCIAL WORK SECTION    Inpatient Status Date: 4/2/22    Readmission Risk Assessment Score:  Readmission Risk              Risk of Unplanned Readmission:  0           Discharging to Facility/ Agency   Name:   Address:  Phone:  Fax:    Dialysis Facility (if applicable)   Name:  Address:  Dialysis Schedule:  Phone:  Fax:    / signature: Electronically signed by Lurdes Licea RN on 4/3/22 at 11:15 AM EDT    PHYSICIAN SECTION    Prognosis: Good    Condition at Discharge: Stable    Rehab Potential (if transferring to Rehab): Good    Recommended Labs or Other Treatments After Discharge: PT/OT    Physician Certification: I certify the above information and transfer of Kim Bradley  is necessary for the continuing treatment of the diagnosis listed and that she requires Home Care for less 30 days.      Update Admission H&P: No change in H&P    PHYSICIAN SIGNATURE:  Electronically signed by Juan Antonio Villalpando MD on 4/3/22 at 10:38 AM EDT

## 2022-04-03 NOTE — PROGRESS NOTES
Discharge paperwork explained to pt and pt's . Both verbalized understanding. Pt's  given written prescription for pain med. IV removed without complications. Pt discharged to car via wheelchair.

## 2022-04-03 NOTE — PROGRESS NOTES
Speech Language Pathology  ATTEMPT      Natacha Travis Phi  1950      SLP eval and treat orders received. Performed chart review and consulted with RN. RN reports patient tolerating meds and current diet with no difficulties; though, patient does endorse globus sensation and thus referred for evaluation. However, RN states patient is scheduled to discharge, unable to complete evaluation at this time, as patient waiting in room to leave.  Recommend evaluation be completed at next level of care if dysphagia symptoms persist.       Thanks,  Sherrill Vail M.A., Gely Ambriz [address]  Speech-Language Pathologist

## 2022-04-03 NOTE — PROGRESS NOTES
Physical Therapy    Facility/Department: [facility] C5 - MED SURG/ORTHO  Initial Assessment and Treatment Note    NAME: Cong Davis  : 1950  MRN: [id number]    Date of Service: 4/3/2022    Discharge Recommendations:  Home with Home health PT,24 hour supervision or assist   PT Equipment Recommendations  Equipment Needed: No (pt owns device)    Assessment   Body structures, Functions, Activity limitations: Decreased functional mobility ; Decreased balance;Decreased safe awareness;Decreased endurance  Assessment: Pt is functioning below baseline where she was independent at home with use of 4WW. Currently, pt is requiring CGA to min A for ambulation with RW for short distances in room and CGA up to min A for sit to/from stand transfers at [facility]. Pt would benefit from continued acute skilled PT during LOS to address these limitations and allow for safe discharge. Prognosis: Good  Decision Making: Medium Complexity  PT Education: PT Role;Goals;Plan of Care;Orientation;General Safety;Gait Training;Home Exercise Program;Equipment;Transfer Training;Functional Mobility Training  Patient Education: Pt verbalized understanding and would benefit from reinforcement. REQUIRES PT FOLLOW UP: Yes  Activity Tolerance  Activity Tolerance: Patient Tolerated treatment well  Activity Tolerance: Sitting EOB: /[birth year], . Patient Diagnosis(es): The primary encounter diagnosis was Bilateral hip pain. Diagnoses of Parkinson's disease (tremor, stiffness, slow motion, unstable posture) (formerly [facility]) and Unstable gait were also pertinent to this visit. has a past medical history of Corticobasal degeneration, Dyskinesia, Hyperlipidemia, Hyperreflexia, Neuropathy, Parkinson's disease ([address] and Pituitary adenoma ([facility] [address] has a past surgical history that includes Hammer toe surgery (); Finger trigger release; Tonsillectomy and adenoidectomy (14 yo); laparoscopy; Colonoscopy ();  Colonoscopy (); and back surgery. Restrictions  Restrictions/Precautions  Restrictions/Precautions: Fall Risk  Vision/Hearing  Vision: Impaired  Vision Exceptions: Wears glasses at all times (reports cataract surgery last year)  Hearing: Within functional limits     Subjective  General  Chart Reviewed: Yes  Patient assessed for rehabilitation services?: Yes  Response To Previous Treatment: Not applicable  Family / Caregiver Present: No  Referring Practitioner: Funmi Padilla MD  Referral Date : 04/02/22  General Comment  Comments: RN cleared for therapy. Subjective  Subjective: Pt in bed upon arrival, agreeable to therapy - requesting to use the restroom.   Pain Screening  Patient Currently in Pain: Denies  Vital Signs  Patient Currently in Pain:  (none reported during session)       Orientation  Orientation  Overall Orientation Status: Within Functional Limits  Social/Functional History  Social/Functional History  Lives With: Spouse  Type of Home: House  Home Layout: Two level,1/2 bath on main level,Bed/Bath upstairs,Laundry in basement (flight of stairs to bedroom with 1-2 rails and stairs to basement with LHR when ascending)  Home Access: Stairs to enter without rails  Entrance Stairs - Number of Steps: 3  Bathroom Shower/Tub: Tub/Shower unit  Bathroom Toilet: Standard (uses vanity for support)  Bathroom Equipment: Shower chair,Grab bars in shower  Home Equipment: 4 wheeled walker,Cane,Wheelchair-manual  ADL Assistance:  (report primarily independent (albeit slow), occasional assist by  to pull shirt over head pending her status)  Homemaking Assistance:  (family lives nearby who helps with cleaning every 2 weeks, states she performs laundry downstairs with help from someone to carry laundry up/downstairs,  grocery shops, patient reports cooking on her own)  Ambulation Assistance: Independent (4WW use inside to help her carry things from room to room)  Transfer Assistance: Needs assistance (occasional bed and chair transfer help from )  Additional Comments: Reports two falls outside ~1.5 years ago, but no recent falls in last 3 months. Receives outpatient PT 2x/wk. Note that pt was sometimes inconsistent with subjective history reports, answering differently with further inquiry/repeat questioning. Cognition   Cognition  Overall Cognitive Status: Exceptions  Arousal/Alertness: Appropriate responses to stimuli  Following Commands: Follows one step commands consistently  Attention Span: Appears intact  Memory: Appears intact  Safety Judgement: Decreased awareness of need for assistance  Problem Solving: Assistance required to identify errors made;Assistance required to implement solutions;Assistance required to generate solutions  Insights: Decreased awareness of deficits  Initiation: Requires cues for some  Sequencing: Requires cues for some  Cognition Comment: Pt's cognition appeared to wax/wane during session with note of inconsistencies with subjective history reports, although pt was Ox4. Objective          AROM RLE (degrees)  RLE AROM: WFL  AROM LLE (degrees)  LLE AROM : WFL  Strength RLE  Strength RLE: WFL  Strength LLE  Strength LLE: WFL  Motor Control  Comments: Note initial small shuffling steps with gait that improved with cues. Bed mobility  Supine to Sit: Moderate assistance  Sit to Supine: Unable to assess (Pt returned to chair at end of session.)  Transfers  Sit to Stand: Contact guard assistance;Minimal Assistance (at [employer], up to min A for one slight posterior LOB)  Stand to sit: Contact guard assistance;Minimal Assistance (at [employer])  Ambulation  Ambulation?: Yes  More Ambulation?: No  Ambulation 1  Surface: level tile  Device: Rolling Walker  Assistance: Contact guard assistance;Minimal assistance  Quality of Gait: With verbal cueing, pt able to intermittently taking longer steps.   Note pt ambulating on forefoot with heels off ground unless cued (pt stating she has a long term \"Carlyle horse\" affecting R >L LE). Gait Deviations: Shuffles; Slow Tori;Decreased step length;Decreased step height;Staggers  Distance: 12 ft + 20 ft  Comments: Note pt was easily fatigued, requiring self selected sitting breaks. Balance  Sitting - Static: Good  Sitting - Dynamic: Good;-  Standing - Static: Fair;-  Standing - Dynamic: Poor;+  Exercises  Hip Flexion: x 3 BLE  Knee Long Arc Quad: x 3 BLE  Ankle Pumps: x 5 B LE  Comments: Educate pt on performing above HEP during the day in hospital (choosing one activity and performing 10 reps every hour). Plan   Plan  Times per week: 3-5x/wk  Times per day: Daily  Current Treatment Recommendations: Strengthening,Neuromuscular Re-education,Home Exercise Program,ROM,Safety Education & Donnise Haggis Training,Patient/Caregiver Education & Training,Functional Mobility Training,Equipment Evaluation, Education, & procurement,Transfer Training,Gait Training,Stair training  Safety Devices  Type of devices: All fall risk precautions in place,Left in chair,Call light within reach,Chair alarm in place,Nurse notified,Gait belt,Patient at risk for falls  Restraints  Initially in place: No      AM-PAC Score  AM-PAC Inpatient Mobility Raw Score : 16 (04/03/22 1146)  AM-PAC Inpatient T-Scale Score : 40.78 (04/03/22 1146)  Mobility Inpatient CMS 0-100% Score: 54.16 (04/03/22 1146)  Mobility Inpatient CMS G-Code Modifier : CK (04/03/22 1146)          Goals  Short term goals  Time Frame for Short term goals: 4/10/22  Short term goal 1: Pt to perform bed mobility with min A. Short term goal 2: Pt to perform sit to/from stand transfers at [facility] with SBA. Short term goal 3: Pt to ambulate 50 ft for household distances at [facility] with CGA. Short term goal 4: Pt to manage 3 steps without HR and LRAD with min A to return home. Short term goal 5: To be met 4/7/22: Pt to perform 12-15 reps of LE HEP to target strength/ROM.   Patient Goals   Patient goals : to return home Therapy Time   Individual Concurrent Group Co-treatment   Time In 0749         Time Out 0834         Minutes 45         Timed Code Treatment Minutes: 35 Minutes (10 min eval + 1 billable treatment unit)       Sagrario Mane, PT

## 2022-04-03 NOTE — CARE COORDINATION
CASE MANAGEMENT DISCHARGE SUMMARY      Discharge to: Home with Lizeth Smallwood    Transportation:    Family/car: yes    Confirmed discharge plan with:     Patient: yes per RN     Family:  No spoke to pt     Facility/Agency, name: [facility] faxed      RN, name: Marvel Balderrama RN    Note: Discharging nurse to complete CINDY, reconcile AVS, and place final copy with patient's discharge packet. RN to ensure that written prescriptions for  Level II medications are sent with patient to the facility as per protocol.

## 2022-04-03 NOTE — PROGRESS NOTES
Pt requested to get home meds back in her possession due to her having a d/c order. Instructed pt not to take any meds without informing me. Pt and  verbalized understanding.

## 2022-04-03 NOTE — PROGRESS NOTES
Occupational Therapy   Occupational Therapy Initial Assessment  Date: 4/3/2022   Patient Name: Georgina Larkin  MRN: [id number]     : 1950    Date of Service: 4/3/2022    Discharge Recommendations:  24 hour supervision or assist,S Level 1,Home with Home health OT       Assessment   Performance deficits / Impairments: Decreased functional mobility ; Decreased endurance;Decreased ADL status; Decreased balance;Decreased high-level IADLs  Prognosis: Good  Decision Making: Low Complexity  OT Education: OT Role;Plan of Care;Precautions; ADL Adaptive Strategies;Transfer Training;Energy Conservation  REQUIRES OT FOLLOW UP: Yes  Activity Tolerance  Activity Tolerance: Patient Tolerated treatment well  Safety Devices  Safety Devices in place: Yes  Type of devices: All fall risk precautions in place; Left in chair;Call light within reach;Nurse notified; Chair alarm in place; Patient at risk for falls           Patient Diagnosis(es): The primary encounter diagnosis was Bilateral hip pain. Diagnoses of Parkinson's disease (tremor, stiffness, slow motion, unstable posture) ([facility]) and Unstable gait were also pertinent to this visit. has a past medical history of Corticobasal degeneration, Dyskinesia, Hyperlipidemia, Hyperreflexia, Neuropathy, Parkinson's disease ([facility] [address] and Pituitary adenoma ([facility] [address] has a past surgical history that includes Hammer toe surgery (); Finger trigger release; Tonsillectomy and adenoidectomy (14 yo); laparoscopy; Colonoscopy (); Colonoscopy (); and back surgery.            Restrictions  Restrictions/Precautions  Restrictions/Precautions: Fall Risk    Subjective   General  Chart Reviewed: Yes,Progress Notes,History and Physical,Imaging  Patient assessed for rehabilitation services?: Yes  Additional Pertinent Hx: Parkinson's Disease  Family / Caregiver Present: No  Referring Practitioner: John Adair MD  Diagnosis: Difficulty Ambulating  Subjective  Subjective: Pt seated at side of bed with PT, requesting to use the bathroom. Pt intermittently confused throughout evaluation  Patient Currently in Pain: Denies  Vital Signs  Patient Currently in Pain: Denies  Social/Functional History  Social/Functional History  Lives With: Spouse  Type of Home: House  Home Layout: Two level,1/2 bath on main level,Bed/Bath upstairs,Laundry in basement (flight of stairs to bedroom with 1-2 rails and stairs to basement with LHR when ascending)  Home Access: Stairs to enter without rails  Entrance Stairs - Number of Steps: 3  Bathroom Shower/Tub: Tub/Shower unit  Bathroom Toilet: Standard (uses vanity for support)  Bathroom Equipment: Shower chair,Grab bars in shower  Home Equipment: 4 wheeled walker,Cane,Wheelchair-manual  ADL Assistance:  (report primarily independent (albeit slow), occasional assist by  to pull shirt over head pending her status)  Homemaking Assistance:  (family lives nearby who helps with cleaning every 2 weeks, states she performs laundry downstairs with help from someone to carry laundry up/downstairs,  grocery shops, patient reports cooking on her own)  Ambulation Assistance: Independent (4WW use inside to help her carry things from room to room)  Transfer Assistance: Needs assistance (occasional bed and chair transfer help from )  Additional Comments: Reports two falls outside ~1.5 years ago, but no recent falls in last 3 months. Receives outpatient PT 2x/wk. Note that pt was sometimes inconsistent with subjective history reports, answering differently with further inquiry/repeat questioning.        Objective        Orientation  Overall Orientation Status: Within Functional Limits     Balance  Sitting Balance: Stand by assistance  Standing Balance: Contact guard assistance  Functional Mobility  Functional - Mobility Device: Rolling Walker  Activity: To/from bathroom  Assist Level: Contact guard assistance  Toilet Transfers  Toilet - Technique: Ambulating  Equipment Used: Standard toilet  Toilet Transfer: Contact guard assistance  ADL  Feeding: Independent  Grooming: Supervision  LE Dressing: Contact guard assistance; Increased time to complete  Toileting: Increased time to complete;Contact guard assistance  Tone RUE  RUE Tone: Normotonic  Tone LUE  LUE Tone: Normotonic  Coordination  Movements Are Fluid And Coordinated: No  Coordination and Movement description: Tremors     Bed mobility  Supine to Sit: Moderate assistance  Sit to Supine: Unable to assess  Transfers  Sit to stand: Contact guard assistance  Stand to sit: Contact guard assistance  Transfer Comments: VC for hand placement     Cognition  Overall Cognitive Status: Exceptions  Arousal/Alertness: Appropriate responses to stimuli  Following Commands: Follows one step commands consistently  Attention Span: Appears intact  Memory: Appears intact  Safety Judgement: Decreased awareness of need for assistance  Problem Solving: Assistance required to identify errors made;Assistance required to implement solutions;Assistance required to generate solutions  Insights: Decreased awareness of deficits  Initiation: Requires cues for some  Sequencing: Requires cues for some  Cognition Comment: Pt's cognition appeared to wax/wane during session with note of inconsistencies with subjective history reports, although pt was Ox4.                  LUE AROM (degrees)  LUE AROM : WFL  Left Hand AROM (degrees)  Left Hand AROM: WFL  RUE AROM (degrees)  RUE AROM : WFL  Right Hand AROM (degrees)  Right Hand AROM: WFL                      Plan   Plan  Times per week: 2-3x  Current Treatment Recommendations: Strengthening,Patient/Caregiver Education & Training,ROM,Balance Training,Functional Mobility Training,Endurance Training,Safety Education & Training,Self-Care / ADL      AM-PAC Score        AM-[facility] Inpatient Daily Activity Raw Score: 19 (04/03/22 1150)  AM-PAC Inpatient ADL T-Scale Score : 40.22 (04/03/22 1150)  ADL Inpatient CMS 0-100% Score: 42.8 (04/03/22 1150)  ADL Inpatient CMS G-Code Modifier : CK (04/03/22 1150)    Goals  Short term goals  Time Frame for Short term goals: by d/c  Short term goal 1: Pt will complete LB dressing with mod  I  Short term goal 2: Complete toileting with mod I  Short term goal 3: Complete functional transfers with mod I       Therapy Time   Individual Concurrent Group Co-treatment   Time In 0810         Time Out 0838         Minutes 28         Timed Code Treatment Minutes: 207 Arnel Luna, OTR/L

## 2022-04-05 ENCOUNTER — TELEPHONE (OUTPATIENT)
Dept: FAMILY MEDICINE CLINIC | Age: 72
End: 2022-04-05

## 2022-04-05 NOTE — TELEPHONE ENCOUNTER
Jorden Garcia just started caring for patient and is requesting verbal orders for home PT and OT. Hospital f/u scheduled for tomorrow.

## 2022-04-05 NOTE — TELEPHONE ENCOUNTER
Pt was seen and evaluated in [facility] ER on 4/2/22 for bilateral hip pain. Home care orders were made at that time. Do you want to wait until tomorrow to okay the PT/OT?

## 2022-04-06 ENCOUNTER — OFFICE VISIT (OUTPATIENT)
Dept: FAMILY MEDICINE CLINIC | Age: 72
End: 2022-04-06
Payer: MEDICARE

## 2022-04-06 VITALS
HEIGHT: 65 IN | SYSTOLIC BLOOD PRESSURE: 110 MMHG | WEIGHT: 106 LBS | OXYGEN SATURATION: 98 % | HEART RATE: 98 BPM | DIASTOLIC BLOOD PRESSURE: 70 MMHG | BODY MASS INDEX: 17.66 KG/M2

## 2022-04-06 DIAGNOSIS — M48.00 SPINAL STENOSIS, UNSPECIFIED SPINAL REGION: ICD-10-CM

## 2022-04-06 DIAGNOSIS — G20 PARKINSON'S DISEASE (HCC): Primary | ICD-10-CM

## 2022-04-06 PROCEDURE — G8419 CALC BMI OUT NRM PARAM NOF/U: HCPCS | Performed by: FAMILY MEDICINE

## 2022-04-06 PROCEDURE — 1123F ACP DISCUSS/DSCN MKR DOCD: CPT | Performed by: FAMILY MEDICINE

## 2022-04-06 PROCEDURE — 3017F COLORECTAL CA SCREEN DOC REV: CPT | Performed by: FAMILY MEDICINE

## 2022-04-06 PROCEDURE — 4040F PNEUMOC VAC/ADMIN/RCVD: CPT | Performed by: FAMILY MEDICINE

## 2022-04-06 PROCEDURE — 1111F DSCHRG MED/CURRENT MED MERGE: CPT | Performed by: FAMILY MEDICINE

## 2022-04-06 PROCEDURE — 1036F TOBACCO NON-USER: CPT | Performed by: FAMILY MEDICINE

## 2022-04-06 PROCEDURE — G8399 PT W/DXA RESULTS DOCUMENT: HCPCS | Performed by: FAMILY MEDICINE

## 2022-04-06 PROCEDURE — 1090F PRES/ABSN URINE INCON ASSESS: CPT | Performed by: FAMILY MEDICINE

## 2022-04-06 PROCEDURE — G8427 DOCREV CUR MEDS BY ELIG CLIN: HCPCS | Performed by: FAMILY MEDICINE

## 2022-04-06 PROCEDURE — 99214 OFFICE O/P EST MOD 30 MIN: CPT | Performed by: FAMILY MEDICINE

## 2022-04-06 NOTE — PROGRESS NOTES
Post-Discharge Transitional Care  Follow Up      Cresencio Shultz   YOB: 1950    Date of Office Visit:  4/6/2022  Date of Hospital Admission: 4/2/22  Date of Hospital Discharge: 4/3/22  Risk of hospital readmission (high >=14%. Medium >=10%) :No data recorded    Care management risk score Rising risk (score 2-5) and Complex Care (Scores >=6): 5     Non face to face  following discharge, date last encounter closed (first attempt may have been earlier): *No documented post hospital discharge outreach found in the last 14 days    Call initiated 2 business days of discharge: *No response recorded in the last 14 days    ASSESSMENT/PLAN:   Parkinson's disease ([facility] [address]  -     WI DISCHARGE MEDS RECONCILED W/ CURRENT OUTPATIENT MED LIST  Spinal stenosis, unspecified spinal region  -     WI DISCHARGE MEDS RECONCILED W/ CURRENT OUTPATIENT MED LIST      Medical Decision Making: moderate complexity  Return if symptoms worsen or fail to improve. Subjective:   HPI:  Follow up of Hospital problems/diagnosis(es):   Encounter Diagnoses   Name Primary?  Parkinson's disease ([facility] [address] Yes    Spinal stenosis, unspecified spinal region          Inpatient course: Discharge summary reviewed- see chart. Interval history/Current status: Patient was in the hospital from 4/2/2022 to 4/3/2022. She is here in follow-up with her . Patient has a history of severe advanced Parkinson's. She has managed by nurse practitioner Andres Mckeon at [facility]. She was in the hospital she was seen in consult by Dr. Marlene Fitch. Patient's dyskinesia is much improved from when I saw her last.  There has been an adjustment in her medication. This adjustment has helped with her dyskinesia. However needs to be taken 5 times a day. Her  helps her with her medication. They have a log where they keep track of the doses. The night before she was admitted there were 3 missing doses from the day prior.   So patient had not been taking her medication correctly. This resulted in extreme stiffness in her legs and she was unable to walk. After being in the hospital and being back on her regular medication schedule her symptoms improved. The thought was that she would benefit from home physical therapy and Occupational Therapy. This has been ordered as an outpatient for the patient, however it was a major stress on her and her  to get to these appointments. Having OT PT come to her home is the best solution. Patient was given a prescription for hydrocodone. She has not filled it. I discouraged her from filling it since she is not having pain and it could increase her risk for falls    Patient Active Problem List   Diagnosis    Neuropathy    Pituitary adenoma ([facility] [address]    Lumbar radicular pain    Depression    Parkinson's disease ([facility] [address]    Dyskinesia    Hyperreflexia    Right wrist tendinitis    Arthritis of wrist, right    Primary osteoarthritis of left wrist    Left wrist tendinitis    OAB (overactive bladder)    Parkinson's disease (tremor, stiffness, slow motion, unstable posture) (HCC)    Bilateral hip pain       Medications listed as ordered at the time of discharge from hospital     Medication List          Accurate as of April 6, 2022  3:19 PM. If you have any questions, ask your nurse or doctor. CONTINUE taking these medications    Advil 200 MG Caps  Generic drug: ibuprofen     calcium carbonate 500 MG Tabs tablet  Commonly known as: OSCAL     DULoxetine 60 MG extended release capsule  Commonly known as: CYMBALTA  TAKE ONE CAPSULE BY MOUTH DAILY     Magnesium 100 MG Caps     melatonin 3 MG Tabs tablet     Myrbetriq 25 MG Tb24  Generic drug: mirabegron     oxyCODONE-acetaminophen 5-325 MG per tablet  Commonly known as: PERCOCET  Take 1 tablet by mouth every 6 hours as needed for Pain for up to 3 days.      pantoprazole 40 MG tablet  Commonly known as: PROTONIX  TAKE ONE TABLET BY MOUTH EVERY MORNING BEFORE BREAKFAST     Rytary 61. MG Cpcr per extended release capsule  Generic drug: carbidopa-levodopa     vitamin D3 10 MCG (400 UNIT) Tabs tablet  Commonly known as: CHOLECALCIFEROL     ZINC PO              Medications marked \"taking\" at this time  Outpatient Medications Marked as Taking for the 4/6/22 encounter (Office Visit) with Arabella Francis MD   Medication Sig Dispense Refill    oxyCODONE-acetaminophen (PERCOCET) 5-325 MG per tablet Take 1 tablet by mouth every 6 hours as needed for Pain for up to 3 days. 12 tablet 0    ibuprofen (ADVIL) 200 MG CAPS Take 2 capsules by mouth 3 times daily as needed for Fever      MYRBETRIQ 25 MG TB24 Take 25 mg by mouth daily       DULoxetine (CYMBALTA) 60 MG extended release capsule TAKE ONE CAPSULE BY MOUTH DAILY 90 capsule 1    pantoprazole (PROTONIX) 40 MG tablet TAKE ONE TABLET BY MOUTH EVERY MORNING BEFORE BREAKFAST 30 tablet 5    ZINC PO Take by mouth      calcium carbonate (OSCAL) 500 MG TABS tablet Take 500 mg by mouth daily      Magnesium 100 MG CAPS Take 100 mg by mouth daily       vitamin D3 (CHOLECALCIFEROL) 10 MCG (400 UNIT) TABS tablet Take 2,000 Units by mouth daily       melatonin 3 MG TABS tablet Take 3 mg by mouth nightly as needed       carbidopa-levodopa (RYTARY) 61. MG CPCR per extended release capsule Take 1 capsule by mouth 5 times daily           Medications patient taking as of now reconciled against medications ordered at time of hospital discharge: Yes    A comprehensive review of systems was negative except for what was noted in the HPI.     Objective:    /70   Pulse 98   Ht 5' 5\" (1.651 m)   Wt 106 lb (48.1 kg)   SpO2 98%   BMI 17.64 kg/m²   General Appearance: alert and oriented to person, place and time, thin elderly female, in no acute distress    Head: normocephalic and atraumatic  Eyes:  extraocular eye movements intact, conjunctivae normal    Neck: supple  Pulmonary/Chest: clear to auscultation bilaterally- no wheezes, rales or rhonchi, normal air movement, no respiratory distress  Cardiovascular: normal rate, regular rhythm, normal S1 and S2, no murmurs, rubs, clicks, or gallops, distal pulses intact, no carotid bruits    Extremities: no cyanosis, clubbing or edema  Musculoskeletal: Limited range of motion of her right hip and knee, tightness  Neurologic:  no cranial nerve deficit, gait unsteady, speech normal      An electronic signature was used to authenticate this note.   --Jameson Barclay MD

## 2022-04-06 NOTE — TELEPHONE ENCOUNTER
Hipolito Ward from [employer] returned call. I advised Dr. Nathalia Garcia will not sign off on orders until patient has been seen for her hospital follow up which is today. She would like a call after the appointment to give a verbal for orders.

## 2022-04-11 NOTE — DISCHARGE SUMMARY
Hospital Medicine Discharge Summary    Patient ID: Abdirashid Salazar      Patient's PCP: Zcahariah Young MD    Admit Date: 4/2/2022     Discharge Date: 4/3/2022      Admitting Provider: Jose J Pittman MD     Discharge Provider: Jose J Pittman MD     Discharge Diagnoses: Active Hospital Problems    Diagnosis     Parkinson's disease (tremor, stiffness, slow motion, unstable posture) ([facility]) [G20]     Bilateral hip pain [M25.551, M25.552]        The patient was seen and examined on day of discharge and this discharge summary is in conjunction with any daily progress note from day of discharge. Hospital Course:   66 y.o. female who presented to [facility] with hip pain. Patient has advanced parkinson's disease. Her neurologist changed her home regimen recently due to increased tremors. She initially had improvement in her symptoms but is now having increasing stiffness. She has greatly reduced mobility and has been unable to ambulate since yesterday. This has been associated with bilateral hip pain that is worse in the right. Hip pain  - likely due to parkinsons  - pain control ordered     Parkinsons disease  - continue home rytary  - neurology consulted  - follow up with outpatient neurologist     GERD  - on PPI    Physical Exam Performed:     /65   Pulse 81   Temp 97.5 °F (36.4 °C) (Oral)   Resp 16   Ht 5' 5\" (1.651 m)   Wt 105 lb (47.6 kg)   SpO2 98%   BMI 17.47 kg/m²       General appearance:  No apparent distress, appears stated age and cooperative. HEENT:  Normal cephalic, atraumatic without obvious deformity. Pupils equal, round, and reactive to light. Extra ocular muscles intact. Conjunctivae/corneas clear. Neck: Supple, with full range of motion. No jugular venous distention. Trachea midline. Respiratory:  Normal respiratory effort. Clear to auscultation, bilaterally without Rales/Wheezes/Rhonchi.   Cardiovascular:  Regular rate and rhythm with normal S1/S2 without murmurs, rubs or gallops. Abdomen: Soft, non-tender, non-distended with normal bowel sounds. Musculoskeletal:  No clubbing, cyanosis or edema bilaterally. Full range of motion without deformity. Skin: Skin color, texture, turgor normal.  No rashes or lesions. Neurologic:  Neurovascularly intact without any focal sensory/motor deficits. Cranial nerves: II-XII intact, grossly non-focal. Diffuse rigidity  Psychiatric:  Alert and oriented, thought content appropriate, normal insight  Capillary Refill: Brisk,3 seconds, normal  Peripheral Pulses: +2 palpable, equal bilaterally     Labs: For convenience and continuity at follow-up the following most recent labs are provided:      CBC:    Lab Results   Component Value Date    WBC 8.0 04/03/2022    HGB 12.6 04/03/2022    HCT 38.5 04/03/2022     04/03/2022       Renal:    Lab Results   Component Value Date     04/03/2022    K 3.8 04/03/2022     04/03/2022    CO2 26 04/03/2022    BUN 24 04/03/2022    CREATININE 0.8 04/03/2022    CALCIUM 9.2 04/03/2022         Significant Diagnostic Studies    Radiology:   No orders to display          Consults:     IP CONSULT TO NEUROLOGY  IP CONSULT TO HOME CARE NEEDS    Disposition:  [facility]    Condition at Discharge: Stable    Discharge Instructions/Follow-up:  Follow up with PCP, neurologist within 1-2 weeks    Code Status:  Full code    Activity: activity as tolerated    Diet: regular diet      Discharge Medications:     Discharge Medication List as of 4/3/2022  1:30 PM           Details   oxyCODONE-acetaminophen (PERCOCET) 5-325 MG per tablet Take 1 tablet by mouth every 6 hours as needed for Pain for up to 3 days. , Disp-12 tablet, R-0Print              Details   ibuprofen (ADVIL) 200 MG CAPS Take 2 capsules by mouth 3 times daily as needed for FeverHistorical Med      MYRBETRIQ 25 MG TB24 Take 25 mg by mouth daily , DAWHistorical Med      DULoxetine (CYMBALTA) 60 MG extended release capsule TAKE ONE CAPSULE BY MOUTH DAILY, Disp-90 capsule, R-1Normal      pantoprazole (PROTONIX) 40 MG tablet TAKE ONE TABLET BY MOUTH EVERY MORNING BEFORE BREAKFAST, Disp-30 tablet, R-5Normal      ZINC PO Take by mouthHistorical Med      calcium carbonate (OSCAL) 500 MG TABS tablet Take 500 mg by mouth dailyHistorical Med      Magnesium 100 MG CAPS Take 100 mg by mouth daily Historical Med      vitamin D3 (CHOLECALCIFEROL) 10 MCG (400 UNIT) TABS tablet Take 2,000 Units by mouth daily Historical Med      melatonin 3 MG TABS tablet Take 3 mg by mouth nightly as needed Historical Med      carbidopa-levodopa (RYTARY) 61. MG CPCR per extended release capsule Take 1 capsule by mouth 5 times daily Historical Med             Time Spent on discharge is more than 20 minutes in the examination, evaluation, counseling and review of medications and discharge plan. Signed:    Felicia Howell MD   4/10/2022      Thank you Gilbert Hurd MD for the opportunity to be involved in this patient's care. If you have any questions or concerns please feel free to contact me at [phone].

## 2022-04-12 ENCOUNTER — TELEPHONE (OUTPATIENT)
Dept: FAMILY MEDICINE CLINIC | Age: 72
End: 2022-04-12

## 2022-04-12 NOTE — TELEPHONE ENCOUNTER
Patient feels like she has increased depression. She would like to increase the Cymbalta before it worsens. Last seen 4-6-22. [employer] [place], [address]

## 2022-04-12 NOTE — TELEPHONE ENCOUNTER
I spoke with Samanta Magaña. She states that the pt was very confused when she was at her house today. She states that Natacha called the nurse line at [facility] and said that she was having UTI symptoms but when Samanta Gómez got there this afternoon, she said that wasn't having any issues. Samanta Gómez feels that a home nurse would be beneficial and the  could look to see what resources are available for her.

## 2022-04-12 NOTE — TELEPHONE ENCOUNTER
----- Message from Marylene Pinks sent at 4/12/2022 10:48 AM EDT -----  Subject: Message to Provider    QUESTIONS  Information for Provider? Patient would like her Cymbalta increased,   patient is under a lot of issues and isn't coping very well.  ---------------------------------------------------------------------------  --------------  CALL BACK INFO  What is the best way for the office to contact you? OK to leave message on   voicemail  Preferred Call Back Phone Number? [phone]  ---------------------------------------------------------------------------  --------------  SCRIPT ANSWERS  Relationship to Patient?  Self

## 2022-04-12 NOTE — TELEPHONE ENCOUNTER
Delia Montejo who is in care of patient for Physical Therapy at the moment is requesting orders for a  and nurse. Hospital f/u was on 4/6/22.

## 2022-04-12 NOTE — TELEPHONE ENCOUNTER
Please let patient know that I would not recommend increasing the dose of her Cymbalta beyond 60 mg.  I think that her increased anxiety as a result of the Parkinson's.   I think it would be best for her to call her neurologist to see if they have any recommendations of what can be added to her Cymbalta

## 2022-04-12 NOTE — TELEPHONE ENCOUNTER
I spoke with the pt. She states that she is currently taking Cymbalta 60 mg daily. She is having a hard time dealing with her parkinsons and having the medication for that adjusted. She also states that she cannot talk to her  because he gets aggravated with her if she asks too many questions. She is not getting much sleep because her mind goes constantly. Pt would like the medication sent to [facility] on file.

## 2022-04-14 ENCOUNTER — NURSE ONLY (OUTPATIENT)
Dept: FAMILY MEDICINE CLINIC | Age: 72
End: 2022-04-14
Payer: MEDICARE

## 2022-04-14 ENCOUNTER — TELEPHONE (OUTPATIENT)
Dept: FAMILY MEDICINE CLINIC | Age: 72
End: 2022-04-14

## 2022-04-14 DIAGNOSIS — R30.0 DYSURIA: Primary | ICD-10-CM

## 2022-04-14 LAB
BILIRUBIN, POC: 0
BLOOD URINE, POC: NORMAL
CLARITY, POC: CLEAR
COLOR, POC: YELLOW
GLUCOSE URINE, POC: 0
KETONES, POC: NORMAL
LEUKOCYTE EST, POC: 0
NITRITE, POC: 0
PH, POC: 6
PROTEIN, POC: NORMAL
SPECIFIC GRAVITY, POC: 1.02
UROBILINOGEN, POC: 0.2

## 2022-04-14 PROCEDURE — 81002 URINALYSIS NONAUTO W/O SCOPE: CPT | Performed by: FAMILY MEDICINE

## 2022-04-14 NOTE — TELEPHONE ENCOUNTER
Jarek Pedro, nurse with [facility], is just about to head over to patients home. Said that patient called their on call center last night with complaints of frequency, burning, and odor with urination. She is wondering if she can get a verbal to do a UA on her while she is there.

## 2022-04-14 NOTE — TELEPHONE ENCOUNTER
I spoke with Chase. I asked her to bring the urine here to the office today since we are closed tomorrow. She states that she gave Natacha the U/A cup and she is waiting for her to get a sample. Once she is done, she will bring the sample to the office.

## 2022-04-14 NOTE — TELEPHONE ENCOUNTER
Left message for Madhuri Carroll with [employer] to advise of physician's note and to call with any further questions.

## 2022-04-15 LAB — URINE CULTURE, ROUTINE: NORMAL

## 2022-04-22 ENCOUNTER — TELEPHONE (OUTPATIENT)
Dept: FAMILY MEDICINE CLINIC | Age: 72
End: 2022-04-22

## 2022-04-22 NOTE — TELEPHONE ENCOUNTER
Was unaware that forms were left. This will have to be addressed on Monday morning since Dr. Caleb Lundberg is out of the office already.

## 2022-04-22 NOTE — LETTER
[address] [place] [address]  Phone: [phone]  Fax: [phone]    Nuria Linton MD         April 25, 2022     Patient: Yanet Young   YOB: 1950   Date of Visit: 4/22/2022       To Whom It May Concern: It is my medical opinion that Pasquale Lopez requires a disability parking placard for the following reasons:  She has limited walking ability due to a neurologic condition. Duration of need: 5 years    If you have any questions or concerns, please don't hesitate to call.     Sincerely,        Nuria Linton MD

## 2022-04-22 NOTE — TELEPHONE ENCOUNTER
Patient's  called to see when he can  the forms he dropped off this morning for a disability placard. He would like a return call as soon as possible bc he needs to mail the forms in.

## 2022-04-25 ENCOUNTER — APPOINTMENT (OUTPATIENT)
Dept: ULTRASOUND IMAGING | Age: 72
End: 2022-04-25
Payer: MEDICARE

## 2022-04-25 ENCOUNTER — APPOINTMENT (OUTPATIENT)
Dept: CT IMAGING | Age: 72
End: 2022-04-25
Payer: MEDICARE

## 2022-04-25 ENCOUNTER — APPOINTMENT (OUTPATIENT)
Dept: GENERAL RADIOLOGY | Age: 72
End: 2022-04-25
Payer: MEDICARE

## 2022-04-25 ENCOUNTER — HOSPITAL ENCOUNTER (EMERGENCY)
Age: 72
Discharge: HOME | End: 2022-04-25
Payer: MEDICARE

## 2022-04-25 VITALS
OXYGEN SATURATION: 96 % | TEMPERATURE: 97.6 F | HEART RATE: 98 BPM | SYSTOLIC BLOOD PRESSURE: 145 MMHG | WEIGHT: 106 LBS | HEIGHT: 65 IN | DIASTOLIC BLOOD PRESSURE: 82 MMHG | BODY MASS INDEX: 17.66 KG/M2 | RESPIRATION RATE: 19 BRPM

## 2022-04-25 DIAGNOSIS — R10.9 ABDOMINAL DISCOMFORT: ICD-10-CM

## 2022-04-25 DIAGNOSIS — R07.89 CHEST PRESSURE: Primary | ICD-10-CM

## 2022-04-25 LAB
A/G RATIO: 2.3 (ref 1.1–2.2)
ALBUMIN SERPL-MCNC: 4.3 G/DL (ref 3.4–5)
ALP BLD-CCNC: 83 U/L (ref 40–129)
ALT SERPL-CCNC: <5 U/L (ref 10–40)
ANION GAP SERPL CALCULATED.3IONS-SCNC: 10 MMOL/L (ref 3–16)
AST SERPL-CCNC: 16 U/L (ref 15–37)
BASOPHILS ABSOLUTE: 0.1 K/UL (ref 0–0.2)
BASOPHILS RELATIVE PERCENT: 1.1 %
BILIRUB SERPL-MCNC: 0.4 MG/DL (ref 0–1)
BILIRUBIN URINE: NEGATIVE
BLOOD, URINE: ABNORMAL
BUN BLDV-MCNC: 20 MG/DL (ref 7–20)
CALCIUM SERPL-MCNC: 10.3 MG/DL (ref 8.3–10.6)
CHLORIDE BLD-SCNC: 105 MMOL/L (ref 99–110)
CLARITY: CLEAR
CO2: 26 MMOL/L (ref 21–32)
COLOR: YELLOW
CREAT SERPL-MCNC: 0.7 MG/DL (ref 0.6–1.2)
EKG ATRIAL RATE: 86 BPM
EKG DIAGNOSIS: NORMAL
EKG P AXIS: 83 DEGREES
EKG P-R INTERVAL: 172 MS
EKG Q-T INTERVAL: 386 MS
EKG QRS DURATION: 106 MS
EKG QTC CALCULATION (BAZETT): 461 MS
EKG R AXIS: 68 DEGREES
EKG T AXIS: 64 DEGREES
EKG VENTRICULAR RATE: 86 BPM
EOSINOPHILS ABSOLUTE: 0.1 K/UL (ref 0–0.6)
EOSINOPHILS RELATIVE PERCENT: 2.1 %
GFR AFRICAN AMERICAN: >60
GFR NON-AFRICAN AMERICAN: >60
GLUCOSE BLD-MCNC: 130 MG/DL (ref 70–99)
GLUCOSE URINE: NEGATIVE MG/DL
HCT VFR BLD CALC: 38.9 % (ref 36–48)
HEMOGLOBIN: 12.7 G/DL (ref 12–16)
KETONES, URINE: NEGATIVE MG/DL
LEUKOCYTE ESTERASE, URINE: NEGATIVE
LIPASE: 34 U/L (ref 13–60)
LYMPHOCYTES ABSOLUTE: 2.2 K/UL (ref 1–5.1)
LYMPHOCYTES RELATIVE PERCENT: 35.9 %
MCH RBC QN AUTO: 30.2 PG (ref 26–34)
MCHC RBC AUTO-ENTMCNC: 32.6 G/DL (ref 31–36)
MCV RBC AUTO: 92.6 FL (ref 80–100)
MICROSCOPIC EXAMINATION: YES
MONOCYTES ABSOLUTE: 0.5 K/UL (ref 0–1.3)
MONOCYTES RELATIVE PERCENT: 8.3 %
NEUTROPHILS ABSOLUTE: 3.3 K/UL (ref 1.7–7.7)
NEUTROPHILS RELATIVE PERCENT: 52.6 %
NITRITE, URINE: NEGATIVE
PDW BLD-RTO: 13.2 % (ref 12.4–15.4)
PH UA: 6 (ref 5–8)
PLATELET # BLD: 251 K/UL (ref 135–450)
PMV BLD AUTO: 8 FL (ref 5–10.5)
POTASSIUM REFLEX MAGNESIUM: 3.9 MMOL/L (ref 3.5–5.1)
PRO-BNP: 132 PG/ML (ref 0–124)
PROTEIN UA: NEGATIVE MG/DL
RBC # BLD: 4.2 M/UL (ref 4–5.2)
RBC UA: NORMAL /HPF (ref 0–4)
SODIUM BLD-SCNC: 141 MMOL/L (ref 136–145)
SPECIFIC GRAVITY UA: 1.01 (ref 1–1.03)
TOTAL PROTEIN: 6.2 G/DL (ref 6.4–8.2)
TROPONIN: <0.01 NG/ML
TROPONIN: <0.01 NG/ML
TSH REFLEX: 1.33 UIU/ML (ref 0.27–4.2)
URINE REFLEX TO CULTURE: ABNORMAL
URINE TYPE: ABNORMAL
UROBILINOGEN, URINE: 0.2 E.U./DL
WBC # BLD: 6.3 K/UL (ref 4–11)
WBC UA: NORMAL /HPF (ref 0–5)

## 2022-04-25 PROCEDURE — 99285 EMERGENCY DEPT VISIT HI MDM: CPT

## 2022-04-25 PROCEDURE — 83880 ASSAY OF NATRIURETIC PEPTIDE: CPT

## 2022-04-25 PROCEDURE — 76705 ECHO EXAM OF ABDOMEN: CPT

## 2022-04-25 PROCEDURE — 85025 COMPLETE CBC W/AUTO DIFF WBC: CPT

## 2022-04-25 PROCEDURE — 93010 ELECTROCARDIOGRAM REPORT: CPT | Performed by: INTERNAL MEDICINE

## 2022-04-25 PROCEDURE — 84484 ASSAY OF TROPONIN QUANT: CPT

## 2022-04-25 PROCEDURE — 6360000002 HC RX W HCPCS: Performed by: PHYSICIAN ASSISTANT

## 2022-04-25 PROCEDURE — 81001 URINALYSIS AUTO W/SCOPE: CPT

## 2022-04-25 PROCEDURE — 71045 X-RAY EXAM CHEST 1 VIEW: CPT

## 2022-04-25 PROCEDURE — 96374 THER/PROPH/DIAG INJ IV PUSH: CPT

## 2022-04-25 PROCEDURE — 74176 CT ABD & PELVIS W/O CONTRAST: CPT

## 2022-04-25 PROCEDURE — 84443 ASSAY THYROID STIM HORMONE: CPT

## 2022-04-25 PROCEDURE — 83690 ASSAY OF LIPASE: CPT

## 2022-04-25 PROCEDURE — 93005 ELECTROCARDIOGRAM TRACING: CPT | Performed by: EMERGENCY MEDICINE

## 2022-04-25 PROCEDURE — 80053 COMPREHEN METABOLIC PANEL: CPT

## 2022-04-25 RX ORDER — LORAZEPAM 2 MG/ML
1 INJECTION INTRAMUSCULAR ONCE
Status: COMPLETED | OUTPATIENT
Start: 2022-04-25 | End: 2022-04-25

## 2022-04-25 RX ORDER — PANTOPRAZOLE SODIUM 40 MG/1
40 TABLET, DELAYED RELEASE ORAL ONCE
Status: DISCONTINUED | OUTPATIENT
Start: 2022-04-25 | End: 2022-04-25 | Stop reason: HOSPADM

## 2022-04-25 RX ORDER — ONDANSETRON 2 MG/ML
4 INJECTION INTRAMUSCULAR; INTRAVENOUS ONCE
Status: DISCONTINUED | OUTPATIENT
Start: 2022-04-25 | End: 2022-04-25 | Stop reason: HOSPADM

## 2022-04-25 RX ADMIN — LORAZEPAM 1 MG: 2 INJECTION INTRAMUSCULAR; INTRAVENOUS at 14:13

## 2022-04-25 ASSESSMENT — PAIN DESCRIPTION - ORIENTATION: ORIENTATION: UPPER

## 2022-04-25 ASSESSMENT — PAIN SCALES - GENERAL: PAINLEVEL_OUTOF10: 5

## 2022-04-25 ASSESSMENT — PAIN DESCRIPTION - LOCATION: LOCATION: BACK

## 2022-04-25 ASSESSMENT — PAIN - FUNCTIONAL ASSESSMENT
PAIN_FUNCTIONAL_ASSESSMENT: 0-10
PAIN_FUNCTIONAL_ASSESSMENT: NONE - DENIES PAIN

## 2022-04-25 NOTE — ED PROVIDER NOTES
[facility] Emergency Department    CHIEF COMPLAINT  Chest Pain (Described as heaviness started an hour ago, upper back pain, given one nitro with no relief)      SHARED SERVICE VISIT  Evaluated by RICHARD. My supervising physician was available for consultation. EKG interpretation provided by attending physician. HISTORY OF PRESENT ILLNESS  Natacha Howell is a 66 y.o. female who presents to the ED complaining of several hour history of chest discomfort. Patient brought in by squad today for evaluation. Patient reports that upon awaking this morning had some \"gurgling, and left chest and upper abdomen. Reports that had some chest pressure which is resolved. No cough or congestion. No shortness of breath. No pain with deep breaths. She denies leg pain or swelling. No recent travel, trauma or surgery. She reports chronic back pain unchanged. Has also been having some left-sided abdominal pain over the past year unchanged. Occasional nausea. Did receive aspirin in route and reports that she does feel that that is making her more nauseous. No vomiting. No diarrhea or constipation. No change in appetite. Denies fevers chills. No urinary symptoms. Non-smoker. States that she has also had intermittent lightheadedness again unchanged along with some ongoing hot flashes. No other complaints, modifying factors or associated symptoms. Nursing notes reviewed.    Past Medical History:   Diagnosis Date    Corticobasal degeneration     Dyskinesia     Hyperlipidemia     Hyperreflexia     Neuropathy     corticobasal degeneration    Parkinson's disease ([facility] [facility]           Pituitary adenoma ([facility] [address]      Past Surgical History:   Procedure Laterality Date    BACK SURGERY      COLONOSCOPY  6/02    due 6/12    COLONOSCOPY  8/12    due 8/17    FINGER TRIGGER RELEASE      right hand    HAMMER TOE SURGERY  2005    LAPAROSCOPY      TONSILLECTOMY AND ADENOIDECTOMY  11 yo     Family History   Problem Relation Age of Onset    Heart Disease Mother     Stroke Father     Cancer Father         prostate    Diabetes Sister     Breast Cancer Sister 55    Heart Disease Sister         quadruple bypass    Breast Cancer Sister 37     Social History     Socioeconomic History    Marital status:      Spouse name: Alannah Harris Number of children: 2    Years of education: Not on file    Highest education level: Not on file   Occupational History    Occupation: homemaker     Comment: retired nurse   Tobacco Use    Smoking status: Never Smoker    Smokeless tobacco: Never Used   Vaping Use    Vaping Use: Never used   Substance and Sexual Activity    Alcohol use: No    Drug use: Not on file    Sexual activity: Not on file   Other Topics Concern    Not on file   Social History Narrative    Retired, cares for grand son, exercises regularly, healthy diet     Social Determinants of Health     Financial Resource Strain: Low Risk     Difficulty of Paying Living Expenses: Not hard at all   Food Insecurity: No Food Insecurity    Worried About [employer] [employer] in the Last Year: Never true    [facility] in the Last Year: Never true   Transportation Needs:     Lack of Transportation (Medical): Not on file    Lack of Transportation (Non-Medical):  Not on file   Physical Activity: Sufficiently Active    Days of Exercise per Week: 7 days    Minutes of Exercise per Session: 30 min   Stress:     Feeling of Stress : Not on file   Social Connections:     Frequency of Communication with Friends and Family: Not on file    Frequency of Social Gatherings with Friends and Family: Not on file    Attends Buddhist Services: Not on file    Active Member of Clubs or Organizations: Not on file    Attends Club or Organization Meetings: Not on file    Marital Status: Not on file   Intimate Partner Violence:     Fear of Current or Ex-Partner: Not on file    Emotionally Abused: Not on file    Physically Abused: Not on file    Sexually Abused: Not on file   Housing Stability:     Unable to Pay for Housing in the Last Year: Not on file    Number of Places Lived in the Last Year: Not on file    Unstable Housing in the Last Year: Not on file     No current facility-administered medications for this encounter. Current Outpatient Medications   Medication Sig Dispense Refill    ibuprofen (ADVIL) 200 MG CAPS Take 2 capsules by mouth 3 times daily as needed for Fever      MYRBETRIQ 25 MG TB24 Take 25 mg by mouth daily       DULoxetine (CYMBALTA) 60 MG extended release capsule TAKE ONE CAPSULE BY MOUTH DAILY 90 capsule 1    pantoprazole (PROTONIX) 40 MG tablet TAKE ONE TABLET BY MOUTH EVERY MORNING BEFORE BREAKFAST 30 tablet 5    ZINC PO Take by mouth      calcium carbonate (OSCAL) 500 MG TABS tablet Take 500 mg by mouth daily      Magnesium 100 MG CAPS Take 100 mg by mouth daily       vitamin D3 (CHOLECALCIFEROL) 10 MCG (400 UNIT) TABS tablet Take 2,000 Units by mouth daily       melatonin 3 MG TABS tablet Take 3 mg by mouth nightly as needed       carbidopa-levodopa (RYTARY) 61. MG CPCR per extended release capsule Take 1 capsule by mouth 5 times daily        Allergies   Allergen Reactions    Lidocaine     Procaine     Anesthetics, Tracy Other (See Comments)     Hypotension episode with ER visit    Celecoxib Other (See Comments)     Abdominal discomfort    Lidocaine Hcl      hypotension    Methylprednisolone Other (See Comments)     Dose pack caused hallucinations       REVIEW OF SYSTEMS  10 systems reviewed, pertinent positives per HPI otherwise noted to be negative    PHYSICAL EXAM  /73   Pulse 91   Temp 97.6 °F (36.4 °C) (Oral)   Resp 18   Ht 5' 5\" (1.651 m)   Wt 106 lb (48.1 kg)   SpO2 94%   BMI 17.64 kg/m²   GENERAL APPEARANCE: Awake and alert. Cooperative. No acute distress. HEAD: Normocephalic. Atraumatic. EYES: PERRL. EOM's grossly intact.    ENT: Mucous membranes are moist.   NECK: Supple. No meningismus. HEART: RRR. No murmurs. LUNGS: Respirations unlabored. CTAB. Good air exchange. Speaking comfortably in full sentences. No wheezing, rhonchi, rales. ABDOMEN: Soft. Non-distended. Non-tender. No guarding or rebound. Negative Barreto's, McBurney's and Rovsing's. No fluid waves or ascites. No hernias or masses. Bowel sounds normal in all quadrants. No CVA tenderness. EXTREMITIES: No peripheral edema. Moves all extremities equally. All extremities neurovascularly intact. SKIN: Warm and dry. No acute rashes. NEUROLOGICAL: Alert and oriented. CN's 2-12 intact. No gross facial drooping. Strength 5/5, sensation intact. PSYCHIATRIC: Normal mood and affect. RADIOLOGY  CT ABDOMEN PELVIS WO CONTRAST Additional Contrast? None    Result Date: 4/25/2022  EXAMINATION: CT OF THE ABDOMEN AND PELVIS WITHOUT CONTRAST 4/25/2022 2:50 pm TECHNIQUE: CT of the abdomen and pelvis was performed without the administration of intravenous contrast. Multiplanar reformatted images are provided for review. Dose modulation, iterative reconstruction, and/or weight based adjustment of the mA/kV was utilized to reduce the radiation dose to as low as reasonably achievable. COMPARISON: Gallbladder ultrasound 04/25/2022. Abdomen pelvic CT 11/18/2021. HISTORY: ORDERING SYSTEM PROVIDED HISTORY: flank pain, hydropnephrosis TECHNOLOGIST PROVIDED HISTORY: Reason for exam:->flank pain, hydropnephrosis Additional Contrast?->None Decision Support Exception - unselect if not a suspected or confirmed emergency medical condition->Emergency Medical Condition (MA) Reason for Exam: rt sided pain, frequent urination FINDINGS: Lower Chest: Unremarkable. Organs; 1. Liver: The density is unremarkable with no focal suspicious liver lesions on noncontrast images. Multiple simple hepatic cysts are present largest at the dome of the liver measuring 4 cm unchanged. 2. Gallbladder: Unremarkable. There is no biliary dilation. 3. Spleen: Normal. 4. Pancreas: Unremarkable on noncontrast imaging. 5. Kidneys: No renal calculus or significant hydronephrosis noted. Mildly prominent renal pelvis and calices on the right unchanged. 6. Adrenal glands: Normal. GI/Bowel: There is moderate stool throughout the colon with distention of the rectum and rectosigmoid maximum almost 8 cm in diameter. Small bowel is unremarkable. Pelvis: Urinary bladder is unremarkable. There is no free pelvic fluid. Peritoneum/Retroperitoneum: There is no mass or adenopathy. Vasculature: Unremarkable with noncontrast imaging. Soft Tissues/Bones: No acute abnormality. Dextroscoliosis of lumbar spine is seen with metal artifact from posterior supportive hardware, unchanged. Moderate fecal impaction. Otherwise, no significant abnormality. RECOMMENDATIONS: Unavailable     US GALLBLADDER RUQ    Result Date: 4/25/2022  EXAMINATION: RIGHT UPPER QUADRANT ULTRASOUND 4/25/2022 1:32 pm COMPARISON: 11/18/2021 CT HISTORY: ORDERING SYSTEM PROVIDED HISTORY: ruq pain TECHNOLOGIST PROVIDED HISTORY: Reason for exam:->ruq pain FINDINGS: LIVER:  The liver demonstrates normal echogenicity without evidence of intrahepatic biliary ductal dilatation. Liver cysts noted measuring 4.3 and 4.5 cm. BILIARY SYSTEM:  Gallbladder is unremarkable without evidence of pericholecystic fluid, wall thickening or stones. Negative sonographic Barreto's sign. Common bile duct is within normal limits measuring 3 mm. RIGHT KIDNEY: Mild right hydronephrosis. 15 mm cyst seen inferiorly. PANCREAS:  Visualized portions of the pancreas are unremarkable. OTHER: No evidence of right upper quadrant ascites.      Unremarkable appearing gallbladder Liver cysts noted, similar to previous CT Mild right hydronephrosis     XR CHEST PORTABLE    Result Date: 4/25/2022  EXAMINATION: ONE XRAY VIEW OF THE CHEST 4/25/2022 7:52 am COMPARISON: 06/11/2021 HISTORY: ORDERING SYSTEM PROVIDED HISTORY: jayden medications as prescribed.     MDM  Results for orders placed or performed during the hospital encounter of 04/25/22   CBC with Auto Differential   Result Value Ref Range    WBC 6.3 4.0 - 11.0 K/uL    RBC 4.20 4.00 - 5.20 M/uL    Hemoglobin 12.7 12.0 - 16.0 g/dL    Hematocrit 38.9 36.0 - 48.0 %    MCV 92.6 80.0 - 100.0 fL    MCH 30.2 26.0 - 34.0 pg    MCHC 32.6 31.0 - 36.0 g/dL    RDW 13.2 12.4 - 15.4 %    Platelets 962 900 - 927 K/uL    MPV 8.0 5.0 - 10.5 fL    Neutrophils % 52.6 %    Lymphocytes % 35.9 %    Monocytes % 8.3 %    Eosinophils % 2.1 %    Basophils % 1.1 %    Neutrophils Absolute 3.3 1.7 - 7.7 K/uL    Lymphocytes Absolute 2.2 1.0 - 5.1 K/uL    Monocytes Absolute 0.5 0.0 - 1.3 K/uL    Eosinophils Absolute 0.1 0.0 - 0.6 K/uL    Basophils Absolute 0.1 0.0 - 0.2 K/uL   Comprehensive Metabolic Panel w/ Reflex to MG   Result Value Ref Range    Sodium 141 136 - 145 mmol/L    Potassium reflex Magnesium 3.9 3.5 - 5.1 mmol/L    Chloride 105 99 - 110 mmol/L    CO2 26 21 - 32 mmol/L    Anion Gap 10 3 - 16    Glucose 130 (H) 70 - 99 mg/dL    BUN 20 7 - 20 mg/dL    CREATININE 0.7 0.6 - 1.2 mg/dL    GFR Non-African American >60 >60    GFR African American >60 >60    Calcium 10.3 8.3 - 10.6 mg/dL    Total Protein 6.2 (L) 6.4 - 8.2 g/dL    Albumin 4.3 3.4 - 5.0 g/dL    Albumin/Globulin Ratio 2.3 (H) 1.1 - 2.2    Total Bilirubin 0.4 0.0 - 1.0 mg/dL    Alkaline Phosphatase 83 40 - 129 U/L    ALT <5 (L) 10 - 40 U/L    AST 16 15 - 37 U/L   Lipase   Result Value Ref Range    Lipase 34.0 13.0 - 60.0 U/L   Troponin   Result Value Ref Range    Troponin <0.01 <0.01 ng/mL   Brain Natriuretic Peptide   Result Value Ref Range    Pro- (H) 0 - 124 pg/mL   Urinalysis with Reflex to Culture    Specimen: Urine   Result Value Ref Range    Color, UA Yellow Straw/Yellow    Clarity, UA Clear Clear    Glucose, Ur Negative Negative mg/dL    Bilirubin Urine Negative Negative    Ketones, Urine Negative Negative mg/dL    Specific Gravity, UA 1.010 1.005 - 1.030    Blood, Urine SMALL (A) Negative    pH, UA 6.0 5.0 - 8.0    Protein, UA Negative Negative mg/dL    Urobilinogen, Urine 0.2 <2.0 E.U./dL    Nitrite, Urine Negative Negative    Leukocyte Esterase, Urine Negative Negative    Microscopic Examination YES     Urine Type NotGiven     Urine Reflex to Culture Not Indicated    TSH with Reflex   Result Value Ref Range    TSH 1.33 0.27 - 4.20 uIU/mL   Troponin   Result Value Ref Range    Troponin <0.01 <0.01 ng/mL   Microscopic Urinalysis   Result Value Ref Range    WBC, UA None seen 0 - 5 /HPF    RBC, UA 3-4 0 - 4 /HPF   EKG 12 Lead   Result Value Ref Range    Ventricular Rate 86 BPM    Atrial Rate 86 BPM    P-R Interval 172 ms    QRS Duration 106 ms    Q-T Interval 386 ms    QTc Calculation (Bazett) 461 ms    P Axis 83 degrees    R Axis 68 degrees    T Axis 64 degrees    Diagnosis       Sinus rhythm with Premature atrial complexes with Aberrant conductionIncomplete right bundle branch blockCannot rule out Inferior infarct , age undeterminedAbnormal ECGConfirmed by Gretchen Bishop MD, Ignacio Shafer ([id number]) on 4/25/2022 1:44:17 PM     I estimate there is LOW risk for ACUTE CORONARY SYNDROME, INTRACRANIAL HEMORRHAGE, MALIGNANT DYSRHYTHMIA or HYPERTENSION, PULMONARY EMBOLISM, SEPSIS, SUBARACHNOID HEMORRHAGE, SUBDURAL HEMATOMA, STROKE, or THORACIC AORTIC DISSECTION, thus I consider the discharge disposition reasonable. Natacha Yeager and I have discussed the diagnosis and risks, and we agree with discharging home to follow-up with their primary doctor. We also discussed returning to the Emergency Department immediately if new or worsening symptoms occur. We have discussed the symptoms which are most concerning (e.g., bloody sputum, fever, worsening pain or shortness of breath, vomiting, weakness) that necessitate immediate return. Final Impression  1. Chest pressure    2.  Abdominal discomfort      Blood pressure (!) 145/82, pulse 98, temperature 97.6 °F (36.4 °C), temperature source Oral, resp. rate 19, height 5' 5\" (1.651 m), weight 106 lb (48.1 kg), SpO2 96 %, not currently breastfeeding. DISPOSITION  Patient was discharged to home in good condition.           Wilda Velasco  04/25/22 0171

## 2022-04-25 NOTE — ED PROVIDER NOTES
I did not personally evaluate this patient but I was asked to review the EKG. EKG   The Ekg interpreted by myself in the emergency department in the absence of a cardiologist.  normal sinus rhythm with a rate of 86  Axis is   Normal  QTc is  within an acceptable range  Intervals and Durations are unremarkable. No specific ST-T wave changes appreciated. No evidence of acute ischemia.    No significant change from prior EKG dated June 11, 2021     Mahsa Kang MD  04/25/22 0861

## 2022-05-02 ENCOUNTER — OFFICE VISIT (OUTPATIENT)
Dept: FAMILY MEDICINE CLINIC | Age: 72
End: 2022-05-02
Payer: MEDICARE

## 2022-05-02 VITALS
DIASTOLIC BLOOD PRESSURE: 60 MMHG | OXYGEN SATURATION: 98 % | HEART RATE: 97 BPM | HEIGHT: 65 IN | WEIGHT: 104.8 LBS | BODY MASS INDEX: 17.46 KG/M2 | SYSTOLIC BLOOD PRESSURE: 100 MMHG

## 2022-05-02 DIAGNOSIS — G20 PARKINSON'S DISEASE (TREMOR, STIFFNESS, SLOW MOTION, UNSTABLE POSTURE) (HCC): Primary | ICD-10-CM

## 2022-05-02 DIAGNOSIS — R07.89 CHEST PRESSURE: ICD-10-CM

## 2022-05-02 DIAGNOSIS — K59.00 CONSTIPATION, UNSPECIFIED CONSTIPATION TYPE: ICD-10-CM

## 2022-05-02 PROCEDURE — 1090F PRES/ABSN URINE INCON ASSESS: CPT | Performed by: FAMILY MEDICINE

## 2022-05-02 PROCEDURE — 1123F ACP DISCUSS/DSCN MKR DOCD: CPT | Performed by: FAMILY MEDICINE

## 2022-05-02 PROCEDURE — 99214 OFFICE O/P EST MOD 30 MIN: CPT | Performed by: FAMILY MEDICINE

## 2022-05-02 PROCEDURE — G8427 DOCREV CUR MEDS BY ELIG CLIN: HCPCS | Performed by: FAMILY MEDICINE

## 2022-05-02 PROCEDURE — G8399 PT W/DXA RESULTS DOCUMENT: HCPCS | Performed by: FAMILY MEDICINE

## 2022-05-02 PROCEDURE — 4040F PNEUMOC VAC/ADMIN/RCVD: CPT | Performed by: FAMILY MEDICINE

## 2022-05-02 PROCEDURE — 1036F TOBACCO NON-USER: CPT | Performed by: FAMILY MEDICINE

## 2022-05-02 PROCEDURE — 3017F COLORECTAL CA SCREEN DOC REV: CPT | Performed by: FAMILY MEDICINE

## 2022-05-02 PROCEDURE — G8419 CALC BMI OUT NRM PARAM NOF/U: HCPCS | Performed by: FAMILY MEDICINE

## 2022-05-02 RX ORDER — DULOXETIN HYDROCHLORIDE 30 MG/1
1 CAPSULE, DELAYED RELEASE ORAL DAILY
COMMUNITY
Start: 2022-04-28

## 2022-05-02 RX ORDER — PILOCARPINE HYDROCHLORIDE 5 MG/1
1 TABLET, FILM COATED ORAL 3 TIMES DAILY
COMMUNITY
Start: 2022-04-28

## 2022-05-02 RX ORDER — FAMOTIDINE 40 MG/1
1 TABLET, FILM COATED ORAL DAILY
COMMUNITY
Start: 2022-04-28 | End: 2022-08-18

## 2022-05-02 ASSESSMENT — ENCOUNTER SYMPTOMS: CONSTIPATION: 1

## 2022-05-02 NOTE — PROGRESS NOTES
Patient:  Ross Dubon a 66 y.o. Randy Sevilla presents today with the following Chief Complaint(s):  Chief Complaint   Patient presents with    Follow-Up from Hospital     pt went to [facility] on 4/25/22 for chest pain. she states that she is feeling better today. Patient is here for an emergency room visit follow-up. On 4/25/2022 she was in the emergency room for chest pressure and abdominal pressure. ER notes were reviewed including lab work and imaging and diagnostics. Cardiac evaluation unremarkable troponins were negative. Patient had a gallbladder ultrasound and a CT abdomen and pelvis. She had some mild hydronephrosis but also moderate stool. Today, patient is here with her . She is feeling better she has not had any further chest pressure. She also was seen by her neurologist on 4/20/2022. That note was reviewed, medication changes have been made. Patient has not initiated some of those changes. Patient has had increasing anxiety which does make her Parkinson's worse. Neurology increased her Cymbalta to 90 mg a day. Patient has not increased yet. Patient is also now on pilocarpine for dry mouth. Patient has had issue with constipation. She says she will take MiraLAX when she becomes constipated. We talked about taking MiraLAX on a consistent daily basis to prevent constipation. She wants to find a balance between not causing diarrhea but also promoting bowel movements. Patient is finishing up with physical therapy. They have been coming to her home. She is walking with a wheeled walker and that helps her.         Current Outpatient Medications   Medication Sig Dispense Refill    famotidine (PEPCID) 40 MG tablet Take 1 tablet by mouth daily      pilocarpine (SALAGEN) 5 MG tablet Take 1 tablet by mouth 3 times daily      DULoxetine (CYMBALTA) 30 MG extended release capsule Take 1 capsule by mouth daily      ibuprofen (ADVIL) 200 MG CAPS Take 2 capsules by mouth 3 times daily as needed for Fever      MYRBETRIQ 25 MG TB24 Take 25 mg by mouth daily       DULoxetine (CYMBALTA) 60 MG extended release capsule TAKE ONE CAPSULE BY MOUTH DAILY 90 capsule 1    ZINC PO Take by mouth      calcium carbonate (OSCAL) 500 MG TABS tablet Take 500 mg by mouth daily      Magnesium 100 MG CAPS Take 100 mg by mouth daily       vitamin D3 (CHOLECALCIFEROL) 10 MCG (400 UNIT) TABS tablet Take 2,000 Units by mouth daily       melatonin 3 MG TABS tablet Take 3 mg by mouth nightly as needed       carbidopa-levodopa (RYTARY) 61. MG CPCR per extended release capsule Take 1 capsule by mouth 5 times daily        No current facility-administered medications for this visit. Patients past medical history, surgical history, family history, medications and allergies were all reviewed and updated as appropriate today. Review of Systems   Constitutional: Negative for fatigue and fever. Cardiovascular: Negative for chest pain and leg swelling. Gastrointestinal: Positive for constipation. Musculoskeletal: Positive for gait problem. Physical Exam  Vitals reviewed. Constitutional:       General: She is not in acute distress. Comments: Thin frail   Cardiovascular:      Rate and Rhythm: Normal rate and regular rhythm. Heart sounds: Normal heart sounds. Pulmonary:      Breath sounds: Normal breath sounds. No wheezing, rhonchi or rales. Abdominal:      Palpations: Abdomen is soft. Neurological:      Mental Status: She is alert. Mental status is at baseline.       Gait: Gait normal.      Comments: Dyskinesia is improved   Psychiatric:         Mood and Affect: Mood normal.           Vitals:    05/02/22 1424   BP: 100/60   Pulse: 97   SpO2: 98%   Weight: 104 lb 12.8 oz (47.5 kg)   Height: 5' 5\" (1.651 m)       Assessment/Plan:   Natacha was seen today for follow-up from hospital.    Diagnoses and all orders for this visit:    Parkinson's disease (tremor, stiffness, slow motion, unstable posture) ([facility] [address] improved with adjusting medication per neurology. Patient is due to see them again in 2-month    Constipation, unspecified constipation type, patient will take MiraLAX on a consistent basis to prevent constipation versus waiting till it is already an issue.   She was told to adjusted until she reaches a balance so that she does not cause diarrhea as a side effect    Chest pressure, resolved cardiac enzymes negative, no  further recurrence of symptom

## 2022-05-03 ENCOUNTER — TELEPHONE (OUTPATIENT)
Dept: FAMILY MEDICINE CLINIC | Age: 72
End: 2022-05-03

## 2022-05-03 NOTE — TELEPHONE ENCOUNTER
Vita Song said she was at patient's home today and did a reassessment. She wants to update the plan of care to 1 time a week for 4 weeks and is asking for a verbal that they can continue.

## 2022-05-20 ENCOUNTER — TELEPHONE (OUTPATIENT)
Dept: FAMILY MEDICINE CLINIC | Age: 72
End: 2022-05-20

## 2022-05-20 DIAGNOSIS — F32.A DEPRESSION, UNSPECIFIED DEPRESSION TYPE: ICD-10-CM

## 2022-05-20 RX ORDER — DULOXETIN HYDROCHLORIDE 60 MG/1
CAPSULE, DELAYED RELEASE ORAL
Qty: 90 CAPSULE | Refills: 0 | Status: SHIPPED | OUTPATIENT
Start: 2022-05-20 | End: 2022-10-05

## 2022-05-20 NOTE — TELEPHONE ENCOUNTER
I spoke with the pt. She states that it is on her baby toe. I advised her that the pads are sold OTC. She verbalized understanding.

## 2022-05-20 NOTE — TELEPHONE ENCOUNTER
Natacha Roche [phone] (home)    is requesting refill(s) of medication Duloxetine to preferred pharmacy [id number] [facility] [address] 5/2/22 (pertaining to medication)   Last refill 2/16/22 (per medication requested)  Next office visit scheduled or attempted No  Date   If No, reason

## 2022-05-20 NOTE — TELEPHONE ENCOUNTER
----- Message from Manuel Ortega sent at 5/20/2022 10:40 AM EDT -----  Subject: Message to Provider    QUESTIONS  Information for Provider? Had a corn on middle toe, didn't get the   patches. Its all red, and hurts tot walk, possible to call in a script for   the patches.   ---------------------------------------------------------------------------  --------------  CALL BACK INFO  What is the best way for the office to contact you? OK to leave message on   voicemail  Preferred Call Back Phone Number? [phone]  ---------------------------------------------------------------------------  --------------  SCRIPT ANSWERS  Relationship to Patient?  Self

## 2022-06-28 RX ORDER — PANTOPRAZOLE SODIUM 40 MG/1
TABLET, DELAYED RELEASE ORAL
Qty: 30 TABLET | Refills: 1 | OUTPATIENT
Start: 2022-06-28

## 2022-07-22 ENCOUNTER — TELEPHONE (OUTPATIENT)
Dept: FAMILY MEDICINE CLINIC | Age: 72
End: 2022-07-22

## 2022-07-22 NOTE — TELEPHONE ENCOUNTER
Let patient know that Tylenol is not likely to be causing her problem. I am not sure how much she is taking the way of MiraLAX. She can gradually increase according to the bottle directions. Most likely her constipation is from her Parkinson's. She can also talk with her neurologist about what do they suggest her to do if the MiraLAX is now not working well for her.   If need be she can try a Dulcolax suppository

## 2022-07-22 NOTE — TELEPHONE ENCOUNTER
Patient states the miralax has been working well besides the last few days  Her last bowel movement was 2 days ago, previously she was doing every day. She feels bloated today and has some discomfort around her waist now   She is wondering if the tylernol could be aggravating her abdomen and causing her to not use the restroom? She is also wondering if there is anything else she can try?

## 2022-07-25 ENCOUNTER — NURSE TRIAGE (OUTPATIENT)
Dept: OTHER | Facility: CLINIC | Age: 72
End: 2022-07-25

## 2022-07-25 NOTE — TELEPHONE ENCOUNTER
Received call from Jaspreet at [facility] with Red Flag Complaint. Subjective: Caller states \"abdominal pain that radiates to back on the right side\"     Current Symptoms: abdominal pain that radiates to back on the right side    Onset: 3 weeks ago; worsening    Associated Symptoms: constipation    Pain Severity: 7/10; soreness; constant    Temperature: hot flashes    What has been tried: tylenol    LMP: NA Pregnant: NA    Recommended disposition: Go to ED/[facility] Now (Or to Office with PCP Approval) for further evaluation of abdominal pain. Care advice provided, patient verbalizes understanding; denies any other questions or concerns; instructed to call back for any new or worsening symptoms. Writer provided warm transfer to Concepcion Holguin at [facility] for second level triage. Attention Provider: Thank you for allowing me to participate in the care of your patient. The patient was connected to triage in response to information provided to the ECC/PSC. Please do not respond through this encounter as the response is not directed to a shared pool.           Reason for Disposition   Constant abdominal pain lasting > 2 hours    Protocols used: Abdominal Pain - Female-ADULT-[place]

## 2022-08-08 ENCOUNTER — OFFICE VISIT (OUTPATIENT)
Dept: FAMILY MEDICINE CLINIC | Age: 72
End: 2022-08-08
Payer: MEDICARE

## 2022-08-08 ENCOUNTER — HOSPITAL ENCOUNTER (OUTPATIENT)
Dept: GENERAL RADIOLOGY | Age: 72
Discharge: HOME | End: 2022-08-08
Payer: MEDICARE

## 2022-08-08 VITALS
WEIGHT: 105.8 LBS | DIASTOLIC BLOOD PRESSURE: 64 MMHG | HEART RATE: 97 BPM | OXYGEN SATURATION: 98 % | BODY MASS INDEX: 17.63 KG/M2 | HEIGHT: 65 IN | SYSTOLIC BLOOD PRESSURE: 122 MMHG

## 2022-08-08 DIAGNOSIS — G20 PARKINSON'S DISEASE (TREMOR, STIFFNESS, SLOW MOTION, UNSTABLE POSTURE) (HCC): ICD-10-CM

## 2022-08-08 DIAGNOSIS — M25.531 WRIST PAIN, ACUTE, RIGHT: ICD-10-CM

## 2022-08-08 DIAGNOSIS — M25.531 WRIST PAIN, ACUTE, RIGHT: Primary | ICD-10-CM

## 2022-08-08 PROCEDURE — 99213 OFFICE O/P EST LOW 20 MIN: CPT | Performed by: FAMILY MEDICINE

## 2022-08-08 PROCEDURE — G8419 CALC BMI OUT NRM PARAM NOF/U: HCPCS | Performed by: FAMILY MEDICINE

## 2022-08-08 PROCEDURE — 1036F TOBACCO NON-USER: CPT | Performed by: FAMILY MEDICINE

## 2022-08-08 PROCEDURE — 73110 X-RAY EXAM OF WRIST: CPT

## 2022-08-08 PROCEDURE — 3017F COLORECTAL CA SCREEN DOC REV: CPT | Performed by: FAMILY MEDICINE

## 2022-08-08 PROCEDURE — G8427 DOCREV CUR MEDS BY ELIG CLIN: HCPCS | Performed by: FAMILY MEDICINE

## 2022-08-08 PROCEDURE — G8399 PT W/DXA RESULTS DOCUMENT: HCPCS | Performed by: FAMILY MEDICINE

## 2022-08-08 PROCEDURE — 1123F ACP DISCUSS/DSCN MKR DOCD: CPT | Performed by: FAMILY MEDICINE

## 2022-08-08 PROCEDURE — 1090F PRES/ABSN URINE INCON ASSESS: CPT | Performed by: FAMILY MEDICINE

## 2022-08-08 NOTE — PROGRESS NOTES
Patient:  Rina John a 67 y.o. Deandre Castaneda presents today with the following Chief Complaint(s):  Chief Complaint   Patient presents with    Hand Pain     Pt states that for the past week she has been having right hand pain. States that when she tries to use her hand, she gets a sharp pain just below her thumb. Patient says 10 days ago she fell on her right outstretched wrist.  Initially did not cause any pain or swelling. Last night patient says that the pain was very intense when she was folding laundry. At times there is very little pain at times it is very severe and knifelike. Patient has Parkinson's and falls frequently. Current Outpatient Medications   Medication Sig Dispense Refill    DULoxetine (CYMBALTA) 60 MG extended release capsule TAKE ONE CAPSULE BY MOUTH ONCE DAILY 90 capsule 0    famotidine (PEPCID) 40 MG tablet Take 1 tablet by mouth daily      pilocarpine (SALAGEN) 5 MG tablet Take 1 tablet by mouth 3 times daily      DULoxetine (CYMBALTA) 30 MG extended release capsule Take 1 capsule by mouth daily      ibuprofen (ADVIL;MOTRIN) 200 MG CAPS Take 2 capsules by mouth 3 times daily as needed for Fever      MYRBETRIQ 25 MG TB24 Take 25 mg by mouth daily       ZINC PO Take by mouth      calcium carbonate (OSCAL) 500 MG TABS tablet Take 500 mg by mouth daily      Magnesium 100 MG CAPS Take 100 mg by mouth daily       vitamin D3 (CHOLECALCIFEROL) 10 MCG (400 UNIT) TABS tablet Take 2,000 Units by mouth daily       melatonin 3 MG TABS tablet Take 3 mg by mouth nightly as needed       carbidopa-levodopa (RYTARY) 61. MG CPCR per extended release capsule Take 1 capsule by mouth 5 times daily        No current facility-administered medications for this visit. Patients past medical history, surgical history, family history, medications and allergies were all reviewed and updated as appropriate today.       Review of Systems   Musculoskeletal:  Positive for arthralgias and

## 2022-08-18 RX ORDER — FAMOTIDINE 40 MG/1
TABLET, FILM COATED ORAL
Qty: 30 TABLET | Refills: 3 | Status: SHIPPED | OUTPATIENT
Start: 2022-08-18

## 2022-08-18 NOTE — TELEPHONE ENCOUNTER
Natacha Wooten [place] [phone] (home)    is requesting refill(s) of medication Famotidine  to preferred pharmacy [facility] 8/8/22 (pertaining to medication)   Last refill 4/28/22 (per medication requested)  Next office visit scheduled or attempted No  Date   If No, reason

## 2022-09-26 ENCOUNTER — TELEPHONE (OUTPATIENT)
Dept: FAMILY MEDICINE CLINIC | Age: 72
End: 2022-09-26

## 2022-09-26 NOTE — TELEPHONE ENCOUNTER
Let patient know that I recommend that she get a home COVID test and test for COVID. Otherwise, her symptoms are very vague chills and sweats with a slight cough could be any potential respiratory illness. I think she just needs to rest and give this more time to see if other symptoms start to evolve.   There is really no medicine I can recommend for her with her particular symptoms

## 2022-09-26 NOTE — TELEPHONE ENCOUNTER
Pt called the office and stated that she has been having off and on chills and sweats for the past 2 days. She states that she has a slight cough, but no congestion. She has not been covid tested and cannot do VV because she not good with technology. What should the pt do?

## 2022-10-05 DIAGNOSIS — F32.A DEPRESSION, UNSPECIFIED DEPRESSION TYPE: ICD-10-CM

## 2022-10-05 RX ORDER — DULOXETIN HYDROCHLORIDE 60 MG/1
CAPSULE, DELAYED RELEASE ORAL
Qty: 90 CAPSULE | Refills: 1 | Status: SHIPPED | OUTPATIENT
Start: 2022-10-05

## 2022-10-05 NOTE — TELEPHONE ENCOUNTER
Margarette Espinal is requesting refill(s) cymbalta  Last OV 8/8/22 (pertaining to medication)  LR 5/20/22 (per medication requested)  Next office visit scheduled or attempted No   If no, reason:

## 2022-10-18 ENCOUNTER — NURSE ONLY (OUTPATIENT)
Dept: FAMILY MEDICINE CLINIC | Age: 72
End: 2022-10-18
Payer: MEDICARE

## 2022-10-18 DIAGNOSIS — Z23 FLU VACCINE NEED: Primary | ICD-10-CM

## 2022-10-18 PROCEDURE — G0008 ADMIN INFLUENZA VIRUS VAC: HCPCS | Performed by: FAMILY MEDICINE

## 2022-10-18 PROCEDURE — 90694 VACC AIIV4 NO PRSRV 0.5ML IM: CPT | Performed by: FAMILY MEDICINE

## 2022-10-18 NOTE — PROGRESS NOTES
Vaccine Information Sheet, \"Influenza - Inactivated\"  given to [employer], or parent/legal guardian of  [employer] and verbalized understanding. Patient responses:    Have you ever had a reaction to a flu vaccine? No  Do you have any current illness? No  Have you ever had Guillian Ehrenberg Syndrome? No  Do you have a serious allergy to any of the follow: Neomycin, Polymyxin, Thimerosal, eggs or egg products? No    Flu vaccine given per order. Please see immunization tab. Risks and benefits explained. Current VIS given.

## 2022-12-06 RX ORDER — FAMOTIDINE 40 MG/1
TABLET, FILM COATED ORAL
Qty: 30 TABLET | Refills: 5 | Status: SHIPPED | OUTPATIENT
Start: 2022-12-06

## 2022-12-06 NOTE — TELEPHONE ENCOUNTER
Johana Benitez is requesting refill(s) famotidine  Last OV 8/8/22 (pertaining to medication)  LR 8/18/22 (per medication requested)  Next office visit scheduled or attempted No   If no, reason:

## 2022-12-08 ENCOUNTER — TELEPHONE (OUTPATIENT)
Dept: FAMILY MEDICINE CLINIC | Age: 72
End: 2022-12-08

## 2022-12-08 NOTE — TELEPHONE ENCOUNTER
----- Message from Celia Lagunas sent at 12/8/2022 11:51 AM EST -----  Subject: Message to Provider    QUESTIONS  Information for Provider? Patient is wanting to know if she needs to have   her PCP get her an appt for Physical therapy or can she just call them to   schedule an appt.   ---------------------------------------------------------------------------  --------------  Destiney GLASS  [phone]; OK to leave message on voicemail  ---------------------------------------------------------------------------  --------------  SCRIPT ANSWERS  Relationship to Patient?  Self

## 2022-12-08 NOTE — TELEPHONE ENCOUNTER
Pt states that she is needing PT done because she is having stiffness in her muscles from having to hold  herself steady from the parkinsons. She states that it mainly on her right side. She states that she is wanting to go to [facility]. Is it okay to place the order for this?

## 2022-12-09 NOTE — TELEPHONE ENCOUNTER
Let patient know that her neurologist has been the one ordering her physical therapy for her Parkinson's.   Please have her contact her neurologist office regarding additional physical therapy orders

## 2022-12-09 NOTE — TELEPHONE ENCOUNTER
Patient called back and was advised, she will call her neurologist office to discuss the physical therapy orders.

## 2023-01-12 ENCOUNTER — HOSPITAL ENCOUNTER (OUTPATIENT)
Dept: PHYSICAL THERAPY | Age: 73
Setting detail: THERAPIES SERIES
Discharge: HOME | End: 2023-01-12
Payer: MEDICARE

## 2023-01-12 PROCEDURE — 97162 PT EVAL MOD COMPLEX 30 MIN: CPT

## 2023-01-12 PROCEDURE — 97112 NEUROMUSCULAR REEDUCATION: CPT

## 2023-01-12 NOTE — FLOWSHEET NOTE
[facility], [facility], [address]  Phone: [phone]  Fax [phone]    Physical Therapy Daily Treatment Note    Date:  2023    Patient Name:  Linnette Phillips    :  1950  MRN: [id number]  Restrictions/Precautions:    Medical/Treatment Diagnosis Information:   Parkinson's Disease  Insurance/Certification information:   [employer] Medicare - $53 copay and precert through [facility]  Physician Information:   Janeane Cranker, MD  Plan of care signed (Y/N):  M  Visit# / total visits:  1/10     G-Code (if applicable):          LEFS  23    Medicare Cap (if applicable):  997 = total amount used, updated 2023    Time in:   2:00      Timed Treatment: 10 Total Treatment Time:  45  Time out: 2:45  ________________________________________________________________________________________    Pain Level:    /10  SUBJECTIVE:  See initial eval    OBJECTIVE:     Exercise/Equipment Resistance/Repetitions Other comments                                                                                                                                             Other Therapeutic Activities:  Education regarding POC including demonstration with models of anatomy and physiology in order to maximize benefits of treatment; total neuro re-ed 10 minutes    Manual Treatments:         Modalities:      Test/Measurements:  See initial eval       ASSESSMENT:    See initial eval     Treatment/Activity Tolerance:   [x]Patient tolerated treatment well [] Patient limited by fatique  []Patient limited by pain [] Patient limited by other medical complications  [] Other:     GOALS:  Patient stated goal: learn techniques to improve pain and function  [] Progressing: [] Met: [] Not Met: [] Adjusted    Therapist goals for Patient:   Short Term Goals: To be achieved in: 2 weeks  1.  Independent with HEP and progression per patient tolerance, in order to prevent re-injury. [] Progressing: [] Met: [] Not Met: [] Adjusted  2. Patient will have a 25% decrease in pain to facilitate improvement in movement, function, and ADLs as indicated by Functional Deficits. [] Progressing: [] Met: [] Not Met: [] Adjusted    Long Term Goals: To be achieved in: 5 weeks  1. Patient to demonstrate TUG cognitive score <3 seconds difference from standard to demonstrate improved fall risk to assist with reaching prior level of function. [] Progressing: [] Met: [] Not Met: [] Adjusted  2. Patient will demonstrate increased AROM to [facility], good scapular mobility and good hip ROM to allow for proper joint functioning as indicated by patients Functional Deficits. [] Progressing: [] Met: [] Not Met: [] Adjusted  3. Patient will demonstrate an increase in strength to good shoulder & hip complex, and core activation to allow for proper functional mobility as indicated by patients Functional Deficits. [] Progressing: [] Met: [] Not Met: [] Adjusted  4. Patient will return to functional activities including light housework and walking short distances without increased symptoms or restriction. [] Progressing: [] Met: [] Not Met: [] Adjusted  5.  Patient will report 50% improvement in ability to get up in the morning and make it to the restroom with less stiffness (patient specific functional goal)    [] Progressing: [] Met: [] Not Met: [] Adjusted     Plan: [] Continue per plan of care [] Alter current plan (see comments)   [x] Plan of care initiated [] Hold pending MD visit [] Discharge      Plan for Next Session:  exercises as stated above, balance training, functional movement training, dual task training    Re-Certification Due Date:         Signature:  Khadra Sharp, PT

## 2023-01-12 NOTE — PROGRESS NOTES
[facility] SCALE 14-Item Long Form Original Version     Patient Name: Darvin Robles   Date: 1/12/2023    1. SITTING TO STANDING   INSTRUCTIONS: Please stand up. Try not to use your hands for support. (4) able to stand without using hands and stabilize independently   (3) able to stand independently using hands   (2) able to stand using hands after several tries   (1) needs minimal aid to stand or to stabilize   (0) needs moderate or maximal assist to stand   Score: 4    2. STANDING UNSUPPORTED   INSTRUCTIONS: Please stand for two minutes without holding. (4) able to stand safely 2 minutes   (3) able to stand 2 minutes with supervision   (2) able to stand 30 seconds unsupported   (1) needs several tries to stand 30 seconds unsupported   (0) unable to stand 30 seconds unassisted If a subject is able to stand 2   minutes unsupported, score full points for sitting unsupported. Proceed to   item #4. Score: 3     3. SITTING WITH BACK UNSUPPORTED BUT FEET SUPPORTED   ON FLOOR OR ON A STOOL   INSTRUCTIONS: Please sit with arms folded for 2 minutes. (4) able to sit safely and securely 2 minutes   (3) able to sit 2 minutes under supervision   (2) able to sit 30 seconds   (1) able to sit 10 seconds   (0) unable to sit without support 10 seconds   Score: 4     4. STANDING TO SITTING   INSTRUCTIONS: Please sit down. (4) sits safely with minimal use of hands   (3) controls descent by using hands   (2) uses back of legs against chair to control descent   (1) sits independently but has uncontrolled descent   (0) needs assistance to sit   Score: 4    5. TRANSFERS   INSTRUCTIONS: Arrange chairs(s) for a pivot transfer. Ask subject to   transfer one way toward a seat with armrests and one way toward a seat   without armrests. You may use two chairs (one with and one without   armrests) or a bed and a chair.    (4) able to transfer safely with minor use of hands   (3) able to transfer safely definite need of hands (2) able to transfer with verbal cueing and/or supervision   (1) needs one person to assist   (0) needs two people to assist or supervise to be safe   Score: 4    6. STANDING UNSUPPORTED WITH EYES CLOSED   INSTRUCTIONS: Please close your eyes and stand still for 10 seconds. (4) able to stand 10 seconds safely   (3) able to stand 10 seconds with supervision   (2) able to stand 3 seconds   (1) unable to keep eyes closed 3 seconds but stays steady   (0) needs help to keep from falling   Score: 3    7. STANDING UNSUPPORTED WITH FEET TOGETHER   INSTRUCTIONS: Place your feet together and stand without holding. (4) able to place feet together independently and stand 1 minute safely   (3) able to place feet together independently and stand for 1 minute with   supervision   (2) able to place feet together independently but unable to hold for 30 seconds   (1) needs help to attain position but able to stand 15 seconds feet together   (0) needs help to attain position and unable to hold for 15 seconds   Score: 4    8. REACHING FORWARD WITH OUTSTRETCHED ARM WHILE   STANDING   INSTRUCTIONS: Lift arm to 90 degrees. Stretch out your fingers and reach   forward as far as you can. (Examiner places a ruler at end of fingertips when   arm is at 90 degrees. Fingers should not touch the ruler while reaching   forward. The recorded measure is the distance forward that the finger reaches   while the subject is in the most forward lean position. When possible, ask   subject to use both arms when reaching to avoid rotation of the trunk.). (4) can reach forward confidently >25 cm (10 inches)   (3) can reach forward >12 cm safely (5 inches)   (2) can reach forward >5 cm safely (2 inches)   (1) reaches forward but needs supervision   (0) loses balance while trying/requires external support   Score: 3    9.  OBJECT FROM FLOOR FROM A STANDING POSITION   INSTRUCTIONS:  shoe/slipper which is placed in front of your feet.    (4) able to  slipper safely and easily   (3) able to  slipper but needs supervision   (2) unable to  but reaches 2-5cm (1-2 inches) from slipper and keeps   balance independently   (1) unable to  and needs supervision while trying   (0) unable to try/needs assist to keep from losing balance or falling   Score: 3    10. TURNING TO LOOK BEHIND OVER LEFT AND RIGHT   SHOULDERS WHILE STANDING   INSTRUCTIONS: Turn to look directly behind you over toward left shoulder. Repeat to the right. Examiner may pick an object to look at directly behind the   subject to encourage a better twist turn. (4) looks behind from both sides and weight shifts well   (3) looks behind one side only other side shows less weight shift   (2) turns sideways only but maintains balance   (1) needs supervision when turning   (0) needs assist to keep from losing balance or falling   Score: 2    11. TURN 360 DEGREES   INSTRUCTIONS: Turn completely around in a full [place]. Pause. Then turn a   full [place] in the other direction. (4) able to turn 360 degrees safely in 4 seconds or less   (3) able to turn 360 degrees safely one side only in 4 seconds or less   (2) able to turn 360 degrees safely but slowly   (1) needs close supervision or verbal cueing   (0) needs assistance while turning   Score: 1 (due to shuffling gait and trunk lean)    12. PLACING ALTERNATE FOOT ON STEP OR STOOL WHILE   STANDING UNSUPPORTED   INSTRUCTIONS: Place each foot alternately on the step/stool. Continue until   each foot has touched the step/stool four times. (4) able to stand independently and safely and complete 8 steps in 20 seconds   (3) able to stand independently and complete 8 steps >20 seconds   (2) able to complete 4 steps without aid with supervision   (1) able to complete >2 steps needs minimal assist   (0) needs assistance to keep from falling/unable to try   Score: 2 (8 steps in 18 seconds with supervision)    13.  STANDING UNSUPPORTED ONE FOOT IN FRONT   INSTRUCTIONS: (DEMONSTRATE TO SUBJECT) Place one foot directly in   front of the other. If you feel that you cannot place your foot directly in front,   try to step far enough ahead that the heel of your forward foot is ahead of the   toes of the other foot. (To score 3 points, the length of the step should exceed   the length of the other foot and the width of the stance should approximate the   subject's normal stride width). (4) able to place foot tandem independently and hold 30 seconds   (3) able to place foot ahead of other independently and hold 30 seconds   (2) able to take small step independently and hold 30 seconds   (1) needs help to step but can hold 15 seconds   (0) loses balance while stepping or standing   Score: 1 (due to back fatigue)    14. STANDING ON ONE LEG   INSTRUCTIONS: Stand on one leg as long as you can without holding. (4) able to lift leg independently and hold >10 seconds   (3) able to lift leg independently and hold 5-10 seconds   (2) able to lift leg independently and hold = or >3 seconds   (1) tries to lift leg unable to hold 3 seconds but remains standing   independently   (0) unable to try or needs assist to prevent fall   Score: 1 (4 seconds on L LE, <1 second on R LE)    Total Score: 39/56   Max score 56,a person scoring below 45 is considered to be at risk for falling.      Completed by: Drake Hanley, PT, DPT

## 2023-01-12 NOTE — PROGRESS NOTES
Angela  78. and Therapy, [facility], [address], [address]  Phone: [phone]  Fax [phone]       Physical Therapy Certification    Dear  Mckenzie Rodriguez MD,    We had the pleasure of evaluating the following patient for physical therapy services at [address]. A summary of our findings can be found in the initial assessment below. This includes our plan of care. If you have any questions or concerns regarding these findings, please do not hesitate to contact me at the office phone number checked above. Thank you for the referral.       Physician Signature:_______________________________Date:__________________  By signing above (or electronic signature), therapist's plan is approved by physician      Patient: Svetlana Orourke   : 1950   MRN: [id number]       Evaluation Date: 2023        Medical Diagnosis Information:    Parkinson's Disease                  Referring Physician:  Mckenzie Rodriguez MD                             Ins[place] information:  [facility] [place], precert through [employer]    Time In: 2:00  Total Timed Treatment: 10  Total Treatment Time: 45  Time Out: 2:45     Precautions/ Contra-indications: none  Latex Allergy:   [x]NO       []YES  Preferred Language for Healthcare:   [x]English       []Other:    C-SSRS Triggered by Intake questionnaire (Past 2 wk assessment ):   [x] No, Questionnaire did not trigger screening.   [] Yes, Patient intake triggered C-SSRS Screening     [] Completed, no further action required. [] Completed, PCP notified via [employer]    Relevant Medical History:   back pain s/p 2019, GERD, Headache, osteoporosis, depression, anxiety   (also see below for co-morbidities)    SUBJECTIVE:   Pt reports that she has noticed gradual worsening of her movement, particularly with getting out of bed in the morning and walking to the bathroom.  Mornings are worse because she is stiff/rigid. Also having a lot of falls - a couple in the last year, maybe 6 total. Parkinson's diagnosed in early 2000's and has been consistent with her medications. She had a back surgery to help with a scoliosis in 2019 which was not successful, and her back pain is almost more limiting than the Parkinson's but they aide and abet each other. Pain [x]Yes  []No   Patient reports pain is  6/10 pain at present and  8/10 pain at its worst.  Numbness/Tingling: []Yes  [x]No      Current Functional Limitations: [x]Yes  []No   Functional Complaints:      getting in/out of bed, walking to the restroom, walking for duration, standing for duration, balance  PLOF:    [x]No functional limitations []Pre-existing limitations:   Is sleep affected [x]Yes  []No     Social support/Environment:    Family/caregiver support  [x]Yes -  []No    Home Environment: []1 story [x]>2 story []ADILENE    []No rail []R handrail []L handrail  Bedroom upstairs and laundry in the basement    [place] Environment: Did not ask     []Walk in shower []Tub/shower  []Tub  []Grab bars  []Shower seat []Modified toilet []Hand held shower head    Equipment:    []Rolling walker []Standard walker [x]Rollator []Christian-walker  []Wheelchair  []Quad cane  [x]SPC (but forgot it in [facility])  []Bedside commode  []Hospital bed  Other:  uses in primarily in the house.       Occupation:   Does not drive, used to be a [employer]      OBJECTIVE:     Cognitive Status    Alert and oriented to: [x]Person [x]Place [x]Time [x]Situation  Cognitive Function: []Normal [x]Impulsive []Flat-affect [x]Other:  forgot cane in lobby, lost track of items in her purse, very scattered thought processing and conversation    Communication [x]WNL []Impaired     Comments:     Cardiopulmonary Status    [x]NT  Blood pressure:   Heart Rate:   SpO2:     Tone  [x]WNL []Hypertonia []Hypotonia  []Clonus  Location:  []R UE []L UE []R LE []L  LE    Trunk Control []Good [x]Fair  []Poor  Sitting Posture  Severe scoliosis that came on with leaning to the L   Standing Posture Severe scoliosis remains with decreased trunk support     Quality of Movement:     Rapid Alternation Movement: [x]Normal []Dysdiadokinesia  Finger to Nose:  [x]Normal []Dysmetric     Heel to Leela Him    []Normal [x]Ataxic    Vision: []WNL [x]Impaired  Comments: wears glasses bifocals     Hearing: [x]WNL []Impaired  Comments:     Sensation:  Dermatomes Normal Abnormal Comments   Top of head (C1)      Posterior occipital region (C2)      Side of neck (C3)      Top of shoulder (C4)      Lateral deltoid (C5)      Tip of thumb (C6)      Distal middle finger (C7)      Distal fifth finger (C8)      Medial forearm (T1)            inguinal area (L1)       anterior mid-thigh (L2)      distal ant thigh/med knee (L3)      medial lower leg and foot (L4)      lateral lower leg and foot (L5)      posterior calf (S1)      Medial calcaneus (S2)        ROM:   [x]All ROM WNL except as marked below    [x]UE ROM deferred to OT    AROM RIGHT LEFT Comments   Cervical Flexion       Cervical Extension       Cervical Rotation       Cervical Side-bend       Shoulder Flexion       Shoulder Abduction/scaption       Shoulder ER      Shoulder IR      Elbow Flexion       Elbow Extension      Wrist Flexion      Wrist Extension             Hip Flexion      Hip Abduction      Hip ER      Hip IR      Hip Extension      Knee Extension      Knee Flexion      Hamstring 90/90      Ankle Dorsiflexion       Ankle Plantarflexion         Strength:   [x]All strength WNL (5/5) except as marked below (measured out of 5)   [x]UE strength deferred to OT    Myotomes/MMT RIGHT LEFT Comments   Neck flexion (C1-C2)      Neck sidebending (C3)      Shoulder elevation (C4)      Shoulder abduction (C5)      Elbow flexion/wrist extension (C6)      Elbow extension/wrist flexion (C7)      Thumb abduction (C8)      Finger abduction (T1)            Hip flexion (L1-L2) 4 4    Hip abduction 4 4    Hip extension 4 4    Knee extension (L2-L4)      Knee flexion 4+ 4+    Ankle Dorsiflexion (L4-L5) 4 4    Ankle Plantar flexion (S1-S2)      Ankle Eversion (S1-S2) 4 4    Great Toe Ext (L5)            Core Strength         Functional Mobility    Transitional Movement Assistance Level Comments   Rolling to left side Modified Independent and VC    Rolling to right side Modified Independent and VC    Supine to sit Modified Independent and VC    Sit to supine Modified Independent and VC    Sit to stand Supervision    Stand to sit Supervision      Balance    Static Sitting:  []Normal [x]Good []Fair  []Poor  Dynamic Sitting:  []Normal []Good [x]Fair + []Poor  Static Stance:       []Normal []Good [x]Fair  []Poor  Dynamic Stance:   []Normal []Good [x]Fair - []Poor    Single Leg Stance: [x]R LE 0 seconds  [x]L LE 0 seconds  Tandem Stance: [x]R LE 0 seconds  [x]L LE 0 seconds    Gait Testing:     Level of Assistance needed:   Supervision    Gait Deviations (firm surface/linoleum):    narrow FER, decreased head and trunk rotation, scissoring pattern, parkinsonian pattern, and ataxia bilaterally  Comment:    Assistive Device Used:  No AD     Steps (4 steps):     [x]NT  []Reciprocal   []One rail []Two rails     []Step to pattern  []One rail []Two rails   ascending with  []right  [] left  descending with  []right  [] left    Physical Performance Test  Score  Clinical Reasoning    Basilio Balance Scale 39/56 Scores less than 45 indicative of increased fall risk    Dynamic Gait Index  Scores less than 19 indicative of increased fall risk    Functional Gait Assessment  Scores 22 or below indicative of increased fall risk    TUG 7 seconds  11 seconds cognitive Scores greater than 13.5 seconds indicative of poor community ambulation, fall risk    Tinetti  Scores 20 or below indicative of increased fall risk    5 times sit<>stand 16 seconds Scores greater than 15 seconds indicative of increased fall risk    Functional Reach  Scores under 7 inches indicative of increased fall risk     [x] Patient history, allergies, meds reviewed. Medical chart reviewed. See intake form. Review Of Systems (ROS):  [x]Performed Review of systems (Integumentary, CardioPulmonary, Neurological) by intake and observation. Intake form has been scanned into medical record. Patient has been instructed to contact their primary care physician regarding ROS issues if not already being addressed at this time.       Co-morbidities/Complexities (which will affect course of rehabilitation):   []None        []Hx of COVID   Arthritic conditions   []Rheumatoid arthritis (M05.9)  []Osteoarthritis (M19.91)  []Gout   Cardiovascular conditions   []Hypertension (I10)  [x]Hyperlipidemia (E78.5)  []Angina pectoris (I20)  []Atherosclerosis (I70)  []Pacemaker  []Hx of CABG/stent/  cardiac surgeries   Musculoskeletal conditions   [x]Disc pathology   []Congenital spine pathologies   [x]Osteoporosis (M81.8)  []Osteopenia (M85.8)  []Scoliosis       Endocrine conditions   []Hypothyroid (E03.9)  []Hyperthyroid Gastrointestinal conditions   []Constipation (U27.91)   Metabolic conditions   []Morbid obesity (E66.01)  []Diabetes type 1(E10.65) or 2 (E11.65)   []Neuropathy (G60.9)     Cardio/Pulmonary conditions   []Asthma (J45)  []Coughing   []COPD (J44.9)  []CHF  []A-fib   Psychological Disorders  [x]Anxiety (F41.9)  [x]Depression (F32.9)   []Other:   Developmental Disorders  []Autism (F84.0)  []CP (G80)  []Down Syndrome (Q90.9)  []Developmental delay     Neurological conditions  []Prior Stroke (I69.30)  [x]Parkinson's (G20)  []Encephalopathy (G93.40)  []MS (G35)  []Post-polio (G14)  []SCI  []TBI  []ALS Other conditions  []Fibromyalgia (M79.7)  []Vertigo  []Syncope  []Kidney Failure  []Cancer      []currently undergoing                treatment  []Pregnancy  []Incontinence   Prior surgeries  []involved limb  []previous spinal surgery  [] section birth  []hysterectomy  []bowel / bladder surgery  []other relevant surgeries   []Other:   GERD,              Barriers to/and or personal factors that will affect rehab potential:  []Age  []Sex    []Smoker  []Motivation/Lack of Motivation            [x]Co-Morbidities  [x]Cognitive Function, education/learning barriers  []Environmental, home barriers  []profession/work barriers  []past PT/medical experience  []other:    Falls Risk Assessment (30 days):   [] Falls Risk assessed and no intervention required. [x] Falls Risk assessed and Patient requires intervention due to being higher risk   TUG score (>12s at risk):     [x] Falls education provided    ASSESSMENT: Pt is a 68 y/o female who presents with scoliosis, decreased core strength, and Parkinson's. She had more difficulty with balance and gait with decreased cognitive attention. She is a fall risk as measured by [facility] Scale. Due to all of these complexities, patient would benefit from skilled, outpatient PT to address deficits, attain functional goals, and decrease risk for falling.        Functional Impairments:     []Decreased ROM  [x]Decreased strength  []Decreased joint mobility  [x]Increased pain  []Decreased flexibility  [x]Abnormality of gait  [x]Decreased balance  [x]Poor posture/alignment  [x]Decreased functional status   [x]Decreased coordination/motor control    Functional Activity Limitations (from functional questionnaire and intake)   [x]Reduced ability to tolerate prolonged functional positions   [x]Reduced ability or difficulty with changes of positions or transfers between positions   [x]Reduced ability to maintain good posture and demonstrate good body mechanics with sitting, bending, and lifting   [x]Reduced ability to sleep   []Reduced ability or tolerance with driving and/or computer work   [x]Reduced ability to perform lifting, reaching, carrying tasks   [x]Reduced ability to squat   [x]Reduced ability to forward bend   [x]Reduced ability to ambulate prolonged functional periods/distances/surfaces   [x]Reduced ability to ascend/descend stairs   [x]Other: Reduced ability to self-correct losses of balance        Participation Restrictions   [x]Reduced participation in self care activities   [x]Reduced participation in home management activities  N/A[]Reduced participation in work activities   [x]Reduced participation in social activities. N/A[]Reduced participation in sport/recreational activities.     Classification:   [x]Signs/symptoms consistent with Primary prevention/risk reduction for loss of balance and falls    []Signs/symptoms consistent with Impaired neuromotor development   []Signs/symptoms consistent with Impaired motor function and sensory integrity associated w/non-progressive disorders of CNS - Congenital/Aquired in Infancy or Childhood or Acquired in Adolescence or adulthood   [x]Signs/symptoms consistent with Impaired motor function and sensory integrity associate w/progressive disorders of the CNS     []Signs/symptoms consistent with Impaired motor function and sensory integrity associated w/acute or chronic polyneuropathies   []Signs/symptoms consistent with Impaired motor function, peripheral nerve integrity, and sendory integrity associated w/non-progressive disorders of spinal cord   []other:      Prognosis/Rehab Potential:   Tolerance of evaluation/treatment:    []Excellent     []Excellent   [x]Good     [x]Good   []Fair      []Fair   []Poor      []Poor      Physical Therapy Evaluation Complexity Justification  [x] A history of present problem with:  [] no personal factors and/or comorbidities that impact the plan of care;  [x]1-2 personal factors and/or comorbidities that impact the plan of care  []3 personal factors and/or comorbidities that impact the plan of care  [x] An examination of body systems using standardized tests and measures addressing any of the following: body structures and functions (impairments), activity limitations, and/or participation restrictions;:  [] a total of 1-2 or more elements   [x] a total of 3 or more elements   [] a total of 4 or more elements   [x] A clinical presentation with:  [] stable and/or uncomplicated characteristics   [x] evolving clinical presentation with changing characteristics  [] unstable and unpredictable characteristics;   [x] Clinical decision making of [] low, [x] moderate, [] high complexity using standardized patient assessment instrument and/or measurable assessment of functional outcome. [] EVAL (LOW) 23973 (typically 15 minutes face-to-face)  [x] EVAL (MOD) 25528 (typically 30 minutes face-to-face)  [] EVAL (HIGH) 73343 (typically 45 minutes face-to-face)  [] RE-EVAL     PLAN: PT to begin focusing on activation of scapular and hip musculature, transfer and gait safety, evenly distributed weight-bearing through extremities, and pain management. Frequency/Duration:  2 days per week for 5 Weeks:  Interventions:  [x]  Therapeutic exercise including: strength training and ROM  [x]  NMR activation and proprioception to improve joint mechanics, motion patterns, and balance reactions  [x]  Manual therapy as indicated to improve joint mobility, tissue pliability, and muscle facilitation  [x]  Modalities as needed that may include: thermal agents, E-stim, Biofeedback, US as indicated  [x]  Patient education on joint protection, postural re-education, home/activity modification, progression of HEP. HEP instruction: Written HEP instructions provided and reviewed. See flowsheet for details    GOALS:  Patient stated goal: learn techniques to improve pain and function  [] Progressing: [] Met: [] Not Met: [] Adjusted    Therapist goals for Patient:   Short Term Goals: To be achieved in: 2 weeks  1. Independent with HEP and progression per patient tolerance, in order to prevent re-injury. [] Progressing: [] Met: [] Not Met: [] Adjusted  2.  Patient will have a 25% decrease in pain to facilitate improvement in movement, function, and ADLs as indicated by Functional Deficits. [] Progressing: [] Met: [] Not Met: [] Adjusted    Long Term Goals: To be achieved in: 5 weeks  1. Patient to demonstrate TUG cognitive score <3 seconds difference from standard to demonstrate improved fall risk to assist with reaching prior level of function. [] Progressing: [] Met: [] Not Met: [] Adjusted  2. Patient will demonstrate increased [facility], good scapular mobility and good hip ROM to allow for proper joint functioning as indicated by patients Functional Deficits. [] Progressing: [] Met: [] Not Met: [] Adjusted  3. Patient will demonstrate an increase in strength to good shoulder & hip complex, and core activation to allow for proper functional mobility as indicated by patients Functional Deficits. [] Progressing: [] Met: [] Not Met: [] Adjusted  4. Patient will return to functional activities including light housework and walking short distances without increased symptoms or restriction. [] Progressing: [] Met: [] Not Met: [] Adjusted  5.  Patient will report 50% improvement in ability to get up in the morning and make it to the restroom with less stiffness (patient specific functional goal)    [] Progressing: [] Met: [] Not Met: [] Adjusted     Electronically signed by:  Robert Ortiz, PT , DPT [id number]

## 2023-01-16 ENCOUNTER — HOSPITAL ENCOUNTER (OUTPATIENT)
Dept: PHYSICAL THERAPY | Age: 73
Setting detail: THERAPIES SERIES
Discharge: HOME | End: 2023-01-16
Payer: MEDICARE

## 2023-01-16 PROCEDURE — 97112 NEUROMUSCULAR REEDUCATION: CPT

## 2023-01-16 PROCEDURE — 97110 THERAPEUTIC EXERCISES: CPT

## 2023-01-16 NOTE — FLOWSHEET NOTE
Angela  78. and Therapy, [facility], [address], [address] [place]   Phone: [phone]  Fax [phone]    Physical Therapy Daily Treatment Note    Date:  2023    Patient Name:  Сергей Sheikh    :  1950  MRN: [id number]  Restrictions/Precautions:    Medical/Treatment Diagnosis Information:   Parkinson's Disease  Insurance/Certification information:   [employer] Medicare - $05 copay and precert through [facility]  Physician Information:   Carlos Vincent MD  Plan of care signed (Y/N):  M  Visit# / total visits:  2/10     G-Code (if applicable):          LEFS  23    Medicare Cap (if applicable):  024 = total amount used, updated 2023    Time in:  3:20      Timed Treatment: 45    Total Treatment Time:  45  Time out: 4:05  ________________________________________________________________________________________    Pain Level:    5/10  SUBJECTIVE:  Pt reports that she is moving slow and stiff today. Back is sore. OBJECTIVE:     Exercise/Equipment Resistance/Repetitions Other comments   Nustep   Level 2 x6 minutes           Seated sagittal plane movement for Parkinson's  8x HEP; max cueing for big movement   Seated frontal plane movement for Parkinson's   8x HEP; max cueing for big movement          Open book 5x B HEP   3-way ball compression NV                                                                                             Other Therapeutic Activities:  Education regarding POC including demonstration with models of anatomy and physiology in order to maximize benefits of treatment; total neuro re-ed 10 minutes    Manual Treatments:         Modalities:      Test/Measurements:  See initial eval       ASSESSMENT:    Pt required significant cueing to complete exercises completely this date. Initiated home exercises. Progress as pt tolerates.      Treatment/Activity Tolerance:   [x]Patient tolerated treatment well [] Patient limited by ja  []Patient limited by pain [] Patient limited by other medical complications  [] Other:     GOALS:  Patient stated goal: learn techniques to improve pain and function  [] Progressing: [] Met: [] Not Met: [] Adjusted    Therapist goals for Patient:   Short Term Goals: To be achieved in: 2 weeks  1. Independent with HEP and progression per patient tolerance, in order to prevent re-injury. [] Progressing: [] Met: [] Not Met: [] Adjusted  2. Patient will have a 25% decrease in pain to facilitate improvement in movement, function, and ADLs as indicated by Functional Deficits. [] Progressing: [] Met: [] Not Met: [] Adjusted    Long Term Goals: To be achieved in: 5 weeks  1. Patient to demonstrate TUG cognitive score <3 seconds difference from standard to demonstrate improved fall risk to assist with reaching prior level of function. [] Progressing: [] Met: [] Not Met: [] Adjusted  2. Patient will demonstrate increased AROM to [facility], good scapular mobility and good hip ROM to allow for proper joint functioning as indicated by patients Functional Deficits. [] Progressing: [] Met: [] Not Met: [] Adjusted  3. Patient will demonstrate an increase in strength to good shoulder & hip complex, and core activation to allow for proper functional mobility as indicated by patients Functional Deficits. [] Progressing: [] Met: [] Not Met: [] Adjusted  4. Patient will return to functional activities including light housework and walking short distances without increased symptoms or restriction. [] Progressing: [] Met: [] Not Met: [] Adjusted  5.  Patient will report 50% improvement in ability to get up in the morning and make it to the restroom with less stiffness (patient specific functional goal)    [] Progressing: [] Met: [] Not Met: [] Adjusted     Plan: [x] Continue per plan of care [] Alter current plan (see comments)   [] Plan of care initiated [] Hold pending MD visit [] Discharge      Plan for Next Session: exercises as stated above, balance training, functional movement training, dual task training    Re-Certification Due Date:         Signature:  Teja Jenkins, PT

## 2023-01-20 ENCOUNTER — HOSPITAL ENCOUNTER (OUTPATIENT)
Dept: PHYSICAL THERAPY | Age: 73
Setting detail: THERAPIES SERIES
Discharge: HOME | End: 2023-01-20
Payer: MEDICARE

## 2023-01-20 NOTE — PROGRESS NOTES
Physical Therapy  Cancellation/No-show Note  Patient Name:  Mark Garcia  :  1950   Date:  2023  Cancels to date: 0  No-shows to date: 1    For today's appointment patient:  [] Cancelled  [] Rescheduled appointment  [x] No-show     Reason given by patient:  [] Patient ill  [] Conflicting appointment  [] No transportation    [] Conflict with work  [] No reason given  [] Other:     Comments:      Electronically signed by:  Tj Corona PT

## 2023-01-21 ENCOUNTER — APPOINTMENT (OUTPATIENT)
Dept: CT IMAGING | Age: 73
End: 2023-01-21
Payer: MEDICARE

## 2023-01-21 ENCOUNTER — HOSPITAL ENCOUNTER (EMERGENCY)
Age: 73
Discharge: HOME | End: 2023-01-21
Attending: STUDENT IN AN ORGANIZED HEALTH CARE EDUCATION/TRAINING PROGRAM
Payer: MEDICARE

## 2023-01-21 VITALS
OXYGEN SATURATION: 100 % | HEART RATE: 91 BPM | WEIGHT: 100 LBS | DIASTOLIC BLOOD PRESSURE: 63 MMHG | BODY MASS INDEX: 16.66 KG/M2 | TEMPERATURE: 97.9 F | HEIGHT: 65 IN | SYSTOLIC BLOOD PRESSURE: 146 MMHG | RESPIRATION RATE: 13 BRPM

## 2023-01-21 DIAGNOSIS — I95.1 ORTHOSTATIC SYNCOPE: Primary | ICD-10-CM

## 2023-01-21 DIAGNOSIS — S01.81XA FACIAL LACERATION, INITIAL ENCOUNTER: ICD-10-CM

## 2023-01-21 DIAGNOSIS — S02.2XXA CLOSED FRACTURE OF NASAL BONE, INITIAL ENCOUNTER: ICD-10-CM

## 2023-01-21 DIAGNOSIS — G20 PARKINSON'S DISEASE (HCC): ICD-10-CM

## 2023-01-21 LAB
A/G RATIO: 2.4 (ref 1.1–2.2)
ALBUMIN SERPL-MCNC: 4.7 G/DL (ref 3.4–5)
ALP BLD-CCNC: 81 U/L (ref 40–129)
ALT SERPL-CCNC: <5 U/L (ref 10–40)
ANION GAP SERPL CALCULATED.3IONS-SCNC: 13 MMOL/L (ref 3–16)
AST SERPL-CCNC: 19 U/L (ref 15–37)
BACTERIA: ABNORMAL /HPF
BASOPHILS ABSOLUTE: 0 K/UL (ref 0–0.2)
BASOPHILS RELATIVE PERCENT: 0.8 %
BILIRUB SERPL-MCNC: 0.4 MG/DL (ref 0–1)
BILIRUBIN URINE: NEGATIVE
BLOOD, URINE: ABNORMAL
BUN BLDV-MCNC: 22 MG/DL (ref 7–20)
CALCIUM SERPL-MCNC: 9.7 MG/DL (ref 8.3–10.6)
CHLORIDE BLD-SCNC: 103 MMOL/L (ref 99–110)
CLARITY: CLEAR
CO2: 25 MMOL/L (ref 21–32)
COLOR: YELLOW
CREAT SERPL-MCNC: 0.9 MG/DL (ref 0.6–1.2)
EKG ATRIAL RATE: 109 BPM
EKG DIAGNOSIS: NORMAL
EKG P AXIS: 74 DEGREES
EKG P-R INTERVAL: 160 MS
EKG Q-T INTERVAL: 358 MS
EKG QRS DURATION: 112 MS
EKG QTC CALCULATION (BAZETT): 482 MS
EKG R AXIS: 69 DEGREES
EKG T AXIS: 76 DEGREES
EKG VENTRICULAR RATE: 109 BPM
EOSINOPHILS ABSOLUTE: 0 K/UL (ref 0–0.6)
EOSINOPHILS RELATIVE PERCENT: 0.4 %
GFR SERPL CREATININE-BSD FRML MDRD: >60 ML/MIN/{1.73_M2}
GLUCOSE BLD-MCNC: 113 MG/DL (ref 70–99)
GLUCOSE URINE: NEGATIVE MG/DL
HCT VFR BLD CALC: 40.1 % (ref 36–48)
HEMOGLOBIN: 12.8 G/DL (ref 12–16)
HYALINE CASTS: ABNORMAL /LPF (ref 0–2)
KETONES, URINE: ABNORMAL MG/DL
LEUKOCYTE ESTERASE, URINE: NEGATIVE
LYMPHOCYTES ABSOLUTE: 1.3 K/UL (ref 1–5.1)
LYMPHOCYTES RELATIVE PERCENT: 20.2 %
MCH RBC QN AUTO: 30.3 PG (ref 26–34)
MCHC RBC AUTO-ENTMCNC: 31.9 G/DL (ref 31–36)
MCV RBC AUTO: 94.9 FL (ref 80–100)
MICROSCOPIC EXAMINATION: YES
MONOCYTES ABSOLUTE: 0.4 K/UL (ref 0–1.3)
MONOCYTES RELATIVE PERCENT: 6.7 %
NEUTROPHILS ABSOLUTE: 4.5 K/UL (ref 1.7–7.7)
NEUTROPHILS RELATIVE PERCENT: 71.9 %
NITRITE, URINE: NEGATIVE
PDW BLD-RTO: 13 % (ref 12.4–15.4)
PH UA: 5.5 (ref 5–8)
PLATELET # BLD: 263 K/UL (ref 135–450)
PMV BLD AUTO: 8.2 FL (ref 5–10.5)
POTASSIUM REFLEX MAGNESIUM: 4.5 MMOL/L (ref 3.5–5.1)
PROTEIN UA: 30 MG/DL
RBC # BLD: 4.23 M/UL (ref 4–5.2)
RBC UA: ABNORMAL /HPF (ref 0–4)
SODIUM BLD-SCNC: 141 MMOL/L (ref 136–145)
SPECIFIC GRAVITY UA: 1.02 (ref 1–1.03)
TOTAL PROTEIN: 6.7 G/DL (ref 6.4–8.2)
TROPONIN: <0.01 NG/ML
URINE REFLEX TO CULTURE: ABNORMAL
URINE TYPE: ABNORMAL
UROBILINOGEN, URINE: 0.2 E.U./DL
WBC # BLD: 6.3 K/UL (ref 4–11)
WBC UA: ABNORMAL /HPF (ref 0–5)

## 2023-01-21 PROCEDURE — 70450 CT HEAD/BRAIN W/O DYE: CPT

## 2023-01-21 PROCEDURE — 93010 ELECTROCARDIOGRAM REPORT: CPT | Performed by: INTERNAL MEDICINE

## 2023-01-21 PROCEDURE — 84484 ASSAY OF TROPONIN QUANT: CPT

## 2023-01-21 PROCEDURE — 70486 CT MAXILLOFACIAL W/O DYE: CPT

## 2023-01-21 PROCEDURE — 72125 CT NECK SPINE W/O DYE: CPT

## 2023-01-21 PROCEDURE — 99284 EMERGENCY DEPT VISIT MOD MDM: CPT

## 2023-01-21 PROCEDURE — 2580000003 HC RX 258: Performed by: STUDENT IN AN ORGANIZED HEALTH CARE EDUCATION/TRAINING PROGRAM

## 2023-01-21 PROCEDURE — 80053 COMPREHEN METABOLIC PANEL: CPT

## 2023-01-21 PROCEDURE — 12011 RPR F/E/E/N/L/M 2.5 CM/<: CPT

## 2023-01-21 PROCEDURE — 93005 ELECTROCARDIOGRAM TRACING: CPT | Performed by: STUDENT IN AN ORGANIZED HEALTH CARE EDUCATION/TRAINING PROGRAM

## 2023-01-21 PROCEDURE — 85025 COMPLETE CBC W/AUTO DIFF WBC: CPT

## 2023-01-21 PROCEDURE — 81001 URINALYSIS AUTO W/SCOPE: CPT

## 2023-01-21 PROCEDURE — 96360 HYDRATION IV INFUSION INIT: CPT

## 2023-01-21 RX ORDER — CEPHALEXIN 500 MG/1
500 CAPSULE ORAL 4 TIMES DAILY
Qty: 28 CAPSULE | Refills: 0 | Status: SHIPPED | OUTPATIENT
Start: 2023-01-21 | End: 2023-01-21 | Stop reason: SDUPTHER

## 2023-01-21 RX ORDER — CEPHALEXIN 500 MG/1
500 CAPSULE ORAL 4 TIMES DAILY
Qty: 28 CAPSULE | Refills: 0 | Status: SHIPPED | OUTPATIENT
Start: 2023-01-21 | End: 2023-01-28

## 2023-01-21 RX ORDER — 0.9 % SODIUM CHLORIDE 0.9 %
1000 INTRAVENOUS SOLUTION INTRAVENOUS ONCE
Status: COMPLETED | OUTPATIENT
Start: 2023-01-21 | End: 2023-01-21

## 2023-01-21 RX ADMIN — SODIUM CHLORIDE 1000 ML: 9 INJECTION, SOLUTION INTRAVENOUS at 15:25

## 2023-01-21 ASSESSMENT — PAIN - FUNCTIONAL ASSESSMENT
PAIN_FUNCTIONAL_ASSESSMENT: 0-10
PAIN_FUNCTIONAL_ASSESSMENT: PAIN ASSESSMENT IN ADVANCED DEMENTIA (PAINAD)

## 2023-01-21 ASSESSMENT — PAIN SCALES - GENERAL: PAINLEVEL_OUTOF10: 2

## 2023-01-21 NOTE — ED PROVIDER NOTES
Emergency Department Provider Note  Location: [address] [place] [address]  ED  1/21/2023     Patient Identification  Natacha Ly is a 67 y.o. female    Chief Complaint  Loss of Consciousness (Pt to ED via EMS with c/o syncopal episode today while at basketball game. Pt reports she felt it coming on but couldn't sit down quick enough. Denies any chest pain prior or after LOC. Pt with lac to nose, denies any other injuries.)      Mode of Arrival  EMS    HPI  (History provided by patient)  This is a 67 y.o. female with a PMH significant for dyskinesia, hyperlipidemia, Parkinson disease  presented today for syncopal event. Patient was at a basketball game when she felt suddenly dizzy and lightheaded. Patient was unable to sit down quick enough. Patient did strike her head and sustained laceration to her nose. Patient denies any dizziness or lightheadedness currently. She denies any chest pain prior or after loss of consciousness. States that she has syncopized in the past and typically it comes in groups. She denies any new neck or back pain. Denies any shoulder pain or lower extremity pain. She denies any abdominal pain. Denies any recent fever, cough, congestion, orthopnea, PND, changes to bowel or bladder, or new numbness or tingling. She denies any new medication changes. Patient has history of hyponatremia. Denies any history of seizures. ROS  Review of Systems   All other systems reviewed and are negative. I have reviewed the following nursing documentation:  Allergies:    Allergies   Allergen Reactions    Lidocaine     Procaine     Anesthetics, Tracy Other (See Comments)     Hypotension episode with ER visit    Celecoxib Other (See Comments)     Abdominal discomfort    Lidocaine Hcl      hypotension    Methylprednisolone Other (See Comments)     Dose pack caused hallucinations       Past medical history:  has a past medical history of Corticobasal degeneration, Dyskinesia, Hyperlipidemia, Hyperreflexia, Neuropathy, Parkinson's disease ([facility] [address] and Pituitary adenoma ([facility] [address] Past surgical history:  has a past surgical history that includes Hammer toe surgery (2005); Finger trigger release; Tonsillectomy and adenoidectomy (12 yo); laparoscopy; Colonoscopy (6/02); Colonoscopy (8/12); and back surgery. Home medications:   Prior to Admission medications    Medication Sig Start Date End Date Taking? Authorizing Provider   cephALEXin (KEFLEX) 500 MG capsule Take 1 capsule by mouth 4 times daily for 7 days 1/21/23 1/28/23 Yes Lobo Machado MD   famotidine (PEPCID) 40 MG tablet TAKE ONE TABLET BY MOUTH EVERY EVENING 12/6/22   Licha Mora MD   DULoxetine (CYMBALTA) 60 MG extended release capsule TAKE ONE CAPSULE BY MOUTH ONCE DAILY 10/5/22   Licha Mora MD   pilocarpine (SALAGEN) 5 MG tablet Take 1 tablet by mouth 3 times daily 4/28/22   Historical Provider, MD   DULoxetine (CYMBALTA) 30 MG extended release capsule Take 1 capsule by mouth daily 4/28/22   Historical Provider, MD   ibuprofen (ADVIL;MOTRIN) 200 MG CAPS Take 2 capsules by mouth 3 times daily as needed for Fever    Historical Provider, MD   MYRBETRIQ 25 MG TB24 Take 25 mg by mouth daily  2/2/22   Historical Provider, MD   ZINC PO Take by mouth    Historical Provider, MD   calcium carbonate (OSCAL) 500 MG TABS tablet Take 500 mg by mouth daily    Historical Provider, MD   Magnesium 100 MG CAPS Take 100 mg by mouth daily     Historical Provider, MD   vitamin D3 (CHOLECALCIFEROL) 10 MCG (400 UNIT) TABS tablet Take 2,000 Units by mouth daily     Historical Provider, MD   melatonin 3 MG TABS tablet Take 3 mg by mouth nightly as needed     Historical Provider, MD   carbidopa-levodopa (RYTARY) 61. MG CPCR per extended release capsule Take 1 capsule by mouth 5 times daily     Historical Provider, MD       Social history:  reports that she has never smoked.  She has never used smokeless tobacco. She reports that she does not currently use drugs. She reports that she does not drink alcohol. Family history:    Family History   Problem Relation Age of Onset    Heart Disease Mother     Stroke Father     Cancer Father         prostate    Diabetes Sister     Breast Cancer Sister 55    Heart Disease Sister         quadruple bypass    Breast Cancer Sister 37       Exam  ED Triage Vitals [01/21/23 1302]   BP Temp Temp Source Heart Rate Resp SpO2 Height Weight   133/70 97.9 °F (36.6 °C) Oral (!) 111 18 99 % 5' 5\" (1.651 m) 100 lb (45.4 kg)     Physical Exam  Vitals and nursing note reviewed. Constitutional:       Appearance: She is not toxic-appearing. HENT:      Head: Normocephalic. Comments: Small 1 cm laceration to the nasal bridge     Right Ear: External ear normal.      Left Ear: External ear normal.      Nose: Nose normal.      Mouth/Throat:      Pharynx: Oropharynx is clear. Eyes:      Extraocular Movements: Extraocular movements intact. Conjunctiva/sclera: Conjunctivae normal.   Cardiovascular:      Rate and Rhythm: Regular rhythm. Tachycardia present. Pulses: Normal pulses. Heart sounds: Normal heart sounds. Pulmonary:      Effort: Pulmonary effort is normal.      Breath sounds: Normal breath sounds. No wheezing. Abdominal:      General: There is no distension. Palpations: Abdomen is soft. Tenderness: There is no abdominal tenderness. There is no right CVA tenderness, left CVA tenderness, guarding or rebound. Musculoskeletal:         General: Normal range of motion. Cervical back: Normal range of motion and neck supple. No rigidity. Right lower leg: No edema. Left lower leg: No edema. Skin:     General: Skin is warm. Capillary Refill: Capillary refill takes less than 2 seconds. Findings: No bruising. Neurological:      General: No focal deficit present. Mental Status: She is alert and oriented to person, place, and time. Cranial Nerves: No cranial nerve deficit. Motor: No weakness. Psychiatric:         Mood and Affect: Mood normal.         Behavior: Behavior normal.           MDM/ED Course  ED Medication Orders (From admission, onward)      Start Ordered     Status Ordering Provider    01/21/23 1530 01/21/23 1520  0.9 % sodium chloride bolus  ONCE         Last MAR action: Stopped - by Gem Clements on 01/21/23 at [address] [place] [address]            EKG  I interpreted the EKG and notes sinus tachycardia with a rate of 109, incomplete right bundle branch block unchanged from prior, T wave abnormalities in anterior and lateral leads. This appears unchanged from prior on April 25, 2022      Radiology  CT HEAD WO CONTRAST    Result Date: 1/21/2023  EXAMINATION: CT OF THE HEAD WITHOUT CONTRAST; CT OF THE FACE WITHOUT CONTRAST  1/21/2023 1:25 pm TECHNIQUE: CT of the head was performed without the administration of intravenous contrast. Automated exposure control, iterative reconstruction, and/or weight based adjustment of the mA/kV was utilized to reduce the radiation dose to as low as reasonably achievable.; CT of the face was performed without the administration of intravenous contrast. Multiplanar reformatted images are provided for review. Automated exposure control, iterative reconstruction, and/or weight based adjustment of the mA/kV was utilized to reduce the radiation dose to as low as reasonably achievable. COMPARISON: Head CT of 06/11/2021 ORDERING SYSTEM PROVIDED HISTORY: fall with head trauma TECHNOLOGIST PROVIDED HISTORY: Reason for exam:->fall with head trauma Has a \"code stroke\" or \"stroke alert\" been called? ->No Decision Support Exception - unselect if not a suspected or confirmed emergency medical condition->Emergency Medical Condition ([place]) Reason for Exam: passed out, hit face, lac on nose, neck pain FINDINGS: Head CT: There is no acute infarction, intracranial hemorrhage or intraparenchymal mass lesion.  No mass effect, midline shift or extra-axial collection is noted. Unchanged 11 mm partially calcified extra-axial mass adjacent to the right superior frontal gyrus anteriorly most likely representing a meningioma. No mass effect on adjacent brain parenchyma. Mild chronic microangiopathic ischemic disease. 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 The brain parenchyma is normal. The cerebellar tonsils are in normal position. The ventricles, sulci, and cisterns are within normal limits in size and configuration. No skull fracture is identified. CT face: Nasal bones and nasal cavity: Acute comminuted and displaced fracture of bilateral nasal bones with extensive overlying soft tissue swelling. Paranasal sinuses: The paranasal sinuses are clear. The paranasal sinus walls are intact. Orbits: The roof, floor, superior and inferior orbital rims, and lateral wall of the orbits are intact bilaterally. The lamina papyracea are intact bilaterally. The perpendicular plate of the ethmoid bone demonstrates normal central positioning and no evidence of fracture. Mandible: No concerning lytic or sclerotic lesion is noted. No periapical hypodensity is noted surrounding the mandibular teeth. Temporomandibular joints appear unremarkable. Temporal bones: Mastoid air cells appear normally-developed and clear of fluid. The temporal bones appear intact. The middle ear cavities are clear with appropriate positioning of the ossicles. The remainder of the osseous structures of the face: The pterygoid process and plates of the sphenoid bone appear intact without displaced fracture. The superior alveolar process of the maxilla is normal. Soft tissues overlying the facial bones appear normal.     Head CT: No acute intracranial abnormality.   No evidence for acute intracranial hemorrhage, territorial infarction or intraparenchymal mass lesion. 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 Unchanged 11 mm partially calcified extra-axial mass adjacent to the right superior frontal gyrus anteriorly most likely representing a meningioma. No mass effect on adjacent brain parenchyma. Mild chronic microangiopathic ischemic disease. CT face: Acute comminuted and displaced fractures of bilateral nasal bones with extensive overlying soft tissue swelling. .     CT FACIAL BONES WO CONTRAST    Result Date: 1/21/2023  EXAMINATION: CT OF THE HEAD WITHOUT CONTRAST; CT OF THE FACE WITHOUT CONTRAST  1/21/2023 1:25 pm TECHNIQUE: CT of the head was performed without the administration of intravenous contrast. Automated exposure control, iterative reconstruction, and/or weight based adjustment of the mA/kV was utilized to reduce the radiation dose to as low as reasonably achievable.; CT of the face was performed without the administration of intravenous contrast. Multiplanar reformatted images are provided for review. Automated exposure control, iterative reconstruction, and/or weight based adjustment of the mA/kV was utilized to reduce the radiation dose to as low as reasonably achievable. COMPARISON: Head CT of 06/11/2021 ORDERING SYSTEM PROVIDED HISTORY: fall with head trauma TECHNOLOGIST PROVIDED HISTORY: Reason for exam:->fall with head trauma Has a \"code stroke\" or \"stroke alert\" been called? ->No Decision Support Exception - unselect if not a suspected or confirmed emergency medical condition->Emergency Medical Condition ([place]) Reason for Exam: passed out, hit face, lac on nose, neck pain FINDINGS: Head CT: There is no acute infarction, intracranial hemorrhage or intraparenchymal mass lesion. No mass effect, midline shift or extra-axial collection is noted. Unchanged 11 mm partially calcified extra-axial mass adjacent to the right superior frontal gyrus anteriorly most likely representing a meningioma. No mass effect on adjacent brain parenchyma. Mild chronic microangiopathic ischemic disease. 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 The brain parenchyma is normal. The cerebellar tonsils are in normal position. The ventricles, sulci, and cisterns are within normal limits in size and configuration. No skull fracture is identified. CT face: Nasal bones and nasal cavity: Acute comminuted and displaced fracture of bilateral nasal bones with extensive overlying soft tissue swelling. Paranasal sinuses: The paranasal sinuses are clear. The paranasal sinus walls are intact. Orbits: The roof, floor, superior and inferior orbital rims, and lateral wall of the orbits are intact bilaterally. The lamina papyracea are intact bilaterally. The perpendicular plate of the ethmoid bone demonstrates normal central positioning and no evidence of fracture. Mandible: No concerning lytic or sclerotic lesion is noted. No periapical hypodensity is noted surrounding the mandibular teeth. Temporomandibular joints appear unremarkable. Temporal bones: Mastoid air cells appear normally-developed and clear of fluid. The temporal bones appear intact. The middle ear cavities are clear with appropriate positioning of the ossicles. The remainder of the osseous structures of the face: The pterygoid process and plates of the sphenoid bone appear intact without displaced fracture. The superior alveolar process of the maxilla is normal. Soft tissues overlying the facial bones appear normal.     Head CT: No acute intracranial abnormality.   No evidence for acute intracranial hemorrhage, territorial infarction or intraparenchymal mass lesion. 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 Unchanged 11 mm partially calcified extra-axial mass adjacent to the right superior frontal gyrus anteriorly most likely representing a meningioma. No mass effect on adjacent brain parenchyma. Mild chronic microangiopathic ischemic disease. CT face: Acute comminuted and displaced fractures of bilateral nasal bones with extensive overlying soft tissue swelling. .     CT CERVICAL SPINE WO CONTRAST    Result Date: 1/21/2023  EXAMINATION: CT OF THE CERVICAL SPINE WITHOUT CONTRAST 1/21/2023 1:25 pm TECHNIQUE: CT of the cervical spine was performed without the administration of intravenous contrast. Multiplanar reformatted images are provided for review. Automated exposure control, iterative reconstruction, and/or weight based adjustment of the mA/kV was utilized to reduce the radiation dose to as low as reasonably achievable. COMPARISON: 05/01/2021 HISTORY: ORDERING SYSTEM PROVIDED HISTORY: fall with neck pain TECHNOLOGIST PROVIDED HISTORY: Reason for exam:->fall with neck pain Decision Support Exception - unselect if not a suspected or confirmed emergency medical condition->Emergency Medical Condition ([place]) Reason for Exam: passed out, hit face, lac on nose, neck pain FINDINGS: BONES/ALIGNMENT: There is no acute fracture or subluxation. Vertebral bodies are stable in height and alignment. There is a stable 4 mm degenerative anterolisthesis of C3 on C4. There is a stable mild 2 mm degenerative anterolisthesis C4 on C5, unchanged from the prior exam of 05/01/2021. No additional abnormal listhesis is identified. No destructive osseous lesion is seen. The posterior elements are intact and aligned.  DEGENERATIVE CHANGES: There is stable multilevel spondylosis, most notable at C4 through C6. There is stable moderate to severe disc space loss, endplate sclerosis, and vacuum disc phenomenon present at C5-C6. There is stable mild disc space loss at C3-C4. There is a stable chronic disc osteophyte protrusion at C5-C6 leading to mild chronic central canal stenosis. There is multilevel bilateral uncovertebral and facet hypertrophy, leading to varying degrees of multilevel bilateral neuroforaminal stenosis. SOFT TISSUES: There are small subcentimeter nodules throughout the thyroid parenchyma, which require no further follow-up. The remainder of the cervical soft tissues are unremarkable. There is a stable 3 mm granuloma within the left lung apex, unchanged from 05/01/2021. Lung apices are otherwise clear. 1. No acute fracture or subluxation of the cervical spine. 2. Stable mild degenerative anterolistheses of C3 and C4. 3. Stable multilevel cervical degenerative disc disease, as detailed above.         Labs  Results for orders placed or performed during the hospital encounter of 01/21/23   CBC with Auto Differential   Result Value Ref Range    WBC 6.3 4.0 - 11.0 K/uL    RBC 4.23 4.00 - 5.20 M/uL    Hemoglobin 12.8 12.0 - 16.0 g/dL    Hematocrit 40.1 36.0 - 48.0 %    MCV 94.9 80.0 - 100.0 fL    MCH 30.3 26.0 - 34.0 pg    MCHC 31.9 31.0 - 36.0 g/dL    RDW 13.0 12.4 - 15.4 %    Platelets 669 157 - 532 K/uL    MPV 8.2 5.0 - 10.5 fL    Neutrophils % 71.9 %    Lymphocytes % 20.2 %    Monocytes % 6.7 %    Eosinophils % 0.4 %    Basophils % 0.8 %    Neutrophils Absolute 4.5 1.7 - 7.7 K/uL    Lymphocytes Absolute 1.3 1.0 - 5.1 K/uL    Monocytes Absolute 0.4 0.0 - 1.3 K/uL    Eosinophils Absolute 0.0 0.0 - 0.6 K/uL    Basophils Absolute 0.0 0.0 - 0.2 K/uL   Comprehensive Metabolic Panel w/ Reflex to MG   Result Value Ref Range    Sodium 141 136 - 145 mmol/L    Potassium reflex Magnesium 4.5 3.5 - 5.1 mmol/L    Chloride 103 99 - 110 mmol/L    CO2 25 21 - 32 mmol/L    Anion Gap 13 3 - 16    Glucose 113 (H) 70 - 99 mg/dL    BUN 22 (H) 7 - 20 mg/dL    Creatinine 0.9 0.6 - 1.2 mg/dL    Est, Glom Filt Rate >60 >60    Calcium 9.7 8.3 - 10.6 mg/dL    Total Protein 6.7 6.4 - 8.2 g/dL    Albumin 4.7 3.4 - 5.0 g/dL    Albumin/Globulin Ratio 2.4 (H) 1.1 - 2.2    Total Bilirubin 0.4 0.0 - 1.0 mg/dL    Alkaline Phosphatase 81 40 - 129 U/L    ALT <5 (L) 10 - 40 U/L    AST 19 15 - 37 U/L   Troponin   Result Value Ref Range    Troponin <0.01 <0.01 ng/mL   Urinalysis with Reflex to Culture    Specimen: Urine   Result Value Ref Range    Color, UA Yellow Straw/Yellow    Clarity, UA Clear Clear    Glucose, Ur Negative Negative mg/dL    Bilirubin Urine Negative Negative    Ketones, Urine TRACE (A) Negative mg/dL    Specific Gravity, UA 1.025 1.005 - 1.030    Blood, Urine SMALL (A) Negative    pH, UA 5.5 5.0 - 8.0    Protein, UA 30 (A) Negative mg/dL    Urobilinogen, Urine 0.2 <2.0 E.U./dL    Nitrite, Urine Negative Negative    Leukocyte Esterase, Urine Negative Negative    Microscopic Examination YES     Urine Type NotGiven     Urine Reflex to Culture Not Indicated    Microscopic Urinalysis   Result Value Ref Range    Hyaline Casts, UA 0-2 0 - 2 /LPF    WBC, UA 3-5 0 - 5 /HPF    RBC, UA 5-10 (A) 0 - 4 /HPF    Bacteria, UA 2+ (A) None Seen /HPF   EKG 12 Lead   Result Value Ref Range    Ventricular Rate 109 BPM    Atrial Rate 109 BPM    P-R Interval 160 ms    QRS Duration 112 ms    Q-T Interval 358 ms    QTc Calculation (Bazett) 482 ms    P Axis 74 degrees    R Axis 69 degrees    T Axis 76 degrees    Diagnosis       Sinus tachycardiaIncomplete right bundle branch blockPossible Lateral infarct , age undeterminedPossible Inferior infarct (cited on or before 25-APR-2022)Abnormal ECGWhen compared with ECG of 25-APR-2022 07:55,Aberrant conduction is no longer PresentConfirmed by Briseida YANG MD ([id number]) on 1/21/2023 3:56:05 PM           - Patient seen and evaluated in room 6.  67 y.o. female presented for syncopal event. Patient presented normotensive, afebrile, tachycardic, respiratory rate of 18 and satting at 99% on room air. On exam she had no focal neurologic deficit. It was regular rate and rhythm, clear lungs bilaterally. Given history and exam my differential diagnosis includes but is not limited to vasovagal syncope, orthostatic syncope, dehydration, electrolyte abnormalities, arrhythmia. I have low suspicion for underlying carotid or vertebral stenosis given that this is happened in the past therefore I did not obtain CTA head and neck. I obtained labs and imaging studies as noted below    - Patient was placed on telemetry during his/her ED stay and no malignant dysrhythmia observed. - Pertinent old records reviewed. -Patient has PCP follow-up  -No limiting social stressors  at this time. - Patient was given 1 L IV fluids, intradermal bupivacaine given history of lidocaine allergy in the ED. Upon reassessment, patient reports improved symptoms. She was able to ambulate without significant dizziness. .    - Diagnostic studies reviewed. - EKG sinus tachycardia with a rate of 109, incomplete right bundle branch block unchanged from prior, T wave abnormalities in anterior and lateral leads. This appears unchanged from prior on April 25, 2022   - CT head without contrast with no acute intracranial abnormality. She has a hyperdense mass lesion arising from the sella extending into the suprasellar cistern which is similar in appearance to prior. -CT max face with acute comminuted and displaced fractures of bilateral nasal bones with extensive soft tissue swelling   -CT cervical spine with no acute fracture   - Lab:  CBC with a normal white blood cell count, no evidence of anemia, normal platelets  CMP with normal electrolytes, elevated BUN to 22 with a normal creatinine and GFR, mildly hyperglycemic to 113, normal LFTs and bilirubin  Troponin negative <0.01     Orthostatics performed and positive.   She was treated with IV fluids. On reassessment she had improved symptoms. Laceration was repaired without any difficulty. Procedure  Patient Name: Cong Davis   Medical Record Number: [id number]  Date: 1/21/2023   Time: 9:37 PM   Room/Bed: 11/11  Laceration Repair Procedure Note  Indication: Laceration    Procedure: The patient was placed in the appropriate position and anesthesia around the laceration was obtained by infiltration using 0.5% Bupivacaine without epinephrine. The area was then cleansed with betadine and draped in a sterile fashion. The laceration was closed with 5-0 fast absorbing gut using interrupted sutures. There were no additional lacerations requiring repair. The wound area was then dressed with a bandage. Total repaired wound length: 1 cm. Other Items: Suture count: 2    The patient tolerated the procedure well. Complications: None          - I discussed the results with patient and spouse/SO. We agreed to discharge patient home for acute orthostatic syncope, acute facial laceration, and acute bilateral nasal bone fractures. 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 - Return precautions also discussed. patient and spouse/SO verbalized understanding of care plan and agreed to follow-up with primary care physician as advised. Clinical Impression:  1. Orthostatic syncope    2. Closed fracture of nasal bone, initial encounter    3. Facial laceration, initial encounter    4. Parkinson's disease ([facility] [address]          Disposition:  Discharge to home in fair condition. Blood pressure (!) 146/63, pulse 91, temperature 97.9 °F (36.6 °C), temperature source Oral, resp. rate 13, height 5' 5\" (1.651 m), weight 100 lb (45.4 kg), SpO2 100 %, not currently breastfeeding.     Patient was given scripts for the following medications. I counseled patient how to take these medications. Discharge Medication List as of 1/21/2023  5:06 PM        START taking these medications    Details   cephALEXin (KEFLEX) 500 MG capsule Take 1 capsule by mouth 4 times daily for 7 days, Disp-28 capsule, R-0Normal             Disposition referral (if applicable):  [employer], [address]  [phone]    In 2 days          This chart was generated in part by using Dragon Dictation system and may contain errors related to that system including errors in grammar, punctuation, and spelling, as well as words and phrases that may be inappropriate. If there are any questions or concerns please feel free to contact the dictating provider for clarification.      Meg Wise MD  [address] [place] [address]        Meg Wise MD  01/21/23 2137       Meg Wise MD  01/21/23 2143

## 2023-01-26 ENCOUNTER — TELEPHONE (OUTPATIENT)
Dept: FAMILY MEDICINE CLINIC | Age: 73
End: 2023-01-26

## 2023-01-26 ENCOUNTER — APPOINTMENT (OUTPATIENT)
Dept: PHYSICAL THERAPY | Age: 73
End: 2023-01-26
Payer: MEDICARE

## 2023-01-26 NOTE — TELEPHONE ENCOUNTER
Natacha cartagena. Explained on day 3 of Keflex taking 4x/d. Saw the redness at end of yesterday. Did not take one today. Denies dyspnea, cough, choking or edema. Advised to try one more dose on a full stomach. If redness returns, stop med and take benadryl. If dyspnea, call 911. If no adverse effect, then continue at 3x per day. Appointment made for 1/30/23 . Patient understands and agrees with plan. All questions were answered.

## 2023-01-26 NOTE — TELEPHONE ENCOUNTER
Pt called the office and states that she fell on Sunday and broke her nose in 2 different places. When she went to the hospital, they gave her Cephalexin. She states that she took the first dose on Monday and felt fine. She went to take t he medication on Tuesday and after taking it, she started to get flushed and really hot. She states that she took it again yesterday and she got very hot and flushed again as well as her abdomen turning red. She wants to know if she should continue taking this medication or if it should be changed. Routing to Thea who is in the office today.

## 2023-01-30 ENCOUNTER — OFFICE VISIT (OUTPATIENT)
Dept: FAMILY MEDICINE CLINIC | Age: 73
End: 2023-01-30
Payer: MEDICARE

## 2023-01-30 VITALS
DIASTOLIC BLOOD PRESSURE: 78 MMHG | SYSTOLIC BLOOD PRESSURE: 130 MMHG | HEART RATE: 83 BPM | HEIGHT: 65 IN | BODY MASS INDEX: 17.03 KG/M2 | WEIGHT: 102.2 LBS | RESPIRATION RATE: 16 BRPM | TEMPERATURE: 97.3 F | OXYGEN SATURATION: 97 %

## 2023-01-30 DIAGNOSIS — R55 SYNCOPE, UNSPECIFIED SYNCOPE TYPE: ICD-10-CM

## 2023-01-30 DIAGNOSIS — R30.0 DYSURIA: ICD-10-CM

## 2023-01-30 DIAGNOSIS — N32.81 OAB (OVERACTIVE BLADDER): ICD-10-CM

## 2023-01-30 DIAGNOSIS — R39.9 URINARY SYMPTOM OR SIGN: ICD-10-CM

## 2023-01-30 DIAGNOSIS — S02.2XXS CLOSED FRACTURE OF NASAL BONE, SEQUELA: Primary | ICD-10-CM

## 2023-01-30 DIAGNOSIS — G20 PARKINSON'S DISEASE (HCC): ICD-10-CM

## 2023-01-30 LAB
BILIRUBIN, POC: NEGATIVE
BLOOD URINE, POC: NORMAL
CLARITY, POC: NORMAL
COLOR, POC: NORMAL
GLUCOSE URINE, POC: NEGATIVE
KETONES, POC: 15
LEUKOCYTE EST, POC: NEGATIVE
NITRITE, POC: NEGATIVE
PH, POC: 5
PROTEIN, POC: NORMAL
SPECIFIC GRAVITY, POC: >=1.03
UROBILINOGEN, POC: 0.2

## 2023-01-30 PROCEDURE — G8399 PT W/DXA RESULTS DOCUMENT: HCPCS | Performed by: PHYSICIAN ASSISTANT

## 2023-01-30 PROCEDURE — G8484 FLU IMMUNIZE NO ADMIN: HCPCS | Performed by: PHYSICIAN ASSISTANT

## 2023-01-30 PROCEDURE — 1123F ACP DISCUSS/DSCN MKR DOCD: CPT | Performed by: PHYSICIAN ASSISTANT

## 2023-01-30 PROCEDURE — 81002 URINALYSIS NONAUTO W/O SCOPE: CPT | Performed by: PHYSICIAN ASSISTANT

## 2023-01-30 PROCEDURE — G8427 DOCREV CUR MEDS BY ELIG CLIN: HCPCS | Performed by: PHYSICIAN ASSISTANT

## 2023-01-30 PROCEDURE — 3017F COLORECTAL CA SCREEN DOC REV: CPT | Performed by: PHYSICIAN ASSISTANT

## 2023-01-30 PROCEDURE — G8419 CALC BMI OUT NRM PARAM NOF/U: HCPCS | Performed by: PHYSICIAN ASSISTANT

## 2023-01-30 PROCEDURE — 1036F TOBACCO NON-USER: CPT | Performed by: PHYSICIAN ASSISTANT

## 2023-01-30 PROCEDURE — 1090F PRES/ABSN URINE INCON ASSESS: CPT | Performed by: PHYSICIAN ASSISTANT

## 2023-01-30 PROCEDURE — 99214 OFFICE O/P EST MOD 30 MIN: CPT | Performed by: PHYSICIAN ASSISTANT

## 2023-01-30 RX ORDER — MIRABEGRON 50 MG/1
50 TABLET, FILM COATED, EXTENDED RELEASE ORAL DAILY
COMMUNITY
Start: 2023-01-24

## 2023-01-30 ASSESSMENT — PATIENT HEALTH QUESTIONNAIRE - PHQ9
SUM OF ALL RESPONSES TO PHQ QUESTIONS 1-9: 11
8. MOVING OR SPEAKING SO SLOWLY THAT OTHER PEOPLE COULD HAVE NOTICED. OR THE OPPOSITE, BEING SO FIGETY OR RESTLESS THAT YOU HAVE BEEN MOVING AROUND A LOT MORE THAN USUAL: 1
1. LITTLE INTEREST OR PLEASURE IN DOING THINGS: 2
SUM OF ALL RESPONSES TO PHQ QUESTIONS 1-9: 11
SUM OF ALL RESPONSES TO PHQ QUESTIONS 1-9: 11
SUM OF ALL RESPONSES TO PHQ9 QUESTIONS 1 & 2: 4
7. TROUBLE CONCENTRATING ON THINGS, SUCH AS READING THE NEWSPAPER OR WATCHING TELEVISION: 1
10. IF YOU CHECKED OFF ANY PROBLEMS, HOW DIFFICULT HAVE THESE PROBLEMS MADE IT FOR YOU TO DO YOUR WORK, TAKE CARE OF THINGS AT HOME, OR GET ALONG WITH OTHER PEOPLE: 0
5. POOR APPETITE OR OVEREATING: 1
3. TROUBLE FALLING OR STAYING ASLEEP: 1
SUM OF ALL RESPONSES TO PHQ QUESTIONS 1-9: 11
2. FEELING DOWN, DEPRESSED OR HOPELESS: 2
4. FEELING TIRED OR HAVING LITTLE ENERGY: 3

## 2023-01-30 NOTE — PROGRESS NOTES
[address] MEDICINE  01/30/23       IMPRESSION/ PLAN   Closed fracture of nasal bone, sequela  Syncope, unspecified syncope type  -appears improving. Monitor. Dysuria  Urinary symptom or sign  OAB (overactive bladder)  -     POCT Urinalysis no Micro  -     Culture, Urine  Results to  Urology. Parkinson's disease ([facility] [address]    Follow Up as per routine        CHIEF COMPLAINT  Chief Complaint   Patient presents with    Follow-up     Follow up on fall x9 days ago which broke her nose. She wanted to get another urine culture because Nurse from [address] [place] [address] told her. HISTORY OF PRESENT  ILLNESS  Natacha Zapata is a 67 y.o.  female   pt of Dr Krystyna Michelle is her for follow up of a syncopal episode on 1/21/2023 . She felt coming on but could not find a place to sit fast enough.  ER workup found   an acute comminuted and displaced fracture of bilateral nasal bones with extensive overlying soft tissue swelling. She was given Keflex on discharge. Then 3 days into antibiotic she reported a red rash about the body. She stopped the med then restarted and completed it as feeling hot was tolerable. Here today stating she is urinating more often and urology at [address] [place] [address] wants a urine culture. She denies hematuria, incontinence. No new backache. No suprapubic pain. NO fever. Feels weak today and asked to lay on exam bed but was later able to sit then stand. She is eating and drinking as usual.     ROS:  Remaining 14 ROS were reviewed and are unremarkable for other constitutional, EENT, cardiac, pulmonary, GI, , neurologic, musculoskeletal, or integumentary complaints. PAST MEDICAL/SURGICAL, SOCIAL, &  FAMILY HISTORY:  Reviewed and updated accordingly. ALLERGIES : Lidocaine; Procaine; Anesthetics, suzanne; Celecoxib;  Lidocaine hcl; and Methylprednisolone    MEDICATIONS:  Current Outpatient Medications   Medication Sig Dispense Refill    MYRBETRIQ 50 MG TB24 50 mg daily      famotidine (PEPCID) 40 MG tablet TAKE ONE TABLET BY MOUTH EVERY EVENING 30 tablet 5    DULoxetine (CYMBALTA) 60 MG extended release capsule TAKE ONE CAPSULE BY MOUTH ONCE DAILY 90 capsule 1    pilocarpine (SALAGEN) 5 MG tablet Take 1 tablet by mouth 3 times daily      DULoxetine (CYMBALTA) 30 MG extended release capsule Take 1 capsule by mouth daily      ibuprofen (ADVIL;MOTRIN) 200 MG CAPS Take 2 capsules by mouth 3 times daily as needed for Fever      MYRBETRIQ 25 MG TB24 Take 25 mg by mouth daily       vitamin D3 (CHOLECALCIFEROL) 10 MCG (400 UNIT) TABS tablet Take 2,000 Units by mouth daily       carbidopa-levodopa (RYTARY) 61. MG CPCR per extended release capsule Take 1 capsule by mouth 5 times daily        No current facility-administered medications for this visit. PHYSICAL EXAM     Vitals:    01/30/23 1046 01/30/23 1314   BP: 130/78    Pulse: 72 83   Resp: 16    Temp: 97.3 °F (36.3 °C)    TempSrc: Temporal    SpO2: (!) 87% 97%   Weight: 102 lb 3.2 oz (46.4 kg)    Height: 5' 5\" (1.651 m)    Body mass index is 17.01 kg/m². APPEARANCE: Well nourished. No distress. Using Rolator. HEENT: Head: Normocephalic, atraumatic. EOMI,PERRLA. Conjunctiva pink and moist. Sclera white. Fundoscopic without hemorrhages or edema. Ear canals clear, TMs intact with clear fluid. Hearing intact. Nares patent. Oral mucosa moist. Throat clear. NECK: No lymphadenopathy or masses. Thyroid is smooth. Moves neck fully. HEART: Reg rate and rhythm. No murmurs, rubs, or gallops. LUNGS: Clear to auscultation. No wheezes, rales, or rhonchi. ABDOMEN:  Soft, bowel sounds present, non-tender, no masses or organomegaly. MUSCULOSKELETAL: Zygomatic tenderness without step offs or deformities. No clubbing, cyanosis or edema. Moves all joints without eliciting pain. Hand ulnar deviations. NEUROLOGIC: Grossly non focal.   SKIN: Warm, dry, normal turgor. Cap refill <3secs. Ecchymosis around orbits that is yellowing. Wound on nose has eschar.    PSYCHIATRIC: Mood, behavior, and judgement normal. Thought content normal.      ADDITIONAL STUDIES     Pertinent prior laboratory results and/or imaging reviewed.    Orders Placed This Encounter   Procedures    Culture, Urine    POCT Urinalysis no Micro       Electronically signed by VICKI Patel on 1/30/2023 at 1:14 PM

## 2023-02-01 ENCOUNTER — HOSPITAL ENCOUNTER (OUTPATIENT)
Dept: PHYSICAL THERAPY | Age: 73
Setting detail: THERAPIES SERIES
Discharge: HOME | End: 2023-02-01
Payer: MEDICARE

## 2023-02-01 LAB — URINE CULTURE, ROUTINE: NORMAL

## 2023-02-01 PROCEDURE — 97110 THERAPEUTIC EXERCISES: CPT

## 2023-02-01 NOTE — FLOWSHEET NOTE
Angela  [facility] Therapy, [facility], [address]  Phone: [phone]  Fax [phone]    Physical Therapy Daily Treatment Note    Date:  2023    Patient Name:  Huma Noble    :  1950  MRN: [id number]  Restrictions/Precautions:    Medical/Treatment Diagnosis Information:   Parkinson's Disease  Insurance/Certification information:   [employer] Medicare - $95 copay and precert through [facility]  Physician Information:   Sherlyn Louis MD  Plan of care signed (Y/N):  M  Visit# / total visits:  2/10     G-Code (if applicable):          LEFS  23    Medicare Cap (if applicable):  226 = total amount used, updated 2023    Time in:   1:45      Timed Treatment: 45  Total Treatment Time:  45  Time out: 2:30  ________________________________________________________________________________________    Pain Level:    /10  SUBJECTIVE:  Pt reports that she passed out at her grandson's basketball game, breaking her nose in two places. Having some back pain today, but nothing out of her normal.     OBJECTIVE: [id number]    Exercise/Equipment Resistance/Repetitions Other comments          Sagittal plane movement in sitting position   5x HEP   Frontal plane movement in sitting position 5x B HEP   Step forward with hand assist 10x B HEP   Step side-ways with hand assist 10x B HEP   Step backwards with hand assist 10x B HEP          Open book   5x B HEP   Bridge  10x HEP   Hand to knee isometric 5x10\" B HEP   Table top holds  5x10\"  HEP                                                             Other Therapeutic Activities:      Manual Treatments:         Modalities:      Test/Measurements:  See initial eval       ASSESSMENT:    Pt tolerated new exercise well, requiring moderate verbal and demonstrative cueing to perform exercises correctly. No limitation from back pain this date. Progress as pt tolerates.      Treatment/Activity Tolerance: [x]Patient tolerated treatment well [] Patient limited by fatique  []Patient limited by pain [] Patient limited by other medical complications  [] Other:     GOALS:  Patient stated goal: learn techniques to improve pain and function  [] Progressing: [] Met: [] Not Met: [] Adjusted    Therapist goals for Patient:   Short Term Goals: To be achieved in: 2 weeks  1. Independent with HEP and progression per patient tolerance, in order to prevent re-injury. [] Progressing: [] Met: [] Not Met: [] Adjusted  2. Patient will have a 25% decrease in pain to facilitate improvement in movement, function, and ADLs as indicated by Functional Deficits. [] Progressing: [] Met: [] Not Met: [] Adjusted    Long Term Goals: To be achieved in: 5 weeks  1. Patient to demonstrate TUG cognitive score <3 seconds difference from standard to demonstrate improved fall risk to assist with reaching prior level of function. [] Progressing: [] Met: [] Not Met: [] Adjusted  2. Patient will demonstrate increased AROM to [facility], good scapular mobility and good hip ROM to allow for proper joint functioning as indicated by patients Functional Deficits. [] Progressing: [] Met: [] Not Met: [] Adjusted  3. Patient will demonstrate an increase in strength to good shoulder & hip complex, and core activation to allow for proper functional mobility as indicated by patients Functional Deficits. [] Progressing: [] Met: [] Not Met: [] Adjusted  4. Patient will return to functional activities including light housework and walking short distances without increased symptoms or restriction. [] Progressing: [] Met: [] Not Met: [] Adjusted  5.  Patient will report 50% improvement in ability to get up in the morning and make it to the restroom with less stiffness (patient specific functional goal)    [] Progressing: [] Met: [] Not Met: [] Adjusted     Plan: [x] Continue per plan of care [] Alter current plan (see comments)   [] Plan of care initiated [] Hold pending MD visit [] Discharge      Plan for Next Session:  exercises as stated above, balance training, functional movement training, dual task training    Re-Certification Due Date:         Signature:  Jay Chatman PT

## 2023-02-03 ENCOUNTER — HOSPITAL ENCOUNTER (OUTPATIENT)
Dept: PHYSICAL THERAPY | Age: 73
Setting detail: THERAPIES SERIES
Discharge: HOME | End: 2023-02-03
Payer: MEDICARE

## 2023-02-03 PROCEDURE — 97112 NEUROMUSCULAR REEDUCATION: CPT

## 2023-02-03 PROCEDURE — 97110 THERAPEUTIC EXERCISES: CPT

## 2023-02-03 NOTE — FLOWSHEET NOTE
Angela  [facility]. and Therapy, [facility], [address] [facility], [address] [place] [address] Dr  Phone: [phone]  Fax [phone]    Physical Therapy Daily Treatment Note    Date:  2/3/2023    Patient Name:  Gerald Acuña    :  1950  MRN: [id number]  Restrictions/Precautions:    Medical/Treatment Diagnosis Information:   Parkinson's Disease  Insurance/Certification information:   [employer] Medicare - $60 copay and precert through [facility]  Physician Information:   Luis Felipe Mistry MD  Plan of care signed (Y/N):  M  Visit# / total visits: 4/10     G-Code (if applicable):          LEFS  23    Medicare Cap (if applicable):  876 = total amount used, updated 2/3/2023    Time in:   1:45      Timed Treatment: 45  Total Treatment Time:  45  Time out: 2:30  ________________________________________________________________________________________    Pain Level:    5-6/10 butt and back and legs  SUBJECTIVE:  Pt reports that she was pretty sore in her back after last session and had trouble sleeping due to the pain. OBJECTIVE: [id number]    Exercise/Equipment Resistance/Repetitions Other comments          Sagittal plane movement in sitting position   5x HEP   Frontal plane movement in sitting position 5x B HEP; difficulty with R side rotation   Step forward with hand assist 10x B HEP   Step side-ways with hand assist 10x B HEP   Step backwards with hand assist 10x B HEP          Open book   5x B HEP   Bridge  10x HEP   Hand to knee isometric HEP   Table top holds  HEP   DKTC with ball   15x    LTR with ball 15x    3-way ball compression 5x10\" each direction                                          Other Therapeutic Activities:      Manual Treatments:         Modalities:      Test/Measurements:  See initial eval       ASSESSMENT:    Pt tolerated new exercise well, requiring moderate verbal and demonstrative cueing to perform exercises correctly. Mild limitation from back pain this date. Progress as pt tolerates. Treatment/Activity Tolerance:   [x]Patient tolerated treatment well [] Patient limited by fatique  []Patient limited by pain [] Patient limited by other medical complications  [] Other:     GOALS:  Patient stated goal: learn techniques to improve pain and function  [] Progressing: [] Met: [] Not Met: [] Adjusted    Therapist goals for Patient:   Short Term Goals: To be achieved in: 2 weeks  1. Independent with HEP and progression per patient tolerance, in order to prevent re-injury. [] Progressing: [] Met: [] Not Met: [] Adjusted  2. Patient will have a 25% decrease in pain to facilitate improvement in movement, function, and ADLs as indicated by Functional Deficits. [] Progressing: [] Met: [] Not Met: [] Adjusted    Long Term Goals: To be achieved in: 5 weeks  1. Patient to demonstrate TUG cognitive score <3 seconds difference from standard to demonstrate improved fall risk to assist with reaching prior level of function. [] Progressing: [] Met: [] Not Met: [] Adjusted  2. Patient will demonstrate increased AROM to [facility], good scapular mobility and good hip ROM to allow for proper joint functioning as indicated by patients Functional Deficits. [] Progressing: [] Met: [] Not Met: [] Adjusted  3. Patient will demonstrate an increase in strength to good shoulder & hip complex, and core activation to allow for proper functional mobility as indicated by patients Functional Deficits. [] Progressing: [] Met: [] Not Met: [] Adjusted  4. Patient will return to functional activities including light housework and walking short distances without increased symptoms or restriction. [] Progressing: [] Met: [] Not Met: [] Adjusted  5.  Patient will report 50% improvement in ability to get up in the morning and make it to the restroom with less stiffness (patient specific functional goal)    [] Progressing: [] Met: [] Not Met: [] Adjusted     Plan: [x] Continue per plan of care [] Alter current plan (see comments)   [] Plan of care initiated [] Hold pending MD visit [] Discharge      Plan for Next Session:  exercises as stated above, balance training, functional movement training, dual task training    Re-Certification Due Date:         Signature:  Sebastian Rodriguez PT

## 2023-02-07 ENCOUNTER — HOSPITAL ENCOUNTER (OUTPATIENT)
Dept: PHYSICAL THERAPY | Age: 73
Setting detail: THERAPIES SERIES
Discharge: HOME | End: 2023-02-07
Payer: MEDICARE

## 2023-02-07 NOTE — PROGRESS NOTES
Physical Therapy  Cancellation/No-show Note  Patient Name:  Tommie Rosas  :  1950   Date:  2023  Cancels to date: 0  No-shows to date: 2    For today's appointment patient:  [] Cancelled  [] Rescheduled appointment  [x] No-show     Reason given by patient:  [] Patient ill  [] Conflicting appointment  [] No transportation    [] Conflict with work  [] No reason given  [] Other:     Comments:  Pt showed up at the wrong time, after her appointment was past    Electronically signed by:  David Hernandez PT

## 2023-02-09 ENCOUNTER — HOSPITAL ENCOUNTER (OUTPATIENT)
Dept: PHYSICAL THERAPY | Age: 73
Setting detail: THERAPIES SERIES
Discharge: HOME | End: 2023-02-09
Payer: MEDICARE

## 2023-02-09 PROCEDURE — 97110 THERAPEUTIC EXERCISES: CPT

## 2023-02-09 PROCEDURE — 97112 NEUROMUSCULAR REEDUCATION: CPT

## 2023-02-09 NOTE — FLOWSHEET NOTE
Angela  [facility] and Therapy, [facility], [address] [place] [address], [address] [place] [address]  Phone: [phone]  Fax [phone]    Physical Therapy Daily Treatment Note    Date:  2023    Patient Name:  Radha Epperson    :  1950  MRN: [id number]  Restrictions/Precautions:    Medical/Treatment Diagnosis Information:   Parkinson's Disease  Insurance/Certification information:   [employer] Medicare - $57 copay and precert through [employer]  Physician Information:   Carlotta Floyd MD  Plan of care signed (Y/N):  M  Visit# / total visits: 5/10     G-Code (if applicable):          LEFS  23    Medicare Cap (if applicable):  567 = total amount used, updated 2023    Time in:   2:00      Timed Treatment: 45  Total Treatment Time:  45  Time out: 2:45  ________________________________________________________________________________________    Pain Level:    610 R hip   SUBJECTIVE:  Pt reports that her R hip is the thing that is the most sore, unless she lies on her L hip, it will also start to hurt. Back pain is on the R side, but R hip is more intense than her back pain. Pt took her Parkinson's med at 1 PM today.      OBJECTIVE: [id number]    Exercise/Equipment Resistance/Repetitions Other comments          Sagittal plane movement in sitting position   5x HEP   Frontal plane movement in sitting position 5x B HEP; difficulty with R side rotation   Step forward with hand assist    held today, pt dizzy in standing today HEP    Step side-ways with hand assist HEP   Step backwards with hand assist HEP          Open book   5x B HEP   Bridge with ball 15x HEP   Hand to knee isometric HEP   Table top holds  HEP   DKTC with ball   15x    LTR with ball 15x    3-way ball compression 5x10\" each direction            rockerboard   2 mins rocking side to side                           Other Therapeutic Activities:      Manual Treatments:         Modalities:      Test/Measurements:  See initial eval    BP: 108/76, 50 bpm       ASSESSMENT:    Pt fatigued this date and limited in standing exercise by dizziness and feeling faint. Vitals stable. Progress as pt tolerates. Treatment/Activity Tolerance:   [x]Patient tolerated treatment well [] Patient limited by fatique  []Patient limited by pain [] Patient limited by other medical complications  [] Other:     GOALS:  Patient stated goal: learn techniques to improve pain and function  [] Progressing: [] Met: [] Not Met: [] Adjusted    Therapist goals for Patient:   Short Term Goals: To be achieved in: 2 weeks  1. Independent with HEP and progression per patient tolerance, in order to prevent re-injury. [] Progressing: [] Met: [] Not Met: [] Adjusted  2. Patient will have a 25% decrease in pain to facilitate improvement in movement, function, and ADLs as indicated by Functional Deficits. [] Progressing: [] Met: [] Not Met: [] Adjusted    Long Term Goals: To be achieved in: 5 weeks  1. Patient to demonstrate TUG cognitive score <3 seconds difference from standard to demonstrate improved fall risk to assist with reaching prior level of function. [] Progressing: [] Met: [] Not Met: [] Adjusted  2. Patient will demonstrate increased AROM to [facility], good scapular mobility and good hip ROM to allow for proper joint functioning as indicated by patients Functional Deficits. [] Progressing: [] Met: [] Not Met: [] Adjusted  3. Patient will demonstrate an increase in strength to good shoulder & hip complex, and core activation to allow for proper functional mobility as indicated by patients Functional Deficits. [] Progressing: [] Met: [] Not Met: [] Adjusted  4. Patient will return to functional activities including light housework and walking short distances without increased symptoms or restriction. [] Progressing: [] Met: [] Not Met: [] Adjusted  5.  Patient will report 50% improvement in ability to get up in the morning and make it to the restroom with less stiffness (patient specific functional goal)    [] Progressing: [] Met: [] Not Met: [] Adjusted     Plan: [x] Continue per plan of care [] Alter current plan (see comments)   [] Plan of care initiated [] Hold pending MD visit [] Discharge      Plan for Next Session:  exercises as stated above, balance training, functional movement training, dual task training    Re-Certification Due Date:         Signature:  Aristeo Phillips PT

## 2023-02-14 ENCOUNTER — HOSPITAL ENCOUNTER (OUTPATIENT)
Dept: PHYSICAL THERAPY | Age: 73
Setting detail: THERAPIES SERIES
Discharge: HOME | End: 2023-02-14
Payer: MEDICARE

## 2023-02-14 ENCOUNTER — OFFICE VISIT (OUTPATIENT)
Dept: ENT CLINIC | Age: 73
End: 2023-02-14
Payer: MEDICARE

## 2023-02-14 VITALS
WEIGHT: 102 LBS | TEMPERATURE: 97.1 F | SYSTOLIC BLOOD PRESSURE: 101 MMHG | HEART RATE: 112 BPM | BODY MASS INDEX: 17 KG/M2 | DIASTOLIC BLOOD PRESSURE: 64 MMHG | RESPIRATION RATE: 16 BRPM | HEIGHT: 65 IN

## 2023-02-14 DIAGNOSIS — J34.2 DEVIATED NASAL SEPTUM: ICD-10-CM

## 2023-02-14 DIAGNOSIS — S02.2XXD CLOSED FRACTURE OF NASAL BONE WITH ROUTINE HEALING, SUBSEQUENT ENCOUNTER: Primary | ICD-10-CM

## 2023-02-14 PROCEDURE — G8419 CALC BMI OUT NRM PARAM NOF/U: HCPCS | Performed by: OTOLARYNGOLOGY

## 2023-02-14 PROCEDURE — G8399 PT W/DXA RESULTS DOCUMENT: HCPCS | Performed by: OTOLARYNGOLOGY

## 2023-02-14 PROCEDURE — G8484 FLU IMMUNIZE NO ADMIN: HCPCS | Performed by: OTOLARYNGOLOGY

## 2023-02-14 PROCEDURE — 99203 OFFICE O/P NEW LOW 30 MIN: CPT | Performed by: OTOLARYNGOLOGY

## 2023-02-14 PROCEDURE — 1123F ACP DISCUSS/DSCN MKR DOCD: CPT | Performed by: OTOLARYNGOLOGY

## 2023-02-14 PROCEDURE — G8427 DOCREV CUR MEDS BY ELIG CLIN: HCPCS | Performed by: OTOLARYNGOLOGY

## 2023-02-14 PROCEDURE — 97110 THERAPEUTIC EXERCISES: CPT

## 2023-02-14 PROCEDURE — 97112 NEUROMUSCULAR REEDUCATION: CPT

## 2023-02-14 PROCEDURE — 1090F PRES/ABSN URINE INCON ASSESS: CPT | Performed by: OTOLARYNGOLOGY

## 2023-02-14 PROCEDURE — 1036F TOBACCO NON-USER: CPT | Performed by: OTOLARYNGOLOGY

## 2023-02-14 PROCEDURE — 3017F COLORECTAL CA SCREEN DOC REV: CPT | Performed by: OTOLARYNGOLOGY

## 2023-02-14 NOTE — FLOWSHEET NOTE
Angela  [facility]. and Therapy, [facility], [facility]  Phone: [phone]  Fax [phone]    Physical Therapy Daily Treatment Note    Date:  2023    Patient Name:  Erin Ambrosio    :  1950  MRN: [id number]  Restrictions/Precautions:    Medical/Treatment Diagnosis Information:   Parkinson's Disease  Insurance/Certification information:   [employer] Medicare - $65 copay and precert through [facility]  Physician Information:   Dirk John MD  Plan of care signed (Y/N):  M  Visit# / total visits: 6/10     G-Code (if applicable):          LEFS  23    Medicare Cap (if applicable):  562 = total amount used, updated 2023    Time in:   1:15      Timed Treatment: 45  Total Treatment Time:  45  Time out: 2:00  ________________________________________________________________________________________    Pain Level:    6/10 R hip   SUBJECTIVE:  Pt reports that she is still in a decent amount of pain in her back and R hip. Also feeling a little lightheaded today.      OBJECTIVE: [id number]    Exercise/Equipment Resistance/Repetitions Other comments          Sagittal plane movement in sitting position   5x HEP   Frontal plane movement in sitting position 5x B HEP; difficulty with R side rotation   Step forward with hand assist    held today, pt dizzy in standing today HEP    Step side-ways with hand assist HEP   Step backwards with hand assist HEP          Open book   5x B HEP   Bridge with ball 15x HEP   Hand to knee isometric HEP   Table top holds  HEP   DKTC with ball   15x    LTR with ball 15x    3-way ball compression 5x10\" each direction            rockerboard   2 mins rocking side to side    Cone taps to 3 cones   X2 mins Max difficulty with R LE stance   FSU 10x B 6\" step    SLS on med ball  2x30\" B with shld ext Max difficulty with R LE stance     Other Therapeutic Activities:      Manual Treatments:         Modalities: Test/Measurements:  See initial eval    BP: 108/76, 50 bpm       ASSESSMENT:    Pt fatigued this date and limited in standing exercise by dizziness and feeling faint. Vitals stable. Progress as pt tolerates. Treatment/Activity Tolerance:   [x]Patient tolerated treatment well [] Patient limited by fatique  []Patient limited by pain [] Patient limited by other medical complications  [] Other:     GOALS:  Patient stated goal: learn techniques to improve pain and function  [] Progressing: [] Met: [] Not Met: [] Adjusted    Therapist goals for Patient:   Short Term Goals: To be achieved in: 2 weeks  1. Independent with HEP and progression per patient tolerance, in order to prevent re-injury. [] Progressing: [] Met: [] Not Met: [] Adjusted  2. Patient will have a 25% decrease in pain to facilitate improvement in movement, function, and ADLs as indicated by Functional Deficits. [] Progressing: [] Met: [] Not Met: [] Adjusted    Long Term Goals: To be achieved in: 5 weeks  1. Patient to demonstrate TUG cognitive score <3 seconds difference from standard to demonstrate improved fall risk to assist with reaching prior level of function. [] Progressing: [] Met: [] Not Met: [] Adjusted  2. Patient will demonstrate increased AROM to [facility], good scapular mobility and good hip ROM to allow for proper joint functioning as indicated by patients Functional Deficits. [] Progressing: [] Met: [] Not Met: [] Adjusted  3. Patient will demonstrate an increase in strength to good shoulder & hip complex, and core activation to allow for proper functional mobility as indicated by patients Functional Deficits. [] Progressing: [] Met: [] Not Met: [] Adjusted  4. Patient will return to functional activities including light housework and walking short distances without increased symptoms or restriction. [] Progressing: [] Met: [] Not Met: [] Adjusted  5.  Patient will report 50% improvement in ability to get up in the morning and make it to the restroom with less stiffness (patient specific functional goal)    [] Progressing: [] Met: [] Not Met: [] Adjusted     Plan: [x] Continue per plan of care [] Alter current plan (see comments)   [] Plan of care initiated [] Hold pending MD visit [] Discharge      Plan for Next Session:  exercises as stated above, balance training, functional movement training, dual task training    Re-Certification Due Date:         Signature:  Jaylyn Ramirez PT

## 2023-02-15 ASSESSMENT — ENCOUNTER SYMPTOMS
EYE ITCHING: 0
TROUBLE SWALLOWING: 0
FACIAL SWELLING: 0
APNEA: 0
SINUS PRESSURE: 0
SHORTNESS OF BREATH: 0
VOICE CHANGE: 0
SORE THROAT: 0
COUGH: 0

## 2023-02-15 NOTE — PROGRESS NOTES
Racquel Cho [address], [address] [facility], [address] [place] [address]  P: [phone]       Patient     Rosa Morrison  1950    ChiefComplaint     Chief Complaint   Patient presents with    New Patient     Patient stats that she broke her nose a few weeks  ago and doesn't feel it is healing correctly. She states it feel like there is something hard are on the left side that doesn't feel right. History of Present Illness     Natacha is a 67year old female here today for evaluation following nasal bone fracture. 1/20/2023 fell resulting in nasal bone fracture. Depressed left nasal bone identified on CT. No intervention sought no overt deformity to the nose or breathing difficulty. In the last several weeks has noted firmness in the soft tissue overlying the left nasal dorsum.     Past Medical History     Past Medical History:   Diagnosis Date    Corticobasal degeneration     Dyskinesia     Hyperlipidemia     Hyperreflexia     Neuropathy     corticobasal degeneration    Parkinson's disease ([facility]           Pituitary adenoma ([facility] [facility]        Past Surgical History     Past Surgical History:   Procedure Laterality Date    BACK SURGERY      COLONOSCOPY  06/2002    due 6/12    COLONOSCOPY  08/2012    due 8/17    FINGER TRIGGER RELEASE      right hand    HAMMER TOE SURGERY  2005    LAPAROSCOPY      TONSILLECTOMY AND ADENOIDECTOMY  14 yo       Family History     Family History   Problem Relation Age of Onset    Heart Disease Mother     Stroke Father     Cancer Father         prostate    Diabetes Sister     Breast Cancer Sister 55    Heart Disease Sister         quadruple bypass    Breast Cancer Sister 37       Social History     Social History     Tobacco Use    Smoking status: Never    Smokeless tobacco: Never   Vaping Use    Vaping Use: Never used   Substance Use Topics    Alcohol use: No    Drug use: Not Currently        Allergies     Allergies   Allergen Reactions    Lidocaine     Procaine Anesthetics, Tracy Other (See Comments)     Hypotension episode with ER visit    Celecoxib Other (See Comments)     Abdominal discomfort    Lidocaine Hcl      hypotension    Methylprednisolone Other (See Comments)     Dose pack caused hallucinations       Medications     Current Outpatient Medications   Medication Sig Dispense Refill    MYRBETRIQ 50 MG TB24 50 mg daily      famotidine (PEPCID) 40 MG tablet TAKE ONE TABLET BY MOUTH EVERY EVENING 30 tablet 5    DULoxetine (CYMBALTA) 60 MG extended release capsule TAKE ONE CAPSULE BY MOUTH ONCE DAILY 90 capsule 1    pilocarpine (SALAGEN) 5 MG tablet Take 1 tablet by mouth 3 times daily      DULoxetine (CYMBALTA) 30 MG extended release capsule Take 1 capsule by mouth daily      ibuprofen (ADVIL;MOTRIN) 200 MG CAPS Take 2 capsules by mouth 3 times daily as needed for Fever      MYRBETRIQ 25 MG TB24 Take 25 mg by mouth daily       vitamin D3 (CHOLECALCIFEROL) 10 MCG (400 UNIT) TABS tablet Take 2,000 Units by mouth daily       carbidopa-levodopa (RYTARY) 61. MG CPCR per extended release capsule Take 1 capsule by mouth 5 times daily        No current facility-administered medications for this visit. Review of Systems     Review of Systems   Constitutional:  Negative for appetite change, chills, fatigue, fever and unexpected weight change. HENT:  Negative for congestion, ear discharge, ear pain, facial swelling, hearing loss, nosebleeds, postnasal drip, sinus pressure, sneezing, sore throat, tinnitus, trouble swallowing and voice change. Eyes:  Negative for itching. Respiratory:  Negative for apnea, cough and shortness of breath. Endocrine: Negative for cold intolerance and heat intolerance. Musculoskeletal:  Negative for myalgias and neck pain. Skin:  Negative for rash. Allergic/Immunologic: Negative for environmental allergies. Neurological:  Negative for dizziness and headaches.    Psychiatric/Behavioral:  Negative for confusion, decreased concentration and sleep disturbance. PhysicalExam     Vitals:    02/14/23 1438   BP: 101/64   Site: Right Upper Arm   Position: Sitting   Cuff Size: Medium Adult   Pulse: (!) 112   Resp: 16   Temp: 97.1 °F (36.2 °C)   TempSrc: Infrared   Weight: 102 lb (46.3 kg)   Height: 5' 5\" (1.651 m)       Physical Exam  Constitutional:       General: She is not in acute distress. Appearance: She is well-developed. HENT:      Head: Normocephalic and atraumatic. Right Ear: Tympanic membrane, ear canal and external ear normal. No drainage. No middle ear effusion. Tympanic membrane is not bulging. Tympanic membrane has normal mobility. Left Ear: Tympanic membrane, ear canal and external ear normal. No drainage. No middle ear effusion. Tympanic membrane is not bulging. Tympanic membrane has normal mobility. Nose: Nasal deformity and septal deviation (s shaped) present. No mucosal edema or rhinorrhea. Mouth/Throat:      Lips: Pink. Mouth: Mucous membranes are moist.      Tongue: No lesions. Palate: No mass. Pharynx: Uvula midline. Eyes:      Pupils: Pupils are equal, round, and reactive to light. Neck:      Thyroid: No thyroid mass or thyromegaly. Trachea: Trachea and phonation normal.   Cardiovascular:      Pulses: Normal pulses. Pulmonary:      Effort: Pulmonary effort is normal. No accessory muscle usage or respiratory distress. Breath sounds: No stridor. Musculoskeletal:      Cervical back: Full passive range of motion without pain. Lymphadenopathy:      Head:      Right side of head: No submental or submandibular adenopathy. Left side of head: No submental or submandibular adenopathy. Cervical: No cervical adenopathy. Right cervical: No superficial, deep or posterior cervical adenopathy. Left cervical: No superficial, deep or posterior cervical adenopathy. Skin:     General: Skin is warm and dry.    Neurological:      Mental Status: She is alert and oriented to person, place, and time. Cranial Nerves: No cranial nerve deficit. Coordination: Coordination normal.      Gait: Gait normal.   Psychiatric:         Thought Content: Thought content normal.         Assessment and Plan     1. Closed fracture of nasal bone with routine healing, subsequent encounter  -healing well  -soft tissue fullness overlying fracture site- discussed scar tissue  -CT images and report reviewed-left mildly depressed nasal bone  -no intervention required    2. Deviated nasal septum  -longstanding  -asymptomatic    Return as needed    Kate Caal DO  2/15/23      Portions of this note were dictated using Dragon.  There may be linguistic errors secondary to the use of this program.

## 2023-02-16 ENCOUNTER — HOSPITAL ENCOUNTER (OUTPATIENT)
Dept: PHYSICAL THERAPY | Age: 73
Setting detail: THERAPIES SERIES
Discharge: HOME | End: 2023-02-16
Payer: MEDICARE

## 2023-02-16 PROCEDURE — 97110 THERAPEUTIC EXERCISES: CPT

## 2023-02-16 PROCEDURE — 97112 NEUROMUSCULAR REEDUCATION: CPT

## 2023-02-16 NOTE — FLOWSHEET NOTE
[facility], [facility], [address]  Phone: [phone]  Fax [phone]    Physical Therapy Daily Treatment Note    Date:  2023    Patient Name:  Mann Mac    :  1950  MRN: [id number]  Restrictions/Precautions:    Medical/Treatment Diagnosis Information:   Parkinson's Disease  Insurance/Certification information:   [employer] Medicare - $60 copay and precert through [employer] - 10 visits through 2/10/23  Physician Information:   Kehinde Hammond MD  Plan of care signed (Y/N):  M  Visit# / total visits: 7/10     G-Code (if applicable):          LEFS  23    Medicare Cap (if applicable):  379 = total amount used, updated 2023    Time in:   1:15      Timed Treatment: 45  Total Treatment Time:  45  Time out: 2:00  ________________________________________________________________________________________    Pain Level:    6/10 R hip   SUBJECTIVE:  Pt reports that her R hip is really bothering her today.      OBJECTIVE: [id number]    Exercise/Equipment Resistance/Repetitions Other comments          Sagittal plane movement in sitting position   HEP   Frontal plane movement in sitting position HEP; difficulty with R side rotation   Step forward with hand assist    held today, pt dizzy in standing today HEP    Step side-ways with hand assist HEP   Step backwards with hand assist HEP          Open book   5x B HEP   Bridge with ball 15x HEP   Hand to knee isometric HEP   Table top holds  HEP   DKTC with ball   15x    LTR with ball 15x    3-way ball compression 5x10\" each direction     Supine clamshell   10x B  10x B unilateral    rockerboard   2 mins rocking side to side    Cone taps to 3 cones   X2 mins Max difficulty with R LE stance   FSU 10x B 6\" step    SLS on med ball  2x30\" B with shld ext Max difficulty with R LE stance   Standing multifidus 10x B                          Other Therapeutic Activities:      Manual Treatments:         Modalities:      Test/Measurements:  See initial eval    m       ASSESSMENT:    Pt was not limited by fatigue or faintness today, and reported improved hip pain post treatment. Progress as pt tolerates. Treatment/Activity Tolerance:   [x]Patient tolerated treatment well [] Patient limited by fatique  []Patient limited by pain [] Patient limited by other medical complications  [] Other:     GOALS:  Patient stated goal: learn techniques to improve pain and function  [] Progressing: [] Met: [] Not Met: [] Adjusted    Therapist goals for Patient:   Short Term Goals: To be achieved in: 2 weeks  1. Independent with HEP and progression per patient tolerance, in order to prevent re-injury. [] Progressing: [] Met: [] Not Met: [] Adjusted  2. Patient will have a 25% decrease in pain to facilitate improvement in movement, function, and ADLs as indicated by Functional Deficits. [] Progressing: [] Met: [] Not Met: [] Adjusted    Long Term Goals: To be achieved in: 5 weeks  1. Patient to demonstrate TUG cognitive score <3 seconds difference from standard to demonstrate improved fall risk to assist with reaching prior level of function. [] Progressing: [] Met: [] Not Met: [] Adjusted  2. Patient will demonstrate increased [facility] to [facility], good scapular mobility and good hip ROM to allow for proper joint functioning as indicated by patients Functional Deficits. [] Progressing: [] Met: [] Not Met: [] Adjusted  3. Patient will demonstrate an increase in strength to good shoulder & hip complex, and core activation to allow for proper functional mobility as indicated by patients Functional Deficits. [] Progressing: [] Met: [] Not Met: [] Adjusted  4. Patient will return to functional activities including light housework and walking short distances without increased symptoms or restriction. [] Progressing: [] Met: [] Not Met: [] Adjusted  5.  Patient will report 50% improvement in ability to get up in the morning and make it to the restroom with less stiffness (patient specific functional goal)    [] Progressing: [] Met: [] Not Met: [] Adjusted     Plan: [x] Continue per plan of care [] Alter current plan (see comments)   [] Plan of care initiated [] Hold pending MD visit [] Discharge      Plan for Next Session:  exercises as stated above, balance training, functional movement training, dual task training    Re-Certification Due Date:         Signature:  Ajay Cheng PT

## 2023-02-21 ENCOUNTER — HOSPITAL ENCOUNTER (OUTPATIENT)
Dept: PHYSICAL THERAPY | Age: 73
Setting detail: THERAPIES SERIES
Discharge: HOME | End: 2023-02-21
Payer: MEDICARE

## 2023-02-21 PROCEDURE — 97110 THERAPEUTIC EXERCISES: CPT

## 2023-02-21 PROCEDURE — 97112 NEUROMUSCULAR REEDUCATION: CPT

## 2023-02-21 NOTE — FLOWSHEET NOTE
[facility], [address], [address] [place]   Phone: [phone]  Fax [phone]    Physical Therapy Daily Treatment Note    Date:  2023    Patient Name:  Cristal Silva    :  1950  MRN: [id number]  Restrictions/Precautions:    Medical/Treatment Diagnosis Information:   Parkinson's Disease  Insurance/Certification information:   [employer] Medicare - $20 copay and precert through [employer] - 10 visits through 23  Physician Information:   Hortencia Márquez MD  Plan of care signed (Y/N):  M  Visit# / total visits: 8/10     G-Code (if applicable):          LEFS  23    Medicare Cap (if applicable):  303 = total amount used, updated 2023    Time in:   3:30     Timed Treatment: 45  Total Treatment Time:  45  Time out: 4:15  ________________________________________________________________________________________    Pain Level:    6/10 R hip   SUBJECTIVE:  Pt reports that she's feeling pretty good today. Hasn't had any more episodes of feeling faint.      OBJECTIVE: [id number]    Exercise/Equipment Resistance/Repetitions Other comments          Sagittal plane movement in sitting position   HEP   Frontal plane movement in sitting position HEP; difficulty with R side rotation   Step forward with hand assist    held today, pt dizzy in standing today HEP    Step side-ways with hand assist HEP   Step backwards with hand assist HEP          Open book   5x B HEP   Bridge with ball 15x HEP   Hand to knee isometric HEP   Table top holds  HEP   DKTC with ball   15x    LTR with ball 15x    3-way ball compression 5x10\" each direction     Supine clamshell   10x B  10x B unilateral    rockerboard   2 mins rocking side to side    Cone taps to 3 cones   X2 mins Max difficulty with R LE stance   FSU 10x B 6\" step    SLS on med ball  2x30\" B with shld ext Max difficulty with R LE stance   Standing multifidus 10x B                          Other Therapeutic Activities:      Manual Treatments:         Modalities:      Test/Measurements:  See initial eval    m       ASSESSMENT:    Pt was not limited by fatigue or faintness today, and reported improved hip pain post treatment. Re-assess NV. Treatment/Activity Tolerance:   [x]Patient tolerated treatment well [] Patient limited by fatique  []Patient limited by pain [] Patient limited by other medical complications  [] Other:     GOALS:  Patient stated goal: learn techniques to improve pain and function  [] Progressing: [] Met: [] Not Met: [] Adjusted    Therapist goals for Patient:   Short Term Goals: To be achieved in: 2 weeks  1. Independent with HEP and progression per patient tolerance, in order to prevent re-injury. [] Progressing: [] Met: [] Not Met: [] Adjusted  2. Patient will have a 25% decrease in pain to facilitate improvement in movement, function, and ADLs as indicated by Functional Deficits. [] Progressing: [] Met: [] Not Met: [] Adjusted    Long Term Goals: To be achieved in: 5 weeks  1. Patient to demonstrate TUG cognitive score <3 seconds difference from standard to demonstrate improved fall risk to assist with reaching prior level of function. [] Progressing: [] Met: [] Not Met: [] Adjusted  2. Patient will demonstrate increased AROM to [facility], good scapular mobility and good hip ROM to allow for proper joint functioning as indicated by patients Functional Deficits. [] Progressing: [] Met: [] Not Met: [] Adjusted  3. Patient will demonstrate an increase in strength to good shoulder & hip complex, and core activation to allow for proper functional mobility as indicated by patients Functional Deficits. [] Progressing: [] Met: [] Not Met: [] Adjusted  4. Patient will return to functional activities including light housework and walking short distances without increased symptoms or restriction. [] Progressing: [] Met: [] Not Met: [] Adjusted  5.  Patient will report 50% improvement in ability to get up in the morning and make it to the restroom with less stiffness (patient specific functional goal)    [] Progressing: [] Met: [] Not Met: [] Adjusted     Plan: [x] Continue per plan of care [] Alter current plan (see comments)   [] Plan of care initiated [] Hold pending MD visit [] Discharge      Plan for Next Session:  exercises as stated above, balance training, functional movement training, dual task training    Re-Certification Due Date:         Signature:  Yousif Arias, PT

## 2023-02-23 ENCOUNTER — HOSPITAL ENCOUNTER (OUTPATIENT)
Dept: PHYSICAL THERAPY | Age: 73
Setting detail: THERAPIES SERIES
Discharge: HOME | End: 2023-02-23
Payer: MEDICARE

## 2023-02-23 NOTE — PROGRESS NOTES
Physical Therapy  Cancellation/No-show Note  Patient Name:  Maxime Richey  :  1950   Date:  2023  Cancels to date: 1  No-shows to date: 2    For today's appointment patient:  [x] Cancelled  [] Rescheduled appointment  [] No-show     Reason given by patient:  [] Patient ill  [] Conflicting appointment  [] No transportation    [] Conflict with work  [] No reason given  [] Other:     Comments:  Hips are sore to move around today    Electronically signed by:  Drake Hanley, PT

## 2023-03-01 DIAGNOSIS — F32.A DEPRESSION, UNSPECIFIED DEPRESSION TYPE: ICD-10-CM

## 2023-03-01 RX ORDER — DULOXETIN HYDROCHLORIDE 60 MG/1
CAPSULE, DELAYED RELEASE ORAL
Qty: 90 CAPSULE | Refills: 1 | Status: SHIPPED | OUTPATIENT
Start: 2023-03-01

## 2023-03-07 ENCOUNTER — OFFICE VISIT (OUTPATIENT)
Dept: FAMILY MEDICINE CLINIC | Age: 73
End: 2023-03-07

## 2023-03-07 VITALS
HEART RATE: 111 BPM | BODY MASS INDEX: 17.33 KG/M2 | OXYGEN SATURATION: 98 % | HEIGHT: 65 IN | SYSTOLIC BLOOD PRESSURE: 100 MMHG | DIASTOLIC BLOOD PRESSURE: 58 MMHG | WEIGHT: 104 LBS

## 2023-03-07 DIAGNOSIS — G89.29 CHRONIC LOW BACK PAIN WITHOUT SCIATICA, UNSPECIFIED BACK PAIN LATERALITY: ICD-10-CM

## 2023-03-07 DIAGNOSIS — K21.9 GASTROESOPHAGEAL REFLUX DISEASE, UNSPECIFIED WHETHER ESOPHAGITIS PRESENT: ICD-10-CM

## 2023-03-07 DIAGNOSIS — Z91.81 AT HIGH RISK FOR FALLS: ICD-10-CM

## 2023-03-07 DIAGNOSIS — G20 PARKINSON'S DISEASE (HCC): Primary | ICD-10-CM

## 2023-03-07 DIAGNOSIS — F32.A DEPRESSION, UNSPECIFIED DEPRESSION TYPE: ICD-10-CM

## 2023-03-07 DIAGNOSIS — M54.50 CHRONIC LOW BACK PAIN WITHOUT SCIATICA, UNSPECIFIED BACK PAIN LATERALITY: ICD-10-CM

## 2023-03-07 DIAGNOSIS — N32.81 OAB (OVERACTIVE BLADDER): ICD-10-CM

## 2023-03-07 RX ORDER — PANTOPRAZOLE SODIUM 40 MG/1
40 TABLET, DELAYED RELEASE ORAL DAILY
COMMUNITY
End: 2023-03-07

## 2023-03-07 NOTE — PROGRESS NOTES
Renetta Beckett presents for   Chief Complaint   Patient presents with    Depression     Pt states that she is here for a 6 month f/u. Wants to know where the best place is to go for PT for her right hip. ASSESSMENT:   Diagnosis Orders   1. Parkinson's disease ([address] improved, dyskinesia improved, continue neurology follow-up       2. At high risk for falls, patient now on salt pills her orthostasis has improved we will continue to monitor       3. Depression, unspecified depression type, controlled on Cymbalta 90 mg daily       4. Gastroesophageal reflux disease, unspecified whether esophagitis present, controlled on Pepcid 40 mg a day       5. OAB (overactive bladder), controlled on Myrbetriq 50 mg a day continue urology follow-up    6. Chronic back pain without radiculopathy        Plan:  1)  Medication: continue current medication regimen unchanged, medications are working and tolerated, continue as listed  2)  Recheck in 6 months, sooner should new symptoms or problems arise. 3) LLR          SUBJECTIVE:  Renetta Beckett is a 67 y.o. female who presents for evaluation of chronic lumbar back pain, Parkinson's disease, overactive bladder, anxiety/depression, GERD. Kathleen Bloodgood Specifically denies chest pain, palpitations, dyspnea, orthopnea, PND or peripheral edema . No anorexia,  or leg cramps noted. Current medication regimen is as listed below. She denies any side effects of medication, and has been taking it regularly. Lab Results   Component Value Date    LDLCALC 124 (H) 02/10/2021       Patient is here with her . She is sitting in a wheelchair. She is ambulatory at home with a rollator. In January patient fell and fractured her nose. She has since seen neurology. That note was reviewed from February. She is now taking sodium chloride pills 1 twice a day. She says that has helped prevent the orthostasis and is help with fall prevention. Patient is on a regimen of Rytary 5 times a day.   Her dyskinesia has improved. He continues with chronic back pain. She has been seen by physical therapy and she says that her pain worsens dramatically after the therapy sessions. Patient does not want to take opioids. She is on Myrbetriq 50 mg a day for overactive bladder. It is helping sufficiently. She is taking Cymbalta 90 mg a day she says that is helped with her anxiety and depression. She takes Pepcid 40 mg a day for her GERD. Current Outpatient Medications   Medication Sig Dispense Refill    DULoxetine (CYMBALTA) 60 MG extended release capsule TAKE ONE CAPSULE BY MOUTH ONCE DAILY 90 capsule 1    MYRBETRIQ 50 MG TB24 50 mg daily      famotidine (PEPCID) 40 MG tablet TAKE ONE TABLET BY MOUTH EVERY EVENING 30 tablet 5    pilocarpine (SALAGEN) 5 MG tablet Take 1 tablet by mouth 3 times daily      DULoxetine (CYMBALTA) 30 MG extended release capsule Take 1 capsule by mouth daily      ibuprofen (ADVIL;MOTRIN) 200 MG CAPS Take 2 capsules by mouth 3 times daily as needed for Fever      vitamin D3 (CHOLECALCIFEROL) 10 MCG (400 UNIT) TABS tablet Take 2,000 Units by mouth daily       carbidopa-levodopa (RYTARY) 61. MG CPCR per extended release capsule Take 1 capsule by mouth 5 times daily        No current facility-administered medications for this visit.        Allergies   Allergen Reactions    Lidocaine     Procaine     Anesthetics, Tracy Other (See Comments)     Hypotension episode with ER visit    Celecoxib Other (See Comments)     Abdominal discomfort    Lidocaine Hcl      hypotension    Methylprednisolone Other (See Comments)     Dose pack caused hallucinations       Social History     Tobacco Use    Smoking status: Never    Smokeless tobacco: Never   Substance Use Topics    Alcohol use: No       OBJECTIVE:   BP (!) 100/58   Pulse (!) 111   Ht 5' 5\" (1.651 m)   Wt 104 lb (47.2 kg)   SpO2 98%   BMI 17.31 kg/m²   NAD, sitting comfortably in a wheelchair  Neck no bruit or JVD  S1 and S2 normal, no murmurs, clicks, gallops or rubs. Regular rate and rhythm. Chest is clear; no wheezes or rales. No edema or JVD. Neuro alert, no CN or motor deficits  Psych nl mood, thought and judgement                EMR Dragon/transcription disclaimer:  Much of this encounter note is electronic transcription/translation of spoken language to printed texts. The electronic translation of spoken language may be erroneous, or at times, nonsensical words or phrases may be inadvertently transcribed.   Although I have reviewed the note for such errors, some may still exist.

## 2023-03-13 ENCOUNTER — HOSPITAL ENCOUNTER (OUTPATIENT)
Dept: PHYSICAL THERAPY | Age: 73
Setting detail: THERAPIES SERIES
Discharge: HOME | End: 2023-03-13

## 2023-03-13 NOTE — PROGRESS NOTES
Physical Therapy  Cancellation/No-show Note  Patient Name:  Ryan Paulson  :  1950   Date:  3/13/2023  Cancels to date: 1  No-shows to date: 2    For today's appointment patient:  [x] Cancelled  [] Rescheduled appointment  [] No-show     Reason given by patient:  [] Patient ill  [] Conflicting appointment  [] No transportation    [] Conflict with work  [] No reason given  [] Other:     Comments:  Pt holding therapy for a few weeks/months to determine if she still needs it. Having more pain when she exercises.      Electronically signed by:  Emilie Rsoado PT

## 2023-04-18 ENCOUNTER — TELEMEDICINE (OUTPATIENT)
Dept: FAMILY MEDICINE CLINIC | Age: 73
End: 2023-04-18

## 2023-04-18 DIAGNOSIS — Z00.00 MEDICARE ANNUAL WELLNESS VISIT, SUBSEQUENT: Primary | ICD-10-CM

## 2023-04-18 ASSESSMENT — COLUMBIA-SUICIDE SEVERITY RATING SCALE - C-SSRS
2. HAVE YOU ACTUALLY HAD ANY THOUGHTS OF KILLING YOURSELF?: NO
1. WITHIN THE PAST MONTH, HAVE YOU WISHED YOU WERE DEAD OR WISHED YOU COULD GO TO SLEEP AND NOT WAKE UP?: NO
6. HAVE YOU EVER DONE ANYTHING, STARTED TO DO ANYTHING, OR PREPARED TO DO ANYTHING TO END YOUR LIFE?: NO

## 2023-04-18 ASSESSMENT — LIFESTYLE VARIABLES
HOW MANY STANDARD DRINKS CONTAINING ALCOHOL DO YOU HAVE ON A TYPICAL DAY: PATIENT DOES NOT DRINK
HOW OFTEN DO YOU HAVE A DRINK CONTAINING ALCOHOL: NEVER

## 2023-04-18 ASSESSMENT — PATIENT HEALTH QUESTIONNAIRE - PHQ9
2. FEELING DOWN, DEPRESSED OR HOPELESS: 1
SUM OF ALL RESPONSES TO PHQ QUESTIONS 1-9: 6
3. TROUBLE FALLING OR STAYING ASLEEP: 1
8. MOVING OR SPEAKING SO SLOWLY THAT OTHER PEOPLE COULD HAVE NOTICED. OR THE OPPOSITE, BEING SO FIGETY OR RESTLESS THAT YOU HAVE BEEN MOVING AROUND A LOT MORE THAN USUAL: 1
9. THOUGHTS THAT YOU WOULD BE BETTER OFF DEAD, OR OF HURTING YOURSELF: 0
1. LITTLE INTEREST OR PLEASURE IN DOING THINGS: 1
SUM OF ALL RESPONSES TO PHQ QUESTIONS 1-9: 6
SUM OF ALL RESPONSES TO PHQ QUESTIONS 1-9: 6
SUM OF ALL RESPONSES TO PHQ9 QUESTIONS 1 & 2: 2
10. IF YOU CHECKED OFF ANY PROBLEMS, HOW DIFFICULT HAVE THESE PROBLEMS MADE IT FOR YOU TO DO YOUR WORK, TAKE CARE OF THINGS AT HOME, OR GET ALONG WITH OTHER PEOPLE: 1
SUM OF ALL RESPONSES TO PHQ QUESTIONS 1-9: 6
5. POOR APPETITE OR OVEREATING: 0
7. TROUBLE CONCENTRATING ON THINGS, SUCH AS READING THE NEWSPAPER OR WATCHING TELEVISION: 1
6. FEELING BAD ABOUT YOURSELF - OR THAT YOU ARE A FAILURE OR HAVE LET YOURSELF OR YOUR FAMILY DOWN: 0
4. FEELING TIRED OR HAVING LITTLE ENERGY: 1

## 2023-04-18 NOTE — PROGRESS NOTES
brisk walk)?: 6 days  Have you lost any weight without trying in the past 3 months?: No       Underweight Interventions:  See AVS for additional education material          ADL's:   Patient reports needing help with:  Select all that apply: (!) Dressing, Bathing (spouse  and sister help)  Select all that apply: (!) Housekeeping, Banking/Finances, Shopping, Transportation (spouse and sister help)  Interventions:  Gets help from family- denies further need for assistance at this time- has contacted [place] on aging in the past                    Objective      Patient-Reported Vitals  Patient-Reported Systolic (Top): 83 mmHg  Patient-Reported Diastolic (Bottom): 57 mmHg  Patient-Reported Weight: 104lb  Patient-Reported Height: 5' 5\"              Allergies   Allergen Reactions    Lidocaine     Procaine     Anesthetics, Tracy Other (See Comments)     Hypotension episode with ER visit    Celecoxib Other (See Comments)     Abdominal discomfort    Lidocaine Hcl      hypotension    Methylprednisolone Other (See Comments)     Dose pack caused hallucinations     Prior to Visit Medications    Medication Sig Taking?  Authorizing Provider   DULoxetine (CYMBALTA) 60 MG extended release capsule TAKE ONE CAPSULE BY MOUTH ONCE DAILY  Aditi Nelson MD   MYRBETRIQ 50 MG TB24 50 mg daily  Historical Provider, MD   famotidine (PEPCID) 40 MG tablet TAKE ONE TABLET BY MOUTH EVERY Brock Castro MD   pilocarpine (SALAGEN) 5 MG tablet Take 1 tablet by mouth 3 times daily  Historical Provider, MD   DULoxetine (CYMBALTA) 30 MG extended release capsule Take 1 capsule by mouth daily  Historical Provider, MD   ibuprofen (ADVIL;MOTRIN) 200 MG CAPS Take 2 capsules by mouth 3 times daily as needed for Fever  Historical Provider, MD   vitamin D3 (CHOLECALCIFEROL) 10 MCG (400 UNIT) TABS tablet Take 2,000 Units by mouth daily   Historical Provider, MD   carbidopa-levodopa (RYTARY) 61. MG CPCR per extended release capsule Take 1 capsule

## 2023-04-18 NOTE — PATIENT INSTRUCTIONS
Personalized Preventive Plan for Sahara Uriarte - 4/18/2023  Medicare offers a range of preventive health benefits. Some of the tests and screenings are paid in full while other may be subject to a deductible, co-insurance, and/or copay. Some of these benefits include a comprehensive review of your medical history including lifestyle, illnesses that may run in your family, and various assessments and screenings as appropriate. After reviewing your medical record and screening and assessments performed today your provider may have ordered immunizations, labs, imaging, and/or referrals for you. A list of these orders (if applicable) as well as your Preventive Care list are included within your After Visit Summary for your review. Other Preventive Recommendations:    A preventive eye exam performed by an eye specialist is recommended every 1-2 years to screen for glaucoma; cataracts, macular degeneration, and other eye disorders. A preventive dental visit is recommended every 6 months. Try to get at least 150 minutes of exercise per week or 10,000 steps per day on a pedometer . Order or download the FREE \"Exercise & Physical Activity: Your Everyday Guide\" from [employer]. Call 5-284.600.3748 or search [employer] online. You need 6335-6944 mg of calcium and 4992-5340 IU of vitamin D per day. It is possible to meet your calcium requirement with diet alone, but a vitamin D supplement is usually necessary to meet this goal.  When exposed to the sun, use a sunscreen that protects against both UVA and UVB radiation with an SPF of 30 or greater. Reapply every 2 to 3 hours or after sweating, drying off with a towel, or swimming. Always wear a seat belt when traveling in a car. Always wear a helmet when riding a bicycle or motorcycle.

## 2023-04-21 ENCOUNTER — TELEPHONE (OUTPATIENT)
Dept: FAMILY MEDICINE CLINIC | Age: 73
End: 2023-04-21

## 2023-04-21 DIAGNOSIS — F41.9 ANXIETY: Primary | ICD-10-CM

## 2023-04-21 RX ORDER — LORAZEPAM 1 MG/1
TABLET ORAL
Qty: 3 TABLET | Refills: 0 | Status: SHIPPED | OUTPATIENT
Start: 2023-04-21 | End: 2023-05-22

## 2023-05-22 RX ORDER — FAMOTIDINE 40 MG/1
TABLET, FILM COATED ORAL
Qty: 30 TABLET | Refills: 5 | Status: SHIPPED | OUTPATIENT
Start: 2023-05-22

## 2023-05-22 NOTE — TELEPHONE ENCOUNTER
Leroy Martinez is requesting refill(s) famotidine  Last OV 3/7/23 (pertaining to medication)  LR 12/6/22 (per medication requested)  Next office visit scheduled or attempted No   If no, reason:  pt is due in September

## 2023-08-09 DIAGNOSIS — F32.A DEPRESSION, UNSPECIFIED DEPRESSION TYPE: ICD-10-CM

## 2023-08-09 NOTE — TELEPHONE ENCOUNTER
PT states that she has enough medication to last her until Dr. Fang Higgins is back in the office on 8/14/23

## 2023-08-14 RX ORDER — DULOXETIN HYDROCHLORIDE 60 MG/1
CAPSULE, DELAYED RELEASE ORAL
Qty: 90 CAPSULE | Refills: 1 | Status: SHIPPED | OUTPATIENT
Start: 2023-08-14

## 2023-08-14 NOTE — TELEPHONE ENCOUNTER
Milagros Lees is requesting refill(s) duloxetine  Last OV 3/7/23 (pertaining to medication)  LR 3/1/23 (per medication requested)  Next office visit scheduled or attempted Yes   If no, reason:  9/13/23

## 2023-08-23 ENCOUNTER — OFFICE VISIT (OUTPATIENT)
Dept: FAMILY MEDICINE CLINIC | Age: 73
End: 2023-08-23

## 2023-08-23 VITALS
DIASTOLIC BLOOD PRESSURE: 70 MMHG | OXYGEN SATURATION: 98 % | WEIGHT: 103.2 LBS | HEIGHT: 65 IN | SYSTOLIC BLOOD PRESSURE: 130 MMHG | HEART RATE: 92 BPM | BODY MASS INDEX: 17.19 KG/M2

## 2023-08-23 DIAGNOSIS — R10.31 RIGHT LOWER QUADRANT ABDOMINAL PAIN: Primary | ICD-10-CM

## 2023-08-23 RX ORDER — ALENDRONATE SODIUM 70 MG/1
1 TABLET ORAL
COMMUNITY
Start: 2023-08-09

## 2023-08-23 RX ORDER — VIBEGRON 75 MG/1
75 TABLET, FILM COATED ORAL DAILY
COMMUNITY
Start: 2023-08-11

## 2023-08-23 SDOH — ECONOMIC STABILITY: FOOD INSECURITY: WITHIN THE PAST 12 MONTHS, THE FOOD YOU BOUGHT JUST DIDN'T LAST AND YOU DIDN'T HAVE MONEY TO GET MORE.: NEVER TRUE

## 2023-08-23 SDOH — ECONOMIC STABILITY: INCOME INSECURITY: HOW HARD IS IT FOR YOU TO PAY FOR THE VERY BASICS LIKE FOOD, HOUSING, MEDICAL CARE, AND HEATING?: NOT HARD AT ALL

## 2023-08-23 SDOH — ECONOMIC STABILITY: HOUSING INSECURITY
IN THE LAST 12 MONTHS, WAS THERE A TIME WHEN YOU DID NOT HAVE A STEADY PLACE TO SLEEP OR SLEPT IN A SHELTER (INCLUDING NOW)?: NO

## 2023-08-23 SDOH — ECONOMIC STABILITY: FOOD INSECURITY: WITHIN THE PAST 12 MONTHS, YOU WORRIED THAT YOUR FOOD WOULD RUN OUT BEFORE YOU GOT MONEY TO BUY MORE.: NEVER TRUE

## 2023-08-23 ASSESSMENT — ENCOUNTER SYMPTOMS
ABDOMINAL PAIN: 1
CONSTIPATION: 1
ABDOMINAL DISTENTION: 0
DIARRHEA: 0
NAUSEA: 0
BLOOD IN STOOL: 0
VOMITING: 0

## 2023-08-23 NOTE — PROGRESS NOTES
Patient:  Lenin Canas a 75 y.o. [facility] presents today with the following Chief Complaint(s):  Chief Complaint   Patient presents with    Abdominal Pain     Pt states that she has been having RLQ pain for about 4 weeks. She states that it sometimes goes into her back. She states that it is worse at night. Patient is here with her  for evaluation of abdominal pain. Primarily her pain is in the right lower quadrant currently, however at times it does moved to the left lower quadrant. She has had this pain before off and on. She says for the last 4 weeks it has been more persistent, and in the last 3 to 4 days has become more intense. She says that her pain is about a 6 out of 10. She denies feeling ill. There is no associated fever, vomiting diarrhea. Her last bowel movement was yesterday. Her bowels generally do not move on a regular basis. She is on multiple Parkinson's medications with very strong anticholinergic effects. She is really not mobile any longer due to severe leg and back pain. She does drink plenty of water. She generally does take MiraLAX. However she waits until she has constipation and then she will take it until she has a bowel movement. She says she does not like to take it because it tends to upset her stomach. Patient did have a bowel movement yesterday and that did help her abdominal pain, but the pain did not resolve. Prior to yesterday her bowel movement was 3 days before that.         Current Outpatient Medications   Medication Sig Dispense Refill    alendronate (FOSAMAX) 70 MG tablet Take 1 tablet by mouth every 7 days      GEMTESA 75 MG TABS tablet Take 1 tablet by mouth daily      DULoxetine (CYMBALTA) 60 MG extended release capsule TAKE ONE CAPSULE BY MOUTH ONCE DAILY 90 capsule 1    famotidine (PEPCID) 40 MG tablet TAKE ONE TABLET BY MOUTH IN THE EVENING 30 tablet 5    pilocarpine (SALAGEN) 5 MG tablet Take 1 tablet by mouth 3 times daily

## 2023-08-25 ENCOUNTER — TELEPHONE (OUTPATIENT)
Dept: FAMILY MEDICINE CLINIC | Age: 73
End: 2023-08-25

## 2023-08-25 ENCOUNTER — HOSPITAL ENCOUNTER (OUTPATIENT)
Age: 73
Discharge: HOME | End: 2023-08-25
Payer: MEDICARE

## 2023-08-25 ENCOUNTER — HOSPITAL ENCOUNTER (OUTPATIENT)
Dept: GENERAL RADIOLOGY | Age: 73
Discharge: HOME | End: 2023-08-25
Payer: MEDICARE

## 2023-08-25 DIAGNOSIS — R10.31 RIGHT LOWER QUADRANT ABDOMINAL PAIN: ICD-10-CM

## 2023-08-25 PROCEDURE — 74019 RADEX ABDOMEN 2 VIEWS: CPT

## 2023-08-31 ENCOUNTER — HOSPITAL ENCOUNTER (INPATIENT)
Age: 73
LOS: 6 days | Discharge: NURSING, REHAB OR LONG-TERM CARE | DRG: 480 | End: 2023-09-06
Attending: EMERGENCY MEDICINE | Admitting: ANESTHESIOLOGY
Payer: MEDICARE

## 2023-08-31 ENCOUNTER — APPOINTMENT (OUTPATIENT)
Dept: GENERAL RADIOLOGY | Age: 73
DRG: 480 | End: 2023-08-31
Payer: MEDICARE
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
## 2023-08-31 DIAGNOSIS — S72.002A CLOSED FRACTURE OF NECK OF LEFT FEMUR, INITIAL ENCOUNTER (HCC): Primary | ICD-10-CM

## 2023-08-31 DIAGNOSIS — S52.502A CLOSED FRACTURE OF DISTAL END OF LEFT RADIUS, UNSPECIFIED FRACTURE MORPHOLOGY, INITIAL ENCOUNTER: ICD-10-CM

## 2023-08-31 PROBLEM — T14.8XXA BONE FRACTURE: Status: ACTIVE | Noted: 2023-08-31

## 2023-08-31 LAB
ALBUMIN SERPL-MCNC: 4.3 G/DL (ref 3.4–5)
ALBUMIN/GLOB SERPL: 1.9 {RATIO} (ref 1.1–2.2)
ALP SERPL-CCNC: 87 U/L (ref 40–129)
ALT SERPL-CCNC: 9 U/L (ref 10–40)
ANION GAP SERPL CALCULATED.3IONS-SCNC: 10 MMOL/L (ref 3–16)
AST SERPL-CCNC: 29 U/L (ref 15–37)
BASOPHILS # BLD: 0.1 K/UL (ref 0–0.2)
BASOPHILS NFR BLD: 0.5 %
BILIRUB SERPL-MCNC: 0.3 MG/DL (ref 0–1)
BUN SERPL-MCNC: 20 MG/DL (ref 7–20)
CALCIUM SERPL-MCNC: 9.2 MG/DL (ref 8.3–10.6)
CHLORIDE SERPL-SCNC: 106 MMOL/L (ref 99–110)
CO2 SERPL-SCNC: 27 MMOL/L (ref 21–32)
CREAT SERPL-MCNC: 0.7 MG/DL (ref 0.6–1.2)
DEPRECATED RDW RBC AUTO: 13.1 % (ref 12.4–15.4)
EOSINOPHIL # BLD: 0 K/UL (ref 0–0.6)
EOSINOPHIL NFR BLD: 0.3 %
GFR SERPLBLD CREATININE-BSD FMLA CKD-EPI: >60 ML/MIN/{1.73_M2}
GLUCOSE SERPL-MCNC: 129 MG/DL (ref 70–99)
HCT VFR BLD AUTO: 37.7 % (ref 36–48)
HGB BLD-MCNC: 12.5 G/DL (ref 12–16)
LYMPHOCYTES # BLD: 1.3 K/UL (ref 1–5.1)
LYMPHOCYTES NFR BLD: 12.8 %
MCH RBC QN AUTO: 31.9 PG (ref 26–34)
MCHC RBC AUTO-ENTMCNC: 33.2 G/DL (ref 31–36)
MCV RBC AUTO: 96.1 FL (ref 80–100)
MONOCYTES # BLD: 0.7 K/UL (ref 0–1.3)
MONOCYTES NFR BLD: 7.4 %
NEUTROPHILS # BLD: 8 K/UL (ref 1.7–7.7)
NEUTROPHILS NFR BLD: 79 %
PLATELET # BLD AUTO: 242 K/UL (ref 135–450)
PMV BLD AUTO: 7.9 FL (ref 5–10.5)
POTASSIUM SERPL-SCNC: 4.7 MMOL/L (ref 3.5–5.1)
PROT SERPL-MCNC: 6.6 G/DL (ref 6.4–8.2)
RBC # BLD AUTO: 3.92 M/UL (ref 4–5.2)
SODIUM SERPL-SCNC: 143 MMOL/L (ref 136–145)
TROPONIN, HIGH SENSITIVITY: 10 NG/L (ref 0–14)
WBC # BLD AUTO: 10.1 K/UL (ref 4–11)

## 2023-08-31 PROCEDURE — 71045 X-RAY EXAM CHEST 1 VIEW: CPT

## 2023-08-31 PROCEDURE — 6370000000 HC RX 637 (ALT 250 FOR IP): Performed by: PHYSICIAN ASSISTANT

## 2023-08-31 PROCEDURE — 99285 EMERGENCY DEPT VISIT HI MDM: CPT

## 2023-08-31 PROCEDURE — 94761 N-INVAS EAR/PLS OXIMETRY MLT: CPT

## 2023-08-31 PROCEDURE — 6370000000 HC RX 637 (ALT 250 FOR IP): Performed by: ANESTHESIOLOGY

## 2023-08-31 PROCEDURE — 2700000000 HC OXYGEN THERAPY PER DAY

## 2023-08-31 PROCEDURE — 73110 X-RAY EXAM OF WRIST: CPT

## 2023-08-31 PROCEDURE — 73502 X-RAY EXAM HIP UNI 2-3 VIEWS: CPT

## 2023-08-31 PROCEDURE — 2580000003 HC RX 258: Performed by: ANESTHESIOLOGY

## 2023-08-31 PROCEDURE — 84484 ASSAY OF TROPONIN QUANT: CPT

## 2023-08-31 PROCEDURE — 85025 COMPLETE CBC W/AUTO DIFF WBC: CPT

## 2023-08-31 PROCEDURE — 93005 ELECTROCARDIOGRAM TRACING: CPT | Performed by: ANESTHESIOLOGY

## 2023-08-31 PROCEDURE — 1200000000 HC SEMI PRIVATE

## 2023-08-31 PROCEDURE — 80053 COMPREHEN METABOLIC PANEL: CPT

## 2023-08-31 RX ORDER — PILOCARPINE HYDROCHLORIDE 5 MG/1
5 TABLET, FILM COATED ORAL 3 TIMES DAILY
Status: DISCONTINUED | OUTPATIENT
Start: 2023-08-31 | End: 2023-09-06 | Stop reason: HOSPADM

## 2023-08-31 RX ORDER — SODIUM CHLORIDE 1 G/1
1 TABLET ORAL 3 TIMES DAILY
COMMUNITY

## 2023-08-31 RX ORDER — HYDROCODONE BITARTRATE AND ACETAMINOPHEN 5; 325 MG/1; MG/1
1 TABLET ORAL EVERY 4 HOURS PRN
Status: DISCONTINUED | OUTPATIENT
Start: 2023-08-31 | End: 2023-09-02

## 2023-08-31 RX ORDER — SODIUM CHLORIDE 9 MG/ML
INJECTION, SOLUTION INTRAVENOUS CONTINUOUS
Status: DISCONTINUED | OUTPATIENT
Start: 2023-08-31 | End: 2023-09-01 | Stop reason: SDUPTHER

## 2023-08-31 RX ORDER — VITAMIN B COMPLEX
2000 TABLET ORAL DAILY
Status: DISCONTINUED | OUTPATIENT
Start: 2023-08-31 | End: 2023-09-06 | Stop reason: HOSPADM

## 2023-08-31 RX ORDER — ONDANSETRON 4 MG/1
4 TABLET, ORALLY DISINTEGRATING ORAL EVERY 8 HOURS PRN
Status: DISCONTINUED | OUTPATIENT
Start: 2023-08-31 | End: 2023-09-06 | Stop reason: HOSPADM

## 2023-08-31 RX ORDER — ACETAMINOPHEN 650 MG/1
650 SUPPOSITORY RECTAL EVERY 6 HOURS PRN
Status: DISCONTINUED | OUTPATIENT
Start: 2023-08-31 | End: 2023-09-01

## 2023-08-31 RX ORDER — SODIUM CHLORIDE 0.9 % (FLUSH) 0.9 %
5-40 SYRINGE (ML) INJECTION EVERY 12 HOURS SCHEDULED
Status: DISCONTINUED | OUTPATIENT
Start: 2023-08-31 | End: 2023-09-06 | Stop reason: HOSPADM

## 2023-08-31 RX ORDER — OXYCODONE HYDROCHLORIDE AND ACETAMINOPHEN 5; 325 MG/1; MG/1
1 TABLET ORAL ONCE
Status: COMPLETED | OUTPATIENT
Start: 2023-08-31 | End: 2023-08-31

## 2023-08-31 RX ORDER — ONDANSETRON 2 MG/ML
4 INJECTION INTRAMUSCULAR; INTRAVENOUS EVERY 6 HOURS PRN
Status: DISCONTINUED | OUTPATIENT
Start: 2023-08-31 | End: 2023-09-06 | Stop reason: HOSPADM

## 2023-08-31 RX ORDER — POLYETHYLENE GLYCOL 3350 17 G/17G
17 POWDER, FOR SOLUTION ORAL DAILY PRN
Status: DISCONTINUED | OUTPATIENT
Start: 2023-08-31 | End: 2023-09-06 | Stop reason: HOSPADM

## 2023-08-31 RX ORDER — ACETAMINOPHEN 325 MG/1
650 TABLET ORAL EVERY 6 HOURS PRN
Status: DISCONTINUED | OUTPATIENT
Start: 2023-08-31 | End: 2023-09-01

## 2023-08-31 RX ORDER — FAMOTIDINE 20 MG/1
20 TABLET, FILM COATED ORAL DAILY
Status: DISCONTINUED | OUTPATIENT
Start: 2023-08-31 | End: 2023-09-06 | Stop reason: HOSPADM

## 2023-08-31 RX ORDER — MORPHINE SULFATE 4 MG/ML
4 INJECTION, SOLUTION INTRAMUSCULAR; INTRAVENOUS ONCE
Status: DISCONTINUED | OUTPATIENT
Start: 2023-08-31 | End: 2023-08-31

## 2023-08-31 RX ORDER — DULOXETIN HYDROCHLORIDE 60 MG/1
60 CAPSULE, DELAYED RELEASE ORAL DAILY
Status: DISCONTINUED | OUTPATIENT
Start: 2023-08-31 | End: 2023-09-06 | Stop reason: HOSPADM

## 2023-08-31 RX ORDER — DULOXETIN HYDROCHLORIDE 30 MG/1
30 CAPSULE, DELAYED RELEASE ORAL DAILY
Status: DISCONTINUED | OUTPATIENT
Start: 2023-08-31 | End: 2023-09-06 | Stop reason: HOSPADM

## 2023-08-31 RX ORDER — SODIUM CHLORIDE 0.9 % (FLUSH) 0.9 %
5-40 SYRINGE (ML) INJECTION PRN
Status: DISCONTINUED | OUTPATIENT
Start: 2023-08-31 | End: 2023-09-06 | Stop reason: HOSPADM

## 2023-08-31 RX ORDER — ACETAMINOPHEN 500 MG
500 TABLET ORAL EVERY 6 HOURS PRN
Status: ON HOLD | COMMUNITY
End: 2023-09-05 | Stop reason: HOSPADM

## 2023-08-31 RX ORDER — SODIUM CHLORIDE 9 MG/ML
INJECTION, SOLUTION INTRAVENOUS PRN
Status: DISCONTINUED | OUTPATIENT
Start: 2023-08-31 | End: 2023-09-06 | Stop reason: HOSPADM

## 2023-08-31 RX ORDER — ONDANSETRON 2 MG/ML
4 INJECTION INTRAMUSCULAR; INTRAVENOUS ONCE
Status: DISCONTINUED | OUTPATIENT
Start: 2023-08-31 | End: 2023-08-31

## 2023-08-31 RX ADMIN — HYDROCODONE BITARTRATE AND ACETAMINOPHEN 1 TABLET: 5; 325 TABLET ORAL at 23:46

## 2023-08-31 RX ADMIN — SODIUM CHLORIDE: 9 INJECTION, SOLUTION INTRAVENOUS at 21:46

## 2023-08-31 RX ADMIN — OXYCODONE HYDROCHLORIDE AND ACETAMINOPHEN 1 TABLET: 5; 325 TABLET ORAL at 18:32

## 2023-08-31 ASSESSMENT — PAIN DESCRIPTION - PAIN TYPE
TYPE: ACUTE PAIN
TYPE: ACUTE PAIN

## 2023-08-31 ASSESSMENT — PAIN DESCRIPTION - LOCATION
LOCATION: WRIST;HIP
LOCATION: HIP
LOCATION: ARM

## 2023-08-31 ASSESSMENT — PAIN DESCRIPTION - ORIENTATION
ORIENTATION: LEFT

## 2023-08-31 ASSESSMENT — PAIN SCALES - GENERAL
PAINLEVEL_OUTOF10: 5
PAINLEVEL_OUTOF10: 8
PAINLEVEL_OUTOF10: 8

## 2023-08-31 ASSESSMENT — PAIN DESCRIPTION - DESCRIPTORS
DESCRIPTORS: ACHING
DESCRIPTORS: SORE

## 2023-08-31 ASSESSMENT — PAIN - FUNCTIONAL ASSESSMENT: PAIN_FUNCTIONAL_ASSESSMENT: 0-10

## 2023-08-31 ASSESSMENT — PAIN DESCRIPTION - ONSET: ONSET: ON-GOING

## 2023-08-31 NOTE — ED PROVIDER NOTES
[address] [place] [address]  ED  EMERGENCY DEPARTMENT ENCOUNTER        Pt Name: Jah Medina  MRN: [id number]  [address] [facility] [place] 1950  Date of evaluation: 8/31/2023  Provider: Amber Randolph PA-C  PCP: Judy Acosta MD  ED Attending: Margie Gomez MD       I have seen and evaluated this patient with my supervising physician Redd Urena MD.    CHIEF COMPLAINT:     Chief Complaint   Patient presents with    Wrist Pain     Patient was sitting in her wheelchair and states she didn't realize the brake was off when she was getting up so it caused her to fall. She is c/o left hip pain and left wrist pain. Patient ambulatory from waiting room to room 25. Gait steady. Hip Pain    Fall       HISTORY OF PRESENT ILLNESS:      History provided by the patient. No limitations. Jah Medina is a 75 y.o. female who arrives to the ED by private vehicle. The patient is here to be evaluated for injuries status post fall. The patient was leaving  after being there for doctor's appointment. She was sitting in a wheelchair and went to stand up out of the wheelchair but the brakes were not on. Upon getting up she ended up falling onto her left side. She reports left hip pain and left wrist pain. This fall occurred around 1 PM.  She has been able to ambulate since the incident and though her hip is sore, her left wrist is hurting more than anything. She and her  got lunch. She ate soup and salad before deciding to come to the ED for her injuries. She denies hitting her head. She denies any neck or back pain. She is right-hand dominant. She is not anticoagulated. Nursing Notes were reviewed     REVIEW OF SYSTEMS:     Review of Systems  Positives and pertinent negatives as per HPI.       PAST MEDICAL HISTORY:     Past Medical History:   Diagnosis Date    Corticobasal degeneration     Dyskinesia     Hyperlipidemia     Hyperreflexia     Neuropathy     corticobasal degeneration    Parkinson's disease ([address])
challenging although she did have good movement with the left elbow, GCS equals 15  MSK: Normal range of motion of bilateral shoulders and bilateral elbows along with nontender to palpation of these joints, normal range of motion and nontender to right wrist, left wrist is already in a splint and therefore unable to officially evaluate this, normal range of motion of right hip/knee/ankle nontender to palpation of these joints, tenderness to palpation to left hip and no tenderness to palpation to left knee or left ankle    The Ekg interpreted by me shows  normal sinus rhythm with a rate of 91  Axis is   Normal  QTc is  normal  Intervals and Durations are unremarkable. ST Segments: no acute change and nonspecific changes  No significant change from prior EKG dated - 1/21/23  No STEMI, incomplete RBBB present today similar to old EKG, no signs of Brugada syndrome currently           I was the primary provider for the patient along with VICKI Lockhart. MDM: Patient was evaluated due to concern for fall from wheelchair and ultimately having a left wrist and hip fracture. She will need to be admitted with orthopedic evaluation. EKG showed nonspecific changes but troponin was negative and she denied any chest pain and story not concerning for acute coronary syndrome. Orthopedics will see the patient. She was stable for the floor and agreed with this plan.   Since she denies any headache or neck pain and denies hitting her head or passing out, I do feel that risk of radiation from CT of the head and cervical spine outweighs the benefit and therefore this was not obtained at this time but if she starts having new symptoms related to headache or neck pain, this can be ordered in the hospital.        Hao Nichols MD  08/31/23 8875

## 2023-08-31 NOTE — ED NOTES
Attempted to call report. Luc Hernandez RN to call this RN back shortly.       Saima Cardona RN  08/31/23 1939

## 2023-08-31 NOTE — ED NOTES
@3037 called orthopedic surgery per Laura Astoria   RE:left hip and left wrist fracture s/p fall  @1715 Ortho called back and spoke to [employer]  08/31/23 9341

## 2023-08-31 NOTE — H&P
Medications:    oxyCODONE-acetaminophen  1 tablet Oral Once      Infusions:   PRN Meds: HYDROcodone 5 mg - acetaminophen, 1 tablet, Q4H PRN        Labs      CBC:   Recent Labs     08/31/23  1752   WBC 10.1   HGB 12.5        BMP:    Recent Labs     08/31/23  1752      K 4.7      CO2 27   BUN 20   CREATININE 0.7   GLUCOSE 129*     Hepatic:   Recent Labs     08/31/23  1752   AST 29   ALT 9*   BILITOT 0.3   ALKPHOS 87     Lipids:   Lab Results   Component Value Date/Time    CHOL 209 02/10/2021 09:55 AM    HDL 68 02/10/2021 09:55 AM    TRIG 87 02/10/2021 09:55 AM     Hemoglobin A1C:   Lab Results   Component Value Date/Time    LABA1C 5.5 06/18/2021 01:47 PM     TSH:   Lab Results   Component Value Date/Time    TSH 1.75 04/17/2013 08:42 AM     Troponin: No results found for: TROPONINT  Lactic Acid: No results for input(s): LACTA in the last 72 hours. BNP: No results for input(s): PROBNP in the last 72 hours.   UA:  Lab Results   Component Value Date/Time    NITRU Negative 01/21/2023 03:14 PM    COLORU Yellow 01/21/2023 03:14 PM    PHUR 5.0 01/30/2023 11:29 AM    PHUR 5.5 01/21/2023 03:14 PM    WBCUA 3-5 01/21/2023 03:14 PM    RBCUA 5-10 01/21/2023 03:14 PM    BACTERIA 2+ 01/21/2023 03:14 PM    CLARITYU Clear 01/21/2023 03:14 PM    SPECGRAV >=1.030 01/30/2023 11:29 AM    SPECGRAV 1.025 01/21/2023 03:14 PM    LEUKOCYTESUR negative 01/30/2023 11:29 AM    LEUKOCYTESUR Negative 01/21/2023 03:14 PM    UROBILINOGEN 0.2 01/21/2023 03:14 PM    BILIRUBINUR negative 01/30/2023 11:29 AM    BLOODU trace-lysed 01/30/2023 11:29 AM    BLOODU SMALL 01/21/2023 03:14 PM    GLUCOSEU negative 01/30/2023 11:29 AM    GLUCOSEU Negative 01/21/2023 03:14 PM    KETUA 15 01/30/2023 11:29 AM    KETUA TRACE 01/21/2023 03:14 PM     Urine Cultures:   Lab Results   Component Value Date/Time    LABURIN No growth at 18 to 36 hours 01/30/2023 11:29 AM     Blood Cultures: No results found for: BC  No results found for:

## 2023-09-01 ENCOUNTER — ANESTHESIA EVENT (OUTPATIENT)
Dept: OPERATING ROOM | Age: 73
End: 2023-09-01
Payer: MEDICARE

## 2023-09-01 ENCOUNTER — ANESTHESIA (OUTPATIENT)
Dept: OPERATING ROOM | Age: 73
End: 2023-09-01
Payer: MEDICARE

## 2023-09-01 ENCOUNTER — APPOINTMENT (OUTPATIENT)
Dept: GENERAL RADIOLOGY | Age: 73
DRG: 480 | End: 2023-09-01
Payer: MEDICARE

## 2023-09-01 PROBLEM — Z01.810 PREOPERATIVE CARDIOVASCULAR EXAMINATION: Status: ACTIVE | Noted: 2023-09-01

## 2023-09-01 LAB
ANION GAP SERPL CALCULATED.3IONS-SCNC: 6 MMOL/L (ref 3–16)
BASOPHILS # BLD: 0.1 K/UL (ref 0–0.2)
BASOPHILS NFR BLD: 0.8 %
BUN SERPL-MCNC: 18 MG/DL (ref 7–20)
CALCIUM SERPL-MCNC: 8.7 MG/DL (ref 8.3–10.6)
CHLORIDE SERPL-SCNC: 109 MMOL/L (ref 99–110)
CO2 SERPL-SCNC: 28 MMOL/L (ref 21–32)
CREAT SERPL-MCNC: <0.5 MG/DL (ref 0.6–1.2)
DEPRECATED RDW RBC AUTO: 13.3 % (ref 12.4–15.4)
EKG ATRIAL RATE: 91 BPM
EKG DIAGNOSIS: NORMAL
EKG P AXIS: 66 DEGREES
EKG P-R INTERVAL: 182 MS
EKG Q-T INTERVAL: 372 MS
EKG QRS DURATION: 112 MS
EKG QTC CALCULATION (BAZETT): 457 MS
EKG R AXIS: 63 DEGREES
EKG T AXIS: 65 DEGREES
EKG VENTRICULAR RATE: 91 BPM
EOSINOPHIL # BLD: 0.1 K/UL (ref 0–0.6)
EOSINOPHIL NFR BLD: 1 %
GFR SERPLBLD CREATININE-BSD FMLA CKD-EPI: >60 ML/MIN/{1.73_M2}
GLUCOSE SERPL-MCNC: 88 MG/DL (ref 70–99)
HCT VFR BLD AUTO: 34.7 % (ref 36–48)
HGB BLD-MCNC: 11.5 G/DL (ref 12–16)
LYMPHOCYTES # BLD: 1.2 K/UL (ref 1–5.1)
LYMPHOCYTES NFR BLD: 17.1 %
MCH RBC QN AUTO: 31.8 PG (ref 26–34)
MCHC RBC AUTO-ENTMCNC: 33 G/DL (ref 31–36)
MCV RBC AUTO: 96.2 FL (ref 80–100)
MONOCYTES # BLD: 0.7 K/UL (ref 0–1.3)
MONOCYTES NFR BLD: 10.2 %
NEUTROPHILS # BLD: 5.1 K/UL (ref 1.7–7.7)
NEUTROPHILS NFR BLD: 70.9 %
PLATELET # BLD AUTO: 216 K/UL (ref 135–450)
PMV BLD AUTO: 8.5 FL (ref 5–10.5)
POTASSIUM SERPL-SCNC: 3.8 MMOL/L (ref 3.5–5.1)
RBC # BLD AUTO: 3.61 M/UL (ref 4–5.2)
SODIUM SERPL-SCNC: 143 MMOL/L (ref 136–145)
WBC # BLD AUTO: 7.1 K/UL (ref 4–11)

## 2023-09-01 PROCEDURE — 0QH734Z INSERTION OF INTERNAL FIXATION DEVICE INTO LEFT UPPER FEMUR, PERCUTANEOUS APPROACH: ICD-10-PCS | Performed by: ORTHOPAEDIC SURGERY

## 2023-09-01 PROCEDURE — 6360000002 HC RX W HCPCS: Performed by: SPECIALIST/TECHNOLOGIST

## 2023-09-01 PROCEDURE — 6370000000 HC RX 637 (ALT 250 FOR IP): Performed by: ORTHOPAEDIC SURGERY

## 2023-09-01 PROCEDURE — 1200000000 HC SEMI PRIVATE

## 2023-09-01 PROCEDURE — 6360000002 HC RX W HCPCS: Performed by: ANESTHESIOLOGY

## 2023-09-01 PROCEDURE — 7100000000 HC PACU RECOVERY - FIRST 15 MIN: Performed by: ORTHOPAEDIC SURGERY

## 2023-09-01 PROCEDURE — 6360000002 HC RX W HCPCS: Performed by: NURSE ANESTHETIST, CERTIFIED REGISTERED

## 2023-09-01 PROCEDURE — 2580000003 HC RX 258: Performed by: ANESTHESIOLOGY

## 2023-09-01 PROCEDURE — 3700000001 HC ADD 15 MINUTES (ANESTHESIA): Performed by: ORTHOPAEDIC SURGERY

## 2023-09-01 PROCEDURE — 6370000000 HC RX 637 (ALT 250 FOR IP): Performed by: NURSE PRACTITIONER

## 2023-09-01 PROCEDURE — 36415 COLL VENOUS BLD VENIPUNCTURE: CPT

## 2023-09-01 PROCEDURE — 2720000010 HC SURG SUPPLY STERILE: Performed by: ORTHOPAEDIC SURGERY

## 2023-09-01 PROCEDURE — 3600000014 HC SURGERY LEVEL 4 ADDTL 15MIN: Performed by: ORTHOPAEDIC SURGERY

## 2023-09-01 PROCEDURE — 6360000002 HC RX W HCPCS: Performed by: ORTHOPAEDIC SURGERY

## 2023-09-01 PROCEDURE — 6370000000 HC RX 637 (ALT 250 FOR IP): Performed by: ANESTHESIOLOGY

## 2023-09-01 PROCEDURE — 6360000002 HC RX W HCPCS

## 2023-09-01 PROCEDURE — 2500000003 HC RX 250 WO HCPCS: Performed by: NURSE ANESTHETIST, CERTIFIED REGISTERED

## 2023-09-01 PROCEDURE — 73502 X-RAY EXAM HIP UNI 2-3 VIEWS: CPT

## 2023-09-01 PROCEDURE — 93010 ELECTROCARDIOGRAM REPORT: CPT | Performed by: INTERNAL MEDICINE

## 2023-09-01 PROCEDURE — C1713 ANCHOR/SCREW BN/BN,TIS/BN: HCPCS | Performed by: ORTHOPAEDIC SURGERY

## 2023-09-01 PROCEDURE — C1769 GUIDE WIRE: HCPCS | Performed by: ORTHOPAEDIC SURGERY

## 2023-09-01 PROCEDURE — 2580000003 HC RX 258: Performed by: ORTHOPAEDIC SURGERY

## 2023-09-01 PROCEDURE — 3600000004 HC SURGERY LEVEL 4 BASE: Performed by: ORTHOPAEDIC SURGERY

## 2023-09-01 PROCEDURE — 85025 COMPLETE CBC W/AUTO DIFF WBC: CPT

## 2023-09-01 PROCEDURE — 2709999900 HC NON-CHARGEABLE SUPPLY: Performed by: ORTHOPAEDIC SURGERY

## 2023-09-01 PROCEDURE — 6360000002 HC RX W HCPCS: Performed by: NURSE PRACTITIONER

## 2023-09-01 PROCEDURE — 99221 1ST HOSP IP/OBS SF/LOW 40: CPT | Performed by: STUDENT IN AN ORGANIZED HEALTH CARE EDUCATION/TRAINING PROGRAM

## 2023-09-01 PROCEDURE — A4217 STERILE WATER/SALINE, 500 ML: HCPCS | Performed by: ORTHOPAEDIC SURGERY

## 2023-09-01 PROCEDURE — 3700000000 HC ANESTHESIA ATTENDED CARE: Performed by: ORTHOPAEDIC SURGERY

## 2023-09-01 PROCEDURE — 80048 BASIC METABOLIC PNL TOTAL CA: CPT

## 2023-09-01 PROCEDURE — 73110 X-RAY EXAM OF WRIST: CPT

## 2023-09-01 PROCEDURE — 0PSJ04Z REPOSITION LEFT RADIUS WITH INTERNAL FIXATION DEVICE, OPEN APPROACH: ICD-10-PCS | Performed by: ORTHOPAEDIC SURGERY

## 2023-09-01 PROCEDURE — 7100000001 HC PACU RECOVERY - ADDTL 15 MIN: Performed by: ORTHOPAEDIC SURGERY

## 2023-09-01 PROCEDURE — C9399 UNCLASSIFIED DRUGS OR BIOLOG: HCPCS

## 2023-09-01 DEVICE — BONE SCREW, FULLY THREADED, T8
Type: IMPLANTABLE DEVICE | Site: WRIST | Status: FUNCTIONAL
Brand: VARIAX

## 2023-09-01 DEVICE — VOLAR PLATE INTERMEDIATE LEFT, X-SHORT
Type: IMPLANTABLE DEVICE | Site: WRIST | Status: FUNCTIONAL
Brand: VARIAX

## 2023-09-01 DEVICE — LOCKING SCREW, FULLY THREADED,T8
Type: IMPLANTABLE DEVICE | Site: WRIST | Status: FUNCTIONAL
Brand: VARIAX

## 2023-09-01 DEVICE — CANNULATED SCREW
Type: IMPLANTABLE DEVICE | Site: HIP | Status: FUNCTIONAL
Brand: ASNIS

## 2023-09-01 RX ORDER — SODIUM CHLORIDE 0.9 % (FLUSH) 0.9 %
5-40 SYRINGE (ML) INJECTION EVERY 12 HOURS SCHEDULED
Status: DISCONTINUED | OUTPATIENT
Start: 2023-09-01 | End: 2023-09-01 | Stop reason: HOSPADM

## 2023-09-01 RX ORDER — SODIUM CHLORIDE 0.9 % (FLUSH) 0.9 %
5-40 SYRINGE (ML) INJECTION PRN
Status: DISCONTINUED | OUTPATIENT
Start: 2023-09-01 | End: 2023-09-06 | Stop reason: HOSPADM

## 2023-09-01 RX ORDER — LABETALOL HYDROCHLORIDE 5 MG/ML
10 INJECTION, SOLUTION INTRAVENOUS
Status: DISCONTINUED | OUTPATIENT
Start: 2023-09-01 | End: 2023-09-01 | Stop reason: HOSPADM

## 2023-09-01 RX ORDER — DEXAMETHASONE SODIUM PHOSPHATE 4 MG/ML
INJECTION, SOLUTION INTRA-ARTICULAR; INTRALESIONAL; INTRAMUSCULAR; INTRAVENOUS; SOFT TISSUE PRN
Status: DISCONTINUED | OUTPATIENT
Start: 2023-09-01 | End: 2023-09-01 | Stop reason: SDUPTHER

## 2023-09-01 RX ORDER — LORAZEPAM 2 MG/ML
1 INJECTION INTRAMUSCULAR ONCE
Status: COMPLETED | OUTPATIENT
Start: 2023-09-01 | End: 2023-09-01

## 2023-09-01 RX ORDER — MORPHINE SULFATE 2 MG/ML
2 INJECTION, SOLUTION INTRAMUSCULAR; INTRAVENOUS
Status: COMPLETED | OUTPATIENT
Start: 2023-09-01 | End: 2023-09-01

## 2023-09-01 RX ORDER — PHENYLEPHRINE HCL IN 0.9% NACL 1 MG/10 ML
SYRINGE (ML) INTRAVENOUS PRN
Status: DISCONTINUED | OUTPATIENT
Start: 2023-09-01 | End: 2023-09-01 | Stop reason: SDUPTHER

## 2023-09-01 RX ORDER — CEFAZOLIN SODIUM IN 0.9 % NACL 2 G/100 ML
2000 PLASTIC BAG, INJECTION (ML) INTRAVENOUS
Status: COMPLETED | OUTPATIENT
Start: 2023-09-01 | End: 2023-09-01

## 2023-09-01 RX ORDER — ACETAMINOPHEN 325 MG/1
650 TABLET ORAL EVERY 6 HOURS PRN
Status: DISCONTINUED | OUTPATIENT
Start: 2023-09-01 | End: 2023-09-06 | Stop reason: HOSPADM

## 2023-09-01 RX ORDER — ENOXAPARIN SODIUM 100 MG/ML
30 INJECTION SUBCUTANEOUS DAILY
Status: DISCONTINUED | OUTPATIENT
Start: 2023-09-02 | End: 2023-09-06 | Stop reason: HOSPADM

## 2023-09-01 RX ORDER — OXYCODONE HYDROCHLORIDE 5 MG/1
10 TABLET ORAL PRN
Status: DISCONTINUED | OUTPATIENT
Start: 2023-09-01 | End: 2023-09-01 | Stop reason: HOSPADM

## 2023-09-01 RX ORDER — ROCURONIUM BROMIDE 10 MG/ML
INJECTION, SOLUTION INTRAVENOUS PRN
Status: DISCONTINUED | OUTPATIENT
Start: 2023-09-01 | End: 2023-09-01 | Stop reason: SDUPTHER

## 2023-09-01 RX ORDER — ONDANSETRON 2 MG/ML
4 INJECTION INTRAMUSCULAR; INTRAVENOUS
Status: DISCONTINUED | OUTPATIENT
Start: 2023-09-01 | End: 2023-09-01 | Stop reason: HOSPADM

## 2023-09-01 RX ORDER — SODIUM CHLORIDE 0.9 % (FLUSH) 0.9 %
5-40 SYRINGE (ML) INJECTION EVERY 12 HOURS SCHEDULED
Status: DISCONTINUED | OUTPATIENT
Start: 2023-09-01 | End: 2023-09-06 | Stop reason: HOSPADM

## 2023-09-01 RX ORDER — SODIUM CHLORIDE 0.9 % (FLUSH) 0.9 %
5-40 SYRINGE (ML) INJECTION PRN
Status: DISCONTINUED | OUTPATIENT
Start: 2023-09-01 | End: 2023-09-01 | Stop reason: HOSPADM

## 2023-09-01 RX ORDER — DIPHENHYDRAMINE HYDROCHLORIDE 50 MG/ML
12.5 INJECTION INTRAMUSCULAR; INTRAVENOUS
Status: DISCONTINUED | OUTPATIENT
Start: 2023-09-01 | End: 2023-09-01 | Stop reason: HOSPADM

## 2023-09-01 RX ORDER — OXYCODONE HYDROCHLORIDE 5 MG/1
5 TABLET ORAL PRN
Status: DISCONTINUED | OUTPATIENT
Start: 2023-09-01 | End: 2023-09-01 | Stop reason: HOSPADM

## 2023-09-01 RX ORDER — FENTANYL CITRATE 50 UG/ML
INJECTION, SOLUTION INTRAMUSCULAR; INTRAVENOUS PRN
Status: DISCONTINUED | OUTPATIENT
Start: 2023-09-01 | End: 2023-09-01 | Stop reason: SDUPTHER

## 2023-09-01 RX ORDER — SODIUM CHLORIDE 450 MG/100ML
INJECTION, SOLUTION INTRAVENOUS CONTINUOUS
Status: DISCONTINUED | OUTPATIENT
Start: 2023-09-01 | End: 2023-09-06 | Stop reason: HOSPADM

## 2023-09-01 RX ORDER — BUPIVACAINE HYDROCHLORIDE 5 MG/ML
INJECTION, SOLUTION EPIDURAL; INTRACAUDAL PRN
Status: DISCONTINUED | OUTPATIENT
Start: 2023-09-01 | End: 2023-09-01 | Stop reason: ALTCHOICE

## 2023-09-01 RX ORDER — ONDANSETRON 2 MG/ML
INJECTION INTRAMUSCULAR; INTRAVENOUS PRN
Status: DISCONTINUED | OUTPATIENT
Start: 2023-09-01 | End: 2023-09-01 | Stop reason: SDUPTHER

## 2023-09-01 RX ORDER — CEFAZOLIN SODIUM IN 0.9 % NACL 2 G/100 ML
2000 PLASTIC BAG, INJECTION (ML) INTRAVENOUS EVERY 8 HOURS
Status: COMPLETED | OUTPATIENT
Start: 2023-09-01 | End: 2023-09-02

## 2023-09-01 RX ORDER — SODIUM CHLORIDE 9 MG/ML
INJECTION, SOLUTION INTRAVENOUS PRN
Status: DISCONTINUED | OUTPATIENT
Start: 2023-09-01 | End: 2023-09-01 | Stop reason: HOSPADM

## 2023-09-01 RX ORDER — PROPOFOL 10 MG/ML
INJECTION, EMULSION INTRAVENOUS PRN
Status: DISCONTINUED | OUTPATIENT
Start: 2023-09-01 | End: 2023-09-01 | Stop reason: SDUPTHER

## 2023-09-01 RX ORDER — MEPERIDINE HYDROCHLORIDE 50 MG/ML
12.5 INJECTION INTRAMUSCULAR; INTRAVENOUS; SUBCUTANEOUS EVERY 5 MIN PRN
Status: DISCONTINUED | OUTPATIENT
Start: 2023-09-01 | End: 2023-09-01 | Stop reason: HOSPADM

## 2023-09-01 RX ORDER — SODIUM CHLORIDE 9 MG/ML
INJECTION, SOLUTION INTRAVENOUS PRN
Status: DISCONTINUED | OUTPATIENT
Start: 2023-09-01 | End: 2023-09-06 | Stop reason: HOSPADM

## 2023-09-01 RX ORDER — SODIUM CHLORIDE 1 G/1
1 TABLET ORAL 3 TIMES DAILY
Status: DISCONTINUED | OUTPATIENT
Start: 2023-09-01 | End: 2023-09-06 | Stop reason: HOSPADM

## 2023-09-01 RX ORDER — ACETAMINOPHEN 500 MG
500 TABLET ORAL EVERY 6 HOURS PRN
Status: DISCONTINUED | OUTPATIENT
Start: 2023-09-01 | End: 2023-09-01 | Stop reason: SDUPTHER

## 2023-09-01 RX ORDER — ACETAMINOPHEN 650 MG/1
650 SUPPOSITORY RECTAL EVERY 6 HOURS PRN
Status: DISCONTINUED | OUTPATIENT
Start: 2023-09-01 | End: 2023-09-06 | Stop reason: HOSPADM

## 2023-09-01 RX ORDER — CYCLOBENZAPRINE HCL 10 MG
10 TABLET ORAL 3 TIMES DAILY PRN
Status: DISCONTINUED | OUTPATIENT
Start: 2023-09-01 | End: 2023-09-02

## 2023-09-01 RX ADMIN — DULOXETINE HYDROCHLORIDE 30 MG: 30 CAPSULE, DELAYED RELEASE ORAL at 18:48

## 2023-09-01 RX ADMIN — LORAZEPAM 1 MG: 2 INJECTION INTRAMUSCULAR; INTRAVENOUS at 23:18

## 2023-09-01 RX ADMIN — Medication 10 ML: at 21:12

## 2023-09-01 RX ADMIN — Medication 2000 MG: at 13:10

## 2023-09-01 RX ADMIN — SODIUM CHLORIDE 1 G: 1 TABLET ORAL at 21:06

## 2023-09-01 RX ADMIN — HYDROCODONE BITARTRATE AND ACETAMINOPHEN 1 TABLET: 5; 325 TABLET ORAL at 23:45

## 2023-09-01 RX ADMIN — DEXAMETHASONE SODIUM PHOSPHATE 4 MG: 4 INJECTION, SOLUTION INTRAMUSCULAR; INTRAVENOUS at 13:14

## 2023-09-01 RX ADMIN — PROPOFOL 130 MG: 10 INJECTION, EMULSION INTRAVENOUS at 13:00

## 2023-09-01 RX ADMIN — Medication 2000 MG: at 21:12

## 2023-09-01 RX ADMIN — FENTANYL CITRATE 25 MCG: 50 INJECTION, SOLUTION INTRAMUSCULAR; INTRAVENOUS at 13:14

## 2023-09-01 RX ADMIN — SUGAMMADEX 200 MG: 100 INJECTION, SOLUTION INTRAVENOUS at 14:09

## 2023-09-01 RX ADMIN — Medication 100 MCG: at 13:37

## 2023-09-01 RX ADMIN — FAMOTIDINE 20 MG: 20 TABLET, FILM COATED ORAL at 09:32

## 2023-09-01 RX ADMIN — CYCLOBENZAPRINE 10 MG: 10 TABLET, FILM COATED ORAL at 16:01

## 2023-09-01 RX ADMIN — SODIUM CHLORIDE 1 G: 1 TABLET ORAL at 16:10

## 2023-09-01 RX ADMIN — Medication 2000 UNITS: at 18:49

## 2023-09-01 RX ADMIN — ONDANSETRON 4 MG: 2 INJECTION INTRAMUSCULAR; INTRAVENOUS at 13:10

## 2023-09-01 RX ADMIN — CYCLOBENZAPRINE 10 MG: 10 TABLET, FILM COATED ORAL at 02:00

## 2023-09-01 RX ADMIN — DULOXETINE HYDROCHLORIDE 60 MG: 60 CAPSULE, DELAYED RELEASE ORAL at 18:49

## 2023-09-01 RX ADMIN — SODIUM CHLORIDE: 4.5 INJECTION, SOLUTION INTRAVENOUS at 16:08

## 2023-09-01 RX ADMIN — ACETAMINOPHEN 650 MG: 325 TABLET ORAL at 09:32

## 2023-09-01 RX ADMIN — SODIUM CHLORIDE: 9 INJECTION, SOLUTION INTRAVENOUS at 13:10

## 2023-09-01 RX ADMIN — Medication 10 ML: at 21:05

## 2023-09-01 RX ADMIN — MORPHINE SULFATE 2 MG: 2 INJECTION, SOLUTION INTRAMUSCULAR; INTRAVENOUS at 01:02

## 2023-09-01 RX ADMIN — PILOCARPINE HYDROCHLORIDE 5 MG: 5 TABLET, FILM COATED ORAL at 21:12

## 2023-09-01 RX ADMIN — FENTANYL CITRATE 25 MCG: 50 INJECTION, SOLUTION INTRAMUSCULAR; INTRAVENOUS at 13:27

## 2023-09-01 RX ADMIN — HYDROCODONE BITARTRATE AND ACETAMINOPHEN 1 TABLET: 5; 325 TABLET ORAL at 16:53

## 2023-09-01 RX ADMIN — Medication 10 ML: at 16:07

## 2023-09-01 RX ADMIN — ROCURONIUM BROMIDE 50 MG: 50 INJECTION, SOLUTION INTRAVENOUS at 13:00

## 2023-09-01 RX ADMIN — HYDROMORPHONE HYDROCHLORIDE 0.5 MG: 1 INJECTION, SOLUTION INTRAMUSCULAR; INTRAVENOUS; SUBCUTANEOUS at 14:32

## 2023-09-01 RX ADMIN — FENTANYL CITRATE 50 MCG: 50 INJECTION, SOLUTION INTRAMUSCULAR; INTRAVENOUS at 13:42

## 2023-09-01 RX ADMIN — MORPHINE SULFATE 2 MG: 2 INJECTION, SOLUTION INTRAMUSCULAR; INTRAVENOUS at 18:06

## 2023-09-01 RX ADMIN — MORPHINE SULFATE 2 MG: 2 INJECTION, SOLUTION INTRAMUSCULAR; INTRAVENOUS at 21:03

## 2023-09-01 ASSESSMENT — PAIN DESCRIPTION - ORIENTATION
ORIENTATION: LEFT
ORIENTATION: MID
ORIENTATION: LOWER
ORIENTATION: LOWER;LEFT
ORIENTATION: LEFT;LOWER
ORIENTATION: LEFT
ORIENTATION: MID
ORIENTATION: MID
ORIENTATION: LEFT
ORIENTATION: ANTERIOR
ORIENTATION: ANTERIOR
ORIENTATION: LEFT

## 2023-09-01 ASSESSMENT — PAIN SCALES - GENERAL
PAINLEVEL_OUTOF10: 6
PAINLEVEL_OUTOF10: 4
PAINLEVEL_OUTOF10: 0
PAINLEVEL_OUTOF10: 8
PAINLEVEL_OUTOF10: 6
PAINLEVEL_OUTOF10: 8
PAINLEVEL_OUTOF10: 9
PAINLEVEL_OUTOF10: 6
PAINLEVEL_OUTOF10: 7
PAINLEVEL_OUTOF10: 6
PAINLEVEL_OUTOF10: 6
PAINLEVEL_OUTOF10: 8
PAINLEVEL_OUTOF10: 6
PAINLEVEL_OUTOF10: 9
PAINLEVEL_OUTOF10: 4
PAINLEVEL_OUTOF10: 8
PAINLEVEL_OUTOF10: 8
PAINLEVEL_OUTOF10: 6
PAINLEVEL_OUTOF10: 6
PAINLEVEL_OUTOF10: 9
PAINLEVEL_OUTOF10: 0
PAINLEVEL_OUTOF10: 8

## 2023-09-01 ASSESSMENT — PAIN DESCRIPTION - FREQUENCY
FREQUENCY: INTERMITTENT
FREQUENCY: CONTINUOUS

## 2023-09-01 ASSESSMENT — PAIN DESCRIPTION - LOCATION
LOCATION: ARM
LOCATION: BACK
LOCATION: HEAD
LOCATION: HEAD
LOCATION: ARM
LOCATION: BACK
LOCATION: ARM
LOCATION: BACK
LOCATION: HIP
LOCATION: ARM
LOCATION: BACK
LOCATION: ARM

## 2023-09-01 ASSESSMENT — PAIN DESCRIPTION - ONSET
ONSET: ON-GOING

## 2023-09-01 ASSESSMENT — PAIN - FUNCTIONAL ASSESSMENT
PAIN_FUNCTIONAL_ASSESSMENT: ACTIVITIES ARE NOT PREVENTED
PAIN_FUNCTIONAL_ASSESSMENT: PREVENTS OR INTERFERES SOME ACTIVE ACTIVITIES AND ADLS
PAIN_FUNCTIONAL_ASSESSMENT: PREVENTS OR INTERFERES WITH MANY ACTIVE NOT PASSIVE ACTIVITIES

## 2023-09-01 ASSESSMENT — PAIN SCALES - WONG BAKER
WONGBAKER_NUMERICALRESPONSE: 6
WONGBAKER_NUMERICALRESPONSE: 6
WONGBAKER_NUMERICALRESPONSE: 8
WONGBAKER_NUMERICALRESPONSE: 6
WONGBAKER_NUMERICALRESPONSE: 8
WONGBAKER_NUMERICALRESPONSE: 6
WONGBAKER_NUMERICALRESPONSE: 4
WONGBAKER_NUMERICALRESPONSE: 6

## 2023-09-01 ASSESSMENT — PAIN DESCRIPTION - PAIN TYPE
TYPE: ACUTE PAIN;SURGICAL PAIN
TYPE: ACUTE PAIN
TYPE: ACUTE PAIN;SURGICAL PAIN
TYPE: ACUTE PAIN
TYPE: ACUTE PAIN;SURGICAL PAIN
TYPE: ACUTE PAIN;SURGICAL PAIN
TYPE: ACUTE PAIN
TYPE: ACUTE PAIN
TYPE: ACUTE PAIN;SURGICAL PAIN

## 2023-09-01 ASSESSMENT — PAIN DESCRIPTION - DESCRIPTORS
DESCRIPTORS: TIGHTNESS
DESCRIPTORS: TIGHTNESS
DESCRIPTORS: ACHING
DESCRIPTORS: ACHING
DESCRIPTORS: TIGHTNESS
DESCRIPTORS: ACHING;SORE;THROBBING
DESCRIPTORS: ACHING;NAGGING
DESCRIPTORS: ACHING;SORE
DESCRIPTORS: ACHING
DESCRIPTORS: TIGHTNESS
DESCRIPTORS: ACHING;NAGGING
DESCRIPTORS: ACHING;SHARP;TIGHTNESS
DESCRIPTORS: ACHING
DESCRIPTORS: ACHING

## 2023-09-01 NOTE — PLAN OF CARE
Problem: Pain  Goal: Verbalizes/displays adequate comfort level or baseline comfort level  Outcome: Progressing   Pt c/o left hip and wrist pain, medicated per mar with prn morphine and norco. Pt verbalized relief and is now resting in bed. No further needs at this time. Problem: Safety - Adult  Goal: Free from fall injury  Outcome: Progressing   Pt has been free from falls this shift, bed alarm on, bed in lowest position, 2/4 side rails up, nonskid socks on, wheels locked, bedside table and call light in reach. Encouraged pt to call out if needed anything.

## 2023-09-01 NOTE — CARE COORDINATION
Case Management Assessment  Initial Evaluation    Date/Time of Evaluation: 9/1/2023 9:51 AM  Assessment Completed by: Fran Guajardo RN    If patient is discharged prior to next notation, then this note serves as note for discharge by case management. Patient Name: Janak Wilson                   YOB: 1950  Diagnosis: Bone fracture [T14. Brandon Due, initial encounter [id number]. XXXA]  Closed fracture of neck of left femur, initial encounter ([address]) [S72.002A]  Closed fracture of distal end of left radius, unspecified fracture morphology, initial encounter [S52.502A]                   Date / Time: 8/31/2023  3:47 PM    Patient Admission Status: Inpatient   Readmission Risk (Low < 19, Mod (19-27), High > 27): Readmission Risk Score: 7.4    Current PCP: Soledad Hills MD  PCP verified by ? Yes    Chart Reviewed: Yes      History Provided by: Patient  Patient Orientation: Alert and Oriented    Patient Cognition: Alert    Hospitalization in the last 30 days (Readmission):  No    If yes, Readmission Assessment in  Navigator will be completed. Advance Directives:    Code Status: Full Code   Patient's Primary Decision Maker is: Legal Next of Kin    Primary Decision MakeZenaida Gifford Spouse - [phone]    Discharge Planning:  Patient lives with: Spouse/Significant Other Type of Home: House  Primary Care Giver: Self (spouse assists)  Patient Support Systems include: Family Members, Spouse/Significant Other   Current Financial resources: Medicare  Current community resources: None  Current services prior to admission: Durable Medical Equipment            Current DME: Elnor Schwab (uses rollator)            Type of Home Care services:  None (none PTA)    ADLS  Prior functional level: Assistance with the following:, Housework  Current functional level: Toileting, Mobility, Assistance with the following:    PT/OT will be ordered postop    Family can provide assistance at DC:  Yes  Would you like Case

## 2023-09-01 NOTE — CARE COORDINATION
Pt still in PACU. Pt's daughter [facility] and  Dion Abreu asked to talk about discharge planning. Writer explained we will need to wait for PT/OT recommendations and then CM can make referrals to whichever level of care pt will need, family understands from surgeon, it will be some kind of facility. Writer provided [facility] [place] from website and encouraged family to review medicare. gov ratings. CM will f/u with pt's family after PT/OT recommendations in, likely over the weekend. Pt's insurance requires precert but can be done online over the weekend, if accepting facility verified. Family verbalized understanding of conversation.   LASHA Buitrago-RN

## 2023-09-01 NOTE — PLAN OF CARE
Problem: Pain  Goal: Verbalizes/displays adequate comfort level or baseline comfort level  Outcome: Progressing  Pt scoring pain on 0-10 scale. Pain medications given per MAR. Pt instructed to call out when pain level increasing. Call light within reach. Problem: Safety - Adult  Goal: Free from fall injury  Outcome: Progressing   Bed in lowest position, wheels locked, 2/4 side rails up, nonskid footwear on. Bed/ chair check alarm in place, call light within reach. Pt instructed to call out when needing assistance. Pt stated understanding.

## 2023-09-01 NOTE — ANESTHESIA POSTPROCEDURE EVALUATION
Department of Anesthesiology  Postprocedure Note    Patient: Moses Loera  MRN: [id number]  YOB: 1950  Date of evaluation: 9/1/2023      Procedure Summary     Date: 09/01/23 Room / Location: [address] [place] [address] / [address] [place] [address]    Anesthesia Start: 6400 Anesthesia Stop: 1416    Procedures:       LEFT HIP PINNING (Left: Hip)      LEFT DISTAL RADIUS OPEN REDUCTION INTERNAL FIXATION (Left: Arm Lower) Diagnosis:       Other closed fracture of distal end of left radius, initial encounter      Other fracture of left femur, initial encounter for closed fracture ([address])      (Other closed fracture of distal end of left radius, initial encounter [S52.592A])      (Other fracture of left femur, initial encounter for closed fracture ([address]) [N44.3D6Q])    Surgeons: Tho Godoy MD Responsible Provider: Con Cage MD    Anesthesia Type: general ASA Status: 3          Anesthesia Type: No value filed. Edward Phase I: Edward Score: 10    Edward Phase II:        Anesthesia Post Evaluation    Patient location during evaluation: PACU  Patient participation: complete - patient participated  Level of consciousness: awake and alert  Airway patency: patent  Nausea & Vomiting: no nausea and no vomiting  Complications: no  Cardiovascular status: blood pressure returned to baseline  Respiratory status: acceptable  Hydration status: euvolemic  Comments: VSS on transfer to phase 2 recovery. No anesthetic complications.   Pain management: adequate

## 2023-09-01 NOTE — BRIEF OP NOTE
Brief Postoperative Note      Patient: Alicia Corbett  YOB: 1950  MRN: [id number]    Date of Procedure: 9/1/2023    Pre-Op Diagnosis Codes:     * Other closed fracture of distal end of left radius, initial encounter [S52.592A]     * Other fracture of left femur, initial encounter for closed fracture ([address]) [S72.8X2A]    Post-Op Diagnosis: Same       Procedure(s):  LEFT HIP PINNING  LEFT DISTAL RADIUS OPEN REDUCTION INTERNAL FIXATION    Surgeon(s):  Gadiel Mike MD    Assistant:  Surgical Assistant: Maribel Piedra    Anesthesia: General    Estimated Blood Loss (mL): Minimal    Complications: None    Specimens:   * No specimens in log *    Implants:  Implant Name Type Inv. Item Serial No.  Lot No. LRB No. Used Action   SCREW BNE L85MM DIA6.5MM ST CANC KIKO TI SELF DRL ST INES - [id number]  SCREW BNE L85MM DIA6.5MM ST CANC KIKO TI SELF DRL ST INES  [employer] [facility]  Left 2 Implanted   SCREW BNE L90MM DIA6.5MM THRD L20MM CANC TI ST SELF DRL - [id number]  SCREW BNE L90MM DIA6.5MM THRD L20MM CANC TI ST SELF DRL  [employer] [facility]  Left 1 Implanted   PLATE BNE G63OG CIU1NY 10 H XSH L DST RAD VOLAR RADHA TI - [id number]  PLATE BNE [id number] [id number] 10 H XSH L DST RAD VOLAR RADHA TI  [employer] [facility]  Left 1 Implanted   SCREW LK 2.7X14MM - [id number]  SCREW LK 2.7X14MM  [employer] [facility]  Left 1 Implanted   SCREW BNE L16MM OD27MM ST MAGDALENA FULL THRD T8 DRV - [id number]  SCREW BNE L16MM OD27MM ST MAGDALENA FULL THRD T8 DRV  [employer] [facility]  Left 1 Implanted   SCREW BNE L18MM OD27MM ST MAGDALENA FULL THRD T8 DRV - [id number]  SCREW BNE L18MM OD27MM ST MAGDALENA FULL THRD T8 [facility]  Left 4 Implanted   SCREW LOCKING 2.0AFT18BB - [id number]  SCREW LOCKING 2.9LRB18LX  [employer]-  Left 2 Implanted   SCREW T8 BONE 2. 3TOH49MU - [id number]  SCREW T8 BONE 2. [id number]  [employer] [facility]-  Left 1 Implanted         Drains:   Urinary

## 2023-09-02 LAB
ANION GAP SERPL CALCULATED.3IONS-SCNC: 9 MMOL/L (ref 3–16)
BASOPHILS # BLD: 0.1 K/UL (ref 0–0.2)
BASOPHILS NFR BLD: 0.6 %
BUN SERPL-MCNC: 16 MG/DL (ref 7–20)
CALCIUM SERPL-MCNC: 8.6 MG/DL (ref 8.3–10.6)
CHLORIDE SERPL-SCNC: 106 MMOL/L (ref 99–110)
CO2 SERPL-SCNC: 24 MMOL/L (ref 21–32)
CREAT SERPL-MCNC: <0.5 MG/DL (ref 0.6–1.2)
DEPRECATED RDW RBC AUTO: 13 % (ref 12.4–15.4)
EOSINOPHIL # BLD: 0 K/UL (ref 0–0.6)
EOSINOPHIL NFR BLD: 0.3 %
GFR SERPLBLD CREATININE-BSD FMLA CKD-EPI: >60 ML/MIN/{1.73_M2}
GLUCOSE SERPL-MCNC: 115 MG/DL (ref 70–99)
HCT VFR BLD AUTO: 31 % (ref 36–48)
HCT VFR BLD AUTO: 31.4 % (ref 36–48)
HGB BLD-MCNC: 10.6 G/DL (ref 12–16)
HGB BLD-MCNC: 10.7 G/DL (ref 12–16)
LYMPHOCYTES # BLD: 1 K/UL (ref 1–5.1)
LYMPHOCYTES NFR BLD: 9.9 %
MAGNESIUM SERPL-MCNC: 1.9 MG/DL (ref 1.8–2.4)
MCH RBC QN AUTO: 32.1 PG (ref 26–34)
MCHC RBC AUTO-ENTMCNC: 34 G/DL (ref 31–36)
MCV RBC AUTO: 94.6 FL (ref 80–100)
MONOCYTES # BLD: 1 K/UL (ref 0–1.3)
MONOCYTES NFR BLD: 10.3 %
NEUTROPHILS # BLD: 7.6 K/UL (ref 1.7–7.7)
NEUTROPHILS NFR BLD: 78.9 %
PLATELET # BLD AUTO: 188 K/UL (ref 135–450)
PMV BLD AUTO: 8.1 FL (ref 5–10.5)
POTASSIUM SERPL-SCNC: 3.2 MMOL/L (ref 3.5–5.1)
RBC # BLD AUTO: 3.32 M/UL (ref 4–5.2)
SODIUM SERPL-SCNC: 139 MMOL/L (ref 136–145)
WBC # BLD AUTO: 9.7 K/UL (ref 4–11)

## 2023-09-02 PROCEDURE — 97162 PT EVAL MOD COMPLEX 30 MIN: CPT

## 2023-09-02 PROCEDURE — 85025 COMPLETE CBC W/AUTO DIFF WBC: CPT

## 2023-09-02 PROCEDURE — 80048 BASIC METABOLIC PNL TOTAL CA: CPT

## 2023-09-02 PROCEDURE — 83735 ASSAY OF MAGNESIUM: CPT

## 2023-09-02 PROCEDURE — 6370000000 HC RX 637 (ALT 250 FOR IP): Performed by: ORTHOPAEDIC SURGERY

## 2023-09-02 PROCEDURE — 1200000000 HC SEMI PRIVATE

## 2023-09-02 PROCEDURE — 97166 OT EVAL MOD COMPLEX 45 MIN: CPT

## 2023-09-02 PROCEDURE — 2580000003 HC RX 258: Performed by: ORTHOPAEDIC SURGERY

## 2023-09-02 PROCEDURE — 6370000000 HC RX 637 (ALT 250 FOR IP): Performed by: INTERNAL MEDICINE

## 2023-09-02 PROCEDURE — 85018 HEMOGLOBIN: CPT

## 2023-09-02 PROCEDURE — 6370000000 HC RX 637 (ALT 250 FOR IP)

## 2023-09-02 PROCEDURE — 6360000002 HC RX W HCPCS: Performed by: NURSE PRACTITIONER

## 2023-09-02 PROCEDURE — 85014 HEMATOCRIT: CPT

## 2023-09-02 PROCEDURE — 97530 THERAPEUTIC ACTIVITIES: CPT

## 2023-09-02 PROCEDURE — 6360000002 HC RX W HCPCS: Performed by: ORTHOPAEDIC SURGERY

## 2023-09-02 PROCEDURE — 36415 COLL VENOUS BLD VENIPUNCTURE: CPT

## 2023-09-02 RX ORDER — TRAMADOL HYDROCHLORIDE 50 MG/1
100 TABLET ORAL EVERY 6 HOURS PRN
Status: DISCONTINUED | OUTPATIENT
Start: 2023-09-02 | End: 2023-09-06 | Stop reason: HOSPADM

## 2023-09-02 RX ORDER — POTASSIUM CHLORIDE 7.45 MG/ML
10 INJECTION INTRAVENOUS
Status: COMPLETED | OUTPATIENT
Start: 2023-09-02 | End: 2023-09-02

## 2023-09-02 RX ORDER — OXYCODONE HYDROCHLORIDE 5 MG/1
10 TABLET ORAL EVERY 6 HOURS PRN
Status: DISCONTINUED | OUTPATIENT
Start: 2023-09-02 | End: 2023-09-02

## 2023-09-02 RX ORDER — ACETAMINOPHEN 500 MG
500 TABLET ORAL EVERY 8 HOURS
Status: DISCONTINUED | OUTPATIENT
Start: 2023-09-02 | End: 2023-09-06 | Stop reason: HOSPADM

## 2023-09-02 RX ORDER — TRAMADOL HYDROCHLORIDE 50 MG/1
50 TABLET ORAL EVERY 6 HOURS PRN
Status: DISCONTINUED | OUTPATIENT
Start: 2023-09-02 | End: 2023-09-06 | Stop reason: HOSPADM

## 2023-09-02 RX ORDER — METHOCARBAMOL 750 MG/1
750 TABLET, FILM COATED ORAL 4 TIMES DAILY
Status: DISCONTINUED | OUTPATIENT
Start: 2023-09-02 | End: 2023-09-06 | Stop reason: HOSPADM

## 2023-09-02 RX ORDER — GABAPENTIN 100 MG/1
100 CAPSULE ORAL 3 TIMES DAILY
Status: DISCONTINUED | OUTPATIENT
Start: 2023-09-02 | End: 2023-09-06 | Stop reason: HOSPADM

## 2023-09-02 RX ORDER — LORAZEPAM 2 MG/ML
0.5 INJECTION INTRAMUSCULAR ONCE
Status: COMPLETED | OUTPATIENT
Start: 2023-09-02 | End: 2023-09-02

## 2023-09-02 RX ORDER — OXYCODONE HYDROCHLORIDE 5 MG/1
5 TABLET ORAL EVERY 6 HOURS PRN
Status: DISCONTINUED | OUTPATIENT
Start: 2023-09-02 | End: 2023-09-02

## 2023-09-02 RX ADMIN — HYDROCODONE BITARTRATE AND ACETAMINOPHEN 1 TABLET: 5; 325 TABLET ORAL at 04:49

## 2023-09-02 RX ADMIN — LORAZEPAM 0.5 MG: 2 INJECTION INTRAMUSCULAR; INTRAVENOUS at 07:48

## 2023-09-02 RX ADMIN — GABAPENTIN 100 MG: 100 CAPSULE ORAL at 21:54

## 2023-09-02 RX ADMIN — PILOCARPINE HYDROCHLORIDE 5 MG: 5 TABLET, FILM COATED ORAL at 14:30

## 2023-09-02 RX ADMIN — Medication 10 ML: at 21:56

## 2023-09-02 RX ADMIN — DULOXETINE HYDROCHLORIDE 30 MG: 30 CAPSULE, DELAYED RELEASE ORAL at 08:47

## 2023-09-02 RX ADMIN — GABAPENTIN 100 MG: 100 CAPSULE ORAL at 14:30

## 2023-09-02 RX ADMIN — SODIUM CHLORIDE 1 G: 1 TABLET ORAL at 14:30

## 2023-09-02 RX ADMIN — Medication 2000 UNITS: at 08:45

## 2023-09-02 RX ADMIN — PILOCARPINE HYDROCHLORIDE 5 MG: 5 TABLET, FILM COATED ORAL at 10:04

## 2023-09-02 RX ADMIN — SODIUM CHLORIDE 1 G: 1 TABLET ORAL at 08:45

## 2023-09-02 RX ADMIN — OXYCODONE HYDROCHLORIDE 10 MG: 5 TABLET ORAL at 08:58

## 2023-09-02 RX ADMIN — ACETAMINOPHEN 500 MG: 500 TABLET ORAL at 17:54

## 2023-09-02 RX ADMIN — TRAMADOL HYDROCHLORIDE 100 MG: 50 TABLET ORAL at 14:50

## 2023-09-02 RX ADMIN — PILOCARPINE HYDROCHLORIDE 5 MG: 5 TABLET, FILM COATED ORAL at 22:47

## 2023-09-02 RX ADMIN — DULOXETINE HYDROCHLORIDE 60 MG: 60 CAPSULE, DELAYED RELEASE ORAL at 08:43

## 2023-09-02 RX ADMIN — SODIUM CHLORIDE 1 G: 1 TABLET ORAL at 21:54

## 2023-09-02 RX ADMIN — POTASSIUM CHLORIDE 10 MEQ: 7.46 INJECTION, SOLUTION INTRAVENOUS at 14:37

## 2023-09-02 RX ADMIN — Medication 2000 MG: at 04:51

## 2023-09-02 RX ADMIN — METHOCARBAMOL 750 MG: 750 TABLET ORAL at 13:10

## 2023-09-02 RX ADMIN — POTASSIUM CHLORIDE 10 MEQ: 7.46 INJECTION, SOLUTION INTRAVENOUS at 13:09

## 2023-09-02 RX ADMIN — SODIUM CHLORIDE: 4.5 INJECTION, SOLUTION INTRAVENOUS at 22:41

## 2023-09-02 RX ADMIN — TRAMADOL HYDROCHLORIDE 100 MG: 50 TABLET ORAL at 21:54

## 2023-09-02 RX ADMIN — POTASSIUM CHLORIDE 10 MEQ: 7.46 INJECTION, SOLUTION INTRAVENOUS at 11:27

## 2023-09-02 RX ADMIN — METHOCARBAMOL 750 MG: 750 TABLET ORAL at 21:55

## 2023-09-02 RX ADMIN — Medication 10 ML: at 21:57

## 2023-09-02 RX ADMIN — ACETAMINOPHEN 500 MG: 500 TABLET ORAL at 10:06

## 2023-09-02 RX ADMIN — SODIUM CHLORIDE: 4.5 INJECTION, SOLUTION INTRAVENOUS at 04:51

## 2023-09-02 RX ADMIN — FAMOTIDINE 20 MG: 20 TABLET, FILM COATED ORAL at 08:45

## 2023-09-02 RX ADMIN — CYCLOBENZAPRINE 10 MG: 10 TABLET, FILM COATED ORAL at 10:04

## 2023-09-02 RX ADMIN — ENOXAPARIN SODIUM 30 MG: 100 INJECTION SUBCUTANEOUS at 08:42

## 2023-09-02 RX ADMIN — METHOCARBAMOL 750 MG: 750 TABLET ORAL at 17:55

## 2023-09-02 ASSESSMENT — PAIN DESCRIPTION - PAIN TYPE
TYPE: ACUTE PAIN;SURGICAL PAIN

## 2023-09-02 ASSESSMENT — PAIN DESCRIPTION - DESCRIPTORS
DESCRIPTORS: SORE
DESCRIPTORS: ACHING;SORE;THROBBING
DESCRIPTORS: ACHING
DESCRIPTORS: ACHING;SORE;PINS AND NEEDLES
DESCRIPTORS: SORE
DESCRIPTORS: SORE
DESCRIPTORS: ACHING
DESCRIPTORS: ACHING;SORE;PINS AND NEEDLES

## 2023-09-02 ASSESSMENT — PAIN DESCRIPTION - LOCATION
LOCATION: ARM
LOCATION: HIP
LOCATION: ARM
LOCATION: HIP
LOCATION: BACK;ABDOMEN
LOCATION: ARM

## 2023-09-02 ASSESSMENT — PAIN SCALES - GENERAL
PAINLEVEL_OUTOF10: 7
PAINLEVEL_OUTOF10: 7
PAINLEVEL_OUTOF10: 5
PAINLEVEL_OUTOF10: 5
PAINLEVEL_OUTOF10: 8
PAINLEVEL_OUTOF10: 7
PAINLEVEL_OUTOF10: 5
PAINLEVEL_OUTOF10: 7
PAINLEVEL_OUTOF10: 10
PAINLEVEL_OUTOF10: 6
PAINLEVEL_OUTOF10: 8

## 2023-09-02 ASSESSMENT — PAIN DESCRIPTION - FREQUENCY
FREQUENCY: CONTINUOUS

## 2023-09-02 ASSESSMENT — PAIN DESCRIPTION - ONSET
ONSET: ON-GOING

## 2023-09-02 ASSESSMENT — PAIN DESCRIPTION - ORIENTATION
ORIENTATION: LEFT
ORIENTATION: MID
ORIENTATION: LEFT

## 2023-09-02 ASSESSMENT — PAIN - FUNCTIONAL ASSESSMENT
PAIN_FUNCTIONAL_ASSESSMENT: PREVENTS OR INTERFERES WITH MANY ACTIVE NOT PASSIVE ACTIVITIES

## 2023-09-02 ASSESSMENT — PAIN SCALES - WONG BAKER
WONGBAKER_NUMERICALRESPONSE: 6

## 2023-09-02 NOTE — PLAN OF CARE
Problem: Discharge Planning  Goal: Discharge to home or other facility with appropriate resources  Outcome: Progressing     Problem: Pain  Goal: Verbalizes/displays adequate comfort level or baseline comfort level  9/2/2023 0016 by Lauri Dallas RN  Outcome: Progressing  9/1/2023 1617 by New Gracia RN  Outcome: Progressing     Problem: Safety - Adult  Goal: Free from fall injury  9/2/2023 0016 by Lauri Dallas RN  Outcome: Progressing  9/1/2023 1617 by New Gracia RN  Outcome: Progressing

## 2023-09-02 NOTE — OP NOTE
fixation. OPERATIVE PROCEDURE:  The patient's left hip and left wrist both were  marked. She received 2 gm Ancef IV preoperatively. The patient was  then brought to the operating room, underwent general anesthesia. She  was then transferred to the [facility] table. The left leg was placed in a  boot and the right leg in a leg bolster. C-arm confirmed that the  fracture still in good near anatomic position, _____. At this point, we  had the wrist, hip and lower extremity prepped and draped in a regular  sterile routine fashion. A time-out was called, confirmed the patient's  name and site of the procedure. A small incision was made over the proximal femur. We placed three pins  across the femoral neck into the femoral head, two in the upper part and  one in the lower part. After we confirmed that all three pins were in  good position, appropriate sized cannulated screws, and these are 6.5  cannulated screws Asnis from [employer] were placed with good purchase for  all three screws. The pins were then removed and C-arm confirmed that  all three screws were within the femoral head. At this point, we  irrigated the incision copiously with normal saline. We closed the  incision with a 2-0 and 3-0 Vicryl and skin with staples. Dressing was  then applied in the form of Xeroform, 4 x 4 and tape. At this point, we kept the back table sterile and we turned our  attention towards the distal radius. We placed a well-padded tourniquet  to the left upper arm. The left upper extremity was then prepped and  draped in a regular sterile routine fashion. A time-out was called  again with a distal radius to confirm the site and name and procedure. Esmarch was used for exsanguination. Tourniquet was inflated to 250  mmHg. A volar approach was performed. An incision was made over the  FCR tendon. The tendon sheath was opened. We then incised the pronator  quadratus muscle with the cautery.   We exposed the

## 2023-09-03 LAB
ANION GAP SERPL CALCULATED.3IONS-SCNC: 7 MMOL/L (ref 3–16)
BASOPHILS # BLD: 0.1 K/UL (ref 0–0.2)
BASOPHILS NFR BLD: 0.8 %
BUN SERPL-MCNC: 11 MG/DL (ref 7–20)
CALCIUM SERPL-MCNC: 8.5 MG/DL (ref 8.3–10.6)
CHLORIDE SERPL-SCNC: 102 MMOL/L (ref 99–110)
CO2 SERPL-SCNC: 26 MMOL/L (ref 21–32)
CREAT SERPL-MCNC: <0.5 MG/DL (ref 0.6–1.2)
DEPRECATED RDW RBC AUTO: 13 % (ref 12.4–15.4)
EOSINOPHIL # BLD: 0.1 K/UL (ref 0–0.6)
EOSINOPHIL NFR BLD: 1 %
GFR SERPLBLD CREATININE-BSD FMLA CKD-EPI: >60 ML/MIN/{1.73_M2}
GLUCOSE SERPL-MCNC: 102 MG/DL (ref 70–99)
HCT VFR BLD AUTO: 30.8 % (ref 36–48)
HGB BLD-MCNC: 10.6 G/DL (ref 12–16)
LYMPHOCYTES # BLD: 1.1 K/UL (ref 1–5.1)
LYMPHOCYTES NFR BLD: 12.1 %
MCH RBC QN AUTO: 32.5 PG (ref 26–34)
MCHC RBC AUTO-ENTMCNC: 34.4 G/DL (ref 31–36)
MCV RBC AUTO: 94.4 FL (ref 80–100)
MONOCYTES # BLD: 0.9 K/UL (ref 0–1.3)
MONOCYTES NFR BLD: 9.3 %
NEUTROPHILS # BLD: 7.3 K/UL (ref 1.7–7.7)
NEUTROPHILS NFR BLD: 76.8 %
PLATELET # BLD AUTO: 184 K/UL (ref 135–450)
PMV BLD AUTO: 8.1 FL (ref 5–10.5)
POTASSIUM SERPL-SCNC: 3.6 MMOL/L (ref 3.5–5.1)
RBC # BLD AUTO: 3.26 M/UL (ref 4–5.2)
SODIUM SERPL-SCNC: 135 MMOL/L (ref 136–145)
WBC # BLD AUTO: 9.5 K/UL (ref 4–11)

## 2023-09-03 PROCEDURE — 6370000000 HC RX 637 (ALT 250 FOR IP): Performed by: ORTHOPAEDIC SURGERY

## 2023-09-03 PROCEDURE — 2580000003 HC RX 258: Performed by: ORTHOPAEDIC SURGERY

## 2023-09-03 PROCEDURE — 6360000002 HC RX W HCPCS: Performed by: NURSE PRACTITIONER

## 2023-09-03 PROCEDURE — 97110 THERAPEUTIC EXERCISES: CPT

## 2023-09-03 PROCEDURE — 85025 COMPLETE CBC W/AUTO DIFF WBC: CPT

## 2023-09-03 PROCEDURE — 1200000000 HC SEMI PRIVATE

## 2023-09-03 PROCEDURE — 92610 EVALUATE SWALLOWING FUNCTION: CPT

## 2023-09-03 PROCEDURE — 6360000002 HC RX W HCPCS: Performed by: ORTHOPAEDIC SURGERY

## 2023-09-03 PROCEDURE — 97530 THERAPEUTIC ACTIVITIES: CPT

## 2023-09-03 PROCEDURE — 6370000000 HC RX 637 (ALT 250 FOR IP)

## 2023-09-03 PROCEDURE — 80048 BASIC METABOLIC PNL TOTAL CA: CPT

## 2023-09-03 PROCEDURE — 6370000000 HC RX 637 (ALT 250 FOR IP): Performed by: PSYCHIATRY & NEUROLOGY

## 2023-09-03 PROCEDURE — 99223 1ST HOSP IP/OBS HIGH 75: CPT | Performed by: PSYCHIATRY & NEUROLOGY

## 2023-09-03 PROCEDURE — 6370000000 HC RX 637 (ALT 250 FOR IP): Performed by: INTERNAL MEDICINE

## 2023-09-03 RX ORDER — ZIPRASIDONE MESYLATE 20 MG/ML
10 INJECTION, POWDER, LYOPHILIZED, FOR SOLUTION INTRAMUSCULAR ONCE
Status: COMPLETED | OUTPATIENT
Start: 2023-09-03 | End: 2023-09-03

## 2023-09-03 RX ORDER — ARIPIPRAZOLE 2 MG/1
2 TABLET ORAL DAILY
Status: DISCONTINUED | OUTPATIENT
Start: 2023-09-03 | End: 2023-09-06 | Stop reason: HOSPADM

## 2023-09-03 RX ADMIN — GABAPENTIN 100 MG: 100 CAPSULE ORAL at 20:17

## 2023-09-03 RX ADMIN — ZIPRASIDONE MESYLATE 10 MG: 20 INJECTION, POWDER, LYOPHILIZED, FOR SOLUTION INTRAMUSCULAR at 21:41

## 2023-09-03 RX ADMIN — ARIPIPRAZOLE 2 MG: 2 TABLET ORAL at 12:54

## 2023-09-03 RX ADMIN — POLYETHYLENE GLYCOL 3350 17 G: 17 POWDER, FOR SOLUTION ORAL at 18:49

## 2023-09-03 RX ADMIN — ENOXAPARIN SODIUM 30 MG: 100 INJECTION SUBCUTANEOUS at 09:12

## 2023-09-03 RX ADMIN — TRAMADOL HYDROCHLORIDE 100 MG: 50 TABLET ORAL at 09:36

## 2023-09-03 RX ADMIN — SODIUM CHLORIDE 1 G: 1 TABLET ORAL at 09:14

## 2023-09-03 RX ADMIN — METHOCARBAMOL 750 MG: 750 TABLET ORAL at 20:17

## 2023-09-03 RX ADMIN — ACETAMINOPHEN 500 MG: 500 TABLET ORAL at 17:26

## 2023-09-03 RX ADMIN — METHOCARBAMOL 750 MG: 750 TABLET ORAL at 17:27

## 2023-09-03 RX ADMIN — FAMOTIDINE 20 MG: 20 TABLET, FILM COATED ORAL at 09:14

## 2023-09-03 RX ADMIN — METHOCARBAMOL 750 MG: 750 TABLET ORAL at 12:54

## 2023-09-03 RX ADMIN — SODIUM CHLORIDE 1 G: 1 TABLET ORAL at 14:47

## 2023-09-03 RX ADMIN — ACETAMINOPHEN 500 MG: 500 TABLET ORAL at 02:05

## 2023-09-03 RX ADMIN — DULOXETINE HYDROCHLORIDE 30 MG: 30 CAPSULE, DELAYED RELEASE ORAL at 09:13

## 2023-09-03 RX ADMIN — PILOCARPINE HYDROCHLORIDE 5 MG: 5 TABLET, FILM COATED ORAL at 14:47

## 2023-09-03 RX ADMIN — SODIUM CHLORIDE: 4.5 INJECTION, SOLUTION INTRAVENOUS at 12:28

## 2023-09-03 RX ADMIN — GABAPENTIN 100 MG: 100 CAPSULE ORAL at 09:14

## 2023-09-03 RX ADMIN — ACETAMINOPHEN 500 MG: 500 TABLET ORAL at 09:14

## 2023-09-03 RX ADMIN — DULOXETINE HYDROCHLORIDE 60 MG: 60 CAPSULE, DELAYED RELEASE ORAL at 09:14

## 2023-09-03 RX ADMIN — PILOCARPINE HYDROCHLORIDE 5 MG: 5 TABLET, FILM COATED ORAL at 10:49

## 2023-09-03 RX ADMIN — METHOCARBAMOL 750 MG: 750 TABLET ORAL at 09:14

## 2023-09-03 RX ADMIN — GABAPENTIN 100 MG: 100 CAPSULE ORAL at 14:47

## 2023-09-03 RX ADMIN — PILOCARPINE HYDROCHLORIDE 5 MG: 5 TABLET, FILM COATED ORAL at 20:46

## 2023-09-03 RX ADMIN — Medication 2000 UNITS: at 09:14

## 2023-09-03 RX ADMIN — SODIUM CHLORIDE 1 G: 1 TABLET ORAL at 20:17

## 2023-09-03 ASSESSMENT — PAIN DESCRIPTION - FREQUENCY
FREQUENCY: CONTINUOUS

## 2023-09-03 ASSESSMENT — PAIN DESCRIPTION - PAIN TYPE
TYPE: ACUTE PAIN;SURGICAL PAIN

## 2023-09-03 ASSESSMENT — PAIN DESCRIPTION - ORIENTATION
ORIENTATION: LEFT

## 2023-09-03 ASSESSMENT — PAIN SCALES - GENERAL
PAINLEVEL_OUTOF10: 3
PAINLEVEL_OUTOF10: 7
PAINLEVEL_OUTOF10: 2
PAINLEVEL_OUTOF10: 7
PAINLEVEL_OUTOF10: 2

## 2023-09-03 ASSESSMENT — PAIN - FUNCTIONAL ASSESSMENT
PAIN_FUNCTIONAL_ASSESSMENT: PREVENTS OR INTERFERES WITH MANY ACTIVE NOT PASSIVE ACTIVITIES

## 2023-09-03 ASSESSMENT — PAIN DESCRIPTION - LOCATION
LOCATION: ARM

## 2023-09-03 ASSESSMENT — PAIN DESCRIPTION - DESCRIPTORS
DESCRIPTORS: ACHING

## 2023-09-03 ASSESSMENT — PAIN DESCRIPTION - ONSET: ONSET: GRADUAL

## 2023-09-03 NOTE — PLAN OF CARE
SLP completed evaluation. Please refer to notes in EMR.     Farhan Olguin [facility] #[id number]  Speech Language Pathologist   [phone]

## 2023-09-03 NOTE — PLAN OF CARE
Problem: Discharge Planning  Goal: Discharge to home or other facility with appropriate resources  Outcome: Progressing     Problem: Pain  Goal: Verbalizes/displays adequate comfort level or baseline comfort level  9/3/2023 0408 by Akin Calix RN  Outcome: Progressing  9/2/2023 1621 by Annie Garcia RN  Outcome: Progressing     Problem: Safety - Adult  Goal: Free from fall injury  9/3/2023 0408 by Akin Calix RN  Outcome: Progressing  9/2/2023 1621 by Annie Garcia RN  Outcome: Progressing     Problem: Skin/Tissue Integrity  Goal: Absence of new skin breakdown  Description: 1. Monitor for areas of redness and/or skin breakdown  2. Assess vascular access sites hourly  3. Every 4-6 hours minimum:  Change oxygen saturation probe site  4. Every 4-6 hours:  If on nasal continuous positive airway pressure, respiratory therapy assess nares and determine need for appliance change or resting period.   Outcome: Progressing

## 2023-09-04 LAB
ANION GAP SERPL CALCULATED.3IONS-SCNC: 8 MMOL/L (ref 3–16)
BACTERIA URNS QL MICRO: ABNORMAL /HPF
BASOPHILS # BLD: 0.1 K/UL (ref 0–0.2)
BASOPHILS NFR BLD: 0.7 %
BILIRUB UR QL STRIP.AUTO: NEGATIVE
BUN SERPL-MCNC: 7 MG/DL (ref 7–20)
CALCIUM SERPL-MCNC: 7.5 MG/DL (ref 8.3–10.6)
CHLORIDE SERPL-SCNC: 101 MMOL/L (ref 99–110)
CLARITY UR: CLEAR
CO2 SERPL-SCNC: 25 MMOL/L (ref 21–32)
COLOR UR: YELLOW
CREAT SERPL-MCNC: <0.5 MG/DL (ref 0.6–1.2)
DEPRECATED RDW RBC AUTO: 12.7 % (ref 12.4–15.4)
EOSINOPHIL # BLD: 0 K/UL (ref 0–0.6)
EOSINOPHIL NFR BLD: 0.3 %
EPI CELLS #/AREA URNS HPF: ABNORMAL /HPF (ref 0–5)
GFR SERPLBLD CREATININE-BSD FMLA CKD-EPI: >60 ML/MIN/{1.73_M2}
GLUCOSE SERPL-MCNC: 110 MG/DL (ref 70–99)
GLUCOSE UR STRIP.AUTO-MCNC: NEGATIVE MG/DL
HCT VFR BLD AUTO: 32.8 % (ref 36–48)
HGB BLD-MCNC: 11.1 G/DL (ref 12–16)
HGB UR QL STRIP.AUTO: ABNORMAL
KETONES UR STRIP.AUTO-MCNC: 15 MG/DL
LEUKOCYTE ESTERASE UR QL STRIP.AUTO: NEGATIVE
LYMPHOCYTES # BLD: 0.7 K/UL (ref 1–5.1)
LYMPHOCYTES NFR BLD: 8.9 %
MAGNESIUM SERPL-MCNC: 1.7 MG/DL (ref 1.8–2.4)
MCH RBC QN AUTO: 32 PG (ref 26–34)
MCHC RBC AUTO-ENTMCNC: 33.8 G/DL (ref 31–36)
MCV RBC AUTO: 94.8 FL (ref 80–100)
MONOCYTES # BLD: 0.6 K/UL (ref 0–1.3)
MONOCYTES NFR BLD: 7.5 %
MUCOUS THREADS #/AREA URNS LPF: ABNORMAL /LPF
NEUTROPHILS # BLD: 6.8 K/UL (ref 1.7–7.7)
NEUTROPHILS NFR BLD: 82.6 %
NITRITE UR QL STRIP.AUTO: NEGATIVE
PH UR STRIP.AUTO: 7.5 [PH] (ref 5–8)
PLATELET # BLD AUTO: 222 K/UL (ref 135–450)
PMV BLD AUTO: 8 FL (ref 5–10.5)
POTASSIUM SERPL-SCNC: 3.1 MMOL/L (ref 3.5–5.1)
PROT UR STRIP.AUTO-MCNC: ABNORMAL MG/DL
RBC # BLD AUTO: 3.46 M/UL (ref 4–5.2)
RBC #/AREA URNS HPF: ABNORMAL /HPF (ref 0–4)
SARS-COV-2 RDRP RESP QL NAA+PROBE: NOT DETECTED
SODIUM SERPL-SCNC: 134 MMOL/L (ref 136–145)
SP GR UR STRIP.AUTO: 1.01 (ref 1–1.03)
UA DIPSTICK W REFLEX MICRO PNL UR: YES
URN SPEC COLLECT METH UR: ABNORMAL
UROBILINOGEN UR STRIP-ACNC: 0.2 E.U./DL
WBC # BLD AUTO: 8.2 K/UL (ref 4–11)
WBC #/AREA URNS HPF: ABNORMAL /HPF (ref 0–5)

## 2023-09-04 PROCEDURE — 6360000002 HC RX W HCPCS: Performed by: NURSE PRACTITIONER

## 2023-09-04 PROCEDURE — 6370000000 HC RX 637 (ALT 250 FOR IP)

## 2023-09-04 PROCEDURE — 51701 INSERT BLADDER CATHETER: CPT

## 2023-09-04 PROCEDURE — 51798 US URINE CAPACITY MEASURE: CPT

## 2023-09-04 PROCEDURE — 80048 BASIC METABOLIC PNL TOTAL CA: CPT

## 2023-09-04 PROCEDURE — 85025 COMPLETE CBC W/AUTO DIFF WBC: CPT

## 2023-09-04 PROCEDURE — 6370000000 HC RX 637 (ALT 250 FOR IP): Performed by: ORTHOPAEDIC SURGERY

## 2023-09-04 PROCEDURE — 6370000000 HC RX 637 (ALT 250 FOR IP): Performed by: PSYCHIATRY & NEUROLOGY

## 2023-09-04 PROCEDURE — 81001 URINALYSIS AUTO W/SCOPE: CPT

## 2023-09-04 PROCEDURE — 36415 COLL VENOUS BLD VENIPUNCTURE: CPT

## 2023-09-04 PROCEDURE — 83735 ASSAY OF MAGNESIUM: CPT

## 2023-09-04 PROCEDURE — 6370000000 HC RX 637 (ALT 250 FOR IP): Performed by: INTERNAL MEDICINE

## 2023-09-04 PROCEDURE — 1200000000 HC SEMI PRIVATE

## 2023-09-04 PROCEDURE — 87635 SARS-COV-2 COVID-19 AMP PRB: CPT

## 2023-09-04 PROCEDURE — 99233 SBSQ HOSP IP/OBS HIGH 50: CPT | Performed by: PSYCHIATRY & NEUROLOGY

## 2023-09-04 PROCEDURE — 6360000002 HC RX W HCPCS: Performed by: ORTHOPAEDIC SURGERY

## 2023-09-04 RX ORDER — MAGNESIUM SULFATE 1 G/100ML
1000 INJECTION INTRAVENOUS ONCE
Status: COMPLETED | OUTPATIENT
Start: 2023-09-04 | End: 2023-09-04

## 2023-09-04 RX ORDER — POTASSIUM CHLORIDE 7.45 MG/ML
10 INJECTION INTRAVENOUS
Status: COMPLETED | OUTPATIENT
Start: 2023-09-04 | End: 2023-09-04

## 2023-09-04 RX ORDER — HALOPERIDOL 5 MG/ML
5 INJECTION INTRAMUSCULAR ONCE
Status: COMPLETED | OUTPATIENT
Start: 2023-09-04 | End: 2023-09-04

## 2023-09-04 RX ADMIN — POTASSIUM CHLORIDE 10 MEQ: 7.46 INJECTION, SOLUTION INTRAVENOUS at 16:51

## 2023-09-04 RX ADMIN — ENOXAPARIN SODIUM 30 MG: 100 INJECTION SUBCUTANEOUS at 09:22

## 2023-09-04 RX ADMIN — DULOXETINE HYDROCHLORIDE 30 MG: 30 CAPSULE, DELAYED RELEASE ORAL at 09:24

## 2023-09-04 RX ADMIN — Medication 2000 UNITS: at 09:25

## 2023-09-04 RX ADMIN — PILOCARPINE HYDROCHLORIDE 5 MG: 5 TABLET, FILM COATED ORAL at 09:24

## 2023-09-04 RX ADMIN — DULOXETINE HYDROCHLORIDE 60 MG: 60 CAPSULE, DELAYED RELEASE ORAL at 09:24

## 2023-09-04 RX ADMIN — MAGNESIUM SULFATE HEPTAHYDRATE 1000 MG: 1 INJECTION, SOLUTION INTRAVENOUS at 11:17

## 2023-09-04 RX ADMIN — POTASSIUM CHLORIDE 10 MEQ: 7.46 INJECTION, SOLUTION INTRAVENOUS at 12:47

## 2023-09-04 RX ADMIN — SODIUM CHLORIDE 1 G: 1 TABLET ORAL at 09:25

## 2023-09-04 RX ADMIN — HALOPERIDOL LACTATE 5 MG: 5 INJECTION, SOLUTION INTRAMUSCULAR at 04:50

## 2023-09-04 RX ADMIN — POTASSIUM CHLORIDE 10 MEQ: 7.46 INJECTION, SOLUTION INTRAVENOUS at 15:47

## 2023-09-04 RX ADMIN — GABAPENTIN 100 MG: 100 CAPSULE ORAL at 09:25

## 2023-09-04 RX ADMIN — FAMOTIDINE 20 MG: 20 TABLET, FILM COATED ORAL at 09:25

## 2023-09-04 RX ADMIN — ACETAMINOPHEN 500 MG: 500 TABLET ORAL at 09:24

## 2023-09-04 RX ADMIN — ARIPIPRAZOLE 2 MG: 2 TABLET ORAL at 12:48

## 2023-09-04 RX ADMIN — METHOCARBAMOL 750 MG: 750 TABLET ORAL at 12:48

## 2023-09-04 RX ADMIN — METHOCARBAMOL 750 MG: 750 TABLET ORAL at 09:24

## 2023-09-04 ASSESSMENT — PAIN SCALES - GENERAL
PAINLEVEL_OUTOF10: 0

## 2023-09-04 NOTE — PLAN OF CARE
Problem: Discharge Planning  Goal: Discharge to home or other facility with appropriate resources  Outcome: Progressing     Problem: Pain  Goal: Verbalizes/displays adequate comfort level or baseline comfort level  9/3/2023 2239 by Dana Dominguez LPN  Outcome: Progressing     Problem: Safety - Adult  Goal: Free from fall injury  9/3/2023 2239 by Dana Dominguez LPN  Outcome: Progressing     Problem: Skin/Tissue Integrity  Goal: Absence of new skin breakdown  Description: 1. Monitor for areas of redness and/or skin breakdown  2. Assess vascular access sites hourly  3. Every 4-6 hours minimum:  Change oxygen saturation probe site  4. Every 4-6 hours:  If on nasal continuous positive airway pressure, respiratory therapy assess nares and determine need for appliance change or resting period.   9/3/2023 2239 by Dana Dominguez LPN  Outcome: Progressing

## 2023-09-04 NOTE — PLAN OF CARE
Problem: Pain  Goal: Verbalizes/displays adequate comfort level or baseline comfort level  Outcome: Progressing  Pt scoring pain on 0-10 scale. Pain medications given per MAR. Pt instructed to call out when pain level increasing. Call light within reach. Problem: Safety - Adult  Goal: Free from fall injury  Outcome: Progressing  Bed in lowest position, wheels locked, 2/4 side rails up, nonskid footwear on. Bed/ chair check alarm in place, call light within reach. Pt instructed to call out when needing assistance. Pt stated understanding. Problem: Skin/Tissue Integrity  Goal: Absence of new skin breakdown  Description: 1. Monitor for areas of redness and/or skin breakdown  2. Assess vascular access sites hourly  3. Every 4-6 hours minimum:  Change oxygen saturation probe site  4. Every 4-6 hours:  If on nasal continuous positive airway pressure, respiratory therapy assess nares and determine need for appliance change or resting period. Outcome: Progressing     Problem: Safety - Medical Restraint  Goal: Remains free of injury from restraints (Restraint for Interference with Medical Device)  Description: INTERVENTIONS:  1. Determine that other, less restrictive measures have been tried or would not be effective before applying the restraint  2. Evaluate the patient's condition at the time of restraint application  3. Inform patient/family regarding the reason for restraint  4.  Q2H: Monitor safety, psychosocial status, comfort, nutrition and hydration  Outcome: Progressing
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
## 2023-09-05 LAB
ANION GAP SERPL CALCULATED.3IONS-SCNC: 9 MMOL/L (ref 3–16)
BASOPHILS # BLD: 0.1 K/UL (ref 0–0.2)
BASOPHILS NFR BLD: 0.5 %
BUN SERPL-MCNC: 15 MG/DL (ref 7–20)
CALCIUM SERPL-MCNC: 9.3 MG/DL (ref 8.3–10.6)
CHLORIDE SERPL-SCNC: 106 MMOL/L (ref 99–110)
CO2 SERPL-SCNC: 28 MMOL/L (ref 21–32)
CREAT SERPL-MCNC: <0.5 MG/DL (ref 0.6–1.2)
DEPRECATED RDW RBC AUTO: 12.8 % (ref 12.4–15.4)
EOSINOPHIL # BLD: 0 K/UL (ref 0–0.6)
EOSINOPHIL NFR BLD: 0.3 %
GFR SERPLBLD CREATININE-BSD FMLA CKD-EPI: >60 ML/MIN/{1.73_M2}
GLUCOSE SERPL-MCNC: 124 MG/DL (ref 70–99)
HCT VFR BLD AUTO: 35 % (ref 36–48)
HGB BLD-MCNC: 11.8 G/DL (ref 12–16)
LYMPHOCYTES # BLD: 1 K/UL (ref 1–5.1)
LYMPHOCYTES NFR BLD: 8.3 %
MCH RBC QN AUTO: 31.7 PG (ref 26–34)
MCHC RBC AUTO-ENTMCNC: 33.7 G/DL (ref 31–36)
MCV RBC AUTO: 94 FL (ref 80–100)
MONOCYTES # BLD: 0.9 K/UL (ref 0–1.3)
MONOCYTES NFR BLD: 7.1 %
NEUTROPHILS # BLD: 10.1 K/UL (ref 1.7–7.7)
NEUTROPHILS NFR BLD: 83.8 %
PLATELET # BLD AUTO: 262 K/UL (ref 135–450)
PMV BLD AUTO: 8.1 FL (ref 5–10.5)
POTASSIUM SERPL-SCNC: 3.9 MMOL/L (ref 3.5–5.1)
RBC # BLD AUTO: 3.73 M/UL (ref 4–5.2)
SODIUM SERPL-SCNC: 143 MMOL/L (ref 136–145)
WBC # BLD AUTO: 12.1 K/UL (ref 4–11)

## 2023-09-05 PROCEDURE — 97535 SELF CARE MNGMENT TRAINING: CPT

## 2023-09-05 PROCEDURE — 6370000000 HC RX 637 (ALT 250 FOR IP): Performed by: PSYCHIATRY & NEUROLOGY

## 2023-09-05 PROCEDURE — 97530 THERAPEUTIC ACTIVITIES: CPT

## 2023-09-05 PROCEDURE — 6370000000 HC RX 637 (ALT 250 FOR IP): Performed by: ORTHOPAEDIC SURGERY

## 2023-09-05 PROCEDURE — 85025 COMPLETE CBC W/AUTO DIFF WBC: CPT

## 2023-09-05 PROCEDURE — 1200000000 HC SEMI PRIVATE

## 2023-09-05 PROCEDURE — 99232 SBSQ HOSP IP/OBS MODERATE 35: CPT | Performed by: PSYCHIATRY & NEUROLOGY

## 2023-09-05 PROCEDURE — 6360000002 HC RX W HCPCS: Performed by: ORTHOPAEDIC SURGERY

## 2023-09-05 PROCEDURE — 6370000000 HC RX 637 (ALT 250 FOR IP): Performed by: INTERNAL MEDICINE

## 2023-09-05 PROCEDURE — APPSS45 APP SPLIT SHARED TIME 31-45 MINUTES: Performed by: NURSE PRACTITIONER

## 2023-09-05 PROCEDURE — 36415 COLL VENOUS BLD VENIPUNCTURE: CPT

## 2023-09-05 PROCEDURE — 92526 ORAL FUNCTION THERAPY: CPT

## 2023-09-05 PROCEDURE — 80048 BASIC METABOLIC PNL TOTAL CA: CPT

## 2023-09-05 RX ORDER — TRAMADOL HYDROCHLORIDE 50 MG/1
50 TABLET ORAL EVERY 6 HOURS PRN
Qty: 28 TABLET | Refills: 0 | Status: SHIPPED | OUTPATIENT
Start: 2023-09-05 | End: 2023-09-12

## 2023-09-05 RX ORDER — ENOXAPARIN SODIUM 100 MG/ML
30 INJECTION SUBCUTANEOUS DAILY
DISCHARGE
Start: 2023-09-06

## 2023-09-05 RX ORDER — ACETAMINOPHEN 500 MG
500 TABLET ORAL EVERY 8 HOURS
Qty: 120 TABLET | Refills: 3 | DISCHARGE
Start: 2023-09-05

## 2023-09-05 RX ORDER — POLYETHYLENE GLYCOL 3350 17 G/17G
17 POWDER, FOR SOLUTION ORAL DAILY
Qty: 527 G | Refills: 0 | DISCHARGE
Start: 2023-09-05 | End: 2023-10-05

## 2023-09-05 RX ADMIN — SODIUM CHLORIDE 1 G: 1 TABLET ORAL at 20:02

## 2023-09-05 RX ADMIN — DULOXETINE HYDROCHLORIDE 60 MG: 60 CAPSULE, DELAYED RELEASE ORAL at 09:04

## 2023-09-05 RX ADMIN — DULOXETINE HYDROCHLORIDE 30 MG: 30 CAPSULE, DELAYED RELEASE ORAL at 09:07

## 2023-09-05 RX ADMIN — ACETAMINOPHEN 500 MG: 500 TABLET ORAL at 16:24

## 2023-09-05 RX ADMIN — ACETAMINOPHEN 500 MG: 500 TABLET ORAL at 09:05

## 2023-09-05 RX ADMIN — ENOXAPARIN SODIUM 30 MG: 100 INJECTION SUBCUTANEOUS at 09:04

## 2023-09-05 RX ADMIN — SODIUM CHLORIDE 1 G: 1 TABLET ORAL at 13:57

## 2023-09-05 RX ADMIN — ARIPIPRAZOLE 2 MG: 2 TABLET ORAL at 09:06

## 2023-09-05 RX ADMIN — Medication 2000 UNITS: at 09:05

## 2023-09-05 RX ADMIN — PILOCARPINE HYDROCHLORIDE 5 MG: 5 TABLET, FILM COATED ORAL at 09:05

## 2023-09-05 RX ADMIN — PILOCARPINE HYDROCHLORIDE 5 MG: 5 TABLET, FILM COATED ORAL at 13:57

## 2023-09-05 RX ADMIN — PILOCARPINE HYDROCHLORIDE 5 MG: 5 TABLET, FILM COATED ORAL at 20:02

## 2023-09-05 RX ADMIN — SODIUM CHLORIDE 1 G: 1 TABLET ORAL at 09:05

## 2023-09-05 RX ADMIN — FAMOTIDINE 20 MG: 20 TABLET, FILM COATED ORAL at 09:05

## 2023-09-05 ASSESSMENT — PAIN SCALES - WONG BAKER: WONGBAKER_NUMERICALRESPONSE: 0

## 2023-09-05 ASSESSMENT — PAIN SCALES - GENERAL
PAINLEVEL_OUTOF10: 2
PAINLEVEL_OUTOF10: 0

## 2023-09-05 NOTE — FLOWSHEET NOTE
Bilateral soft wrist restraints discontinued at 0830. Pt confused to time, place, situation, but easily reoriented, cooperative, follows commands this time.  at bedside, [employer] video monitor remains engaged, Posey bed check alarm on.

## 2023-09-05 NOTE — DISCHARGE INSTR - COC
Continuity of Care Form    Patient Name: Janak Wilson   :  1950  MRN:  [id number]    Admit date:  2023  Discharge date:  2023    Code Status Order: Full Code   Advance Directives:     Admitting Physician:  Mariana Camarena DO  PCP: Soledad Hills MD    Discharging Nurse: [facility] Unit/Room#: 2029/4343-20  Discharging Unit Phone Number: [phone]    Emergency Contact:   Extended Emergency Contact Information  Primary Emergency Contact: Bruce Yeager  Address: [address] [place] [address] of [address] [place] Phone: [phone]  Mobile Phone: [phone]  Relation: Spouse  Secondary Emergency Contact: [address] [place] [address] of [address] [place] Phone: [phone]  Relation: Child    Past Surgical History:  Past Surgical History:   Procedure Laterality Date    BACK SURGERY      COLONOSCOPY  2002    due     COLONOSCOPY  2012    due     FINGER TRIGGER RELEASE      right hand    HAMMER TOE SURGERY      HUMERUS FRACTURE SURGERY Left 2023    LEFT DISTAL RADIUS OPEN REDUCTION INTERNAL FIXATION performed by Starla Hester MD at [facility] Left 2023    LEFT HIP PINNING performed by Starla Hester MD at [facility]  14 yo       Immunization History:   Immunization History   Administered Date(s) Administered    COVID-19, MODERNA BLUE border, Primary or Immunocompromised, (age 12y+), IM, 100 mcg/0.5mL 2021, 2021, 2021    COVID-19, MODERNA Bivalent, (age 12y+), IM, 48 mcg/0.5 mL 10/21/2022    Influenza 2011    Influenza Vaccine, unspecified formulation 10/27/2016    Influenza, FLUAD, (age 72 y+), Adjuvanted, 0.5mL 10/18/2022    Influenza, FLUZONE (age 72 y+), High Dose, 0.7mL 2020, 10/14/2021    Influenza, High Dose (Fluzone 65 yrs and older) 10/28/2014, 2015, 2017, 10/20/2018    Influenza, Intradermal, Preservative free

## 2023-09-05 NOTE — CARE COORDINATION
Chart reviewed day 5. Pt is followed by IM and Neuro. Pt w/improved AMS status, writer visited pt at bedside and pt is pleasant, answering questions correctly. Writer spoke w/pt, spouse and dtr r/t SNF recs. Spouse given SNF list on Friday, reported he had not looked at. Originally requested [facility], accepted. Then pt/family changed mind to [facility], awaiting to hear r/t referral, 2nd choice is [facility], 3rd [facility]. Pt from home w/spouse. Dtr reports spouse can not provide care pt needs and dtr currently has Covid. Will follow for needs as they arise. Electronically signed by Nile Reardon RN on 9/5/2023 at 1:17 PM    [facility] declined d/t SLP notes. Spoke w/khnahusse & pt at bedside. Requested to move forward w/[facility]. Alina Mueller at [facility] placing [id number] [address]. Will follow.  Electronically signed by Nile Reardon RN on 9/5/2023 at 2:07 PM

## 2023-09-05 NOTE — DISCHARGE INSTRUCTIONS
Keep hip dressing and wrist splint clean and dry. Change hip Mepilex every 3-5 days or as needed for excess drainage. Please call physician if increased redness around incision, increased drainage, fevers, or pain becomes significantly worse. Do not immerse in water such as baths but okay to shower with dressing on to protect wound. Do not get splint wet    F/U with Dr Leon Núñez in 10-14 days. Call [facility] for appointment time and date for follow up at [phone]. Weight Bearing on Surgical Side: 25% weightbearing to the left leg. Nonweightbearing to the left arm, OK to use platform walker for ambulation    Continue DVT Prophylaxis:  lovenox 30mg daily for 6 weeks post op    Pain Medications  You were given tramadol  Wean off pain medications as you deem appropriate as long as pain is under control  Be sure to drink plenty of fluids (recommend water) while taking narcotic pain medications to prevent constipation   You may take an over the counter laxative or stool softener as needed to prevent/treat constipation as well, we recommend Senokot S OTC. We recommend that you consider taking these medications the entire time you are taking pain medication. Cold packs/Ice packs/Machine  May be used as much as necessary to control swelling/inflammation/soreness  Be sure to have a barrier (cloth, clothing, towel) between the site and the ice pack to prevent [facility] office [phone] if  Increased redness, swelling, drainage of any kind, and/or pain to surgery site. As well as new onset fevers and or chills. These could signify an infection. Calf or thigh tenderness to touch as well as increased swelling or redness. This could signify a clot formation. Numbness or tingling to an area around the incision site or below the incision site (toes). Any rash appears, increased  or new onset nausea/vomiting occur.   This may indicate a reaction to a

## 2023-09-05 NOTE — PLAN OF CARE
Problem: Discharge Planning  Goal: Discharge to home or other facility with appropriate resources  Outcome: Progressing     Problem: Pain  Goal: Verbalizes/displays adequate comfort level or baseline comfort level  9/4/2023 2118 by Dona Souza RN  Outcome: Progressing  9/4/2023 1255 by Jaime Lemus RN  Outcome: Progressing     Problem: Safety - Adult  Goal: Free from fall injury  9/4/2023 2118 by Dona Souza RN  Outcome: Progressing  9/4/2023 1255 by Jaime Lemus RN  Outcome: Progressing     Problem: Skin/Tissue Integrity  Goal: Absence of new skin breakdown  Description: 1. Monitor for areas of redness and/or skin breakdown  2. Assess vascular access sites hourly  3. Every 4-6 hours minimum:  Change oxygen saturation probe site  4. Every 4-6 hours:  If on nasal continuous positive airway pressure, respiratory therapy assess nares and determine need for appliance change or resting period. 9/4/2023 2118 by Dona Souza RN  Outcome: Progressing  9/4/2023 1255 by Jaime Lemus RN  Outcome: Progressing     Problem: Safety - Medical Restraint  Goal: Remains free of injury from restraints (Restraint for Interference with Medical Device)  Description: INTERVENTIONS:  1. Determine that other, less restrictive measures have been tried or would not be effective before applying the restraint  2. Evaluate the patient's condition at the time of restraint application  3. Inform patient/family regarding the reason for restraint  4.  Q2H: Monitor safety, psychosocial status, comfort, nutrition and hydration  9/4/2023 2118 by Dona Souza RN  Outcome: Progressing  9/4/2023 1255 by Jaime Lemus RN  Outcome: Progressing

## 2023-09-05 NOTE — FLOWSHEET NOTE
Pt remains cooperative and calm still since restraints discontinued. Pt's , however, making critical and disparaging remarks towards RN every time said RN enters room.

## 2023-09-06 ENCOUNTER — TELEPHONE (OUTPATIENT)
Dept: FAMILY MEDICINE CLINIC | Age: 73
End: 2023-09-06

## 2023-09-06 VITALS
DIASTOLIC BLOOD PRESSURE: 75 MMHG | BODY MASS INDEX: 17 KG/M2 | SYSTOLIC BLOOD PRESSURE: 138 MMHG | TEMPERATURE: 98 F | WEIGHT: 102 LBS | OXYGEN SATURATION: 95 % | HEART RATE: 75 BPM | RESPIRATION RATE: 16 BRPM | HEIGHT: 65 IN

## 2023-09-06 LAB
ANION GAP SERPL CALCULATED.3IONS-SCNC: 8 MMOL/L (ref 3–16)
BASOPHILS # BLD: 0.1 K/UL (ref 0–0.2)
BASOPHILS NFR BLD: 0.9 %
BUN SERPL-MCNC: 28 MG/DL (ref 7–20)
CALCIUM SERPL-MCNC: 9.3 MG/DL (ref 8.3–10.6)
CHLORIDE SERPL-SCNC: 104 MMOL/L (ref 99–110)
CO2 SERPL-SCNC: 29 MMOL/L (ref 21–32)
CREAT SERPL-MCNC: <0.5 MG/DL (ref 0.6–1.2)
DEPRECATED RDW RBC AUTO: 13 % (ref 12.4–15.4)
EOSINOPHIL # BLD: 0.2 K/UL (ref 0–0.6)
EOSINOPHIL NFR BLD: 3 %
GFR SERPLBLD CREATININE-BSD FMLA CKD-EPI: >60 ML/MIN/{1.73_M2}
GLUCOSE SERPL-MCNC: 104 MG/DL (ref 70–99)
HCT VFR BLD AUTO: 35.6 % (ref 36–48)
HGB BLD-MCNC: 11.9 G/DL (ref 12–16)
LYMPHOCYTES # BLD: 1.1 K/UL (ref 1–5.1)
LYMPHOCYTES NFR BLD: 13.7 %
MCH RBC QN AUTO: 31.7 PG (ref 26–34)
MCHC RBC AUTO-ENTMCNC: 33.6 G/DL (ref 31–36)
MCV RBC AUTO: 94.4 FL (ref 80–100)
MONOCYTES # BLD: 0.8 K/UL (ref 0–1.3)
MONOCYTES NFR BLD: 10.5 %
NEUTROPHILS # BLD: 5.6 K/UL (ref 1.7–7.7)
NEUTROPHILS NFR BLD: 71.9 %
PLATELET # BLD AUTO: 279 K/UL (ref 135–450)
PMV BLD AUTO: 8.3 FL (ref 5–10.5)
POTASSIUM SERPL-SCNC: 3.9 MMOL/L (ref 3.5–5.1)
RBC # BLD AUTO: 3.77 M/UL (ref 4–5.2)
SODIUM SERPL-SCNC: 141 MMOL/L (ref 136–145)
WBC # BLD AUTO: 7.7 K/UL (ref 4–11)

## 2023-09-06 PROCEDURE — 6370000000 HC RX 637 (ALT 250 FOR IP): Performed by: ORTHOPAEDIC SURGERY

## 2023-09-06 PROCEDURE — 97530 THERAPEUTIC ACTIVITIES: CPT

## 2023-09-06 PROCEDURE — 85025 COMPLETE CBC W/AUTO DIFF WBC: CPT

## 2023-09-06 PROCEDURE — 6360000002 HC RX W HCPCS: Performed by: ORTHOPAEDIC SURGERY

## 2023-09-06 PROCEDURE — 6370000000 HC RX 637 (ALT 250 FOR IP): Performed by: INTERNAL MEDICINE

## 2023-09-06 PROCEDURE — 36415 COLL VENOUS BLD VENIPUNCTURE: CPT

## 2023-09-06 PROCEDURE — 97535 SELF CARE MNGMENT TRAINING: CPT

## 2023-09-06 PROCEDURE — 97110 THERAPEUTIC EXERCISES: CPT

## 2023-09-06 PROCEDURE — 6370000000 HC RX 637 (ALT 250 FOR IP): Performed by: PSYCHIATRY & NEUROLOGY

## 2023-09-06 PROCEDURE — 80048 BASIC METABOLIC PNL TOTAL CA: CPT

## 2023-09-06 RX ORDER — ARIPIPRAZOLE 2 MG/1
2 TABLET ORAL DAILY
Qty: 30 TABLET | Refills: 0 | DISCHARGE
Start: 2023-09-06

## 2023-09-06 RX ADMIN — ARIPIPRAZOLE 2 MG: 2 TABLET ORAL at 08:49

## 2023-09-06 RX ADMIN — Medication 2000 UNITS: at 08:53

## 2023-09-06 RX ADMIN — PILOCARPINE HYDROCHLORIDE 5 MG: 5 TABLET, FILM COATED ORAL at 13:39

## 2023-09-06 RX ADMIN — DULOXETINE HYDROCHLORIDE 60 MG: 60 CAPSULE, DELAYED RELEASE ORAL at 08:54

## 2023-09-06 RX ADMIN — SODIUM CHLORIDE 1 G: 1 TABLET ORAL at 08:53

## 2023-09-06 RX ADMIN — SODIUM CHLORIDE 1 G: 1 TABLET ORAL at 13:39

## 2023-09-06 RX ADMIN — ACETAMINOPHEN 500 MG: 500 TABLET ORAL at 08:53

## 2023-09-06 RX ADMIN — FAMOTIDINE 20 MG: 20 TABLET, FILM COATED ORAL at 08:53

## 2023-09-06 RX ADMIN — ENOXAPARIN SODIUM 30 MG: 100 INJECTION SUBCUTANEOUS at 08:54

## 2023-09-06 RX ADMIN — PILOCARPINE HYDROCHLORIDE 5 MG: 5 TABLET, FILM COATED ORAL at 10:00

## 2023-09-06 RX ADMIN — DULOXETINE HYDROCHLORIDE 30 MG: 30 CAPSULE, DELAYED RELEASE ORAL at 08:52

## 2023-09-06 ASSESSMENT — PAIN SCALES - GENERAL: PAINLEVEL_OUTOF10: 0

## 2023-09-06 NOTE — DISCHARGE SUMMARY
Hospital Medicine Discharge Summary    Patient: Pauline Lindsay   : 1950     Admit Date: 2023   Discharge Date:   23  Disposition:  []Home   []HHC  [x]SNF  []Acute Rehab  []LTAC  []Hospice  Code status:  [x]Full  []DNR/CCA  []Limited (DNR/CCA with Do Not Intubate)  []DNRCC  Condition at Discharge: Stable  Primary Care Provider: Nichole Varner MD    Admitting Provider: Maira Sawyer DO  Discharge Provider: ZAFAR Isaac - NP     Discharge Diagnoses: Active Hospital Problems    Diagnosis     Fall [W19. XXXA]     Preoperative cardiovascular examination [O81.542]     Bone fracture [T14. 8XXA]     Closed fracture of neck of left femur ([address]) [S72.002A]     Closed fracture of left distal radius [S52.502A]     PD (Parkinson's disease) ([address]) [G20]      Patient doing very well today. She is sitting up in the chair and alert and oriented. No more confusion or hallucinations. Her  is also at bedside. She is medically stable for discharge. I did discuss neurology follow up with her primary neurologist to discuss her dose changes since she was admitted here. They voiced understanding    Presenting Admission History:        Pauline Lindsay is a 75 y.o. female who is presenting with fall. Patient was sitting in the wheelchair, when she went to stand up, the wheel chair brakes were not locked on and patient ended up falling onto her left side. Patient was able to ambulate after the fall. She had left hip left wrist pain. No loss of consciousness. When I saw the patient she was comfortable in bed, awake alert oriented x3 on room air. Patient denies chest pain or palpitations before the fall. She denies fever, chills, nausea vomiting, abdominal pain, dysuria and bleeding. In the ED vitals were stable. Labs showed BUN 20, creatinine 0.7, blood sugar 129, LFT unremarkable, white count 10.1, hemoglobin 12.5, platelets 304. Chest x-ray negative for acute findings.   Left wrist x-ray

## 2023-09-06 NOTE — CARE COORDINATION
CASE MANAGEMENT DISCHARGE SUMMARY      Discharge to: [facility], [facility] completed: yes  Hospital Exemption Notification (HENS) completed: yes    IMM given: (date) 9/6/23    New Durable Medical Equipment ordered/agency: NA    Transportation:    Family/car:  to transport        Confirmed discharge plan with:     Patient: yes      Family:  yes-  at bedside     Facility/Agency, name:  [address] [place] [address] faxed   Phone number for report to facility: [phone] - rehab nurse     RN, name: Yue Prabhjotavni    Note: Discharging nurse to complete CINDY, reconcile AVS, and place final copy with patient's discharge packet. RN to ensure that written prescriptions for  Level II medications are sent with patient to the facility as per protocol.

## 2023-09-06 NOTE — TELEPHONE ENCOUNTER
Care Transitions Initial Follow Up Call    Outreach made within 2 business days of discharge: Yes    Patient: Faustino Alatorre Patient : 1950   MRN: [id number]  Reason for Admission: There are no discharge diagnoses documented for the most recent discharge. Discharge Date: 23       Spoke with: Pt    Discharge department/facility: [facility] Interactive Patient Contact:  Was patient able to fill all prescriptions: Yes  Was patient instructed to bring all medications to the follow-up visit: Yes  Is patient taking all medications as directed in the discharge summary?  Yes  Does patient understand their discharge instructions: Yes  Does patient have questions or concerns that need addressed prior to 7-14 day follow up office visit: no    Scheduled appointment with PCP within 7-14 days    Follow Up  Future Appointments   Date Time Provider [address] [place] [address]   2023 11:30 AM Liyah Rowland MD [address] [place] [address] 83 - DYD   2023  2:30 PM Liyah Rowland MD [address] [place] [address]

## 2023-09-19 ENCOUNTER — TELEPHONE (OUTPATIENT)
Dept: ORTHOPEDIC SURGERY | Age: 73
End: 2023-09-19

## 2023-09-19 ENCOUNTER — OFFICE VISIT (OUTPATIENT)
Dept: ORTHOPEDIC SURGERY | Age: 73
End: 2023-09-19

## 2023-09-19 VITALS — HEIGHT: 65 IN | BODY MASS INDEX: 17 KG/M2 | WEIGHT: 102 LBS

## 2023-09-19 DIAGNOSIS — S72.002A CLOSED FRACTURE OF NECK OF LEFT FEMUR, INITIAL ENCOUNTER (HCC): ICD-10-CM

## 2023-09-19 DIAGNOSIS — S52.592A OTHER CLOSED FRACTURE OF DISTAL END OF LEFT RADIUS, INITIAL ENCOUNTER: Primary | ICD-10-CM

## 2023-09-19 NOTE — TELEPHONE ENCOUNTER
Received incoming call from patient's nursing facility. There is an insurance review today and they need the updated office note ASAP. Confirmed verbally with Dr Ryan Barros that the patient is now allowed to be 50% WB with walker.     Fax note to [facility] at [phone] ASAP

## 2023-09-19 NOTE — PROGRESS NOTES
DIAGNOSIS:   1- Left femur impacted neck fracture, status post Hip pinning with cannulated screws. 2- Left distal radius 2 parts intra-articular displaced fracture, status post ORIF. DATE OF SURGERY:  8/31/2023 . HISTORY OF PRESENT ILLNESS: Ms. Nelli Morales 75 y.o.  female  who came in today for 2 weeks postoperative visit. The patient denies any significant pain in the left wrist or hip, 2/10. She has been working on ROM and 25% WB. No numbness or tingling sensation. No fever or Chills. PHYSICAL EXAMINATION:  The incision is healed well, left hip and wrist.  No signs of any erythema or drainage. She has no pain with the active or passive range of motion of the left wrist or hip. She has intact sensation, distally, and  is neurovascularly intact. IMAGING:  Two views left hip and femur, and AP pelvis showed good alignment of the impacted femoral neck fracture, 3 cannulated screws in good position, no loosening, or hardware failure, one screw slightly protruding. Three views left wrist taken today in the office showed anatomic alignment of left distal radius, plate and screws in good position, no loosening. IMPRESSION:    1- Left femur impacted neck fracture, status post Hip pinning with cannulated screws. 2- Left distal radius 2 parts intra-articular displaced fracture, status post ORIF. PLAN:  I have told the patient to work on ROM with  PT, 50% WB left hip as well as strengthening exercises. A removable forearm brace applied. No heavy impact activities. The patient will come back for a follow up in 6 weeks. At that time, we will take Two views left wrist and Hip, and AP pelvis.     As this patient has demonstrated risk factors for osteoporosis, such as age greater than fifty years and evidence of a fracture, I have referred the patient back to the primary care physician for evaluation for osteoporosis, including consideration for DEXA scanning, if this is felt to be clinically

## 2023-09-26 ENCOUNTER — APPOINTMENT (OUTPATIENT)
Dept: CT IMAGING | Age: 73
End: 2023-09-26
Payer: MEDICARE

## 2023-09-26 ENCOUNTER — HOSPITAL ENCOUNTER (EMERGENCY)
Age: 73
Discharge: HOME | End: 2023-09-26
Attending: STUDENT IN AN ORGANIZED HEALTH CARE EDUCATION/TRAINING PROGRAM
Payer: MEDICARE

## 2023-09-26 ENCOUNTER — TELEPHONE (OUTPATIENT)
Dept: ORTHOPEDIC SURGERY | Age: 73
End: 2023-09-26

## 2023-09-26 ENCOUNTER — APPOINTMENT (OUTPATIENT)
Dept: GENERAL RADIOLOGY | Age: 73
End: 2023-09-26
Payer: MEDICARE

## 2023-09-26 VITALS
OXYGEN SATURATION: 98 % | TEMPERATURE: 98.2 F | HEIGHT: 65 IN | RESPIRATION RATE: 16 BRPM | WEIGHT: 102 LBS | BODY MASS INDEX: 17 KG/M2 | HEART RATE: 94 BPM | SYSTOLIC BLOOD PRESSURE: 142 MMHG | DIASTOLIC BLOOD PRESSURE: 74 MMHG

## 2023-09-26 DIAGNOSIS — S01.81XA FACIAL LACERATION, INITIAL ENCOUNTER: ICD-10-CM
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
## 2023-09-26 DIAGNOSIS — S42.032A CLOSED DISPLACED FRACTURE OF ACROMIAL END OF LEFT CLAVICLE, INITIAL ENCOUNTER: Primary | ICD-10-CM

## 2023-09-26 LAB
ALBUMIN SERPL-MCNC: 3.8 G/DL (ref 3.4–5)
ALBUMIN/GLOB SERPL: 1.5 {RATIO} (ref 1.1–2.2)
ALP SERPL-CCNC: 94 U/L (ref 40–129)
ALT SERPL-CCNC: <5 U/L (ref 10–40)
ANION GAP SERPL CALCULATED.3IONS-SCNC: 9 MMOL/L (ref 3–16)
AST SERPL-CCNC: 14 U/L (ref 15–37)
BASOPHILS # BLD: 0.1 K/UL (ref 0–0.2)
BASOPHILS NFR BLD: 0.6 %
BILIRUB SERPL-MCNC: 0.4 MG/DL (ref 0–1)
BUN SERPL-MCNC: 17 MG/DL (ref 7–20)
CALCIUM SERPL-MCNC: 8.8 MG/DL (ref 8.3–10.6)
CHLORIDE SERPL-SCNC: 105 MMOL/L (ref 99–110)
CO2 SERPL-SCNC: 29 MMOL/L (ref 21–32)
CREAT SERPL-MCNC: <0.5 MG/DL (ref 0.6–1.2)
DEPRECATED RDW RBC AUTO: 13.4 % (ref 12.4–15.4)
EOSINOPHIL # BLD: 0 K/UL (ref 0–0.6)
EOSINOPHIL NFR BLD: 0.2 %
GFR SERPLBLD CREATININE-BSD FMLA CKD-EPI: >60 ML/MIN/{1.73_M2}
GLUCOSE SERPL-MCNC: 104 MG/DL (ref 70–99)
HCT VFR BLD AUTO: 34.1 % (ref 36–48)
HGB BLD-MCNC: 11.5 G/DL (ref 12–16)
LYMPHOCYTES # BLD: 1 K/UL (ref 1–5.1)
LYMPHOCYTES NFR BLD: 10.3 %
MAGNESIUM SERPL-MCNC: 2.1 MG/DL (ref 1.8–2.4)
MCH RBC QN AUTO: 31.6 PG (ref 26–34)
MCHC RBC AUTO-ENTMCNC: 33.7 G/DL (ref 31–36)
MCV RBC AUTO: 93.7 FL (ref 80–100)
MONOCYTES # BLD: 0.6 K/UL (ref 0–1.3)
MONOCYTES NFR BLD: 6.5 %
NEUTROPHILS # BLD: 7.8 K/UL (ref 1.7–7.7)
NEUTROPHILS NFR BLD: 82.4 %
PLATELET # BLD AUTO: 269 K/UL (ref 135–450)
PMV BLD AUTO: 7.7 FL (ref 5–10.5)
POTASSIUM SERPL-SCNC: 3.3 MMOL/L (ref 3.5–5.1)
PROT SERPL-MCNC: 6.4 G/DL (ref 6.4–8.2)
RBC # BLD AUTO: 3.64 M/UL (ref 4–5.2)
SODIUM SERPL-SCNC: 143 MMOL/L (ref 136–145)
WBC # BLD AUTO: 9.5 K/UL (ref 4–11)

## 2023-09-26 PROCEDURE — 90715 TDAP VACCINE 7 YRS/> IM: CPT | Performed by: STUDENT IN AN ORGANIZED HEALTH CARE EDUCATION/TRAINING PROGRAM

## 2023-09-26 PROCEDURE — 6370000000 HC RX 637 (ALT 250 FOR IP): Performed by: EMERGENCY MEDICINE

## 2023-09-26 PROCEDURE — 70450 CT HEAD/BRAIN W/O DYE: CPT

## 2023-09-26 PROCEDURE — 72125 CT NECK SPINE W/O DYE: CPT

## 2023-09-26 PROCEDURE — 90471 IMMUNIZATION ADMIN: CPT | Performed by: STUDENT IN AN ORGANIZED HEALTH CARE EDUCATION/TRAINING PROGRAM

## 2023-09-26 PROCEDURE — 80053 COMPREHEN METABOLIC PANEL: CPT

## 2023-09-26 PROCEDURE — 6360000002 HC RX W HCPCS: Performed by: STUDENT IN AN ORGANIZED HEALTH CARE EDUCATION/TRAINING PROGRAM

## 2023-09-26 PROCEDURE — 6370000000 HC RX 637 (ALT 250 FOR IP): Performed by: STUDENT IN AN ORGANIZED HEALTH CARE EDUCATION/TRAINING PROGRAM

## 2023-09-26 PROCEDURE — 73030 X-RAY EXAM OF SHOULDER: CPT

## 2023-09-26 PROCEDURE — 85025 COMPLETE CBC W/AUTO DIFF WBC: CPT

## 2023-09-26 PROCEDURE — 99284 EMERGENCY DEPT VISIT MOD MDM: CPT

## 2023-09-26 PROCEDURE — 71045 X-RAY EXAM CHEST 1 VIEW: CPT

## 2023-09-26 PROCEDURE — 72192 CT PELVIS W/O DYE: CPT

## 2023-09-26 PROCEDURE — 83735 ASSAY OF MAGNESIUM: CPT

## 2023-09-26 PROCEDURE — 12013 RPR F/E/E/N/L/M 2.6-5.0 CM: CPT

## 2023-09-26 RX ORDER — OXYCODONE AND ACETAMINOPHEN 7.5; 325 MG/1; MG/1
1 TABLET ORAL 2 TIMES DAILY
Qty: 6 TABLET | Refills: 0 | Status: SHIPPED | OUTPATIENT
Start: 2023-09-26 | End: 2023-09-26 | Stop reason: SDUPTHER

## 2023-09-26 RX ORDER — OXYCODONE AND ACETAMINOPHEN 7.5; 325 MG/1; MG/1
1 TABLET ORAL 2 TIMES DAILY
Qty: 6 TABLET | Refills: 0 | Status: SHIPPED | OUTPATIENT
Start: 2023-09-26 | End: 2023-09-29

## 2023-09-26 RX ORDER — OXYCODONE HYDROCHLORIDE AND ACETAMINOPHEN 5; 325 MG/1; MG/1
1 TABLET ORAL ONCE
Status: COMPLETED | OUTPATIENT
Start: 2023-09-26 | End: 2023-09-26

## 2023-09-26 RX ADMIN — TETANUS TOXOID, REDUCED DIPHTHERIA TOXOID AND ACELLULAR PERTUSSIS VACCINE, ADSORBED 0.5 ML: 5; 2.5; 8; 8; 2.5 SUSPENSION INTRAMUSCULAR at 06:37

## 2023-09-26 RX ADMIN — POTASSIUM BICARBONATE 40 MEQ: 782 TABLET, EFFERVESCENT ORAL at 06:37

## 2023-09-26 RX ADMIN — OXYCODONE HYDROCHLORIDE AND ACETAMINOPHEN 1 TABLET: 5; 325 TABLET ORAL at 06:36

## 2023-09-26 ASSESSMENT — PAIN - FUNCTIONAL ASSESSMENT: PAIN_FUNCTIONAL_ASSESSMENT: 0-10

## 2023-09-26 ASSESSMENT — PAIN SCALES - GENERAL: PAINLEVEL_OUTOF10: 6

## 2023-09-26 NOTE — TELEPHONE ENCOUNTER
Attempted to reach patient to schedule appointment for new clavicle injury jeanette in [facility] office @ 7 PM per provider. Left message that a second call attempt will be made today.

## 2023-09-26 NOTE — TELEPHONE ENCOUNTER
Had to leave a voicemail for scheduling at [facility]. Asked for a return call to schedule. Per [facility], would like to see patient Friday at [place] office (before 10 AM).  If unable to transport Friday, may overbook on 10/3/23 in [place] @ 9:45 AM.

## 2023-09-26 NOTE — TELEPHONE ENCOUNTER
Spoke with patient's  as daughter instructed. He denies being able to transport Natacha to an appointment. Reports she is at [facility]. I offered to call patient's daughter back to ask if she is available to transport Natacha tonight as this injury should be urgently discussed for treatment options. Ganga Hines wanted the facility contacted and understands she won't be seen until next week then.

## 2023-09-28 ENCOUNTER — TELEPHONE (OUTPATIENT)
Dept: ORTHOPEDIC SURGERY | Age: 73
End: 2023-09-28

## 2023-09-28 NOTE — TELEPHONE ENCOUNTER
CALLING ABOUT PATIENT BEING NON COMPLIANT WITH SLING AND WANTS TO KNOW WHAT TO DO. PATIENT IS CONFUSED SHE HAS A UTI AND COVID. PLEASE CALL ANTONETTE WITH  [facility] [place] [facility] WITH ADVISE OR SUGGESTIONS.

## 2023-09-28 NOTE — TELEPHONE ENCOUNTER
Elisa Michel was instructed to use a ace bandage to secure the patients shoulder. Patient referred to Dr. Ryan Apple from Emergency Department. Called patient in regards to scheduling an appointment to follow up with orthopedics.      Patient was scheduled for 10/3/2023 at the [facility] and [address] [facility], [address] [facility], [address] [place] [address]

## 2023-10-01 PROBLEM — Z01.810 PREOPERATIVE CARDIOVASCULAR EXAMINATION: Status: RESOLVED | Noted: 2023-09-01 | Resolved: 2023-10-01

## 2023-10-02 ENCOUNTER — TELEPHONE (OUTPATIENT)
Dept: ORTHOPEDIC SURGERY | Age: 73
End: 2023-10-02

## 2023-10-02 NOTE — TELEPHONE ENCOUNTER
General Question     Subject: APPT ON 10-3-2023 ON LT CLAVICLE   Patient and /or Facility Request: [facility]  Contact Number: [phone] [address] CALLING IN REGARDS TO PT HAVING COVID AND WOULD LIKE TO KNOW IF HE PT WOULD STILL BE ABLE TO ROBERTO IN OR WILL SHE HAVE TO R/S. PLEASE CALL BACK NUMBER ABOVE.

## 2023-10-03 ENCOUNTER — OFFICE VISIT (OUTPATIENT)
Dept: ORTHOPEDIC SURGERY | Age: 73
End: 2023-10-03
Payer: MEDICARE

## 2023-10-03 ENCOUNTER — TELEPHONE (OUTPATIENT)
Dept: ORTHOPEDIC SURGERY | Age: 73
End: 2023-10-03

## 2023-10-03 VITALS — HEIGHT: 65 IN | WEIGHT: 100 LBS | BODY MASS INDEX: 16.66 KG/M2

## 2023-10-03 DIAGNOSIS — S42.032A DISPLACED FRACTURE OF LATERAL END OF LEFT CLAVICLE, INITIAL ENCOUNTER FOR CLOSED FRACTURE: Primary | ICD-10-CM

## 2023-10-03 DIAGNOSIS — S42.032A CLOSED DISPLACED FRACTURE OF ACROMIAL END OF LEFT CLAVICLE, INITIAL ENCOUNTER: Primary | ICD-10-CM

## 2023-10-03 PROCEDURE — 1036F TOBACCO NON-USER: CPT | Performed by: ORTHOPAEDIC SURGERY

## 2023-10-03 PROCEDURE — 3017F COLORECTAL CA SCREEN DOC REV: CPT | Performed by: ORTHOPAEDIC SURGERY

## 2023-10-03 PROCEDURE — G8427 DOCREV CUR MEDS BY ELIG CLIN: HCPCS | Performed by: ORTHOPAEDIC SURGERY

## 2023-10-03 PROCEDURE — 99214 OFFICE O/P EST MOD 30 MIN: CPT | Performed by: ORTHOPAEDIC SURGERY

## 2023-10-03 PROCEDURE — G8399 PT W/DXA RESULTS DOCUMENT: HCPCS | Performed by: ORTHOPAEDIC SURGERY

## 2023-10-03 PROCEDURE — 1111F DSCHRG MED/CURRENT MED MERGE: CPT | Performed by: ORTHOPAEDIC SURGERY

## 2023-10-03 PROCEDURE — 1090F PRES/ABSN URINE INCON ASSESS: CPT | Performed by: ORTHOPAEDIC SURGERY

## 2023-10-03 PROCEDURE — G8419 CALC BMI OUT NRM PARAM NOF/U: HCPCS | Performed by: ORTHOPAEDIC SURGERY

## 2023-10-03 PROCEDURE — 1123F ACP DISCUSS/DSCN MKR DOCD: CPT | Performed by: ORTHOPAEDIC SURGERY

## 2023-10-03 PROCEDURE — G8484 FLU IMMUNIZE NO ADMIN: HCPCS | Performed by: ORTHOPAEDIC SURGERY

## 2023-10-03 NOTE — TELEPHONE ENCOUNTER
[facility] unable to operate out of [facility]. Returned call to Sonny Quezada to provide address. Call [phone] if surgery time changes. Sonny Quezada would like a call about 30 minutes before patient is ready for pickup after surgery if possible. Explained that our office is not at the surgery center to monitor patient progress. She understood.

## 2023-10-03 NOTE — TELEPHONE ENCOUNTER
Surgery and/or Procedure Scheduling     Contact Name: DESEAN   Surgical/Procedure Request: [facility] ABOUT [facility]   Patient Contact Number: [phone]

## 2023-10-03 NOTE — TELEPHONE ENCOUNTER
Medical Facility Question     Facility Name: [address] Name: [address] Number: [phone]  Request or Information: WONDERING IF YOU COULD [facility] [employer] TO Gaurav Cervantes.

## 2023-10-03 NOTE — TELEPHONE ENCOUNTER
Auth: [employer]  Date: 10/05/23  Reference # None  Spoke with: None  Type of SX: Outpatient  Location: W  CPT: 13522   DX: G43.826O  SX area:  clavicle  [facility] [place]

## 2023-10-03 NOTE — PROGRESS NOTES
CHIEF COMPLAINT: Left shoulder pain/ markedly displaced distal clavicle fracture. DATE OF INJURY: 9/26/2023    HISTORY:  Ms. Golden Whipple is a 75 y.o.  female right handed well known to me who presents today for evaluation of a left shoulder injury. The patient reports that this injury occurred when she fell. She was first seen and evaluated in [facility], when she was x-rayed and splinted, and asked to f/u with Orthopedics. The patient denies any other injuries. Movement makes the pain worse, the sling and resting makes the pain better. No numbness or tingling sensation. The patient is known to me for left femur impacted neck fracture, status post Hip pinning with cannulated screws and left distal radius 2 parts intra-articular displaced fracture, status post ORIF, 8/31/2023 .      Past Medical History:   Diagnosis Date    Corticobasal degeneration     Dyskinesia     Hyperlipidemia     Hyperreflexia     Neuropathy     corticobasal degeneration    Parkinson's disease           Pituitary adenoma ([address])        Past Surgical History:   Procedure Laterality Date    BACK SURGERY      COLONOSCOPY  06/2002    due 6/12    COLONOSCOPY  08/2012    due 8/17    FINGER TRIGGER RELEASE      right hand    HAMMER TOE SURGERY  2005    HUMERUS FRACTURE SURGERY Left 9/1/2023    LEFT DISTAL RADIUS OPEN REDUCTION INTERNAL FIXATION performed by Nile Allen MD at [facility] Left 9/1/2023    LEFT HIP PINNING performed by Nile Allen MD at [address] [facility]  14 yo       Social History     Socioeconomic History    Marital status:      Spouse name: Russel Palomares    Number of children: 2    Years of education: Not on file    Highest education level: Not on file   Occupational History    Occupation: homemaker     Comment: retired nurse   Tobacco Use    Smoking status: Never    Smokeless tobacco: Never   Vaping Use    Vaping Use: Never used   Substance and Sexual Activity

## 2023-10-04 ENCOUNTER — TELEPHONE (OUTPATIENT)
Dept: ORTHOPEDIC SURGERY | Age: 73
End: 2023-10-04

## 2023-10-04 NOTE — TELEPHONE ENCOUNTER
was requesting an overnight stay for patient. he is concerned over her care because of the out break of covid at the Nursing Home and the amount of Nurses who are out with Covid.  Per Dr Cesar Carbajal this is ok

## 2023-10-04 NOTE — PROGRESS NOTES
Name_Natacha Yeager______________________________________Printed:____________________Patient will be staying overnight after surgery. Date and time of surgery____10/5/2023_______1000_____________Arrival Time:___0830_____________2990 [facility]   1. The instructions given regarding when and if a patient needs to stop oral intake prior to surgery varies. Follow the specific instructions you were given                  _x__Nothing to eat or to drink after Midnight the night before. 2. Take the following pills with a small sip of water on the morning of surgery____Abilify and Cardopa levidopa. Please Do Not give Lovenox 10/4/2023 am or pm. Also no Lovenox 10/5/2023_______________________________________________                  Do not take blood pressure medications ending in pril or sartan the kristina prior to surgery or the morning of surgery. Dr Camille Johnson patient are not to take any medications the AM of surgery. 3. Aspirin, Ibuprofen, Advil, Naproxen, Vitamin E and other Anti-inflammatory products and supplements should be stopped for 5 -7days before surgery or as directed by your physician. 4. Check with your Doctor regarding stopping Plavix, Coumadin,Eliquis, Lovenox,Effient,Pradaxa,Xarelto, Fragmin or other blood thinners and follow their instructions. 5. Do not smoke, and do not drink any alcoholic beverages 24 hours prior to surgery. This includes NA Beer. Refrain from the usage of any recreational drugs. 6. You may brush your teeth and gargle the morning of surgery. DO NOT SWALLOW WATER   7. You MUST make arrangements for a responsible adult to stay on site while you are here and take you home after your surgery. You will not be allowed to leave alone or drive yourself home. It is strongly suggested someone stay with you the first 24 hrs. Your surgery will be cancelled if you do not have a ride home.    8. A parent/legal guardian must accompany a child

## 2023-10-05 ENCOUNTER — APPOINTMENT (OUTPATIENT)
Dept: GENERAL RADIOLOGY | Age: 73
End: 2023-10-05
Attending: ORTHOPAEDIC SURGERY
Payer: MEDICARE

## 2023-10-05 ENCOUNTER — ANESTHESIA EVENT (OUTPATIENT)
Dept: OPERATING ROOM | Age: 73
End: 2023-10-05
Payer: MEDICARE

## 2023-10-05 ENCOUNTER — ANESTHESIA (OUTPATIENT)
Dept: OPERATING ROOM | Age: 73
End: 2023-10-05
Payer: MEDICARE

## 2023-10-05 ENCOUNTER — HOSPITAL ENCOUNTER (INPATIENT)
Age: 73
LOS: 2 days | Discharge: HOME | End: 2023-10-07
Attending: ORTHOPAEDIC SURGERY | Admitting: HOSPITALIST
Payer: MEDICARE

## 2023-10-05 DIAGNOSIS — S42.032A CLOSED DISPLACED FRACTURE OF ACROMIAL END OF LEFT CLAVICLE, INITIAL ENCOUNTER: Primary | ICD-10-CM
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
## 2023-10-05 PROCEDURE — 6360000002 HC RX W HCPCS: Performed by: NURSE ANESTHETIST, CERTIFIED REGISTERED

## 2023-10-05 PROCEDURE — 23515 OPTX CLAVICULAR FX W/INT FIX: CPT | Performed by: NURSE PRACTITIONER

## 2023-10-05 PROCEDURE — 6360000002 HC RX W HCPCS: Performed by: ORTHOPAEDIC SURGERY

## 2023-10-05 PROCEDURE — 6370000000 HC RX 637 (ALT 250 FOR IP): Performed by: ANESTHESIOLOGY

## 2023-10-05 PROCEDURE — 0PSB04Z REPOSITION LEFT CLAVICLE WITH INTERNAL FIXATION DEVICE, OPEN APPROACH: ICD-10-PCS | Performed by: ORTHOPAEDIC SURGERY

## 2023-10-05 PROCEDURE — 2580000003 HC RX 258: Performed by: ORTHOPAEDIC SURGERY

## 2023-10-05 PROCEDURE — 3600000014 HC SURGERY LEVEL 4 ADDTL 15MIN: Performed by: ORTHOPAEDIC SURGERY

## 2023-10-05 PROCEDURE — 7100000001 HC PACU RECOVERY - ADDTL 15 MIN: Performed by: ORTHOPAEDIC SURGERY

## 2023-10-05 PROCEDURE — 94150 VITAL CAPACITY TEST: CPT

## 2023-10-05 PROCEDURE — 6360000002 HC RX W HCPCS: Performed by: ANESTHESIOLOGY

## 2023-10-05 PROCEDURE — C1713 ANCHOR/SCREW BN/BN,TIS/BN: HCPCS | Performed by: ORTHOPAEDIC SURGERY

## 2023-10-05 PROCEDURE — 3700000000 HC ANESTHESIA ATTENDED CARE: Performed by: ORTHOPAEDIC SURGERY

## 2023-10-05 PROCEDURE — 1200000000 HC SEMI PRIVATE

## 2023-10-05 PROCEDURE — 6370000000 HC RX 637 (ALT 250 FOR IP): Performed by: NURSE PRACTITIONER

## 2023-10-05 PROCEDURE — A4217 STERILE WATER/SALINE, 500 ML: HCPCS | Performed by: ORTHOPAEDIC SURGERY

## 2023-10-05 PROCEDURE — 3700000001 HC ADD 15 MINUTES (ANESTHESIA): Performed by: ORTHOPAEDIC SURGERY

## 2023-10-05 PROCEDURE — 7100000000 HC PACU RECOVERY - FIRST 15 MIN: Performed by: ORTHOPAEDIC SURGERY

## 2023-10-05 PROCEDURE — 2580000003 HC RX 258: Performed by: ANESTHESIOLOGY

## 2023-10-05 PROCEDURE — 2720000010 HC SURG SUPPLY STERILE: Performed by: ORTHOPAEDIC SURGERY

## 2023-10-05 PROCEDURE — 6360000002 HC RX W HCPCS: Performed by: NURSE PRACTITIONER

## 2023-10-05 PROCEDURE — 2500000003 HC RX 250 WO HCPCS: Performed by: NURSE ANESTHETIST, CERTIFIED REGISTERED

## 2023-10-05 PROCEDURE — 3600000004 HC SURGERY LEVEL 4 BASE: Performed by: ORTHOPAEDIC SURGERY

## 2023-10-05 PROCEDURE — 94760 N-INVAS EAR/PLS OXIMETRY 1: CPT

## 2023-10-05 PROCEDURE — 6370000000 HC RX 637 (ALT 250 FOR IP): Performed by: ORTHOPAEDIC SURGERY

## 2023-10-05 PROCEDURE — 73000 X-RAY EXAM OF COLLAR BONE: CPT

## 2023-10-05 PROCEDURE — 2709999900 HC NON-CHARGEABLE SUPPLY: Performed by: ORTHOPAEDIC SURGERY

## 2023-10-05 PROCEDURE — 23515 OPTX CLAVICULAR FX W/INT FIX: CPT | Performed by: ORTHOPAEDIC SURGERY

## 2023-10-05 DEVICE — HOOK PLATE
Type: IMPLANTABLE DEVICE | Site: SHOULDER | Status: FUNCTIONAL
Brand: VARIAX

## 2023-10-05 DEVICE — BONE SCREW
Type: IMPLANTABLE DEVICE | Site: SHOULDER | Status: FUNCTIONAL
Brand: VARIAX

## 2023-10-05 DEVICE — LOCKING SCREW
Type: IMPLANTABLE DEVICE | Site: SHOULDER | Status: FUNCTIONAL
Brand: VARIAX

## 2023-10-05 RX ORDER — SODIUM CHLORIDE 0.9 % (FLUSH) 0.9 %
5-40 SYRINGE (ML) INJECTION PRN
Status: DISCONTINUED | OUTPATIENT
Start: 2023-10-05 | End: 2023-10-07 | Stop reason: HOSPADM

## 2023-10-05 RX ORDER — HYDROMORPHONE HYDROCHLORIDE 2 MG/ML
0.25 INJECTION, SOLUTION INTRAMUSCULAR; INTRAVENOUS; SUBCUTANEOUS EVERY 5 MIN PRN
Status: DISCONTINUED | OUTPATIENT
Start: 2023-10-05 | End: 2023-10-05 | Stop reason: HOSPADM

## 2023-10-05 RX ORDER — SODIUM CHLORIDE 0.9 % (FLUSH) 0.9 %
5-40 SYRINGE (ML) INJECTION EVERY 12 HOURS SCHEDULED
Status: DISCONTINUED | OUTPATIENT
Start: 2023-10-05 | End: 2023-10-07 | Stop reason: HOSPADM

## 2023-10-05 RX ORDER — ROCURONIUM BROMIDE 10 MG/ML
INJECTION, SOLUTION INTRAVENOUS PRN
Status: DISCONTINUED | OUTPATIENT
Start: 2023-10-05 | End: 2023-10-05 | Stop reason: SDUPTHER

## 2023-10-05 RX ORDER — DEXAMETHASONE SODIUM PHOSPHATE 4 MG/ML
INJECTION, SOLUTION INTRA-ARTICULAR; INTRALESIONAL; INTRAMUSCULAR; INTRAVENOUS; SOFT TISSUE PRN
Status: DISCONTINUED | OUTPATIENT
Start: 2023-10-05 | End: 2023-10-05 | Stop reason: SDUPTHER

## 2023-10-05 RX ORDER — SODIUM CHLORIDE 9 MG/ML
INJECTION, SOLUTION INTRAVENOUS PRN
Status: DISCONTINUED | OUTPATIENT
Start: 2023-10-05 | End: 2023-10-07 | Stop reason: HOSPADM

## 2023-10-05 RX ORDER — OXYCODONE HYDROCHLORIDE 5 MG/1
5 TABLET ORAL EVERY 4 HOURS PRN
Status: DISCONTINUED | OUTPATIENT
Start: 2023-10-05 | End: 2023-10-07 | Stop reason: HOSPADM

## 2023-10-05 RX ORDER — ACETAMINOPHEN 650 MG/1
650 SUPPOSITORY RECTAL EVERY 6 HOURS PRN
Status: DISCONTINUED | OUTPATIENT
Start: 2023-10-05 | End: 2023-10-07 | Stop reason: HOSPADM

## 2023-10-05 RX ORDER — ACETAMINOPHEN 500 MG
500 TABLET ORAL EVERY 8 HOURS SCHEDULED
Status: DISCONTINUED | OUTPATIENT
Start: 2023-10-05 | End: 2023-10-07 | Stop reason: HOSPADM

## 2023-10-05 RX ORDER — ONDANSETRON 4 MG/1
4 TABLET, ORALLY DISINTEGRATING ORAL EVERY 8 HOURS PRN
Status: DISCONTINUED | OUTPATIENT
Start: 2023-10-05 | End: 2023-10-07 | Stop reason: HOSPADM

## 2023-10-05 RX ORDER — BUPIVACAINE HYDROCHLORIDE 5 MG/ML
INJECTION, SOLUTION EPIDURAL; INTRACAUDAL
Status: COMPLETED | OUTPATIENT
Start: 2023-10-05 | End: 2023-10-05

## 2023-10-05 RX ORDER — HYDROMORPHONE HYDROCHLORIDE 2 MG/ML
0.5 INJECTION, SOLUTION INTRAMUSCULAR; INTRAVENOUS; SUBCUTANEOUS EVERY 5 MIN PRN
Status: COMPLETED | OUTPATIENT
Start: 2023-10-05 | End: 2023-10-05

## 2023-10-05 RX ORDER — SODIUM CHLORIDE 0.9 % (FLUSH) 0.9 %
5-40 SYRINGE (ML) INJECTION EVERY 12 HOURS SCHEDULED
Status: DISCONTINUED | OUTPATIENT
Start: 2023-10-05 | End: 2023-10-05 | Stop reason: HOSPADM

## 2023-10-05 RX ORDER — OXYCODONE HYDROCHLORIDE 5 MG/1
5 TABLET ORAL
Status: DISCONTINUED | OUTPATIENT
Start: 2023-10-05 | End: 2023-10-05 | Stop reason: HOSPADM

## 2023-10-05 RX ORDER — ONDANSETRON 2 MG/ML
4 INJECTION INTRAMUSCULAR; INTRAVENOUS
Status: DISCONTINUED | OUTPATIENT
Start: 2023-10-05 | End: 2023-10-05 | Stop reason: HOSPADM

## 2023-10-05 RX ORDER — MORPHINE SULFATE 2 MG/ML
2 INJECTION, SOLUTION INTRAMUSCULAR; INTRAVENOUS EVERY 4 HOURS PRN
Status: DISCONTINUED | OUTPATIENT
Start: 2023-10-05 | End: 2023-10-07 | Stop reason: HOSPADM

## 2023-10-05 RX ORDER — ONDANSETRON 2 MG/ML
INJECTION INTRAMUSCULAR; INTRAVENOUS PRN
Status: DISCONTINUED | OUTPATIENT
Start: 2023-10-05 | End: 2023-10-05 | Stop reason: SDUPTHER

## 2023-10-05 RX ORDER — ONDANSETRON 2 MG/ML
4 INJECTION INTRAMUSCULAR; INTRAVENOUS EVERY 6 HOURS PRN
Status: DISCONTINUED | OUTPATIENT
Start: 2023-10-05 | End: 2023-10-07 | Stop reason: HOSPADM

## 2023-10-05 RX ORDER — SODIUM CHLORIDE 9 MG/ML
INJECTION, SOLUTION INTRAVENOUS PRN
Status: DISCONTINUED | OUTPATIENT
Start: 2023-10-05 | End: 2023-10-05 | Stop reason: HOSPADM

## 2023-10-05 RX ORDER — SENNOSIDES A AND B 8.6 MG/1
1 TABLET, FILM COATED ORAL DAILY
COMMUNITY

## 2023-10-05 RX ORDER — DULOXETIN HYDROCHLORIDE 60 MG/1
60 CAPSULE, DELAYED RELEASE ORAL DAILY
Status: DISCONTINUED | OUTPATIENT
Start: 2023-10-06 | End: 2023-10-07 | Stop reason: HOSPADM

## 2023-10-05 RX ORDER — SENNOSIDES A AND B 8.6 MG/1
1 TABLET, FILM COATED ORAL DAILY
Status: DISCONTINUED | OUTPATIENT
Start: 2023-10-06 | End: 2023-10-07 | Stop reason: HOSPADM

## 2023-10-05 RX ORDER — SODIUM CHLORIDE 450 MG/100ML
INJECTION, SOLUTION INTRAVENOUS CONTINUOUS
Status: DISCONTINUED | OUTPATIENT
Start: 2023-10-05 | End: 2023-10-07 | Stop reason: HOSPADM

## 2023-10-05 RX ORDER — FENTANYL CITRATE 50 UG/ML
INJECTION, SOLUTION INTRAMUSCULAR; INTRAVENOUS PRN
Status: DISCONTINUED | OUTPATIENT
Start: 2023-10-05 | End: 2023-10-05 | Stop reason: SDUPTHER

## 2023-10-05 RX ORDER — PROPOFOL 10 MG/ML
INJECTION, EMULSION INTRAVENOUS PRN
Status: DISCONTINUED | OUTPATIENT
Start: 2023-10-05 | End: 2023-10-05 | Stop reason: SDUPTHER

## 2023-10-05 RX ORDER — SODIUM CHLORIDE 0.9 % (FLUSH) 0.9 %
5-40 SYRINGE (ML) INJECTION PRN
Status: DISCONTINUED | OUTPATIENT
Start: 2023-10-05 | End: 2023-10-05 | Stop reason: HOSPADM

## 2023-10-05 RX ORDER — ACETAMINOPHEN 500 MG
1000 TABLET ORAL ONCE
Status: COMPLETED | OUTPATIENT
Start: 2023-10-05 | End: 2023-10-05

## 2023-10-05 RX ORDER — PHENYLEPHRINE HCL IN 0.9% NACL 1 MG/10 ML
SYRINGE (ML) INTRAVENOUS PRN
Status: DISCONTINUED | OUTPATIENT
Start: 2023-10-05 | End: 2023-10-05 | Stop reason: SDUPTHER

## 2023-10-05 RX ORDER — ACETAMINOPHEN 325 MG/1
650 TABLET ORAL EVERY 6 HOURS PRN
Status: DISCONTINUED | OUTPATIENT
Start: 2023-10-05 | End: 2023-10-07 | Stop reason: HOSPADM

## 2023-10-05 RX ORDER — MEPERIDINE HYDROCHLORIDE 25 MG/ML
12.5 INJECTION INTRAMUSCULAR; INTRAVENOUS; SUBCUTANEOUS EVERY 5 MIN PRN
Status: DISCONTINUED | OUTPATIENT
Start: 2023-10-05 | End: 2023-10-05 | Stop reason: HOSPADM

## 2023-10-05 RX ORDER — POLYETHYLENE GLYCOL 3350 17 G/17G
17 POWDER, FOR SOLUTION ORAL DAILY PRN
Status: DISCONTINUED | OUTPATIENT
Start: 2023-10-05 | End: 2023-10-07 | Stop reason: HOSPADM

## 2023-10-05 RX ORDER — MAGNESIUM SULFATE HEPTAHYDRATE 500 MG/ML
INJECTION, SOLUTION INTRAMUSCULAR; INTRAVENOUS PRN
Status: DISCONTINUED | OUTPATIENT
Start: 2023-10-05 | End: 2023-10-05 | Stop reason: SDUPTHER

## 2023-10-05 RX ORDER — SODIUM CHLORIDE 1 G/1
1 TABLET ORAL 3 TIMES DAILY
Status: DISCONTINUED | OUTPATIENT
Start: 2023-10-05 | End: 2023-10-07 | Stop reason: HOSPADM

## 2023-10-05 RX ORDER — ENOXAPARIN SODIUM 100 MG/ML
30 INJECTION SUBCUTANEOUS EVERY 24 HOURS
Status: DISCONTINUED | OUTPATIENT
Start: 2023-10-05 | End: 2023-10-07 | Stop reason: HOSPADM

## 2023-10-05 RX ADMIN — Medication 100 MCG: at 10:39

## 2023-10-05 RX ADMIN — ACETAMINOPHEN 500 MG: 500 TABLET ORAL at 14:41

## 2023-10-05 RX ADMIN — SODIUM CHLORIDE: 9 INJECTION, SOLUTION INTRAVENOUS at 09:01

## 2023-10-05 RX ADMIN — PROPOFOL 80 MG: 10 INJECTION, EMULSION INTRAVENOUS at 10:30

## 2023-10-05 RX ADMIN — SODIUM CHLORIDE: 4.5 INJECTION, SOLUTION INTRAVENOUS at 14:54

## 2023-10-05 RX ADMIN — SUGAMMADEX 200 MG: 100 INJECTION, SOLUTION INTRAVENOUS at 11:17

## 2023-10-05 RX ADMIN — OXYCODONE HYDROCHLORIDE 5 MG: 5 TABLET ORAL at 14:40

## 2023-10-05 RX ADMIN — FENTANYL CITRATE 25 MCG: 50 INJECTION, SOLUTION INTRAMUSCULAR; INTRAVENOUS at 10:22

## 2023-10-05 RX ADMIN — MAGNESIUM SULFATE HEPTAHYDRATE 1 G: 500 INJECTION, SOLUTION INTRAMUSCULAR; INTRAVENOUS at 10:36

## 2023-10-05 RX ADMIN — ACETAMINOPHEN 1000 MG: 500 TABLET ORAL at 09:10

## 2023-10-05 RX ADMIN — ROCURONIUM BROMIDE 30 MG: 10 INJECTION, SOLUTION INTRAVENOUS at 10:31

## 2023-10-05 RX ADMIN — FENTANYL CITRATE 25 MCG: 50 INJECTION, SOLUTION INTRAMUSCULAR; INTRAVENOUS at 10:46

## 2023-10-05 RX ADMIN — FENTANYL CITRATE 25 MCG: 50 INJECTION, SOLUTION INTRAMUSCULAR; INTRAVENOUS at 11:02

## 2023-10-05 RX ADMIN — SODIUM CHLORIDE 1 G: 1 TABLET ORAL at 14:40

## 2023-10-05 RX ADMIN — SODIUM CHLORIDE 1 G: 1 TABLET ORAL at 20:13

## 2023-10-05 RX ADMIN — HYDROMORPHONE HYDROCHLORIDE 0.5 MG: 2 INJECTION, SOLUTION INTRAMUSCULAR; INTRAVENOUS; SUBCUTANEOUS at 12:06

## 2023-10-05 RX ADMIN — PROPOFOL 20 MG: 10 INJECTION, EMULSION INTRAVENOUS at 10:31

## 2023-10-05 RX ADMIN — OXYCODONE HYDROCHLORIDE 5 MG: 5 TABLET ORAL at 20:14

## 2023-10-05 RX ADMIN — CEFAZOLIN 2000 MG: 2 INJECTION, POWDER, FOR SOLUTION INTRAMUSCULAR; INTRAVENOUS at 17:24

## 2023-10-05 RX ADMIN — DEXAMETHASONE SODIUM PHOSPHATE 4 MG: 4 INJECTION, SOLUTION INTRAMUSCULAR; INTRAVENOUS at 10:41

## 2023-10-05 RX ADMIN — Medication 100 MCG: at 10:40

## 2023-10-05 RX ADMIN — FENTANYL CITRATE 25 MCG: 50 INJECTION, SOLUTION INTRAMUSCULAR; INTRAVENOUS at 10:28

## 2023-10-05 RX ADMIN — MORPHINE SULFATE 2 MG: 2 INJECTION, SOLUTION INTRAMUSCULAR; INTRAVENOUS at 23:55

## 2023-10-05 RX ADMIN — ONDANSETRON 4 MG: 2 INJECTION INTRAMUSCULAR; INTRAVENOUS at 10:41

## 2023-10-05 RX ADMIN — ENOXAPARIN SODIUM 30 MG: 100 INJECTION SUBCUTANEOUS at 14:48

## 2023-10-05 RX ADMIN — CEFAZOLIN 2000 MG: 2 INJECTION, POWDER, FOR SOLUTION INTRAMUSCULAR; INTRAVENOUS at 10:24

## 2023-10-05 RX ADMIN — ACETAMINOPHEN 650 MG: 325 TABLET ORAL at 18:28

## 2023-10-05 RX ADMIN — HYDROMORPHONE HYDROCHLORIDE 0.5 MG: 2 INJECTION, SOLUTION INTRAMUSCULAR; INTRAVENOUS; SUBCUTANEOUS at 11:48

## 2023-10-05 RX ADMIN — ACETAMINOPHEN 500 MG: 500 TABLET ORAL at 20:14

## 2023-10-05 ASSESSMENT — PAIN DESCRIPTION - ORIENTATION
ORIENTATION: LEFT

## 2023-10-05 ASSESSMENT — PAIN DESCRIPTION - DESCRIPTORS
DESCRIPTORS: ACHING

## 2023-10-05 ASSESSMENT — PAIN SCALES - GENERAL
PAINLEVEL_OUTOF10: 8
PAINLEVEL_OUTOF10: 10
PAINLEVEL_OUTOF10: 9
PAINLEVEL_OUTOF10: 10
PAINLEVEL_OUTOF10: 0
PAINLEVEL_OUTOF10: 7
PAINLEVEL_OUTOF10: 10
PAINLEVEL_OUTOF10: 5
PAINLEVEL_OUTOF10: 10
PAINLEVEL_OUTOF10: 0

## 2023-10-05 ASSESSMENT — ENCOUNTER SYMPTOMS: SHORTNESS OF BREATH: 0

## 2023-10-05 ASSESSMENT — PAIN DESCRIPTION - LOCATION
LOCATION: ARM
LOCATION: NECK;SHOULDER
LOCATION: SHOULDER
LOCATION: ARM
LOCATION: SHOULDER;NECK
LOCATION: ARM

## 2023-10-05 NOTE — PROGRESS NOTES
Incentive Spirometry education and demonstration completed by Respiratory Therapy Yes      Response to education: Good     Teaching Time: 5 minutes    Minimum Predicted Vital Capacity - 593 mL. Patient's Actual Vital Capacity - 1000 mL. Turning over to Nursing for routine follow-up Yes.     Comments:     Electronically signed by Christiano Elise RCP on 10/5/2023 at 7:03 PM

## 2023-10-05 NOTE — OP NOTE
was made over the distal clavicle. Careful dissection was  performed. We exposed the fracture and found it to be markedly  displaced superiorly and posteriorly. We then cleaned up the fracture  site and removed the soft tissue and the soft callus. At this point, we  were able to mobilize the fracture and was reduced anatomically. We  then made a place for the hook to sit, and we measured the appropriate  size hook and the 20 was found to be the appropriate size. At this  point, we elected to use a seven-hole, which will give us at four screws  in the shaft. We put the hook plate, which was a 90-FY, seven-hole  Titanium plate from [employer], and we were able to reduce the fracture  anatomically in place and we placed over four screws, three of which  were locking and the shaft was secured in place. Overall, we were very  satisfied with the anatomic reduction and good position of all the  screws as well as the hook. At this point, we irrigated the incision  copiously with normal saline. We then closed the deep layer with a 2-0  Vicryl, subcutaneous with a 3-0 Vicryl, and the skin with a 4-0  Monocryl. Steri-Strips was then applied. Dressing was then applied in  the form of Mepilex. The patient tolerated the procedure well and was taken to the recovery  in a stable condition. Emilie Nayak CNP was 1st Assist given the nature of the procedure that needed advanced assistance. She assisted in all aspect of the procedure, including but limited to draping in a sterile fashion, exposure of surgical area, controlling bleeding, retracting and protecting important structures, achieve and maintain bone reduction and surgical wound closure with dressing application. POSTOPERATIVE PLAN:  The patient will be readmitted as an inpatient. She may need a rehab placement. She is nonweightbearing on her left  upper extremity for at least six weeks.         Furman Halsted, MD    D: 10/05/2023 13:56:52       T: 10/05/2023 17:49:35     /[email or web address]  Job#: [id number]     Doc#: [id number]    CC:  Neyda Chisholm MD

## 2023-10-05 NOTE — PROGRESS NOTES
Pt ready for transfer to room 463. 4T called. Pt's wheelchair, glasses and other belongings taken up with the pt.

## 2023-10-05 NOTE — BRIEF OP NOTE
Brief Postoperative Note      Patient: Dannie Uribe  YOB: 1950  MRN: [id number]    Date of Procedure: 10/5/2023    Pre-Op Diagnosis Codes:     * Closed displaced fracture of acromial end of left clavicle, initial encounter [S42.032A]    Post-Op Diagnosis: Same       Procedure(s):  LEFT CLAVICLE OPEN REDUCTION INTERNAL FIXATION-PATRICIA    Surgeon(s):  Sindy Allen MD    Assistant: Farhan Dubon CNP    Anesthesia: General    Estimated Blood Loss (mL): Minimal    Complications: None    Specimens:   * No specimens in log *    Implants:  Implant Name Type Inv. Item Serial No.  Lot No. LRB No. Used Action   PLATE BNE HK [id number] MM LT LAT CLAV 7 HOLE STRL VARIAX - [id number]  PLATE BNE HK [id number] MM LT LAT CLAV 7 HOLE STRL VARIAX  [employer] [facility] [id number] Left 1 Implanted   SCREW BNE L14MM DIA3.5MM CANC TI MAGDALENA FULL THRD T10 DRV FOR - [id number]  SCREW BNE L14MM DIA3.5MM CANC TI MAGDALENA FULL THRD T10 DRV FOR  [employer] [facility]  Left 2 Implanted   SCREW BNE L16MM DIA3.5MM KIKO TI MAGDALENA FULL THRD T10 DRV FOR - [id number]  SCREW BNE L16MM DIA3.5MM KIKO TI MAGDALENA FULL THRD T10 DRV FOR  [employer] [facility]  Left 3 Implanted   SCREW BNE L16MM DIA3.5MM KIKO TI ST NONLOCKING FULL THRD - [id number]  SCREW BNE L16MM DIA3.5MM KIKO TI ST NONLOCKING FULL THRD  [employer] [facility]  Left 1 Implanted   SCREW BNE L18MM DIA3. 5MM TI ALLY NONLOCKING FULL THRD T10 - [id number]  SCREW BNE L18MM DIA3. 5MM TI ALLY NONLOCKING FULL THRD T10  [employer] [facility]  Left 1 Implanted         Drains:   [REMOVED] Urinary Catheter 08/31/23 Javed (Removed)   $ Urethral catheter insertion $ Not inserted for procedure 08/31/23 1914   Catheter Indications Prolonged immobilization (e.g. unstable thoracic or lumbar spine, multiple traumatic injuries such as pelvic fractures) 09/03/23 2000   Site Assessment RYAN 09/03/23 2000   Urine Color Salome 09/03/23 1351   Urine Appearance Clear 09/03/23 0908   Collection Container Standard 09/03/23 1351   Securement Method Leg strap 09/03/23 0908   Catheter Care  Perineal wipes 09/03/23 1351   Catheter Best Practices  Drainage tube clipped to bed;Bag not on floor;Catheter secured to thigh;Lack of dependent loop in tubing; Tamper seal intact;Drainage bag less than half full;Bag below bladder 09/03/23 1351   Status Draining;Patent 09/03/23 0908   Output (mL) 900 mL 09/03/23 1724       Findings: Same.       Electronically signed by Segundo Euceda MD on 10/5/2023 at 1:45 PM

## 2023-10-05 NOTE — H&P
Preoperative H&P Update    The patient's History and Physical in the medical record from 10/3/2023 was reviewed by me today. Past Medical History:   Diagnosis Date    Corticobasal degeneration     Dyskinesia     Hyperlipidemia     Hyperreflexia     Neuropathy     corticobasal degeneration    Parkinson's disease           Pituitary adenoma ([address])      Past Surgical History:   Procedure Laterality Date    BACK SURGERY      COLONOSCOPY  06/2002    due 6/12    COLONOSCOPY  08/2012    due 8/17    FINGER TRIGGER RELEASE      right hand    HAMMER TOE SURGERY  2005    HUMERUS FRACTURE SURGERY Left 9/1/2023    LEFT DISTAL RADIUS OPEN REDUCTION INTERNAL FIXATION performed by Brad Phipps MD at [address] [place] Left 9/1/2023    LEFT HIP PINNING performed by Brad Phipps MD at [address] [facility]  14 yo     No current facility-administered medications on file prior to encounter. Current Outpatient Medications on File Prior to Encounter   Medication Sig Dispense Refill    Multiple Vitamin (MULTI-VITAMIN DAILY PO) Take 1 tablet by mouth daily      senna (SENOKOT) 8.6 MG tablet Take 1 tablet by mouth daily      carbidopa-levodopa (RYTARY) 61. MG CPCR per extended release capsule Take 1 capsule by mouth 3 times daily (before meals) 90 capsule 0    ARIPiprazole (ABILIFY) 2 MG tablet Take 1 tablet by mouth daily (Patient taking differently: Take 0.5 tablets by mouth daily Takes 1/2 tablet) 30 tablet 0    acetaminophen (TYLENOL) 500 MG tablet Take 1 tablet by mouth in the morning and 1 tablet at noon and 1 tablet in the evening.  120 tablet 3    enoxaparin Sodium (LOVENOX) 30 MG/0.3ML injection Inject 0.3 mLs into the skin daily      polyethylene glycol (GLYCOLAX) 17 g packet Take 1 packet by mouth daily 527 g 0    sodium chloride 1 g tablet Take 1 tablet by mouth 3 times daily      alendronate (FOSAMAX) 70 MG tablet Take 1 tablet by mouth every 7 days

## 2023-10-05 NOTE — PLAN OF CARE
[facility] Orthopedic Surgery  Plan of Care Note      Pt is s/p LEFT clavicle ORIF today  She resides in SNF  S/p left hip pining 8/31/23 and left wrist ORIF    Plan:  - WBAT LEFT leg  - NWB LUE  - Resume diet as rajiv  - PT/OT  - OK to d/c from our end once medically stable    ZAFAR Castellano - CNP 10/5/2023 1:52 PM

## 2023-10-05 NOTE — H&P
HOSPITALISTS HISTORY AND PHYSICAL    10/5/2023 10:44 AM    Patient Information:  Tammy Salazar is a 75 y.o. female [id number]  PCP:  Jennifer Holt MD (Tel: [phone] )    Chief complaint:  No chief complaint on file. Fall fracture    History of Present Illness:  Tammy Salazar is a 75 y.o. female who presented orthopedic concerning for left shoulder pain after a fall patient found to have displaced distal clavicular fracture. Patient was eval by orthopedic. Patient underwent surgery patient with recent history of hip fracture as well underwent ORIF on 8/31/2023. Patient currently is postop in PACU. Patient underwent surgery currently resting well pain is controlled patient with history of dementia Parkinson's liver resides at nursing home. Will need evaluated by PT OT no chest pain no shortness of breath chart reviewed medication reviewed  REVIEW OF SYSTEMS:   Constitutional: Negative for fever,chills or night sweats  ENT: Negative for rhinorrhea, epistaxis, hoarseness, sore throat. Respiratory: Negative for shortness of breath,wheezing  Cardiovascular: Negative for chest pain, palpitations   Gastrointestinal: Negative for nausea, vomiting, diarrhea  Genitourinary: Negative for polyuria, dysuria   Hematologic/Lymphatic: Negative for bleeding tendency, easy bruising  Musculoskeletal: Negative for myalgias and arthralgias  Neurologic: Negative for confusion,dysarthria. Skin: Negative for itching,rash, good capillary refill. Psychiatric: Negative for depression,anxiety, agitation. Endocrine: Negative for polydipsia,polyuria,heat /cold intolerance.     Past Medical History:   has a past medical history of Corticobasal degeneration, COVID-19, Dyskinesia, Hyperlipidemia, Hyperreflexia, Neuropathy, Parkinson's disease, Pituitary adenoma ([address]), and Spinal

## 2023-10-05 NOTE — ANESTHESIA POSTPROCEDURE EVALUATION
Department of Anesthesiology  Postprocedure Note    Patient: Mecca Dillard  MRN: [id number]  YOB: 1950  Date of evaluation: 10/5/2023      Procedure Summary     Date: 10/05/23 Room / Location: [address]    Anesthesia Start: 1024 Anesthesia Stop: 0732    Procedure: LEFT CLAVICLE OPEN REDUCTION INTERNAL FIXATION-PATRICIA (Left: Shoulder) Diagnosis:       Closed displaced fracture of acromial end of left clavicle, initial encounter      (Closed displaced fracture of acromial end of left clavicle, initial encounter [I71.894Y])    Surgeons: Natty Saavedra MD Responsible Provider: Rose Talavera MD    Anesthesia Type: general ASA Status: 3          Anesthesia Type: No value filed.     Edward Phase I: Edward Score: 7    Edward Phase II:        Anesthesia Post Evaluation    Patient location during evaluation: PACU  Patient participation: complete - patient participated  Level of consciousness: awake  Pain score: 0  Airway patency: patent  Nausea & Vomiting: no nausea and no vomiting  Complications: no  Cardiovascular status: hemodynamically stable  Respiratory status: acceptable  Hydration status: stable  Multimodal analgesia pain management approach  Pain management: adequate

## 2023-10-05 NOTE — PROGRESS NOTES
Patient continues to rate pain 10/10. Titrating dilaudid. Moaning. Repositioned for comfort.  Report to [facility]

## 2023-10-06 LAB
HCT VFR BLD AUTO: 30.5 % (ref 36–48)
HGB BLD-MCNC: 10 G/DL (ref 12–16)

## 2023-10-06 PROCEDURE — APPNB45 APP NON BILLABLE 31-45 MINUTES: Performed by: NURSE PRACTITIONER

## 2023-10-06 PROCEDURE — 6360000002 HC RX W HCPCS: Performed by: ORTHOPAEDIC SURGERY

## 2023-10-06 PROCEDURE — 2580000003 HC RX 258: Performed by: ORTHOPAEDIC SURGERY

## 2023-10-06 PROCEDURE — 6370000000 HC RX 637 (ALT 250 FOR IP): Performed by: NURSE PRACTITIONER

## 2023-10-06 PROCEDURE — 97535 SELF CARE MNGMENT TRAINING: CPT

## 2023-10-06 PROCEDURE — 6370000000 HC RX 637 (ALT 250 FOR IP): Performed by: ORTHOPAEDIC SURGERY

## 2023-10-06 PROCEDURE — 1200000000 HC SEMI PRIVATE

## 2023-10-06 PROCEDURE — 85018 HEMOGLOBIN: CPT

## 2023-10-06 PROCEDURE — 36415 COLL VENOUS BLD VENIPUNCTURE: CPT

## 2023-10-06 PROCEDURE — 85014 HEMATOCRIT: CPT

## 2023-10-06 PROCEDURE — 97162 PT EVAL MOD COMPLEX 30 MIN: CPT

## 2023-10-06 PROCEDURE — 97530 THERAPEUTIC ACTIVITIES: CPT

## 2023-10-06 PROCEDURE — 6370000000 HC RX 637 (ALT 250 FOR IP): Performed by: HOSPITALIST

## 2023-10-06 PROCEDURE — 99024 POSTOP FOLLOW-UP VISIT: CPT | Performed by: NURSE PRACTITIONER

## 2023-10-06 PROCEDURE — 97165 OT EVAL LOW COMPLEX 30 MIN: CPT

## 2023-10-06 RX ORDER — OXYCODONE HYDROCHLORIDE 5 MG/1
5 TABLET ORAL EVERY 8 HOURS PRN
Qty: 9 TABLET | Refills: 0 | Status: SHIPPED | OUTPATIENT
Start: 2023-10-06 | End: 2023-10-09

## 2023-10-06 RX ORDER — MIDODRINE HYDROCHLORIDE 5 MG/1
5 TABLET ORAL
Status: DISCONTINUED | OUTPATIENT
Start: 2023-10-06 | End: 2023-10-07 | Stop reason: HOSPADM

## 2023-10-06 RX ORDER — MIDODRINE HYDROCHLORIDE 5 MG/1
5 TABLET ORAL
Qty: 90 TABLET | Refills: 3 | Status: SHIPPED | OUTPATIENT
Start: 2023-10-06

## 2023-10-06 RX ADMIN — MIDODRINE HYDROCHLORIDE 5 MG: 5 TABLET ORAL at 16:13

## 2023-10-06 RX ADMIN — SODIUM CHLORIDE 1 G: 1 TABLET ORAL at 20:29

## 2023-10-06 RX ADMIN — OXYCODONE HYDROCHLORIDE 5 MG: 5 TABLET ORAL at 02:25

## 2023-10-06 RX ADMIN — CEFAZOLIN 2000 MG: 2 INJECTION, POWDER, FOR SOLUTION INTRAMUSCULAR; INTRAVENOUS at 01:41

## 2023-10-06 RX ADMIN — ACETAMINOPHEN 500 MG: 500 TABLET ORAL at 20:30

## 2023-10-06 RX ADMIN — SENNOSIDES 8.6 MG: 8.6 TABLET, FILM COATED ORAL at 09:13

## 2023-10-06 RX ADMIN — ACETAMINOPHEN 650 MG: 325 TABLET ORAL at 02:25

## 2023-10-06 RX ADMIN — ACETAMINOPHEN 650 MG: 325 TABLET ORAL at 09:12

## 2023-10-06 RX ADMIN — SODIUM CHLORIDE 1 G: 1 TABLET ORAL at 14:52

## 2023-10-06 RX ADMIN — ENOXAPARIN SODIUM 30 MG: 100 INJECTION SUBCUTANEOUS at 14:52

## 2023-10-06 RX ADMIN — ACETAMINOPHEN 500 MG: 500 TABLET ORAL at 14:51

## 2023-10-06 RX ADMIN — MIDODRINE HYDROCHLORIDE 5 MG: 5 TABLET ORAL at 12:59

## 2023-10-06 RX ADMIN — SODIUM CHLORIDE 1 G: 1 TABLET ORAL at 09:13

## 2023-10-06 RX ADMIN — DULOXETINE HYDROCHLORIDE 60 MG: 60 CAPSULE, DELAYED RELEASE ORAL at 09:13

## 2023-10-06 ASSESSMENT — PAIN DESCRIPTION - LOCATION
LOCATION: ARM;SHOULDER
LOCATION: NECK;HEAD

## 2023-10-06 ASSESSMENT — PAIN SCALES - GENERAL
PAINLEVEL_OUTOF10: 5
PAINLEVEL_OUTOF10: 7
PAINLEVEL_OUTOF10: 5
PAINLEVEL_OUTOF10: 5

## 2023-10-06 ASSESSMENT — PAIN DESCRIPTION - DESCRIPTORS
DESCRIPTORS: ACHING;DISCOMFORT;SORE
DESCRIPTORS: ACHING

## 2023-10-06 ASSESSMENT — PAIN DESCRIPTION - ORIENTATION: ORIENTATION: RIGHT;LEFT

## 2023-10-06 NOTE — PROGRESS NOTES
Physical [address] [place] [address] Department   Phone: [phone]    Physical Therapy    [x] Initial Evaluation            [] Daily Treatment Note         [] Discharge Summary      Patient: Srikanth Paige   : 1950   MRN: [id number]   Date of Service:  10/6/2023  Admitting Diagnosis: Closed displaced fracture of acromial end of left clavicle, initial encounter   Current Admission Summary: Srikanth Paige is a 75 y.o. female who presented orthopedic concerning for left shoulder pain after a fall. Patient found to have displaced distal clavicular fracture. Patient was eval by orthopedic. Patient is s/p L clavicle ORIF on 10/5. Patient also underwent recent surgery (S/p left hip pining and left wrist ORIF on 23). Patient has been living at [facility] since the end of August. Patient has a significant history of dementia and Parkinson's disease. Will need evaluated by PT OT no chest pain no shortness of breath chart reviewed medication reviewed. Past Medical History:  has a past medical history of Corticobasal degeneration, COVID-19, Dyskinesia, Hyperlipidemia, Hyperreflexia, Neuropathy, Parkinson's disease, Pituitary adenoma ([address]), and Spinal column pain. Past Surgical History:  has a past surgical history that includes Hammer toe surgery (); Finger trigger release; Tonsillectomy and adenoidectomy (12 yo); laparoscopy; Colonoscopy (2002); Colonoscopy (2012); back surgery; Leg Surgery (Left, 2023); Humerus fracture surgery (Left, 2023); Wrist surgery (Left, 2023); Nose surgery (N/A); Hysterectomy; and Clavicle surgery (Left, 10/5/2023). Discharge Recommendations: Natacha Yeager scored a 12/24 on the AM-PAC short mobility form. Current research shows that an AM-PAC score of 17 or less is typically not associated with a discharge to the patient's home setting.  Based on the patient's AM-PAC score and their current

## 2023-10-06 NOTE — DISCHARGE INSTR - COC
Continuity of Care Form    Patient Name: Milagros Lees   :  1950  MRN:  [id number]    Admit date:  10/5/2023  Discharge date:  ***    Code Status Order: Full Code   Advance Directives:   Advance Care Flowsheet Documentation       Date/Time Healthcare Directive Type of Healthcare Directive Copy in [address] Agent's Name Healthcare Agent's Phone Number    10/05/23 0828 Yes, patient has an advance directive for healthcare treatment Living will;Durable power of  for health care -- --  - Michelle Merlos --            Admitting Physician:  Maria Fernanda Guardado MD  PCP: Cresencio Carrera MD    Discharging Nurse: [facility] Unit/Room#: 7ZW-6060/5042-69  Discharging Unit Phone Number: ***    Emergency Contact:   Extended Emergency Contact Information  Primary Emergency Contact: Bruce Yeager  Address: [address] [place] [address] of [address] [address] Phone: [phone]  Mobile Phone: [phone]  Relation: Spouse  Secondary Emergency Contact: [address] [place] [address] of [address] [facility] Phone: [phone]  Relation: Child    Past Surgical History:  Past Surgical History:   Procedure Laterality Date    BACK SURGERY      CLAVICLE SURGERY Left 10/5/2023    LEFT CLAVICLE OPEN REDUCTION INTERNAL FIXATION-PATRICIA performed by Jovany Pike MD at [address] [facility]    COLONOSCOPY  2002    due     COLONOSCOPY  2012    due     FINGER TRIGGER RELEASE      right hand    HAMMER TOE SURGERY  2005    HUMERUS FRACTURE SURGERY Left 2023    LEFT DISTAL RADIUS OPEN REDUCTION INTERNAL FIXATION performed by Jovany Pike MD at [address] ( [facility])      LAPAROSCOPY      LEG SURGERY Left 2023    LEFT HIP PINNING performed by Jovany Pike MD at [address] [place] [address]  14 yo    WRIST SURGERY Left 2023       Immunization History:   Immunization History   Administered Colostomy/Ileostomy/Ileal Conduit: No       Date of Last BM: today    Intake/Output Summary (Last 24 hours) at 10/6/2023 0920  Last data filed at 10/5/2023 1240  Gross per 24 hour   Intake 900 ml   Output 20 ml   Net 880 ml     I/O last 3 completed shifts: In: 900 [I.V.:900]  Out: 20 [Blood:20]    Safety Concerns:     [address] [place] [address] Safety Concerns:[id number]}    Impairments/Disabilities:      [address] [place] [address] Impairments/Disabilities:[id number]}    Nutrition Therapy:  Current Nutrition Therapy:   [address] [place] [address] CINDY Diet List:607315878}    Routes of Feeding: {CHP DME Other Feedings:401806735}  Liquids: {Slp liquid thickness:95755}  Daily Fluid Restriction: {CHP DME Yes amt example:730939483}  Last Modified Barium Swallow with Video (Video Swallowing Test): {Done Not Done AEQL:537152755}    Treatments at the Time of Hospital Discharge:   Respiratory Treatments: ***  Oxygen Therapy:  is not on home oxygen therapy. Ventilator:    - No ventilator support    Rehab Therapies: Physical Therapy  Weight Bearing Status/Restrictions: NW LUE; WBAT LLE  Other Medical Equipment (for information only, NOT a DME order):  walker  Other Treatments: Can removed post-op dressing and leave open to air on POD#7   No shoulder ROM; continue sling until post-op visit in 2 wks. Patient's personal belongings (please select all that are sent with patient):  None    RN SIGNATURE:  Electronically signed by Laney Burns RN on 10/7/23 at 9:31 AM EDT    CASE MANAGEMENT/SOCIAL WORK SECTION    Inpatient Status Date: 10/5/23    Readmission Risk Assessment Score:14%  Readmission Risk              Risk of Unplanned Readmission:  17           Discharging to Facility/ [facility].   [address]  Phone: [phone]  Fax: [phone]       / signature: Electronically signed by Yamila Merino on 10/7/23 at 8:40 AM EDT    PHYSICIAN SECTION    Prognosis: Good    Condition at Discharge:

## 2023-10-06 NOTE — PROGRESS NOTES
[address] [place] [address] Department   Phone: [phone]    Occupational Therapy    [x] Initial Evaluation            [] Daily Treatment Note         [] Discharge Summary      Patient: Eugenio Sarkar   : 1950   MRN: [id number]   Date of Service:  10/6/2023    Admitting Diagnosis:  <principal problem not specified>  Current Admission Summary: hx of falls new fall with left humerus /clavicle fracture  Past Medical History:  has a past medical history of Corticobasal degeneration, COVID-19, Dyskinesia, Hyperlipidemia, Hyperreflexia, Neuropathy, Parkinson's disease, Pituitary adenoma ([address]), and Spinal column pain. Past Surgical History:  has a past surgical history that includes Hammer toe surgery (); Finger trigger release; Tonsillectomy and adenoidectomy (12 yo); laparoscopy; Colonoscopy (2002); Colonoscopy (2012); back surgery; Leg Surgery (Left, 2023); Humerus fracture surgery (Left, 2023); Wrist surgery (Left, 2023); Nose surgery (N/A); Hysterectomy; and Clavicle surgery (Left, 10/5/2023). Discharge Recommendations: Natacha Reyes scored a 13/24 on the AM-PAC ADL Inpatient form. Current research shows that an AM-PAC score of 17 or less is typically not associated with a discharge to the patient's home setting. Based on the patient's AM-PAC score and their current ADL deficits, it is recommended that the patient have 5-7 sessions per week of Occupational Therapy at d/c to increase the patient's independence. At this time, this patient demonstrates complex nursing, medical, and rehabilitative needs, and would benefit from intensive rehabilitation services upon discharge from the Inpatient setting.   This patient demonstrates the ability to participate in and benefit from an intensive therapy program with a coordinated interdisciplinary team approach to foster frequent, structured, and documented communication among disciplines, to be able to move her to a different facility and or take her home. Patient's  is very well versed on patients needs as a patient with parkinsons and dementia. He is a good advocate for her. Safety Interventions: patient left in bed, bed alarm in place, patient at risk for falls, telesitter in use, and patient may benefit from compression to lower extremities and isometric exc prior to out of bed activity to minimize her orthostatic hypotension        Plan    Frequency: 7 x/week  Current Treatment Recommendations: balance training, functional mobility training, transfer training, endurance training, neuromuscular re-education, patient/caregiver education, ADL/self-care training, IADL training, cognitive/perceptual training, cognitive reorientation, pain management, safety education, and equipment evaluation/education    Goals    Patient Goals: Patient would like to be able to use a toilet, patient's  would like to get her orthostatic hypotension under control so he can take her home     Short Term Goals:  Time Frame: discharge  Patient will complete upper body ADL at stand by assistance   Patient will complete lower body ADL at moderate assistance   Patient will complete toileting at moderate assistance   Patient will complete grooming at stand by assistance   Patient will complete functional transfers at minimal assistance   Patient will complete functional mobility at minimal assistance   Patient will increase functional sitting balance to good for improved ADL completion  Patient will increase functional standing balance to fair for improved ADL completion  Patient will complete bed mobility at contact guard assistance   Patient to maintain standing at contact guard assistance for 3 minutes.        Therapy Session Time     Individual Group Co-treatment   Time In     112   Time Out     954    Minutes     83        Timed Code Treatment Minutes:   70    Total Treatment Minutes:

## 2023-10-06 NOTE — CARE COORDINATION
Discharge Planning Note:    Talked with Shabbir Bridges, spouse, on the phone. An approved SNF was reviewed and the following referrals were placed a the request of Shabbir Bridges:    - [facility] - denied, not able to meet the patient's needs    - [facility] - waiting on response    - [facility] [address] [facility]    Will continue to follow.     MOHAMUD Durant RN    [facility]  Phone: [phone]

## 2023-10-06 NOTE — CARE COORDINATION
Readmission Assessment       10/06/23 1112   Readmission Assessment   Number of Days since last admission? 8-30 days   Previous Disposition SNF   Who is being Interviewed Patient   What was the patient's/caregiver's perception as to why they think they needed to return back to the hospital? Other (Comment)  (Patient returned for scheduled surgery)   Did you visit your Primary Care Physician after you left the hospital, before you returned this time? Yes  ([address])   Did you see a specialist, such as Cardiac, Pulmonary, Orthopedic Physician, etc. after you left the hospital? No   Who advised the patient to return to the hospital? Other (Comment)  (Scheduled surgery)   Does the patient report anything that got in the way of taking their medications? No   In our efforts to provide the best possible care to you and others like you, can you think of anything that we could have done to help you after you left the hospital the first time, so that you might not have needed to return so soon?  Other (Comment)  (scheduled surgery)     MOHAMUD Dumont RN    [facility]  Phone: [phone]

## 2023-10-06 NOTE — CARE COORDINATION
Discharge Planning Note:    Patient's spouse would like the patient to return to:    [address], [address] [place] [address]    Report: [phone] ask for the first floor nurse. Fax: [phone]    Patient will be ready for discharge in the AM.    Will continue to follow.     MOHAMUD Amezcua RN    [facility]  Phone: [phone]

## 2023-10-06 NOTE — CARE COORDINATION
IMM Letter       10/06/23 1155   IMM Letter   IMM Letter given to Patient/Family/Significant other/Guardian/POA/by: Spouse, Joycelyn Kearney   IMM Letter date given: 10/06/23   IMM Letter time given: 56     The spouse was in agreement for a copy of the IMM Letter to be placed with the patient's belongings and he would retreive later.     MOHAMUD Osborn RN    [facility]  Phone: [phone]

## 2023-10-06 NOTE — CARE COORDINATION
Case Management Assessment  Initial Evaluation    Date/Time of Evaluation: 10/6/2023 11:45 AM  Assessment Completed by: Eduarda Peña RN    If patient is discharged prior to next notation, then this note serves as note for discharge by case management. Patient Name: Dulce Holt                   YOB: 1950  Diagnosis: Closed displaced fracture of acromial end of left clavicle, initial encounter [S42.032A]  Dementia arising in the senium and [facility]) [F03.90]                   Date / Time: 10/5/2023  7:32 AM    Patient Admission Status: Inpatient   Readmission Risk (Low < 19, Mod (19-27), High > 27): Readmission Risk Score: 15.2    Current PCP: Cata Wilson MD  PCP verified by CM? Chart Reviewed: Yes      History Provided by: Patient  Patient Orientation: Alert and Oriented    Patient Cognition: Alert    Hospitalization in the last 30 days (Readmission):  No    If yes, Readmission Assessment in  Navigator will be completed. Advance Directives:      Code Status: Full Code   Patient's Primary Decision Maker is: Legal Next of Kin    Primary Decision MakerCoral Caio  Spouse - [phone]    Secondary Decision Maker: Rhiannon Chaudhari Child - [phone]    Discharge Planning:    Patient lives with: (P) Spouse/Significant Other Type of Home: (P) House  Primary Care Giver: Self  Patient Support Systems include: Spouse/Significant Other   Current Financial resources: Medicare  Current community resources: None (TBD)  Current services prior to admission: (P) None            Current DME: (P) Bedside Commode, Other (Comment) (Shower Transfer Bench, Rollator)            Type of Home Care services:  (P) None    ADLS  Prior functional level: Assistance with the following:, Mobility  Current functional level: Assistance with the following:, Mobility    PT AM-PAC: 12 /24  OT AM-PAC: 13 /24    Family can provide assistance at DC:  Other (comment) (TBD)  Would you like Case

## 2023-10-06 NOTE — PROGRESS NOTES
Shift assessment complete patient is alert and oriented, sometimes confused. VSS, Hx of dyskinesia, tried to get up few times from bed without calling for help, moving around a lot in bed, kicking, took gown and sling to left arm off at a time.  at bedside, camera in place, bed alarm on. Refused tylenol this morning. All safety precautions in place.

## 2023-10-07 VITALS
TEMPERATURE: 97.8 F | HEART RATE: 97 BPM | SYSTOLIC BLOOD PRESSURE: 119 MMHG | OXYGEN SATURATION: 97 % | WEIGHT: 96 LBS | BODY MASS INDEX: 15.43 KG/M2 | RESPIRATION RATE: 18 BRPM | HEIGHT: 66 IN | DIASTOLIC BLOOD PRESSURE: 68 MMHG

## 2023-10-07 PROCEDURE — 6370000000 HC RX 637 (ALT 250 FOR IP): Performed by: ORTHOPAEDIC SURGERY

## 2023-10-07 RX ADMIN — DULOXETINE HYDROCHLORIDE 60 MG: 60 CAPSULE, DELAYED RELEASE ORAL at 08:49

## 2023-10-07 RX ADMIN — SENNOSIDES 8.6 MG: 8.6 TABLET, FILM COATED ORAL at 08:49

## 2023-10-07 RX ADMIN — ACETAMINOPHEN 500 MG: 500 TABLET ORAL at 08:50

## 2023-10-07 ASSESSMENT — PAIN - FUNCTIONAL ASSESSMENT: PAIN_FUNCTIONAL_ASSESSMENT: PREVENTS OR INTERFERES WITH ALL ACTIVE AND SOME PASSIVE ACTIVITIES

## 2023-10-07 ASSESSMENT — PAIN SCALES - GENERAL: PAINLEVEL_OUTOF10: 6

## 2023-10-07 ASSESSMENT — PAIN DESCRIPTION - FREQUENCY: FREQUENCY: CONTINUOUS

## 2023-10-07 ASSESSMENT — PAIN DESCRIPTION - PAIN TYPE: TYPE: SURGICAL PAIN

## 2023-10-07 ASSESSMENT — PAIN DESCRIPTION - ORIENTATION: ORIENTATION: LEFT

## 2023-10-07 ASSESSMENT — PAIN DESCRIPTION - DESCRIPTORS: DESCRIPTORS: ACHING

## 2023-10-07 ASSESSMENT — PAIN DESCRIPTION - LOCATION: LOCATION: SHOULDER;HIP;LEG

## 2023-10-07 NOTE — CASE COMMUNICATION
Case Management -  Discharge Note      Patient Name: Jah Medina                   YOB: 1950            Readmission Risk (Low < 19, Mod (19-27), High > 27): Readmission Risk Score: 14.4    Current PCP: Judy Acosta MD    ([facility]) Important Message from Medicare:    Date: 10/7/23    PT AM-PAC: 12 /24  OT AM-PAC: 13 /24    Discharge Plan:  Patient discharge to:    [address]  [address] [facility]. [place], [address] [facility]  Phone: [phone]  Fax: [phone]     SW faxed [address] and AVS to [phone]    KATHY Elise to call report to [phone] transport with [address] [place] [address], 11:00 am  time     Family advised of discharge and in agreement. HENS completed. SW intervention complete. Financial    Payor: Becca Adams / Plan: [employer] GOLD PLUS HMO / Product Type: *No Product type* /     Pharmacy:  Potential assistance Purchasing Medications:    Meds-to-Beds request:        [employer] and [address] [phone]  [facility] [place] [address]  Phone: [phone] Fax: [phone]      Notes: Additional Case Management Notes: Patient will discharge today. Facility notified. Nurse will fax over completed [address] No further needs at this time.     Electronically signed by Yuridia Handy on 10/7/23 at 9:19 AM EDT

## 2023-10-07 NOTE — PROGRESS NOTES
Shift assessment complete patient is alert and oriented, sometimes confused. VSS, Hx of dyskinesia, moving around a lot in bed, kicking, daughter at bedside, camera in place, bed alarm on. All safety precautions in place.

## 2023-10-07 NOTE — CARE COORDINATION
Discharge Planning:     (CM) advised by Yara Gomez RN that family will be transporting patient to SNF. CM notified [facility] to cancel transport today.     Electronically signed by Eduardo Ramírez on 10/7/23 at 9:55 AM EDT

## 2023-10-07 NOTE — PROGRESS NOTES
Pt dressed in Pt clothing for discharge. Pt d/c with all Pt belongings and medications, Pt IV access removed without issue, Pt  given paperwork for facility and Pt released for transport by .

## 2023-10-09 ENCOUNTER — TELEPHONE (OUTPATIENT)
Dept: ORTHOPEDIC SURGERY | Age: 73
End: 2023-10-09

## 2023-10-09 NOTE — TELEPHONE ENCOUNTER
General Question     Subject: POST OP CARE  Patient and /or Facility Request: [facility]  Contact Number: [phone]    [address] TO REQ AFTER CARE FROM PT SX  PLEASE CLL PHN NUMBER LISTED ABOVE

## 2023-10-09 NOTE — TELEPHONE ENCOUNTER
Returned call to [facility]. POSTOPERATIVE PLAN:  The patient will be readmitted as an inpatient. She may need a rehab placement. She is nonweightbearing on her left upper extremity for at least six weeks.      [facility] and [facility], [address], [place], [address] [place] [address]

## 2023-10-10 ENCOUNTER — CARE COORDINATION (OUTPATIENT)
Dept: CASE MANAGEMENT | Age: 73
End: 2023-10-10

## 2023-10-10 NOTE — CARE COORDINATION
[address] and confirmed pt still there.        Zaynab Aviles, BSN, RN   [address] Transition Nurse  [phone]

## 2023-10-11 ENCOUNTER — TELEPHONE (OUTPATIENT)
Dept: ORTHOPEDIC SURGERY | Age: 73
End: 2023-10-11

## 2023-10-11 NOTE — TELEPHONE ENCOUNTER
General Question     Subject: POST OP QUESTIONS  Patient and /or Facility Request: [facility]  Contact Number: [phone]    PT  CLLED HAS QUESTIONS ABOUT POST OP CONCERNS  WOULD LIKE A RUN DOWN ON MED SHE IS TAKING   HE FEELS OUT OF THE LOOP ABOUT ON IMPORTANT CARE FOR HIS WIFE  PLEASE CLL BK PHN NUMBER LISTED ABOVE

## 2023-10-11 NOTE — TELEPHONE ENCOUNTER
I spoke with Mr. Nelli Morales and he voices concern that the rehab facility is not dispensing pt. Carbidopa-Levodopa at the correct times, which is causing the patient to be disoriented. I advised Mr. Nelli Morales that he will need to speak with the rehab facility physician and inquire about the timing of those medications. He stated he has done that 3x's without success. I advised him to contact  neurology, who prescribes the medication and ask for their assistance in getting her medication straightened out. I explained that Dr. Nurys Serrano does not order that medication for her, therefore, cannot alter how it is taken.

## 2023-10-12 NOTE — DISCHARGE SUMMARY
given to patient. Follow Up. pcp in 1 week   Disposition. home  Activity. activity as tolerated  Diet: No diet orders on file      Spent 45  minutes in discharge process.     Signed:  Shameka Mata MD     10/12/2023 5:29 PM

## 2023-10-17 ENCOUNTER — OFFICE VISIT (OUTPATIENT)
Dept: ORTHOPEDIC SURGERY | Age: 73
End: 2023-10-17
Payer: MEDICARE

## 2023-10-17 DIAGNOSIS — S52.592A OTHER CLOSED FRACTURE OF DISTAL END OF LEFT RADIUS, INITIAL ENCOUNTER: ICD-10-CM

## 2023-10-17 DIAGNOSIS — G20.A1 PARKINSON DISEASE, SYMPTOMATIC: ICD-10-CM

## 2023-10-17 DIAGNOSIS — S72.002A CLOSED FRACTURE OF NECK OF LEFT FEMUR, INITIAL ENCOUNTER (HCC): ICD-10-CM

## 2023-10-17 DIAGNOSIS — S42.032A CLOSED DISPLACED FRACTURE OF ACROMIAL END OF LEFT CLAVICLE, INITIAL ENCOUNTER: ICD-10-CM

## 2023-10-17 DIAGNOSIS — S42.022A CLOSED DISPLACED FRACTURE OF SHAFT OF LEFT CLAVICLE, INITIAL ENCOUNTER: Primary | ICD-10-CM

## 2023-10-17 PROCEDURE — 23500 CLTX CLAVICULAR FX W/O MNPJ: CPT | Performed by: ORTHOPAEDIC SURGERY

## 2023-10-17 PROCEDURE — 3017F COLORECTAL CA SCREEN DOC REV: CPT | Performed by: ORTHOPAEDIC SURGERY

## 2023-10-17 PROCEDURE — 99214 OFFICE O/P EST MOD 30 MIN: CPT | Performed by: ORTHOPAEDIC SURGERY

## 2023-10-17 PROCEDURE — G8419 CALC BMI OUT NRM PARAM NOF/U: HCPCS | Performed by: ORTHOPAEDIC SURGERY

## 2023-10-17 PROCEDURE — 1123F ACP DISCUSS/DSCN MKR DOCD: CPT | Performed by: ORTHOPAEDIC SURGERY

## 2023-10-17 PROCEDURE — 1036F TOBACCO NON-USER: CPT | Performed by: ORTHOPAEDIC SURGERY

## 2023-10-17 PROCEDURE — 1090F PRES/ABSN URINE INCON ASSESS: CPT | Performed by: ORTHOPAEDIC SURGERY

## 2023-10-17 PROCEDURE — G8427 DOCREV CUR MEDS BY ELIG CLIN: HCPCS | Performed by: ORTHOPAEDIC SURGERY

## 2023-10-17 PROCEDURE — G8484 FLU IMMUNIZE NO ADMIN: HCPCS | Performed by: ORTHOPAEDIC SURGERY

## 2023-10-17 PROCEDURE — 1111F DSCHRG MED/CURRENT MED MERGE: CPT | Performed by: ORTHOPAEDIC SURGERY

## 2023-10-17 PROCEDURE — G8399 PT W/DXA RESULTS DOCUMENT: HCPCS | Performed by: ORTHOPAEDIC SURGERY

## 2023-10-18 ENCOUNTER — CARE COORDINATION (OUTPATIENT)
Dept: CASE MANAGEMENT | Age: 73
End: 2023-10-18

## 2023-10-18 PROBLEM — S42.022A CLOSED DISPLACED FRACTURE OF SHAFT OF LEFT CLAVICLE: Status: ACTIVE | Noted: 2023-10-18

## 2023-10-18 NOTE — CARE COORDINATION
Care Transitions Initial Follow Up Call    Call within 2 business days of discharge: Yes    Patient Current Location:  Home: [address] [place] [address] [place] [phone]    Care Transition Nurse contacted the family by telephone to perform post hospital discharge assessment. Verified name and  with family as identifiers. Provided introduction to self, and explanation of the Care Transition Nurse role. Patient: Nabor Skelton Patient : 1950   MRN: [id number]  Reason for Admission: clavicle fx   Discharge Date: 10/7/23 RARS: Readmission Risk Score: 14.4      Last Discharge Facility       Date Complaint Diagnosis Description Type Department Provider    10/5/23  Closed displaced fracture of acromial end of left clavicle, initial encounter Admission (Discharged) Unknown MD Christi            Was this an external facility discharge? Yes, 10/17/23  Discharge Facility: [address] [place] [address] to be reviewed by the provider   Additional needs identified to be addressed with provider: No  none               Method of communication with provider: none. Acute Care Course:   10/5 to 10/7 Clavicle fx with ORIF on  pt had a radius and femur ORIF.   10/7 to 10/17 [facility] to home with [address] [place] [address] Rd made:  10/17 Ortho visit. New clavicle shaft fx found. Will keep pt in sling. 10/25 PCP HFU   ortho with Dr Inez Oneill Hx:   Parkinsons, overactive bladder, polyneuropathy    DME:     Conversation:   Spoke with Germaine Peterson after 2 IDs. Spouse is not happy with [facility]. He felt that meds were not given at the right times and that Wife had broken bones as a result. He felt he needed to take her home. Gave him the phone number for the [facility] [phone]. Stated they did not give her the Rytary on time and she fell. Stated pt had a UTI and the meds te=hey gave her made her confused. Educated that the UTI caused the confusion. States pt is able to swallow pills.   Reviewed meds

## 2023-10-18 NOTE — PROGRESS NOTES
DIAGNOSIS:   1- Left distal clavicle comminuted fracture, status post ORIF, hook plate. 2- New left clavicle shaft fracture. 3- Left femur impacted neck fracture, status post Hip pinning with cannulated screws, 8/31/2023 . 4- Left distal radius 2 parts intra-articular displaced fracture, status post ORIF, 8/31/2023 . 5- Parkinson disease. DATE OF SURGERY:  10/5/2023 . HISTORY OF PRESENT ILLNESS:  Ms. Ho Kent  75 y.o.  female right handed, with Parkinson disease who came in today for 2 weeks postoperative visit. She denies new injury. The patient c/o pain in the left  shoulder. She has been working on ROM. No numbness or tingling sensation. No fever or Chills. She is in a sling.     Past Medical History:   Diagnosis Date    Corticobasal degeneration     COVID-19 09/27/2023    Dyskinesia     Hyperlipidemia     Hyperreflexia     Neuropathy     corticobasal degeneration    Parkinson's disease           Pituitary adenoma ([facility])     Spinal column pain     Surgery on L4, L5 per        Past Surgical History:   Procedure Laterality Date    BACK SURGERY      CLAVICLE SURGERY Left 10/5/2023    LEFT CLAVICLE OPEN REDUCTION INTERNAL FIXATION-PATRICIA performed by Enzo Jimenez MD at [facility]  06/2002    due 6/12    COLONOSCOPY  08/2012    due 8/17    FINGER TRIGGER RELEASE      right hand    HAMMER TOE SURGERY  2005    HUMERUS FRACTURE SURGERY Left 09/01/2023    LEFT DISTAL RADIUS OPEN REDUCTION INTERNAL FIXATION performed by Enzo Jimenez MD at [address] ([facility])      LAPAROSCOPY      LEG SURGERY Left 09/01/2023    LEFT HIP PINNING performed by Enzo Jimenze MD at [address] ADENOIDECTOMY  14 yo    WRIST SURGERY Left 09/01/2023       Social History     Socioeconomic History    Marital status:      Spouse name: Sridhar Keller    Number of children: 2    Years of education: Not on file    Highest education

## 2023-10-19 DIAGNOSIS — S42.022A CLOSED DISPLACED FRACTURE OF SHAFT OF LEFT CLAVICLE, INITIAL ENCOUNTER: Primary | ICD-10-CM

## 2023-10-19 RX ORDER — TRAMADOL HYDROCHLORIDE 50 MG/1
50 TABLET ORAL EVERY 6 HOURS PRN
Qty: 20 TABLET | Refills: 0 | Status: SHIPPED | OUTPATIENT
Start: 2023-10-19 | End: 2023-10-24

## 2023-10-19 NOTE — TELEPHONE ENCOUNTER
Checking with provider on medication recommendation for patient. Once provider has advised clinical staff, patient will be notified. Provider is currently in surgery AM, delay in response.

## 2023-10-19 NOTE — TELEPHONE ENCOUNTER
Medication Management (NEED STRONGER MED SENT IN FOR PAIN ), other Wendi Baltazar from [facility] called, patient was released from Rehab and her discharge papers said she needs to stay on Levenox injections? She only took the injections before her surgery in Aug.  Do you want her to be on the Levnox injections?  Please call Wendi Baltazar  (secure #))

## 2023-10-19 NOTE — TELEPHONE ENCOUNTER
Provider approved Ultram. Patient was notified. Requested Prescriptions     Pending Prescriptions Disp Refills    traMADol (ULTRAM) 50 MG tablet 20 tablet 0     Sig: Take 1 tablet by mouth every 6 hours as needed for Pain for up to 5 days.  Max Daily Amount: 200 mg

## 2023-10-19 NOTE — TELEPHONE ENCOUNTER
Prescription Refill     Medication Name:  PAIN MED - Pt's SPOUSE SAYS HE SPOKE TO DR Jacob Cheng AND THAT SOMETHING STRONGER THAN TYLENOL WOULD BE SENT IN     Pharmacy: Pedro [facility] [id number] [place], [address] [place] [address] N Mahnaz Teran [phone]    Patient Contact Number:  [phone] DESEAN     Pt IS HOME WITH SPOUSE NOW AND TYLENOL ISN'T ENOUGH. DESEAN SPOKE WITH DR CASTELLANOS AND HE WAS TO CALL AND DR CASTELLANOS WOULD SEND SOMETHING IN FOR THE Pt. PLEASE CALL TO ADVISE WHAT JUVENAL IS BEING SENT.

## 2023-10-19 NOTE — TELEPHONE ENCOUNTER
Per Dr. Jolanta Marmolejo - No Lovenox needed.         Monserrat Youngblood aware  Phone:  [phone]

## 2023-10-20 ENCOUNTER — CARE COORDINATION (OUTPATIENT)
Dept: CARE COORDINATION | Age: 73
End: 2023-10-20

## 2023-10-20 NOTE — CARE COORDINATION
Ambulatory Care Coordination Note  10/20/2023    Patient Current Location:  Home: [address] [place] [address] [place] [phone]    ACM contacted the patient by telephone. Verified name and  with patient as identifiers. Provided introduction to self, and explanation of the ACM role. ACM: Soheila Hernandez RN    Challenges to be reviewed by the provider   Additional needs identified to be addressed with provider: No  none               Method of communication with provider: chart routing. ACM made care coordination outreach to patient as instructed by CTN. Patient informed of the role of ACM and care coordination and agreed to future follow up calls at this time. Patient stated that she is \"tired. \"  She is working with home health care nurse and occupational therapy. She reports that physical therapy is to start next week. She is currently wearing a sling and doing her exercises from Dr. Jolanta Marmolejo. Pain is tolerable. Alternating Tramadol and Tylenol.  manages patient medications. Patient has follow up appointment with PCP 10/25/23. Denied any issues with appetite or bowel at this time. Patient reported having a BM yesterday and is closely monitoring bowel habits. Patient reported that she gets around with a wheelchair. In the bathroom has a shower chair, bars and a non-skid jessica. Looking for assistance with home keeping. ACM discussed [facility] with patient and  and stated they are interested. ACM submitted referral to [facility]. Reference number [id number]. Patient stated no other questions or concerns at the end of the call. Patient provided with ACM phone number to call for any non-emergent questions. Patient agreeable to future care coordination calls. ACM will follow up at a later time. Spoke with Dr. Saji Lucas over the phone to discuss patient plan.        Plan   Follow up [facility]   Mail fall safety   Review fall safety   Follow up [facility]   Follow up pain   Complete SDOH  Complete medication review    Offered

## 2023-10-21 ENCOUNTER — APPOINTMENT (OUTPATIENT)
Dept: CT IMAGING | Age: 73
End: 2023-10-21
Payer: MEDICARE

## 2023-10-21 ENCOUNTER — APPOINTMENT (OUTPATIENT)
Dept: GENERAL RADIOLOGY | Age: 73
End: 2023-10-21
Payer: MEDICARE

## 2023-10-21 ENCOUNTER — HOSPITAL ENCOUNTER (EMERGENCY)
Age: 73
Discharge: HOME | End: 2023-10-21
Attending: EMERGENCY MEDICINE
Payer: MEDICARE

## 2023-10-21 VITALS
BODY MASS INDEX: 16.5 KG/M2 | TEMPERATURE: 97.5 F | WEIGHT: 99 LBS | HEART RATE: 89 BPM | HEIGHT: 65 IN | OXYGEN SATURATION: 96 % | SYSTOLIC BLOOD PRESSURE: 151 MMHG | RESPIRATION RATE: 22 BRPM | DIASTOLIC BLOOD PRESSURE: 74 MMHG

## 2023-10-21 DIAGNOSIS — R55 SYNCOPE AND COLLAPSE: Primary | ICD-10-CM

## 2023-10-21 LAB
ANION GAP SERPL CALCULATED.3IONS-SCNC: 11 MMOL/L (ref 3–16)
BASOPHILS # BLD: 0.1 K/UL (ref 0–0.2)
BASOPHILS NFR BLD: 1 %
BUN SERPL-MCNC: 24 MG/DL (ref 7–20)
CALCIUM SERPL-MCNC: 9.1 MG/DL (ref 8.3–10.6)
CHLORIDE SERPL-SCNC: 102 MMOL/L (ref 99–110)
CHP ED QC CHECK: NORMAL
CO2 SERPL-SCNC: 28 MMOL/L (ref 21–32)
CREAT SERPL-MCNC: <0.5 MG/DL (ref 0.6–1.2)
DEPRECATED RDW RBC AUTO: 14.6 % (ref 12.4–15.4)
EOSINOPHIL # BLD: 0 K/UL (ref 0–0.6)
EOSINOPHIL NFR BLD: 0.5 %
GFR SERPLBLD CREATININE-BSD FMLA CKD-EPI: >60 ML/MIN/{1.73_M2}
GLUCOSE BLD-MCNC: 115 MG/DL
GLUCOSE BLD-MCNC: 115 MG/DL (ref 70–99)
GLUCOSE SERPL-MCNC: 89 MG/DL (ref 70–99)
HCT VFR BLD AUTO: 36.2 % (ref 36–48)
HGB BLD-MCNC: 11.8 G/DL (ref 12–16)
LACTATE BLDV-SCNC: 1.3 MMOL/L (ref 0.4–1.9)
LYMPHOCYTES # BLD: 1.4 K/UL (ref 1–5.1)
LYMPHOCYTES NFR BLD: 18.7 %
MCH RBC QN AUTO: 30.9 PG (ref 26–34)
MCHC RBC AUTO-ENTMCNC: 32.5 G/DL (ref 31–36)
MCV RBC AUTO: 95.1 FL (ref 80–100)
MONOCYTES # BLD: 0.6 K/UL (ref 0–1.3)
MONOCYTES NFR BLD: 8.6 %
NEUTROPHILS # BLD: 5.3 K/UL (ref 1.7–7.7)
NEUTROPHILS NFR BLD: 71.2 %
PERFORMED ON: ABNORMAL
PLATELET # BLD AUTO: 337 K/UL (ref 135–450)
PMV BLD AUTO: 8.1 FL (ref 5–10.5)
POTASSIUM SERPL-SCNC: 4.3 MMOL/L (ref 3.5–5.1)
RBC # BLD AUTO: 3.8 M/UL (ref 4–5.2)
SODIUM SERPL-SCNC: 141 MMOL/L (ref 136–145)
TROPONIN, HIGH SENSITIVITY: 12 NG/L (ref 0–14)
WBC # BLD AUTO: 7.4 K/UL (ref 4–11)

## 2023-10-21 PROCEDURE — 83605 ASSAY OF LACTIC ACID: CPT

## 2023-10-21 PROCEDURE — 99285 EMERGENCY DEPT VISIT HI MDM: CPT

## 2023-10-21 PROCEDURE — 70450 CT HEAD/BRAIN W/O DYE: CPT

## 2023-10-21 PROCEDURE — 71045 X-RAY EXAM CHEST 1 VIEW: CPT

## 2023-10-21 PROCEDURE — 85025 COMPLETE CBC W/AUTO DIFF WBC: CPT

## 2023-10-21 PROCEDURE — 72125 CT NECK SPINE W/O DYE: CPT

## 2023-10-21 PROCEDURE — 80048 BASIC METABOLIC PNL TOTAL CA: CPT

## 2023-10-21 ASSESSMENT — ENCOUNTER SYMPTOMS
CONSTIPATION: 1
SHORTNESS OF BREATH: 0
ABDOMINAL PAIN: 0
EYE PAIN: 0
DIARRHEA: 0
VOMITING: 0
NAUSEA: 1

## 2023-10-21 ASSESSMENT — PAIN - FUNCTIONAL ASSESSMENT: PAIN_FUNCTIONAL_ASSESSMENT: NONE - DENIES PAIN

## 2023-10-21 NOTE — ED PROVIDER NOTES
and may contain errors related to that system including errors in grammar, punctuation, and spelling, as well as words and phrases that may be inappropriate. If there are any questions or concerns please feel free to contact the dictating provider for clarification. Jonathon Arora  PGY1 Family Medicine Resident      Jonathon Lea DO  Resident  10/21/23 [id number]

## 2023-10-22 LAB
EKG ATRIAL RATE: 93 BPM
EKG DIAGNOSIS: NORMAL
EKG P AXIS: 49 DEGREES
EKG P-R INTERVAL: 150 MS
EKG Q-T INTERVAL: 372 MS
EKG QRS DURATION: 110 MS
EKG QTC CALCULATION (BAZETT): 462 MS
EKG R AXIS: 55 DEGREES
EKG T AXIS: 76 DEGREES
EKG VENTRICULAR RATE: 93 BPM

## 2023-10-23 ENCOUNTER — CARE COORDINATION (OUTPATIENT)
Dept: CARE COORDINATION | Age: 73
End: 2023-10-23

## 2023-10-23 RX ORDER — FAMOTIDINE 40 MG/1
40 TABLET, FILM COATED ORAL DAILY
COMMUNITY

## 2023-10-23 SDOH — ECONOMIC STABILITY: HOUSING INSECURITY: IN THE LAST 12 MONTHS, HOW MANY PLACES HAVE YOU LIVED?: 1

## 2023-10-23 SDOH — ECONOMIC STABILITY: INCOME INSECURITY: IN THE LAST 12 MONTHS, WAS THERE A TIME WHEN YOU WERE NOT ABLE TO PAY THE MORTGAGE OR RENT ON TIME?: NO

## 2023-10-23 SDOH — ECONOMIC STABILITY: TRANSPORTATION INSECURITY
IN THE PAST 12 MONTHS, HAS THE LACK OF TRANSPORTATION KEPT YOU FROM MEDICAL APPOINTMENTS OR FROM GETTING MEDICATIONS?: NO

## 2023-10-23 SDOH — ECONOMIC STABILITY: TRANSPORTATION INSECURITY
IN THE PAST 12 MONTHS, HAS LACK OF TRANSPORTATION KEPT YOU FROM MEETINGS, WORK, OR FROM GETTING THINGS NEEDED FOR DAILY LIVING?: NO

## 2023-10-23 ASSESSMENT — SOCIAL DETERMINANTS OF HEALTH (SDOH)
IN A TYPICAL WEEK, HOW MANY TIMES DO YOU TALK ON THE PHONE WITH FAMILY, FRIENDS, OR NEIGHBORS?: TWICE A WEEK
HOW OFTEN DO YOU GET TOGETHER WITH FRIENDS OR RELATIVES?: TWICE A WEEK
HOW OFTEN DO YOU ATTENT MEETINGS OF THE CLUB OR ORGANIZATION YOU BELONG TO?: MORE THAN 4 TIMES PER YEAR
HOW OFTEN DO YOU ATTEND CHURCH OR RELIGIOUS SERVICES?: MORE THAN 4 TIMES PER YEAR
DO YOU BELONG TO ANY CLUBS OR ORGANIZATIONS SUCH AS CHURCH GROUPS UNIONS, FRATERNAL OR ATHLETIC GROUPS, OR SCHOOL GROUPS?: YES
HOW HARD IS IT FOR YOU TO PAY FOR THE VERY BASICS LIKE FOOD, HOUSING, MEDICAL CARE, AND HEATING?: NOT HARD AT ALL

## 2023-10-23 NOTE — CARE COORDINATION
Ambulatory Care Coordination  ED Follow up Call    Reason for ED visit:  unresponsive    Status:     significantly improved    Did you call your PCP prior to going to the ED? No      Did you receive a discharge instructions from the Emergency Room? Yes  Review of Instructions:     Understands what to report/when to return?:  Yes   Understands discharge instructions?:  Yes   Following discharge instructions?:  Yes   If not why? Are there any new complaints of pain? No  New Pain Meds? No    Constipation prophylaxis needed? N/A    If you have a wound is the dressing clean, dry, and intact? N/A  Understands wound care regimen? N/A    Are there any other complaints/concerns that you wish to tell your provider? no    FU appts/Provider:    Future Appointments   Date Time Provider [address]   10/25/2023  1:00 PM Cata Cano MD [facility]   11/28/2023  8:45 AM Gracie Cruz MD AND ORTHO [facility]BY completed ED follow up call with patient and . Patient reported that she feels back to her baseline. Medication review completed. Pain is tolerable. Taking tylenol and 3-4 times daily as needed. COA spoke with patient's  on Friday and made a referral to the [address] [place] [address].     Plan   Follow up [facility]A  Review fall safety   [facility]   Follow up BP   Follow up 1 week       New Medications?:   No      Medication Reconciliation by phone - Yes  Understands Medications? Yes  Taking Medications? Yes  Can you swallow your pills? Yes    Any further needs in the home i.e. Equipment?   Not Applicable    Link to services in community?:  N/A   Which services:

## 2023-10-25 ENCOUNTER — OFFICE VISIT (OUTPATIENT)
Dept: FAMILY MEDICINE CLINIC | Age: 73
End: 2023-10-25

## 2023-10-25 DIAGNOSIS — G20.B2 PARKINSON'S DISEASE WITH DYSKINESIA AND FLUCTUATING MANIFESTATIONS: Primary | ICD-10-CM

## 2023-10-25 DIAGNOSIS — S72.002A CLOSED FRACTURE OF NECK OF LEFT FEMUR, INITIAL ENCOUNTER (HCC): ICD-10-CM

## 2023-10-25 DIAGNOSIS — S52.502A CLOSED FRACTURE OF DISTAL END OF LEFT RADIUS, UNSPECIFIED FRACTURE MORPHOLOGY, INITIAL ENCOUNTER: ICD-10-CM

## 2023-10-25 DIAGNOSIS — Z23 NEEDS FLU SHOT: ICD-10-CM

## 2023-10-25 LAB
BACTERIA BLD CULT ORG #2: NORMAL
BACTERIA BLD CULT: NORMAL

## 2023-10-25 RX ORDER — SENNOSIDES A AND B 8.6 MG/1
1 TABLET, FILM COATED ORAL DAILY
Qty: 30 TABLET | Refills: 3 | Status: SHIPPED | OUTPATIENT
Start: 2023-10-25 | End: 2024-10-24

## 2023-10-31 ENCOUNTER — CARE COORDINATION (OUTPATIENT)
Dept: CARE COORDINATION | Age: 73
End: 2023-10-31
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
## 2023-10-31 NOTE — CARE COORDINATION
Unfortunately, Cymbalta 60 mg is the maximum dose.   They feel that additional medication is needed, they will need to contact the neurologist.  Her crying spells may be related to her Parkinson's

## 2023-11-02 ENCOUNTER — TELEPHONE (OUTPATIENT)
Dept: FAMILY MEDICINE CLINIC | Age: 73
End: 2023-11-02

## 2023-11-02 DIAGNOSIS — G20.B2 PARKINSON'S DISEASE WITH DYSKINESIA AND FLUCTUATING MANIFESTATIONS: Primary | ICD-10-CM

## 2023-11-02 DIAGNOSIS — Z91.81 AT HIGH RISK FOR FALLS: ICD-10-CM

## 2023-11-02 DIAGNOSIS — T14.8XXA BONE FRACTURE: ICD-10-CM

## 2023-11-02 NOTE — TELEPHONE ENCOUNTER
Radha with [facility] calling to see if a medical social worker consult fora home susan aide can be placed. If so, it needs to be faxed to [phone].

## 2023-11-04 ENCOUNTER — APPOINTMENT (OUTPATIENT)
Dept: GENERAL RADIOLOGY | Age: 73
End: 2023-11-04
Payer: MEDICARE

## 2023-11-04 ENCOUNTER — HOSPITAL ENCOUNTER (EMERGENCY)
Age: 73
Discharge: HOME | End: 2023-11-04
Attending: EMERGENCY MEDICINE
Payer: MEDICARE

## 2023-11-04 VITALS
DIASTOLIC BLOOD PRESSURE: 73 MMHG | OXYGEN SATURATION: 99 % | HEART RATE: 99 BPM | HEIGHT: 65 IN | TEMPERATURE: 98.4 F | WEIGHT: 97 LBS | SYSTOLIC BLOOD PRESSURE: 111 MMHG | RESPIRATION RATE: 16 BRPM | BODY MASS INDEX: 16.16 KG/M2

## 2023-11-04 DIAGNOSIS — G89.18 ACUTE POST-OPERATIVE PAIN: Primary | ICD-10-CM

## 2023-11-04 PROCEDURE — 99283 EMERGENCY DEPT VISIT LOW MDM: CPT

## 2023-11-04 PROCEDURE — 6370000000 HC RX 637 (ALT 250 FOR IP): Performed by: EMERGENCY MEDICINE

## 2023-11-04 PROCEDURE — 73030 X-RAY EXAM OF SHOULDER: CPT

## 2023-11-04 RX ORDER — OXYCODONE HYDROCHLORIDE AND ACETAMINOPHEN 5; 325 MG/1; MG/1
1 TABLET ORAL ONCE
Status: COMPLETED | OUTPATIENT
Start: 2023-11-04 | End: 2023-11-04

## 2023-11-04 RX ORDER — HYDROCODONE BITARTRATE AND ACETAMINOPHEN 5; 325 MG/1; MG/1
1 TABLET ORAL ONCE
Status: COMPLETED | OUTPATIENT
Start: 2023-11-04 | End: 2023-11-04

## 2023-11-04 RX ORDER — OXYCODONE HYDROCHLORIDE AND ACETAMINOPHEN 5; 325 MG/1; MG/1
1-2 TABLET ORAL EVERY 6 HOURS PRN
Qty: 11 TABLET | Refills: 0 | Status: SHIPPED | OUTPATIENT
Start: 2023-11-04 | End: 2023-11-07

## 2023-11-04 RX ADMIN — HYDROCODONE BITARTRATE AND ACETAMINOPHEN 1 TABLET: 5; 325 TABLET ORAL at 13:57

## 2023-11-04 RX ADMIN — OXYCODONE AND ACETAMINOPHEN 1 TABLET: 5; 325 TABLET ORAL at 14:46

## 2023-11-04 ASSESSMENT — PAIN DESCRIPTION - ORIENTATION
ORIENTATION: LEFT

## 2023-11-04 ASSESSMENT — PAIN DESCRIPTION - LOCATION
LOCATION: SHOULDER
LOCATION: ARM;SHOULDER
LOCATION: ARM

## 2023-11-04 ASSESSMENT — PAIN SCALES - GENERAL
PAINLEVEL_OUTOF10: 8
PAINLEVEL_OUTOF10: 7
PAINLEVEL_OUTOF10: 8

## 2023-11-04 ASSESSMENT — PAIN - FUNCTIONAL ASSESSMENT: PAIN_FUNCTIONAL_ASSESSMENT: 0-10

## 2023-11-04 NOTE — ED NOTES
Pt scripts x1 instructed to follow up with [facility].  Assessed per Dr Hermilo Mortensen, [address] [place] [address], N  [phone]

## 2023-11-06 ENCOUNTER — CARE COORDINATION (OUTPATIENT)
Dept: CARE COORDINATION | Age: 73
End: 2023-11-06

## 2023-11-06 NOTE — CARE COORDINATION
Ambulatory Care Coordination Note  2023    Patient Current Location:  Home: [address] [place] [address] [place] [phone]     ACM contacted the family by telephone. Verified name and  with family as identifiers. Provided introduction to self, and explanation of the ACM role. Challenges to be reviewed by the provider   Additional needs identified to be addressed with provider: No  none               Method of communication with provider: none. ACM: Penny Homans, RN    ACM made ED follow up call to patient's , Sim Sanchez.  reported that patient's shoulder is looking about the same. The ER physician informed him that it is just bruised and in the process of healing. Patient received a prescription for percocet in the ED. She is taking percocet as needed and alternating with Tylenol.  is following discharge instructions. Patient is scheduled for a follow up with Dr. Lis Isaac tomorrow.  reported that his wife is having periods where she can't move and is really \"weepy\". Periods of being unable to move are happening for around an hour. They come on randomly and stop randomly. Patient has an appointment with neurology on .  encouraged to call neurology to see if they have an earlier appointment available.  reported that they are not getting sleep at night due to the emotional situations, overactive bladder, wanting to sit up or eat in the middle of the night.  reported that they are getting up 3-10+ times a night.  inquired about an in home purewick to use for overnight. Unable to afford without assistance. ACM to discuss purewick with  to see if any resources are available.  is unaware of any resources to assist with purewick cost at this time. Medicare does not cover purewicks.  reported that [facility]A is coming sometime this week for the initial interview.  denied any other questions or concerns at this time.

## 2023-11-07 ENCOUNTER — APPOINTMENT (OUTPATIENT)
Dept: GENERAL RADIOLOGY | Age: 73
End: 2023-11-07
Payer: MEDICARE

## 2023-11-07 ENCOUNTER — OFFICE VISIT (OUTPATIENT)
Dept: ORTHOPEDIC SURGERY | Age: 73
End: 2023-11-07
Payer: MEDICARE

## 2023-11-07 ENCOUNTER — HOSPITAL ENCOUNTER (EMERGENCY)
Age: 73
Discharge: HOME | End: 2023-11-07
Attending: EMERGENCY MEDICINE
Payer: MEDICARE

## 2023-11-07 VITALS
OXYGEN SATURATION: 94 % | SYSTOLIC BLOOD PRESSURE: 148 MMHG | RESPIRATION RATE: 16 BRPM | DIASTOLIC BLOOD PRESSURE: 71 MMHG | HEART RATE: 97 BPM | TEMPERATURE: 97.8 F

## 2023-11-07 DIAGNOSIS — S72.002A CLOSED FRACTURE OF NECK OF LEFT FEMUR, INITIAL ENCOUNTER (HCC): ICD-10-CM

## 2023-11-07 DIAGNOSIS — S52.592A OTHER CLOSED FRACTURE OF DISTAL END OF LEFT RADIUS, INITIAL ENCOUNTER: ICD-10-CM

## 2023-11-07 DIAGNOSIS — S42.022A CLOSED DISPLACED FRACTURE OF SHAFT OF LEFT CLAVICLE, INITIAL ENCOUNTER: Primary | ICD-10-CM

## 2023-11-07 DIAGNOSIS — G20.B1 PARKINSON'S DISEASE WITH DYSKINESIA, UNSPECIFIED WHETHER MANIFESTATIONS FLUCTUATE: ICD-10-CM

## 2023-11-07 DIAGNOSIS — G89.4 CHRONIC PAIN SYNDROME: ICD-10-CM

## 2023-11-07 DIAGNOSIS — I95.9 HYPOTENSION, UNSPECIFIED HYPOTENSION TYPE: Primary | ICD-10-CM

## 2023-11-07 LAB
ANION GAP SERPL CALCULATED.3IONS-SCNC: 10 MMOL/L (ref 3–16)
BASOPHILS # BLD: 0.1 K/UL (ref 0–0.2)
BASOPHILS NFR BLD: 1.7 %
BUN SERPL-MCNC: 24 MG/DL (ref 7–20)
CALCIUM SERPL-MCNC: 9.8 MG/DL (ref 8.3–10.6)
CHLORIDE SERPL-SCNC: 104 MMOL/L (ref 99–110)
CO2 SERPL-SCNC: 27 MMOL/L (ref 21–32)
CREAT SERPL-MCNC: 0.9 MG/DL (ref 0.6–1.2)
DEPRECATED RDW RBC AUTO: 14.5 % (ref 12.4–15.4)
EOSINOPHIL # BLD: 0 K/UL (ref 0–0.6)
EOSINOPHIL NFR BLD: 0.6 %
GFR SERPLBLD CREATININE-BSD FMLA CKD-EPI: >60 ML/MIN/{1.73_M2}
GLUCOSE SERPL-MCNC: 127 MG/DL (ref 70–99)
HCT VFR BLD AUTO: 37.7 % (ref 36–48)
HGB BLD-MCNC: 12.3 G/DL (ref 12–16)
LYMPHOCYTES # BLD: 0.6 K/UL (ref 1–5.1)
LYMPHOCYTES NFR BLD: 8.9 %
MCH RBC QN AUTO: 30.9 PG (ref 26–34)
MCHC RBC AUTO-ENTMCNC: 32.6 G/DL (ref 31–36)
MCV RBC AUTO: 94.8 FL (ref 80–100)
MONOCYTES # BLD: 0.3 K/UL (ref 0–1.3)
MONOCYTES NFR BLD: 4.4 %
NEUTROPHILS # BLD: 5.9 K/UL (ref 1.7–7.7)
NEUTROPHILS NFR BLD: 84.4 %
NT-PROBNP SERPL-MCNC: 236 PG/ML (ref 0–449)
PLATELET # BLD AUTO: 277 K/UL (ref 135–450)
PMV BLD AUTO: 7.8 FL (ref 5–10.5)
POTASSIUM SERPL-SCNC: 3.9 MMOL/L (ref 3.5–5.1)
RBC # BLD AUTO: 3.97 M/UL (ref 4–5.2)
SODIUM SERPL-SCNC: 141 MMOL/L (ref 136–145)
SPECIMEN STATUS: NORMAL
TROPONIN, HIGH SENSITIVITY: 12 NG/L (ref 0–14)
TROPONIN, HIGH SENSITIVITY: 16 NG/L (ref 0–14)
WBC # BLD AUTO: 7 K/UL (ref 4–11)

## 2023-11-07 PROCEDURE — G8399 PT W/DXA RESULTS DOCUMENT: HCPCS | Performed by: ORTHOPAEDIC SURGERY

## 2023-11-07 PROCEDURE — 1036F TOBACCO NON-USER: CPT | Performed by: ORTHOPAEDIC SURGERY

## 2023-11-07 PROCEDURE — G8419 CALC BMI OUT NRM PARAM NOF/U: HCPCS | Performed by: ORTHOPAEDIC SURGERY

## 2023-11-07 PROCEDURE — 84484 ASSAY OF TROPONIN QUANT: CPT

## 2023-11-07 PROCEDURE — 99213 OFFICE O/P EST LOW 20 MIN: CPT | Performed by: ORTHOPAEDIC SURGERY

## 2023-11-07 PROCEDURE — 80048 BASIC METABOLIC PNL TOTAL CA: CPT

## 2023-11-07 PROCEDURE — 71046 X-RAY EXAM CHEST 2 VIEWS: CPT

## 2023-11-07 PROCEDURE — G8427 DOCREV CUR MEDS BY ELIG CLIN: HCPCS | Performed by: ORTHOPAEDIC SURGERY

## 2023-11-07 PROCEDURE — 96376 TX/PRO/DX INJ SAME DRUG ADON: CPT

## 2023-11-07 PROCEDURE — 1123F ACP DISCUSS/DSCN MKR DOCD: CPT | Performed by: ORTHOPAEDIC SURGERY

## 2023-11-07 PROCEDURE — 3017F COLORECTAL CA SCREEN DOC REV: CPT | Performed by: ORTHOPAEDIC SURGERY

## 2023-11-07 PROCEDURE — 99285 EMERGENCY DEPT VISIT HI MDM: CPT

## 2023-11-07 PROCEDURE — 1090F PRES/ABSN URINE INCON ASSESS: CPT | Performed by: ORTHOPAEDIC SURGERY

## 2023-11-07 PROCEDURE — G8484 FLU IMMUNIZE NO ADMIN: HCPCS | Performed by: ORTHOPAEDIC SURGERY

## 2023-11-07 PROCEDURE — 83880 ASSAY OF NATRIURETIC PEPTIDE: CPT

## 2023-11-07 PROCEDURE — 93005 ELECTROCARDIOGRAM TRACING: CPT | Performed by: PHYSICIAN ASSISTANT

## 2023-11-07 PROCEDURE — 96374 THER/PROPH/DIAG INJ IV PUSH: CPT

## 2023-11-07 PROCEDURE — 85025 COMPLETE CBC W/AUTO DIFF WBC: CPT

## 2023-11-07 PROCEDURE — 6360000002 HC RX W HCPCS: Performed by: PHYSICIAN ASSISTANT

## 2023-11-07 RX ORDER — MORPHINE SULFATE 4 MG/ML
4 INJECTION, SOLUTION INTRAMUSCULAR; INTRAVENOUS EVERY 30 MIN PRN
Status: DISCONTINUED | OUTPATIENT
Start: 2023-11-07 | End: 2023-11-07 | Stop reason: HOSPADM

## 2023-11-07 RX ADMIN — MORPHINE SULFATE 4 MG: 4 INJECTION, SOLUTION INTRAMUSCULAR; INTRAVENOUS at 12:30

## 2023-11-07 RX ADMIN — MORPHINE SULFATE 4 MG: 4 INJECTION, SOLUTION INTRAMUSCULAR; INTRAVENOUS at 11:26

## 2023-11-07 ASSESSMENT — PAIN - FUNCTIONAL ASSESSMENT: PAIN_FUNCTIONAL_ASSESSMENT: 0-10

## 2023-11-07 ASSESSMENT — PAIN SCALES - GENERAL
PAINLEVEL_OUTOF10: 8
PAINLEVEL_OUTOF10: 3
PAINLEVEL_OUTOF10: 8

## 2023-11-07 ASSESSMENT — PAIN DESCRIPTION - LOCATION: LOCATION: HEAD

## 2023-11-07 NOTE — PROGRESS NOTES
on file to calculate BMI. Resting respiratory rate is 16. The incision is healed well left shoulder. No signs of any erythema or drainage. She has no pain with the active or passive range of motion of the left  shoulder, but decrease ROM. She has intact sensation, distally, and  is neurovascularly intact. The incision is healed well, left hip and wrist.  No signs of any erythema or drainage. She has no pain with the active or passive range of motion of the left wrist or hip. She has intact sensation, distally, and  is neurovascularly intact. IMAGING:  Two views left  clavicle showed anatomic alignment of the distal clavicle fracture, plate and screws pulled out proximally, there is a new clavicle shaft fracture, stable since last xray. Two views left hip and femur, and AP pelvis showed good alignment of the impacted femoral neck fracture, 3 cannulated screws in good position, no loosening, or hardware failure, one screw slightly protruding. Three views left wrist taken today in the office showed anatomic alignment of left distal radius, plate and screws in good position, no loosening. IMPRESSION:     1- Left distal clavicle comminuted fracture, status post ORIF, hook plate. 2- New left clavicle shaft fracture. 3- Left femur impacted neck fracture, status post Hip pinning with cannulated screws, 8/31/2023 . 4- Left distal radius 2 parts intra-articular displaced fracture, status post ORIF, 8/31/2023 . 5- Parkinson disease. 6- Muscle spasm. PLAN:   I discussed with Mialgros Lees the treatment options and that the overall alignment of the new clavicle shaft fracture is acceptable and we can try to treat this non-operatively with gentle ROM and gradual WBAT. We discussed the risk of nonunion and or malunion. I have told the patient to work on ROM as well as strengthening exercises, but no heavy impact activities. She can be WBAT left hip.  The patient will come back for a

## 2023-11-07 NOTE — ED PROVIDER NOTES
[address] [place] [address]  ED  EMERGENCY DEPARTMENT ENCOUNTER        Pt Name: Jose Angel Godoy  MRN: [id number]  [address] [facility] [place] 1950  Date of evaluation: 11/7/2023  Provider: Luna Quiñones PA-C  PCP: Saqib Sigala MD  Note Started: 10:52 AM EST 11/7/23       I have seen and evaluated this patient with my supervising physician Conda Najjar, MD.      [address]       Chief Complaint   Patient presents with    Hypotension     Pt brought in by spouse with reports of low blood pressure this morning. Pt reports that \"she just didn't feel well\" and had some lightheadedness and took BP and was 60's SBP. Pt is A&O in triage       HISTORY OF PRESENT ILLNESS: 1 or more Elements     History From: The patient and her     Limitations to history : None    Social Determinants Significantly Affecting Health : None    Chief Complaint: Hypotensive episode    Natacha Benson is a 75 y.o. female who presents to the emergency department, the patient states that at 3:00 in the morning she woke up felt very lightheaded, did not get out of bed. She thought that she had a episode of low blood pressure. She states that this morning at 8:00 she got up took her salt tablet, went to her orthopedic doctor, went back home, felt bad again and her blood pressure is 60/39. She states that this is happened before, usually they just tell her to take salt tablets. Nursing Notes were all reviewed and agreed with or any disagreements were addressed in the HPI. REVIEW OF SYSTEMS :      Review of Systems    Positives and Pertinent negatives as per HPI.      SURGICAL HISTORY     Past Surgical History:   Procedure Laterality Date    BACK SURGERY      CLAVICLE SURGERY Left 10/5/2023    LEFT CLAVICLE OPEN REDUCTION INTERNAL FIXATION-PATRICIA performed by Cecil Lomax MD at [facility]    COLONOSCOPY  06/2002    due 6/12    COLONOSCOPY  08/2012    due 8/17    FINGER TRIGGER RELEASE      right hand    HAMMER TOE SURGERY  2005

## 2023-11-07 NOTE — CARE COORDINATION
[facility]  Patient is active with [facility], Will follow for discharge to home with orders to resume care.       Maura Irving RN, BSN CTN [address] [place] [address]   [phone] fax [phone]  [facility] [employer] [address] [phone]

## 2023-11-07 NOTE — ED PROVIDER NOTES
I independently evaluated and obtained a history and physical on Natacha Yeager. I personally saw the patient and performed a substantive portion of the visit including all aspects of the medical decision making. All diagnostic, treatment, and disposition assistants were made to myself in conjunction the advanced practice provider. For further details of this patient's emergency department encounter, please see the advanced practice provider's documentation. History: Patient is a 71-year-old female with a history of Parkinson's disease who presents to the emergency department due to lightheadedness fatigue and hypotension including a blood pressure of 60/39 that she had at her orthopedic doctor's office. Patient does have a history of hypertension which she takes salt tablets for. Patient ports her symptoms have improved since arrival.  Denies any weakness or numbness in 1 arm or leg versus the other. Physician Exam: Pleasant chronically ill appearing elderly  female. Very mild upper extremity tremors. Awake and alert. Intact distal pulses. Lungs clear to auscultation. MDM:    I personally saw the patient and performed a substantive portion of the visit including all aspects of the medical decision making. Patient is afebrile, nontoxic-appearing, in no acute distress. Normal blood pressure. No hypotension during patient stay in ED. Laboratory evaluation does not show any signs of acute emergent endorgan damage. Discussion had about possible autonomic dysregulation due to Parkinson's causing this. At first patient has been referred to be admitted for observation however after speaking to the hospitalist they report they want to be discharged home.   I have discussed this with the patient after this and patient has been both agree that they feel comfortable being discharged home will follow-up as an outpatient and come back to emergency room if symptoms worsen which I feel is

## 2023-11-08 LAB
EKG ATRIAL RATE: 109 BPM
EKG DIAGNOSIS: NORMAL
EKG P AXIS: 78 DEGREES
EKG P-R INTERVAL: 162 MS
EKG Q-T INTERVAL: 348 MS
EKG QRS DURATION: 104 MS
EKG QTC CALCULATION (BAZETT): 468 MS
EKG R AXIS: 74 DEGREES
EKG T AXIS: 77 DEGREES
EKG VENTRICULAR RATE: 109 BPM

## 2023-11-08 PROCEDURE — 93010 ELECTROCARDIOGRAM REPORT: CPT | Performed by: INTERNAL MEDICINE

## 2023-11-09 ENCOUNTER — CARE COORDINATION (OUTPATIENT)
Dept: CARE COORDINATION | Age: 73
End: 2023-11-09

## 2023-11-09 NOTE — CARE COORDINATION
Ambulatory Care Coordination Note  2023    Patient Current Location: [address] [facility] contacted the family by telephone. Verified name and  with family as identifiers. Provided introduction to self, and explanation of the ACM role. Challenges to be reviewed by the provider   Additional needs identified to be addressed with provider: No  none               Method of communication with provider: none. ACM: Gayla Navarro RN    ACM made care coordination outreach to patient's , Tamika Levi. Patient was sent to the emergency room by orthopedic physician for low blood pressure.  is following discharge instructions. No new medications.  is taking patient's blood pressure several times a day to monitor. He reports it has been steady around 130/70.  stated that he will call PCP office to schedule follow up appointment. Patient is receiving home health care physical and occupational therapy. Therapist was out to the house this morning.  reported that patient has a good appetite.  is waiting for call from [address] [facility]. Patient had a virtual appointment this morning with neurology. No medication changes per . Neurology suggested pain medication before bedtime to help patient sleep at night.  gives tylenol throughout the day for pain relief.  denied any other questions or concerns at this time. ACM to follow up at a later date. ACM called patient's daughter. Daughter did not answer. ACM left voicemail and requested a call back. Plan   Follow up BP   Review fall education   Follow up sleep   Follow up 1 week       Offered patient enrollment in the Remote Patient Monitoring (RPM) program for in-home monitoring: Patient declined. Lab Results       None            Care Coordination Interventions    Referral from Primary Care Provider: No  Suggested Interventions and Community Resources  Fall Risk Prevention:  In Process (Comment: mailed

## 2023-11-10 ENCOUNTER — HOSPITAL ENCOUNTER (INPATIENT)
Age: 73
LOS: 2 days | Discharge: HOME | DRG: 392 | End: 2023-11-12
Attending: STUDENT IN AN ORGANIZED HEALTH CARE EDUCATION/TRAINING PROGRAM | Admitting: INTERNAL MEDICINE
Payer: MEDICARE

## 2023-11-10 ENCOUNTER — APPOINTMENT (OUTPATIENT)
Dept: CT IMAGING | Age: 73
DRG: 392 | End: 2023-11-10
Payer: MEDICARE

## 2023-11-10 DIAGNOSIS — R10.84 GENERALIZED ABDOMINAL PAIN: ICD-10-CM

## 2023-11-10 DIAGNOSIS — I95.1 ORTHOSTATIC HYPOTENSION: Primary | ICD-10-CM

## 2023-11-10 PROBLEM — R10.9 ABDOMINAL PAIN: Status: ACTIVE | Noted: 2023-11-10

## 2023-11-10 LAB
ALBUMIN SERPL-MCNC: 4.4 G/DL (ref 3.4–5)
ALBUMIN/GLOB SERPL: 1.5 {RATIO} (ref 1.1–2.2)
ALP SERPL-CCNC: 139 U/L (ref 40–129)
ALT SERPL-CCNC: <5 U/L (ref 10–40)
ANION GAP SERPL CALCULATED.3IONS-SCNC: 13 MMOL/L (ref 3–16)
AST SERPL-CCNC: 13 U/L (ref 15–37)
BACTERIA URNS QL MICRO: ABNORMAL /HPF
BASOPHILS # BLD: 0.1 K/UL (ref 0–0.2)
BASOPHILS NFR BLD: 1.1 %
BILIRUB SERPL-MCNC: 0.3 MG/DL (ref 0–1)
BILIRUB UR QL STRIP.AUTO: NEGATIVE
BUN SERPL-MCNC: 20 MG/DL (ref 7–20)
CALCIUM SERPL-MCNC: 9.7 MG/DL (ref 8.3–10.6)
CHLORIDE SERPL-SCNC: 103 MMOL/L (ref 99–110)
CLARITY UR: CLEAR
CO2 SERPL-SCNC: 25 MMOL/L (ref 21–32)
COLOR UR: YELLOW
CREAT SERPL-MCNC: 0.6 MG/DL (ref 0.6–1.2)
DEPRECATED RDW RBC AUTO: 14 % (ref 12.4–15.4)
EKG ATRIAL RATE: 92 BPM
EKG DIAGNOSIS: NORMAL
EKG P AXIS: 83 DEGREES
EKG P-R INTERVAL: 156 MS
EKG Q-T INTERVAL: 364 MS
EKG QRS DURATION: 108 MS
EKG QTC CALCULATION (BAZETT): 450 MS
EKG R AXIS: 55 DEGREES
EKG T AXIS: 70 DEGREES
EKG VENTRICULAR RATE: 92 BPM
EOSINOPHIL # BLD: 0.1 K/UL (ref 0–0.6)
EOSINOPHIL NFR BLD: 0.6 %
GFR SERPLBLD CREATININE-BSD FMLA CKD-EPI: >60 ML/MIN/{1.73_M2}
GLUCOSE SERPL-MCNC: 116 MG/DL (ref 70–99)
GLUCOSE UR STRIP.AUTO-MCNC: NEGATIVE MG/DL
HCT VFR BLD AUTO: 39.6 % (ref 36–48)
HGB BLD-MCNC: 13.2 G/DL (ref 12–16)
HGB UR QL STRIP.AUTO: ABNORMAL
KETONES UR STRIP.AUTO-MCNC: NEGATIVE MG/DL
LACTATE BLDV-SCNC: 0.8 MMOL/L (ref 0.4–2)
LEUKOCYTE ESTERASE UR QL STRIP.AUTO: NEGATIVE
LIPASE SERPL-CCNC: 35 U/L (ref 13–60)
LYMPHOCYTES # BLD: 2.1 K/UL (ref 1–5.1)
LYMPHOCYTES NFR BLD: 20.4 %
MCH RBC QN AUTO: 31.1 PG (ref 26–34)
MCHC RBC AUTO-ENTMCNC: 33.3 G/DL (ref 31–36)
MCV RBC AUTO: 93.5 FL (ref 80–100)
MONOCYTES # BLD: 0.8 K/UL (ref 0–1.3)
MONOCYTES NFR BLD: 7.9 %
NEUTROPHILS # BLD: 7.3 K/UL (ref 1.7–7.7)
NEUTROPHILS NFR BLD: 70 %
NITRITE UR QL STRIP.AUTO: NEGATIVE
PH UR STRIP.AUTO: 7 [PH] (ref 5–8)
PLATELET # BLD AUTO: 305 K/UL (ref 135–450)
PMV BLD AUTO: 7.9 FL (ref 5–10.5)
POTASSIUM SERPL-SCNC: 3.6 MMOL/L (ref 3.5–5.1)
PROT SERPL-MCNC: 7.4 G/DL (ref 6.4–8.2)
PROT UR STRIP.AUTO-MCNC: NEGATIVE MG/DL
RBC # BLD AUTO: 4.24 M/UL (ref 4–5.2)
RBC #/AREA URNS HPF: ABNORMAL /HPF (ref 0–4)
SODIUM SERPL-SCNC: 141 MMOL/L (ref 136–145)
SP GR UR STRIP.AUTO: 1.01 (ref 1–1.03)
TROPONIN, HIGH SENSITIVITY: 15 NG/L (ref 0–14)
UA COMPLETE W REFLEX CULTURE PNL UR: ABNORMAL
UA DIPSTICK W REFLEX MICRO PNL UR: YES
URN SPEC COLLECT METH UR: ABNORMAL
UROBILINOGEN UR STRIP-ACNC: 0.2 E.U./DL
WBC # BLD AUTO: 10.4 K/UL (ref 4–11)
WBC #/AREA URNS HPF: ABNORMAL /HPF (ref 0–5)

## 2023-11-10 PROCEDURE — 96374 THER/PROPH/DIAG INJ IV PUSH: CPT

## 2023-11-10 PROCEDURE — 84484 ASSAY OF TROPONIN QUANT: CPT

## 2023-11-10 PROCEDURE — 93010 ELECTROCARDIOGRAM REPORT: CPT | Performed by: INTERNAL MEDICINE

## 2023-11-10 PROCEDURE — 1200000000 HC SEMI PRIVATE

## 2023-11-10 PROCEDURE — 81001 URINALYSIS AUTO W/SCOPE: CPT

## 2023-11-10 PROCEDURE — 83605 ASSAY OF LACTIC ACID: CPT

## 2023-11-10 PROCEDURE — 97530 THERAPEUTIC ACTIVITIES: CPT

## 2023-11-10 PROCEDURE — 6360000004 HC RX CONTRAST MEDICATION: Performed by: STUDENT IN AN ORGANIZED HEALTH CARE EDUCATION/TRAINING PROGRAM

## 2023-11-10 PROCEDURE — 74177 CT ABD & PELVIS W/CONTRAST: CPT

## 2023-11-10 PROCEDURE — 97166 OT EVAL MOD COMPLEX 45 MIN: CPT

## 2023-11-10 PROCEDURE — 97162 PT EVAL MOD COMPLEX 30 MIN: CPT

## 2023-11-10 PROCEDURE — 99285 EMERGENCY DEPT VISIT HI MDM: CPT

## 2023-11-10 PROCEDURE — C9113 INJ PANTOPRAZOLE SODIUM, VIA: HCPCS | Performed by: INTERNAL MEDICINE

## 2023-11-10 PROCEDURE — 6360000002 HC RX W HCPCS: Performed by: STUDENT IN AN ORGANIZED HEALTH CARE EDUCATION/TRAINING PROGRAM

## 2023-11-10 PROCEDURE — 2580000003 HC RX 258: Performed by: INTERNAL MEDICINE

## 2023-11-10 PROCEDURE — 93005 ELECTROCARDIOGRAM TRACING: CPT | Performed by: STUDENT IN AN ORGANIZED HEALTH CARE EDUCATION/TRAINING PROGRAM

## 2023-11-10 PROCEDURE — 85025 COMPLETE CBC W/AUTO DIFF WBC: CPT

## 2023-11-10 PROCEDURE — 2580000003 HC RX 258: Performed by: STUDENT IN AN ORGANIZED HEALTH CARE EDUCATION/TRAINING PROGRAM

## 2023-11-10 PROCEDURE — 83690 ASSAY OF LIPASE: CPT

## 2023-11-10 PROCEDURE — 6370000000 HC RX 637 (ALT 250 FOR IP): Performed by: INTERNAL MEDICINE

## 2023-11-10 PROCEDURE — 6360000002 HC RX W HCPCS: Performed by: INTERNAL MEDICINE

## 2023-11-10 PROCEDURE — 80053 COMPREHEN METABOLIC PANEL: CPT

## 2023-11-10 RX ORDER — TROSPIUM CHLORIDE 20 MG/1
20 TABLET, FILM COATED ORAL 2 TIMES DAILY
Status: DISCONTINUED | OUTPATIENT
Start: 2023-11-10 | End: 2023-11-12 | Stop reason: HOSPADM

## 2023-11-10 RX ORDER — MAGNESIUM SULFATE IN WATER 40 MG/ML
2000 INJECTION, SOLUTION INTRAVENOUS PRN
Status: DISCONTINUED | OUTPATIENT
Start: 2023-11-10 | End: 2023-11-12 | Stop reason: HOSPADM

## 2023-11-10 RX ORDER — SODIUM CHLORIDE 9 MG/ML
INJECTION, SOLUTION INTRAVENOUS CONTINUOUS
Status: DISCONTINUED | OUTPATIENT
Start: 2023-11-10 | End: 2023-11-10

## 2023-11-10 RX ORDER — SODIUM CHLORIDE 1 G/1
1 TABLET ORAL 3 TIMES DAILY
Status: DISCONTINUED | OUTPATIENT
Start: 2023-11-10 | End: 2023-11-12 | Stop reason: HOSPADM

## 2023-11-10 RX ORDER — POTASSIUM CHLORIDE 20 MEQ/1
40 TABLET, EXTENDED RELEASE ORAL PRN
Status: DISCONTINUED | OUTPATIENT
Start: 2023-11-10 | End: 2023-11-12 | Stop reason: HOSPADM

## 2023-11-10 RX ORDER — SODIUM CHLORIDE 0.9 % (FLUSH) 0.9 %
5-40 SYRINGE (ML) INJECTION PRN
Status: DISCONTINUED | OUTPATIENT
Start: 2023-11-10 | End: 2023-11-12 | Stop reason: HOSPADM

## 2023-11-10 RX ORDER — PILOCARPINE HYDROCHLORIDE 5 MG/1
5 TABLET, FILM COATED ORAL 4 TIMES DAILY
Status: DISCONTINUED | OUTPATIENT
Start: 2023-11-10 | End: 2023-11-12 | Stop reason: HOSPADM

## 2023-11-10 RX ORDER — HYDROCODONE BITARTRATE AND ACETAMINOPHEN 5; 300 MG/1; MG/1
TABLET ORAL
COMMUNITY
End: 2023-11-17

## 2023-11-10 RX ORDER — SODIUM CHLORIDE 0.9 % (FLUSH) 0.9 %
5-40 SYRINGE (ML) INJECTION EVERY 12 HOURS SCHEDULED
Status: DISCONTINUED | OUTPATIENT
Start: 2023-11-10 | End: 2023-11-12 | Stop reason: HOSPADM

## 2023-11-10 RX ORDER — SODIUM CHLORIDE 9 MG/ML
INJECTION, SOLUTION INTRAVENOUS PRN
Status: DISCONTINUED | OUTPATIENT
Start: 2023-11-10 | End: 2023-11-12 | Stop reason: HOSPADM

## 2023-11-10 RX ORDER — PILOCARPINE HYDROCHLORIDE 5 MG/1
5 TABLET, FILM COATED ORAL 4 TIMES DAILY
COMMUNITY
Start: 2023-10-27

## 2023-11-10 RX ORDER — ONDANSETRON 2 MG/ML
4 INJECTION INTRAMUSCULAR; INTRAVENOUS EVERY 6 HOURS PRN
Status: DISCONTINUED | OUTPATIENT
Start: 2023-11-10 | End: 2023-11-12 | Stop reason: HOSPADM

## 2023-11-10 RX ORDER — ONDANSETRON 4 MG/1
4 TABLET, ORALLY DISINTEGRATING ORAL EVERY 8 HOURS PRN
Status: DISCONTINUED | OUTPATIENT
Start: 2023-11-10 | End: 2023-11-12 | Stop reason: HOSPADM

## 2023-11-10 RX ORDER — 0.9 % SODIUM CHLORIDE 0.9 %
1000 INTRAVENOUS SOLUTION INTRAVENOUS ONCE
Status: COMPLETED | OUTPATIENT
Start: 2023-11-10 | End: 2023-11-10

## 2023-11-10 RX ORDER — SENNOSIDES A AND B 8.6 MG/1
1 TABLET, FILM COATED ORAL DAILY
Status: DISCONTINUED | OUTPATIENT
Start: 2023-11-10 | End: 2023-11-12 | Stop reason: HOSPADM

## 2023-11-10 RX ORDER — HALOPERIDOL 5 MG/ML
1 INJECTION INTRAMUSCULAR ONCE
Status: DISCONTINUED | OUTPATIENT
Start: 2023-11-10 | End: 2023-11-10

## 2023-11-10 RX ORDER — POTASSIUM CHLORIDE 7.45 MG/ML
10 INJECTION INTRAVENOUS PRN
Status: DISCONTINUED | OUTPATIENT
Start: 2023-11-10 | End: 2023-11-12 | Stop reason: HOSPADM

## 2023-11-10 RX ORDER — PANTOPRAZOLE SODIUM 40 MG/10ML
40 INJECTION, POWDER, LYOPHILIZED, FOR SOLUTION INTRAVENOUS DAILY
Status: DISCONTINUED | OUTPATIENT
Start: 2023-11-10 | End: 2023-11-12 | Stop reason: HOSPADM

## 2023-11-10 RX ORDER — ENOXAPARIN SODIUM 100 MG/ML
30 INJECTION SUBCUTANEOUS DAILY
Status: DISCONTINUED | OUTPATIENT
Start: 2023-11-10 | End: 2023-11-12 | Stop reason: HOSPADM

## 2023-11-10 RX ORDER — ACETAMINOPHEN 650 MG/1
650 SUPPOSITORY RECTAL EVERY 6 HOURS PRN
Status: DISCONTINUED | OUTPATIENT
Start: 2023-11-10 | End: 2023-11-12 | Stop reason: HOSPADM

## 2023-11-10 RX ORDER — DULOXETIN HYDROCHLORIDE 60 MG/1
60 CAPSULE, DELAYED RELEASE ORAL DAILY
Status: DISCONTINUED | OUTPATIENT
Start: 2023-11-10 | End: 2023-11-12 | Stop reason: HOSPADM

## 2023-11-10 RX ORDER — POLYETHYLENE GLYCOL 3350 17 G/17G
17 POWDER, FOR SOLUTION ORAL DAILY PRN
Status: DISCONTINUED | OUTPATIENT
Start: 2023-11-10 | End: 2023-11-12 | Stop reason: HOSPADM

## 2023-11-10 RX ORDER — ACETAMINOPHEN 325 MG/1
650 TABLET ORAL EVERY 6 HOURS PRN
Status: DISCONTINUED | OUTPATIENT
Start: 2023-11-10 | End: 2023-11-12 | Stop reason: HOSPADM

## 2023-11-10 RX ORDER — FENTANYL CITRATE 50 UG/ML
25 INJECTION, SOLUTION INTRAMUSCULAR; INTRAVENOUS ONCE
Status: COMPLETED | OUTPATIENT
Start: 2023-11-10 | End: 2023-11-10

## 2023-11-10 RX ADMIN — DULOXETINE HYDROCHLORIDE 60 MG: 60 CAPSULE, DELAYED RELEASE ORAL at 14:29

## 2023-11-10 RX ADMIN — IOPAMIDOL 75 ML: 755 INJECTION, SOLUTION INTRAVENOUS at 06:09

## 2023-11-10 RX ADMIN — PANTOPRAZOLE SODIUM 40 MG: 40 INJECTION, POWDER, FOR SOLUTION INTRAVENOUS at 14:29

## 2023-11-10 RX ADMIN — SENNOSIDES 8.6 MG: 8.6 TABLET, FILM COATED ORAL at 14:29

## 2023-11-10 RX ADMIN — SODIUM CHLORIDE 1 G: 1 TABLET ORAL at 14:29

## 2023-11-10 RX ADMIN — FENTANYL CITRATE 25 MCG: 50 INJECTION INTRAMUSCULAR; INTRAVENOUS at 05:23

## 2023-11-10 RX ADMIN — TROSPIUM CHLORIDE 20 MG: 20 TABLET, FILM COATED ORAL at 20:20

## 2023-11-10 RX ADMIN — SODIUM CHLORIDE 1 G: 1 TABLET ORAL at 20:20

## 2023-11-10 RX ADMIN — PILOCARPINE HYDROCHLORIDE 5 MG: 5 TABLET, FILM COATED ORAL at 17:50

## 2023-11-10 RX ADMIN — ENOXAPARIN SODIUM 30 MG: 100 INJECTION SUBCUTANEOUS at 14:30

## 2023-11-10 RX ADMIN — SODIUM CHLORIDE: 9 INJECTION, SOLUTION INTRAVENOUS at 14:15

## 2023-11-10 RX ADMIN — PILOCARPINE HYDROCHLORIDE 5 MG: 5 TABLET, FILM COATED ORAL at 20:20

## 2023-11-10 RX ADMIN — SODIUM CHLORIDE 1000 ML: 9 INJECTION, SOLUTION INTRAVENOUS at 05:22

## 2023-11-10 RX ADMIN — ACETAMINOPHEN 650 MG: 325 TABLET ORAL at 17:52

## 2023-11-10 RX ADMIN — TROSPIUM CHLORIDE 20 MG: 20 TABLET, FILM COATED ORAL at 14:29

## 2023-11-10 ASSESSMENT — PAIN DESCRIPTION - ORIENTATION: ORIENTATION: UPPER

## 2023-11-10 ASSESSMENT — PAIN DESCRIPTION - LOCATION
LOCATION: ABDOMEN
LOCATION: ABDOMEN

## 2023-11-10 ASSESSMENT — LIFESTYLE VARIABLES
HOW OFTEN DO YOU HAVE A DRINK CONTAINING ALCOHOL: NEVER
HOW MANY STANDARD DRINKS CONTAINING ALCOHOL DO YOU HAVE ON A TYPICAL DAY: PATIENT DOES NOT DRINK

## 2023-11-10 ASSESSMENT — PAIN SCALES - GENERAL
PAINLEVEL_OUTOF10: 10
PAINLEVEL_OUTOF10: 6

## 2023-11-10 ASSESSMENT — PAIN - FUNCTIONAL ASSESSMENT: PAIN_FUNCTIONAL_ASSESSMENT: 0-10

## 2023-11-10 NOTE — CARE COORDINATION
Critical access hospital  Patient is active with [facility], Will follow for discharge to home with orders to resume care.   Sn/pt/ot/msw      Jaun Ruelas RN, BSN CTN [address] [place] [address]   [phone] fax [phone]  [facility] [address] [place] [address] [phone]

## 2023-11-10 NOTE — PROGRESS NOTES
Physical Therapy  Facility/Department: [place] [address] - MED SURG/ORTHO  Physical Therapy Initial Assessment    Name: Janak Wilson  : 1950  MRN: [id number]  Date of Service: 11/10/2023    Discharge Recommendations:  Subacute/Skilled Nursing Facility   PT Equipment Recommendations  Equipment Needed: No  Other: defer to next level of care. Therapy discharge recommendations take into account each patient's current medical complexities and are subject to input/oversight from the patient's healthcare team.   Barriers to Home Discharge:   [x] Steps to access home entry or bed/bath:   [x] Unable to transfer, ambulate, or propel wheelchair household distances without assist   [x] Unable to perform ADLs without assist   [] Limited available assist at home upon discharge    [] Patient or family requests d/c to post-acute facility    [x] Poor cognition/safety awareness for d/c to home alone    [] Unable to maintain ordered weight bearing status    [] Patient with salient signs of long-standing immobility   [] Other: recurrent falls with fx. Patient Diagnosis(es): The primary encounter diagnosis was Orthostatic hypotension. A diagnosis of Generalized abdominal pain was also pertinent to this visit. Past Medical History:  has a past medical history of Corticobasal degeneration, COVID-19, Dyskinesia, Hyperlipidemia, Hyperreflexia, Neuropathy, Parkinson's disease, Pituitary adenoma ([address]), and Spinal column pain. Past Surgical History:  has a past surgical history that includes Hammer toe surgery (); Finger trigger release; Tonsillectomy and adenoidectomy (14 yo); laparoscopy; Colonoscopy (2002); Colonoscopy (2012); back surgery; Leg Surgery (Left, 2023); Humerus fracture surgery (Left, 2023); Wrist surgery (Left, 2023); Nose surgery (N/A); Hysterectomy; and Clavicle surgery (Left, 10/5/2023).     Assessment   Body Structures, Functions, Activity Limitations Requiring Skilled Therapeutic Intervention: Decreased functional mobility ; Decreased ADL status; Decreased ROM; Decreased body mechanics; Decreased strength;Decreased safe awareness;Decreased cognition;Decreased endurance;Decreased balance;Decreased high-level IADLs;Decreased posture; Increased pain  Assessment: pt seen in conjunction with OT to maximize pt safety and mobility and safely assess pt maximal level of mobility. pt presents to [facility] with primary diagnosis of abdominal pain, diagnosis of dimentia, parkinsons disease, L distal clavicle fx with ORIF, L clavicle shaft fx after that treated non-operatively, L femur fx with hip pinning, L distal radius fx with ORIF, were also pertienent to this pt's treatment, multiple fractures due to multiple falls. PTA pt lived at home with spouse in home with 1+1 ADILENE, spouse reports he provides 24 hour care and HHA for mobility. currently pt demonstrates MAX A for bed mobility, unsafe to attempt transfer training on today's date secondary to poor command following and decreased BP and increased HR with trasnfer in seated position. pt demonstrates decreased strength, endurance, mobility, activity tolerance, balance, and functionla capacity compared to her baseline and would benefit from ksilled PT to address the margarita stated deficits during her stay in the acute care setting. recommend DC to SNF. Treatment Diagnosis: decreased strength, endurance, mobility, activity tolerance, balance, and functinola capacity. Therapy Prognosis: Fair  Decision Making: Medium Complexity  Requires PT Follow-Up: Yes  Activity Tolerance  Activity Tolerance: Patient tolerated evaluation without incident;Treatment limited secondary to decreased cognition;Treatment limited secondary to medical complications  Activity Tolerance Comments: pt demonstrates poor command following secondary to decreased cognition, pt has notable drop in BP and increase in HR when sitting compared to supine as documented above.      Plan   Physical secondary to decreased cognition and increased pain.)  Sensation:  (unable to fully assess secondary to decreased command following secondary to decreased cognition and increased pain.)         Bed Mobility Training  Bed Mobility Training: Yes  Overall Level of Assistance: Maximum assistance; Additional time; Adaptive equipment (with HOB elevated and use of BR.)  Interventions: Safety awareness training;Verbal cues  Rolling: Maximum assistance; Adaptive equipment; Additional time (R and L to change bed sheets.)  Supine to Sit: Maximum assistance; Additional time; Adaptive equipment  Sit to Supine: Maximum assistance; Additional time; Adaptive equipment  Transfer Training  Transfer Training: No (unsafe to attempt secondary to decreased cognition/command following and decreased BP.)        AM-PAC Score  AM-PAC Inpatient Mobility Raw Score : 8 (11/10/23 1853)  AM-PAC Inpatient T-Scale Score : 28.52 (11/10/23 1853)  Mobility Inpatient CMS 0-100% Score: 86.62 (11/10/23 1853)  Mobility Inpatient CMS G-Code Modifier : CM (11/10/23 1853)        Goals  Short Term Goals  Time Frame for Short Term Goals: 7 days (11/17) unless otherwise noted. Short Term Goal 1: pt will perform bed mobility with MOD A. Short Term Goal 2: pt will perform functional transfers with LRAD and MOD A. Short Term Goal 3: pt will participate in gait assessment and training and set goals when appropriate. Short Term Goal 4: pt will participate in 4 sets of 12-15 reps of BLE exercises to improve strength and endurance by 11/15. Patient Goals   Patient Goals : pt unable to state goals secondary to decreased cognition. Education  Patient Education  Education Given To: Patient; Family  Education Provided: Role of Therapy;Plan of Care  Education Method: Demonstration;Verbal  Barriers to Learning: Cognition  Education Outcome: Unable to verbalize; Unable to demonstrate understanding;Continued education needed      Therapy Time   Individual Concurrent Group

## 2023-11-10 NOTE — CARE COORDINATION
Case Management Assessment  Initial Evaluation    Date/Time of Evaluation: 11/10/2023 2:41 PM  Assessment Completed by: Manolo Mendoza RN    If patient is discharged prior to next notation, then this note serves as note for discharge by case management. Patient Name: Daniela Rizo                   YOB: 1950  Diagnosis: Orthostatic hypotension [I95.1]  Abdominal pain [R10.9]  Generalized abdominal pain [R10.84]                   Date / Time: 11/10/2023  4:04 AM    Patient Admission Status: Inpatient   Readmission Risk (Low < 19, Mod (19-27), High > 27): Readmission Risk Score: 20.3    Current PCP: Andrew Hernández MD  PCP verified by CM? Yes    Chart Reviewed: Yes      History Provided by: Patient, Spouse  Patient Orientation: Alert and Oriented    Patient Cognition: Alert    Hospitalization in the last 30 days (Readmission):  No    If yes, Readmission Assessment in CM Navigator will be completed.     Advance Directives:      Code Status: Full Code   Patient's Primary Decision Maker is: Patient Declined (Legal Next of Kin Remains as Decision Maker)    Primary Decision MakerBmagalie Jennings - Spouse - [phone]    Secondary Decision Maker: Florina Peterson Child - [phone]    Discharge Planning:    Patient lives with: Spouse/Significant Other Type of Home: House  Primary Care Giver: Spouse  Patient Support Systems include: Spouse/Significant Other, Children, Family Members, Friends/Neighbors   Current Financial resources: Medicare  Current community resources: None  Current services prior to admission: Home Care            Current DME:              Type of Home Care services:  OT, PT, Aide Services    ADLS  Prior functional level: Assistance with the following:, Mobility, Bathing, Dressing, Cooking, Housework, Shopping  Current functional level: Assistance with the following:, Bathing, Dressing, Cooking, Housework, Shopping, Mobility    PT AM-PAC:   /24  OT AM-PAC:   /24    Family can provide provided with a choice of provider and agrees with the discharge plan. Freedom of choice list with basic dialogue that supports the patient's individualized plan of care/goals and shares the quality data associated with the providers was provided to: Patient   Patient Representative Name:       The Patient and/or Patient Representative Agree with the Discharge Plan?  Yes    Hoda Collado RN  Case Management Department  Ph: [phone] Fax: [phone]

## 2023-11-10 NOTE — H&P
Hospital Medicine History & Physical      Date of Admission: 11/10/2023    Date of Service:  Pt seen/examined on  11/10/2023     [x]Admitted to Inpatient with expected LOS greater than two midnights due to medical therapy. []Placed in Observation status. Chief Admission Complaint:  intermittent  ab pain for 1-2 weeks    Presenting Admission History:      75 y.o. female who presented to [facility] with  above c/p . Arely Worley PMHx significant for parkinson , dementia with begav issues , hyperlipidemia, pituitary adenoma. She has dementia and unable to give detailed history. History is mainly from her . He has been complaining of abdominal pain more over the right lower quadrant on and off for last 2 weeks. Sometimes very mild and sometimes is very intense . No abdominal distention fever nausea vomiting diarrhea or constipation. No urinary complaints. Her appetite is not that great. She does not have any swallowing difficulties. She is basically bedbound and need help for daily activities. She has a history of Parkinson and her blood pressure fluctuates time to time. Whenever her blood pressure goes down she feels dizzy. Currently she does not have dizziness chest pain shortness of breath or any focal neurological symptoms. Assessment/Plan:      Current Principal Problem:  Abdominal pain    Abdominal pain-more over right lower quadrant-CT of the abdomen pelvis with IV contrast no abnormalities found-admit, clear liquids, advance as tolerated, pain control, monitor closely Protonix    Parkinson's-continue home medication    Dizziness and orthostasis-possibly autonomic insufficiency following Parkinson's-IV fluids, patient is bedbound at home  Overall prognosis is not good.     Dementia with behavioral issues-on Cymbalta at home    Constipation-continue home laxatives        Discussed management and the need for Hospitalization of the patient w/ the Emergency Department Provider: Raul Higuera Take 1 capsule by mouth 3 times daily (before meals)  Patient taking differently: Take 1 capsule by mouth 3 times daily (before meals) Now taking 5 times a day prior to meals 9/6/23   Madison Mckenzie Shalini, APRN   sodium chloride 1 g tablet Take 1 tablet by mouth 3 times daily    ProviderKsenia MD   alendronate (FOSAMAX) 70 MG tablet Take 1 tablet by mouth every 7 days 8/9/23   ProviderKsenia MD   GEMTESA 75 MG TABS tablet Take 1 tablet by mouth daily 8/11/23   ProviderKsenia MD   DULoxetine (CYMBALTA) 60 MG extended release capsule TAKE ONE CAPSULE BY MOUTH ONCE DAILY 8/14/23   Reed Kendall MD       Labs: Personally reviewed and interpreted for clinical significance. Recent Labs     11/07/23  1032 11/10/23  0415   WBC 7.0 10.4   HGB 12.3 13.2   HCT 37.7 39.6    305     Recent Labs     11/07/23  1032 11/10/23  0415    141   K 3.9 3.6    103   CO2 27 25   BUN 24* 20   CREATININE 0.9 0.6   CALCIUM 9.8 9.7     Recent Labs     11/07/23  1032 11/07/23  1224 11/10/23  0415   PROBNP 236  --   --    TROPHS 16* 12 15*     No results for input(s): \"LABA1C\" in the last 72 hours.   Recent Labs     11/10/23  0415   AST 13*   ALT <5*   BILITOT 0.3   ALKPHOS 139*     Recent Labs     11/10/23  0537   LACTA 0.8        Shira Key MD

## 2023-11-10 NOTE — PROGRESS NOTES
Occupational Therapy  Facility/Department: [place] [address] - MED SURG/ORTHO  Occupational Therapy Initial Assessment    Name: Samaria Lawler  : 1950  MRN: [id number]  Date of Service: 11/10/2023    Discharge Recommendations:  [address]  Therapy discharge recommendations take into account each patient's current medical complexities and are subject to input/oversight from the patient's healthcare team.   Barriers to Home Discharge:   [x] Steps to access home entry or bed/bath:    [x] Limited available assist at home upon discharge    [x] Patient or family requests d/c to post-acute facility      [x] Patient is at risk for falls due to: orthostatic hypotension; If pt is unable to be seen after this session, please let this note serve as discharge summary. Please see case management note for discharge disposition. Thank you. Patient Diagnosis(es): The primary encounter diagnosis was Orthostatic hypotension. A diagnosis of Generalized abdominal pain was also pertinent to this visit. Past Medical History:  has a past medical history of Corticobasal degeneration, COVID-19, Dyskinesia, Hyperlipidemia, Hyperreflexia, Neuropathy, Parkinson's disease, Pituitary adenoma ([address]), and Spinal column pain. Past Surgical History:  has a past surgical history that includes Hammer toe surgery (); Finger trigger release; Tonsillectomy and adenoidectomy (14 yo); laparoscopy; Colonoscopy (2002); Colonoscopy (2012); back surgery; Leg Surgery (Left, 2023); Humerus fracture surgery (Left, 2023); Wrist surgery (Left, 2023); Nose surgery (N/A); Hysterectomy; and Clavicle surgery (Left, 10/5/2023). Assessment   Performance deficits / Impairments: Decreased functional mobility ; Decreased endurance;Decreased ADL status; Decreased cognition;Decreased balance;Decreased safe awareness  Assessment: pt from home with spouse with h/o multiple falls  & fractures Left hip &

## 2023-11-10 NOTE — ED NOTES
Orthostatic vitals complete. Patient was able to tolerate. While standing patient complained of dizziness. MD notified.        Justice Part  11/10/23 4520

## 2023-11-11 LAB
ANION GAP SERPL CALCULATED.3IONS-SCNC: 11 MMOL/L (ref 3–16)
BASOPHILS # BLD: 0.1 K/UL (ref 0–0.2)
BASOPHILS NFR BLD: 1 %
BUN SERPL-MCNC: 17 MG/DL (ref 7–20)
CALCIUM SERPL-MCNC: 9.3 MG/DL (ref 8.3–10.6)
CHLORIDE SERPL-SCNC: 105 MMOL/L (ref 99–110)
CO2 SERPL-SCNC: 25 MMOL/L (ref 21–32)
CREAT SERPL-MCNC: <0.5 MG/DL (ref 0.6–1.2)
DEPRECATED RDW RBC AUTO: 14 % (ref 12.4–15.4)
EOSINOPHIL # BLD: 0 K/UL (ref 0–0.6)
EOSINOPHIL NFR BLD: 0.4 %
GFR SERPLBLD CREATININE-BSD FMLA CKD-EPI: >60 ML/MIN/{1.73_M2}
GLUCOSE SERPL-MCNC: 93 MG/DL (ref 70–99)
HCT VFR BLD AUTO: 36.9 % (ref 36–48)
HGB BLD-MCNC: 12.2 G/DL (ref 12–16)
LYMPHOCYTES # BLD: 1.1 K/UL (ref 1–5.1)
LYMPHOCYTES NFR BLD: 15 %
MAGNESIUM SERPL-MCNC: 2 MG/DL (ref 1.8–2.4)
MCH RBC QN AUTO: 31.3 PG (ref 26–34)
MCHC RBC AUTO-ENTMCNC: 33 G/DL (ref 31–36)
MCV RBC AUTO: 94.9 FL (ref 80–100)
MONOCYTES # BLD: 0.6 K/UL (ref 0–1.3)
MONOCYTES NFR BLD: 8.5 %
NEUTROPHILS # BLD: 5.5 K/UL (ref 1.7–7.7)
NEUTROPHILS NFR BLD: 75.1 %
PLATELET # BLD AUTO: 291 K/UL (ref 135–450)
PMV BLD AUTO: 8.1 FL (ref 5–10.5)
POTASSIUM SERPL-SCNC: 3.1 MMOL/L (ref 3.5–5.1)
RBC # BLD AUTO: 3.89 M/UL (ref 4–5.2)
SODIUM SERPL-SCNC: 141 MMOL/L (ref 136–145)
WBC # BLD AUTO: 7.4 K/UL (ref 4–11)

## 2023-11-11 PROCEDURE — 2580000003 HC RX 258: Performed by: INTERNAL MEDICINE

## 2023-11-11 PROCEDURE — 6370000000 HC RX 637 (ALT 250 FOR IP): Performed by: INTERNAL MEDICINE

## 2023-11-11 PROCEDURE — 80048 BASIC METABOLIC PNL TOTAL CA: CPT

## 2023-11-11 PROCEDURE — 6360000002 HC RX W HCPCS: Performed by: INTERNAL MEDICINE

## 2023-11-11 PROCEDURE — 36415 COLL VENOUS BLD VENIPUNCTURE: CPT

## 2023-11-11 PROCEDURE — 1200000000 HC SEMI PRIVATE

## 2023-11-11 PROCEDURE — 83735 ASSAY OF MAGNESIUM: CPT

## 2023-11-11 PROCEDURE — 85025 COMPLETE CBC W/AUTO DIFF WBC: CPT

## 2023-11-11 PROCEDURE — C9113 INJ PANTOPRAZOLE SODIUM, VIA: HCPCS | Performed by: INTERNAL MEDICINE

## 2023-11-11 RX ORDER — OXYCODONE HYDROCHLORIDE AND ACETAMINOPHEN 5; 325 MG/1; MG/1
1 TABLET ORAL ONCE
Status: COMPLETED | OUTPATIENT
Start: 2023-11-11 | End: 2023-11-11

## 2023-11-11 RX ADMIN — DULOXETINE HYDROCHLORIDE 60 MG: 60 CAPSULE, DELAYED RELEASE ORAL at 09:23

## 2023-11-11 RX ADMIN — PILOCARPINE HYDROCHLORIDE 5 MG: 5 TABLET, FILM COATED ORAL at 09:23

## 2023-11-11 RX ADMIN — TROSPIUM CHLORIDE 20 MG: 20 TABLET, FILM COATED ORAL at 19:48

## 2023-11-11 RX ADMIN — PILOCARPINE HYDROCHLORIDE 5 MG: 5 TABLET, FILM COATED ORAL at 19:48

## 2023-11-11 RX ADMIN — SODIUM CHLORIDE 1 G: 1 TABLET ORAL at 19:48

## 2023-11-11 RX ADMIN — Medication 10 ML: at 19:50

## 2023-11-11 RX ADMIN — TROSPIUM CHLORIDE 20 MG: 20 TABLET, FILM COATED ORAL at 09:23

## 2023-11-11 RX ADMIN — SODIUM CHLORIDE 1 G: 1 TABLET ORAL at 09:23

## 2023-11-11 RX ADMIN — PANTOPRAZOLE SODIUM 40 MG: 40 INJECTION, POWDER, FOR SOLUTION INTRAVENOUS at 09:23

## 2023-11-11 RX ADMIN — ACETAMINOPHEN 650 MG: 325 TABLET ORAL at 22:03

## 2023-11-11 RX ADMIN — SODIUM CHLORIDE 1 G: 1 TABLET ORAL at 16:00

## 2023-11-11 RX ADMIN — ACETAMINOPHEN 650 MG: 325 TABLET ORAL at 13:14

## 2023-11-11 RX ADMIN — ENOXAPARIN SODIUM 30 MG: 100 INJECTION SUBCUTANEOUS at 09:25

## 2023-11-11 RX ADMIN — PILOCARPINE HYDROCHLORIDE 5 MG: 5 TABLET, FILM COATED ORAL at 12:43

## 2023-11-11 RX ADMIN — ACETAMINOPHEN 650 MG: 325 TABLET ORAL at 00:22

## 2023-11-11 RX ADMIN — OXYCODONE AND ACETAMINOPHEN 1 TABLET: 5; 325 TABLET ORAL at 16:00

## 2023-11-11 ASSESSMENT — PAIN DESCRIPTION - DESCRIPTORS: DESCRIPTORS: ACHING;DISCOMFORT

## 2023-11-11 ASSESSMENT — PAIN DESCRIPTION - ORIENTATION
ORIENTATION: RIGHT;LEFT;LOWER
ORIENTATION: MID

## 2023-11-11 ASSESSMENT — PAIN SCALES - GENERAL
PAINLEVEL_OUTOF10: 8
PAINLEVEL_OUTOF10: 0
PAINLEVEL_OUTOF10: 7

## 2023-11-11 ASSESSMENT — PAIN DESCRIPTION - LOCATION
LOCATION: BACK
LOCATION: BACK;HIP

## 2023-11-11 NOTE — PLAN OF CARE
Problem: Discharge Planning  Goal: Discharge to home or other facility with appropriate resources  11/10/2023 2203 by Juaquin Loo LPN  Outcome: Progressing     Problem: Pain  Goal: Verbalizes/displays adequate comfort level or baseline comfort level  11/10/2023 2203 by Juaquin Loo LPN  Outcome: Progressing  Flowsheets (Taken 11/10/2023 2011)  Verbalizes/displays adequate comfort level or baseline comfort level: Encourage patient to monitor pain and request assistance     Problem: Safety - Adult  Goal: Free from fall injury  11/10/2023 2203 by Juaquin Loo LPN  Outcome: Progressing     Problem: ABCDS Injury Assessment  Goal: Absence of physical injury  11/10/2023 2203 by Juaquin Loo LPN  Outcome: Progressing

## 2023-11-11 NOTE — PLAN OF CARE
Problem: Pain  Goal: Verbalizes/displays adequate comfort level or baseline comfort level  Outcome: Progressing  Flowsheets (Taken 11/10/2023 1950)  Verbalizes/displays adequate comfort level or baseline comfort level:   Encourage patient to monitor pain and request assistance   Assess pain using appropriate pain scale   Administer analgesics based on type and severity of pain and evaluate response   Implement non-pharmacological measures as appropriate and evaluate response     Problem: Safety - Adult  Goal: Free from fall injury  Outcome: Progressing  Flowsheets (Taken 11/10/2023 1950)  Free From Fall Injury: Instruct family/caregiver on patient safety     Problem: ABCDS Injury Assessment  Goal: Absence of physical injury  Outcome: Progressing  Flowsheets (Taken 11/10/2023 1950)  Absence of Physical Injury: Implement safety measures based on patient assessment

## 2023-11-11 NOTE — FLOWSHEET NOTE
Orthostatic VSS   11/11/23 1030   Vital Signs   Orthostatic B/P and Pulse?  Yes   Blood Pressure Lying 121/70   Pulse Lying 101 PER MINUTE   Blood Pressure Sitting 85/55   Pulse Sitting 105 PER MINUTE   Blood Pressure Standing   (pt unable to tolerate standing)

## 2023-11-11 NOTE — PLAN OF CARE
Problem: Discharge Planning  Goal: Discharge to home or other facility with appropriate resources  Outcome: Progressing     Problem: Pain  Goal: Verbalizes/displays adequate comfort level or baseline comfort level  11/10/2023 2203 by Candace Garcia LPN  Outcome: Progressing  Flowsheets (Taken 11/10/2023 2011)  Verbalizes/displays adequate comfort level or baseline comfort level: Encourage patient to monitor pain and request assistance     Problem: Safety - Adult  Goal: Free from fall injury  11/10/2023 2203 by Candace Garcia LPN  Outcome: Progressing     Problem: ABCDS Injury Assessment  Goal: Absence of physical injury  11/10/2023 2203 by Candace Garcia LPN  Outcome: Progressing

## 2023-11-11 NOTE — PROGRESS NOTES
Perfect serve Dr. Chin Livers: low UO.  bladder scanned 475.  would you like order for straight cath? Thanks.

## 2023-11-11 NOTE — PROGRESS NOTES
Hospital Medicine Progress Note      Date of Admission: 11/10/2023  Hospital Day: 2    Chief Admission Complaint:  intermittent  ab pain for 1-2 weeks     Subjective:   behv issues / agitation on and off ,now very  calm , communicating , family is at bed side     Presenting Admission History:       75 y.o. female who presented to [facility] with  above c/p . Brandee Crockett PMHx significant for parkinson , dementia with begav issues , hyperlipidemia, pituitary adenoma. She has dementia and unable to give detailed history. History is mainly from her . He has been complaining of abdominal pain more over the right lower quadrant on and off for last 2 weeks. Sometimes very mild and sometimes is very intense . No abdominal distention fever nausea vomiting diarrhea or constipation. No urinary complaints. Her appetite is not that great. She does not have any swallowing difficulties. She is basically bedbound and need help for daily activities. She has a history of Parkinson and her blood pressure fluctuates time to time. Whenever her blood pressure goes down she feels dizzy. Currently she does not have dizziness chest pain shortness of breath or any focal neurological symptoms    Assessment/Plan:      Current Principal Problem:  Abdominal pain    Abdominal pain-more over right lower quadrant-CT of the abdomen pelvis with IV contrast no abnormalities found- tolerating clear liquids, advance to reg diet , no  pain  now  Protonix     Parkinson's-continue home medication     Dizziness and orthostasis-possibly autonomic insufficiency following Parkinson's-IV fluids, patient is bedbound at home  Overall prognosis is not good.   Stockings   Midodrine if marked orthostatic BP   DW RN  Slow movement advised     BP labile 2/2 above      Dementia with behavioral issues-on Cymbalta at home     Constipation-continue home laxatives    Hypokalemia - replace     Physical Exam Performed:      General appearance:  No apparent current management and discharge planning options with Case Management. [] Discussed management of the case with:    [] Telemetry personally reviewed and interpreted as documented above    [] Imaging personally reviewed and interpreted, includes:    [x] Data Review (any 3)  [] Collateral history obtained from:    [x] All available Consultant notes from yesterday/today were reviewed  [x] All current labs were reviewed and interpreted for clinical significance   [x] Appropriate follow-up labs were ordered    Medications:  Personally reviewed in detail in conjunction w/ labs as documented for evidence of drug toxicity. Infusion Medications    sodium chloride       Scheduled Medications    DULoxetine  60 mg Oral Daily    trospium  20 mg Oral BID    senna  1 tablet Oral Daily    sodium chloride  1 g Oral TID    sodium chloride flush  5-40 mL IntraVENous 2 times per day    enoxaparin  30 mg SubCUTAneous Daily    pantoprazole  40 mg IntraVENous Daily    pilocarpine  5 mg Oral 4x Daily    carbidopa-levodopa  1 capsule Oral 5x daily     PRN Meds: sodium chloride flush, sodium chloride, potassium chloride **OR** potassium alternative oral replacement **OR** potassium chloride, magnesium sulfate, ondansetron **OR** ondansetron, polyethylene glycol, acetaminophen **OR** acetaminophen     Labs:  Personally reviewed and interpreted for clinical significance. Recent Labs     11/10/23  0415 11/11/23  0541   WBC 10.4 7.4   HGB 13.2 12.2   HCT 39.6 36.9    291     Recent Labs     11/10/23  0415 11/11/23  0541    141   K 3.6 3.1*    105   CO2 25 25   BUN 20 17   CREATININE 0.6 <0.5*   CALCIUM 9.7 9.3   MG  --  2.00     Recent Labs     11/10/23  0415   TROPHS 15*     No results for input(s): \"LABA1C\" in the last 72 hours.   Recent Labs     11/10/23  0415   AST 13*   ALT <5*   BILITOT 0.3   ALKPHOS 139*     Recent Labs     11/10/23  0537   LACTA 0.8       Urine Cultures:   Lab Results   Component Value

## 2023-11-12 ENCOUNTER — APPOINTMENT (OUTPATIENT)
Dept: GENERAL RADIOLOGY | Age: 73
End: 2023-11-12
Payer: MEDICARE

## 2023-11-12 ENCOUNTER — HOSPITAL ENCOUNTER (EMERGENCY)
Age: 73
Discharge: HOME | End: 2023-11-12
Payer: MEDICARE

## 2023-11-12 VITALS
RESPIRATION RATE: 16 BRPM | SYSTOLIC BLOOD PRESSURE: 106 MMHG | HEART RATE: 90 BPM | OXYGEN SATURATION: 98 % | TEMPERATURE: 98.3 F | WEIGHT: 100 LBS | DIASTOLIC BLOOD PRESSURE: 67 MMHG | HEIGHT: 65 IN | BODY MASS INDEX: 16.66 KG/M2

## 2023-11-12 VITALS
HEIGHT: 65 IN | TEMPERATURE: 97.9 F | BODY MASS INDEX: 16.66 KG/M2 | WEIGHT: 100 LBS | SYSTOLIC BLOOD PRESSURE: 127 MMHG | HEART RATE: 91 BPM | DIASTOLIC BLOOD PRESSURE: 78 MMHG | OXYGEN SATURATION: 92 % | RESPIRATION RATE: 17 BRPM

## 2023-11-12 DIAGNOSIS — S82.832A CLOSED FRACTURE OF DISTAL END OF LEFT FIBULA, UNSPECIFIED FRACTURE MORPHOLOGY, INITIAL ENCOUNTER: Primary | ICD-10-CM

## 2023-11-12 LAB
ANION GAP SERPL CALCULATED.3IONS-SCNC: 9 MMOL/L (ref 3–16)
BUN SERPL-MCNC: 22 MG/DL (ref 7–20)
CALCIUM SERPL-MCNC: 8.8 MG/DL (ref 8.3–10.6)
CHLORIDE SERPL-SCNC: 104 MMOL/L (ref 99–110)
CO2 SERPL-SCNC: 27 MMOL/L (ref 21–32)
CREAT SERPL-MCNC: 0.7 MG/DL (ref 0.6–1.2)
GFR SERPLBLD CREATININE-BSD FMLA CKD-EPI: >60 ML/MIN/{1.73_M2}
GLUCOSE SERPL-MCNC: 100 MG/DL (ref 70–99)
MAGNESIUM SERPL-MCNC: 1.9 MG/DL (ref 1.8–2.4)
POTASSIUM SERPL-SCNC: 3.4 MMOL/L (ref 3.5–5.1)
SODIUM SERPL-SCNC: 140 MMOL/L (ref 136–145)

## 2023-11-12 PROCEDURE — 6360000002 HC RX W HCPCS: Performed by: INTERNAL MEDICINE

## 2023-11-12 PROCEDURE — 6370000000 HC RX 637 (ALT 250 FOR IP): Performed by: INTERNAL MEDICINE

## 2023-11-12 PROCEDURE — 2580000003 HC RX 258: Performed by: INTERNAL MEDICINE

## 2023-11-12 PROCEDURE — 73610 X-RAY EXAM OF ANKLE: CPT

## 2023-11-12 PROCEDURE — 80048 BASIC METABOLIC PNL TOTAL CA: CPT

## 2023-11-12 PROCEDURE — 36415 COLL VENOUS BLD VENIPUNCTURE: CPT

## 2023-11-12 PROCEDURE — 83735 ASSAY OF MAGNESIUM: CPT

## 2023-11-12 PROCEDURE — 99283 EMERGENCY DEPT VISIT LOW MDM: CPT

## 2023-11-12 PROCEDURE — C9113 INJ PANTOPRAZOLE SODIUM, VIA: HCPCS | Performed by: INTERNAL MEDICINE

## 2023-11-12 RX ORDER — MIDODRINE HYDROCHLORIDE 5 MG/1
5 TABLET ORAL 2 TIMES DAILY WITH MEALS
Status: DISCONTINUED | OUTPATIENT
Start: 2023-11-12 | End: 2023-11-12 | Stop reason: HOSPADM

## 2023-11-12 RX ORDER — 0.9 % SODIUM CHLORIDE 0.9 %
1000 INTRAVENOUS SOLUTION INTRAVENOUS ONCE
Status: DISCONTINUED | OUTPATIENT
Start: 2023-11-12 | End: 2023-11-12

## 2023-11-12 RX ORDER — MIDODRINE HYDROCHLORIDE 5 MG/1
5 TABLET ORAL 2 TIMES DAILY WITH MEALS
Qty: 14 TABLET | Refills: 0 | Status: SHIPPED | OUTPATIENT
Start: 2023-11-12 | End: 2023-11-19

## 2023-11-12 RX ADMIN — Medication 10 ML: at 08:12

## 2023-11-12 RX ADMIN — ACETAMINOPHEN 650 MG: 325 TABLET ORAL at 11:29

## 2023-11-12 RX ADMIN — SENNOSIDES 8.6 MG: 8.6 TABLET, FILM COATED ORAL at 08:12

## 2023-11-12 RX ADMIN — ENOXAPARIN SODIUM 30 MG: 100 INJECTION SUBCUTANEOUS at 08:11

## 2023-11-12 RX ADMIN — POTASSIUM CHLORIDE 40 MEQ: 1500 TABLET, EXTENDED RELEASE ORAL at 11:00

## 2023-11-12 RX ADMIN — TROSPIUM CHLORIDE 20 MG: 20 TABLET, FILM COATED ORAL at 08:12

## 2023-11-12 RX ADMIN — DULOXETINE HYDROCHLORIDE 60 MG: 60 CAPSULE, DELAYED RELEASE ORAL at 08:12

## 2023-11-12 RX ADMIN — PILOCARPINE HYDROCHLORIDE 5 MG: 5 TABLET, FILM COATED ORAL at 08:12

## 2023-11-12 RX ADMIN — MIDODRINE HYDROCHLORIDE 5 MG: 5 TABLET ORAL at 11:29

## 2023-11-12 RX ADMIN — SODIUM CHLORIDE 1 G: 1 TABLET ORAL at 08:12

## 2023-11-12 RX ADMIN — ACETAMINOPHEN 650 MG: 325 TABLET ORAL at 05:07

## 2023-11-12 RX ADMIN — PILOCARPINE HYDROCHLORIDE 5 MG: 5 TABLET, FILM COATED ORAL at 11:29

## 2023-11-12 RX ADMIN — PANTOPRAZOLE SODIUM 40 MG: 40 INJECTION, POWDER, FOR SOLUTION INTRAVENOUS at 08:12

## 2023-11-12 ASSESSMENT — PAIN DESCRIPTION - DESCRIPTORS
DESCRIPTORS: ACHING
DESCRIPTORS: DISCOMFORT

## 2023-11-12 ASSESSMENT — PAIN DESCRIPTION - ORIENTATION
ORIENTATION: LEFT
ORIENTATION: MID

## 2023-11-12 ASSESSMENT — PAIN SCALES - GENERAL
PAINLEVEL_OUTOF10: 0
PAINLEVEL_OUTOF10: 8
PAINLEVEL_OUTOF10: 4
PAINLEVEL_OUTOF10: 4

## 2023-11-12 ASSESSMENT — PAIN DESCRIPTION - LOCATION
LOCATION: ANKLE
LOCATION: BACK

## 2023-11-12 ASSESSMENT — PAIN DESCRIPTION - PAIN TYPE: TYPE: ACUTE PAIN

## 2023-11-12 ASSESSMENT — PAIN - FUNCTIONAL ASSESSMENT: PAIN_FUNCTIONAL_ASSESSMENT: 0-10

## 2023-11-12 NOTE — CARE COORDINATION
CASE MANAGEMENT DISCHARGE SUMMARY      Discharge to: home with [address] ordered/agency: na    Transportation:    Family/car: private      Confirmed discharge plan with:RN, [facility]     Patient: yes/no     Family:  yes/no    Name: Contact number:     Facility/Agency, name:  [facility] faxed Bernie Childs to pull orders   Phone number for report to facility:      RN, name: Tylercindy Cardona    Note: Discharging nurse to complete CINDY, reconcile AVS, and place final copy with patient's discharge packet. RN to ensure that written prescriptions for  Level II medications are sent with patient to the facility as per protocol.

## 2023-11-12 NOTE — PROGRESS NOTES
AM medications and AM assessment completed at this time. Patient has mild pain in back. Heat packs applied to back per patient request. Family at bedside. Patient assisted with breakfast and bathroom. Call light in reach. Bed alarm is on.

## 2023-11-12 NOTE — PLAN OF CARE
Problem: Discharge Planning  Goal: Discharge to home or other facility with appropriate resources  11/12/2023 1040 by Hank Figueroa RN  Outcome: Progressing  11/11/2023 2053 by Xiomara Welch LPN  Outcome: Progressing     Problem: Pain  Goal: Verbalizes/displays adequate comfort level or baseline comfort level  11/12/2023 1040 by Hank Figueroa RN  Outcome: Progressing  11/11/2023 2053 by Xiomara Welch LPN  Outcome: Progressing  Flowsheets (Taken 11/11/2023 1930)  Verbalizes/displays adequate comfort level or baseline comfort level: Encourage patient to monitor pain and request assistance     Problem: Safety - Adult  Goal: Free from fall injury  11/12/2023 1040 by Hank Figueroa RN  Outcome: Progressing  11/11/2023 2053 by Xiomara Welch LPN  Outcome: Progressing

## 2023-11-12 NOTE — DISCHARGE SUMMARY
Hospital Medicine Discharge Summary    Patient: Dominick Broderick   : 1950     Admit Date: 11/10/2023   Discharge Date: 2023    Disposition:  []Home   [x]HHC  []SNF  []ECF  []Acute Rehab  []LTAC  []Hospice  Code status:  [x]Full  []DNR/CCA  []Limited (DNR/CCA with Do Not Intubate)  []DNRCC  Condition at Discharge: Stable  Primary Care Provider: Hoda Teran MD    Admitting Provider: Kailee Wells MD  Discharge Provider: Kailee Wells MD     Discharge Diagnoses: Active Hospital Problems    Diagnosis     Abdominal pain [R10.9]        Presenting Admission History:      75 y.o. female who presented to [facility] with  above c/p . [facility] PMHx significant for parkinson , dementia with begav issues , hyperlipidemia, pituitary adenoma. She has dementia and unable to give detailed history. History is mainly from her . He has been complaining of abdominal pain more over the right lower quadrant on and off for last 2 weeks. Sometimes very mild and sometimes is very intense . No abdominal distention fever nausea vomiting diarrhea or constipation. No urinary complaints. Her appetite is not that great. She does not have any swallowing difficulties. She is basically bedbound and need help for daily activities. She has a history of Parkinson and her blood pressure fluctuates time to time. Whenever her blood pressure goes down she feels dizzy. Currently she does not have dizziness chest pain shortness of breath or any focal neurological symptoms     Assessment/Plan:      Abdominal pain-more over right lower quadrant-CT of the abdomen pelvis with IV contrast no abnormalities found- tolerating clear liquids, advance to reg diet , no  pain  now  Protonix     Parkinson's-continue home medication     Dizziness and orthostasis-possibly autonomic insufficiency following Parkinson's-IV fluids, patient is bedbound at home  Overall prognosis is not good.   Stockings   Midodrine if marked

## 2023-11-12 NOTE — CARE COORDINATION
Spoke to pt spouse re: SNF recs. At this time he declines a SNF referral, states that he \"has not found a facility that can prevent her falling\". Plans to return home with pt and resume [address] . Believes he can transport home at [place].

## 2023-11-12 NOTE — PROGRESS NOTES
Discharge instructions and medications/side effects reviewed with patient and  at bedside. Vitals stable at time of discharge. IV dcd and tele monitor removed. Medication that  brought in from home was returned to him at discharge. All patient belongings packed at bedside. Patient assisted via wheelchair down to from lobby and assisted into car.  will transport patient home with help of friend.

## 2023-11-12 NOTE — DISCHARGE INSTR - COC
Continuity of Care Form    Patient Name: Alicia Corbett   :  1950  MRN:  [id number]    Admit date:  11/10/2023  Discharge date:  ***    Code Status Order: Full Code   Advance Directives:     Admitting Physician:  Dakota Weaver MD  PCP: Shahbaz Flores MD    Discharging Nurse: [facility] Unit/Room#: 6741/6040-82  Discharging Unit Phone Number: ***    Emergency Contact:   Extended Emergency Contact Information  Primary Emergency Contact: [facility]  Address: [address] [facility] [place] of [address] [place] Phone: [phone]  Mobile Phone: [phone]  Relation: Spouse  Secondary Emergency Contact: [address] [place] [address] of [address] [place] Phone: [phone]  Relation: Child    Past Surgical History:  Past Surgical History:   Procedure Laterality Date    BACK SURGERY      CLAVICLE SURGERY Left 10/5/2023    LEFT CLAVICLE OPEN REDUCTION INTERNAL FIXATION-PATRICIA performed by Gadiel Mike MD at [facility]    COLONOSCOPY  2002    due     COLONOSCOPY  2012    due     FINGER TRIGGER RELEASE      right hand    HAMMER TOE SURGERY      HUMERUS FRACTURE SURGERY Left 2023    LEFT DISTAL RADIUS OPEN REDUCTION INTERNAL FIXATION performed by Gadiel Mike MD at [address] [place] [address] ( [facility])      LAPAROSCOPY      LEG SURGERY Left 2023    LEFT HIP PINNING performed by Gadiel Mike MD at [address] [place] [address]  14 yo    WRIST SURGERY Left 2023       Immunization History:   Immunization History   Administered Date(s) Administered    COVID-19, MODERNA BLUE border, Primary or Immunocompromised, (age 12y+), IM, 100 mcg/0.5mL 2021, 2021, 2021    COVID-19, MODERNA Bivalent, (age 12y+), IM, 48 mcg/0.5 mL 10/21/2022    Influenza 2011    Influenza Vaccine, unspecified formulation 10/27/2016    Influenza, FLUAD, (age 72 y+), Adjuvanted, 0.5mL 10/18/2022, {EQUIPMENT:[id number]}  Other Treatments: ***    Patient's personal belongings (please select all that are sent with patient):  {CHP DME Belongings:[id number]}    RN SIGNATURE:  {Esignature:[id number]}    CASE MANAGEMENT/SOCIAL WORK SECTION    Inpatient Status Date: ***    Readmission Risk Assessment Score:  Readmission Risk              Risk of Unplanned Readmission:  19           Discharging to Facility/ Agency   Name: [facility]  Address:  [facility]:[phone]  OTX:-[id number]    Dialysis Facility (if applicable)   Name:  Address:  Dialysis Schedule:  Phone:  Fax:    / signature: Electronically signed by Jared Page RN on 11/12/23 at 10:40 AM EST    PHYSICIAN SECTION    Prognosis: Fair    Condition at Discharge: Stable    Rehab Potential (if transferring to Rehab): Fair    Recommended Labs or Other Treatments After Discharge:PCP, dee hose , slow change in position , 24 hr care - fall risk , lower bed ,safe environment to prevent fall injury      Physician Certification: I certify the above information and transfer of Vedia Prader  is necessary for the continuing treatment of the diagnosis listed and that she requires [address] [place] [address] for less 30 days.      Update Admission H&P: No change in H&P    PHYSICIAN SIGNATURE:  Electronically signed by Saulo Bhatia MD on 11/12/23 at 11:39 AM EST

## 2023-11-12 NOTE — ED PROVIDER NOTES
Oral (!) 115 20 97 % 1.651 m (5' 5\") 45.4 kg (100 lb)       Physical Exam    CONSTITUTIONAL: Awake and alert. Cooperative. Well-developed. Thin and somewhat chronically ill-appearing. HENT: Normocephalic. Atraumatic. External ears normal, without discharge. No nasal discharge. Oropharynx clear. Mucous membranes moist.  EYES: Conjunctiva non-injected. No scleral icterus. PERRL. NECK: Supple. Normal ROM. CARDIOVASCULAR: Tachycardia with regular rhythm. Intact distal pulses. PULMONARY/CHEST WALL: Effort normal. No tachypnea. Lungs clear to ausculation. ABDOMEN: Soft. Nondistended. No tenderness to palpate. /ANORECTAL: Not assessed  MUSKULOSKELETAL: Left lower extremity: Swelling at the ankle joint with some tenderness over the lateral malleolus. No crepitus. No pain to the proximal fibula. No pain or defect over the Achilles tendon. Patient maintains normal ROM. No acute deformities. SKIN: Warm and dry. No rash. NEUROLOGICAL: Alert and oriented x 3. GCS 15. CN II-XII grossly intact. Strength is 5/5 in all extremities and sensation is intact. PSYCHIATRIC: Normal affect      DIAGNOSTIC RESULTS:         RADIOLOGY:  All x-ray studies are viewed/reviewed by me. Formal interpretations per the radiologist are as follows:      XR ANKLE LEFT (MIN 3 VIEWS)    Result Date: 11/12/2023  EXAMINATION: THREE XRAY VIEWS OF THE LEFT ANKLE 11/12/2023 4:04 pm COMPARISON: None. HISTORY: ORDERING SYSTEM PROVIDED HISTORY: Pain TECHNOLOGIST PROVIDED HISTORY: Reason for exam:->Pain Reason for Exam: left ankle pain FINDINGS: The ankle mortise is intact. The bones are osteopenic. There is a questionable subtle cortical fracture along the lateral malleolus laterally which is only seen on a single view. No displaced bony fragment is seen. There is moderate soft tissue swelling laterally.      Moderate soft tissue swelling laterally and diffuse osteopenia with a questionable nondisplaced cortical fracture along the

## 2023-11-13 ENCOUNTER — CARE COORDINATION (OUTPATIENT)
Dept: CASE MANAGEMENT | Age: 73
End: 2023-11-13

## 2023-11-13 ENCOUNTER — TELEPHONE (OUTPATIENT)
Dept: FAMILY MEDICINE CLINIC | Age: 73
End: 2023-11-13

## 2023-11-13 ENCOUNTER — TELEPHONE (OUTPATIENT)
Dept: ORTHOPEDIC SURGERY | Age: 73
End: 2023-11-13

## 2023-11-13 DIAGNOSIS — T14.8XXA BONE FRACTURE: Primary | ICD-10-CM

## 2023-11-13 PROCEDURE — 1111F DSCHRG MED/CURRENT MED MERGE: CPT | Performed by: FAMILY MEDICINE

## 2023-11-13 NOTE — ED PROVIDER NOTES
Emergency Department Encounter    Patient: Hubert Umaña  MRN: [id number]  : 1950  Date of Evaluation: 2023  ED Provider:  John Paul Betancourt MD    Triage Chief Complaint:   Abdominal Pain (Pain bi-lat last BM yesterday)    [place]:  Hubert Umaña is a 75 y.o. female with history seen below presenting with complaints of generalized abdominal pain and constipation. Patient states since yesterday she has had increased lower abdominal pain. States she has had decreased bowel movements. States the pain is moderate, constant, stabbing, worse with palpation without relieving factors. States she has had nausea. Denies urinary symptoms. Patient denies chest pain or shortness of breath but does mention that she has been having increasing lightheadedness with standing recently. Patient states she is fallen several times recently because of this. States when she stands up she becomes acutely lightheaded and dizzy. States that time she will take her blood pressure and they are in 60s. Patient was recently seen for similar symptoms and offered admission but patient refused.     ROS - see HPI, below listed is current ROS at time of my eval:  At least 14 systems reviewed, negative other than HPI    Past Medical History:   Diagnosis Date    Corticobasal degeneration     COVID-19 2023    Dyskinesia     Hyperlipidemia     Hyperreflexia     Neuropathy     corticobasal degeneration    Parkinson's disease           Pituitary adenoma ([address])     Spinal column pain     Surgery on L4, L5 per      Past Surgical History:   Procedure Laterality Date    BACK SURGERY      CLAVICLE SURGERY Left 10/5/2023    LEFT CLAVICLE OPEN REDUCTION INTERNAL FIXATION-PATRICIA performed by Moises Gomez MD at [address] [facility]  2002    due     COLONOSCOPY  2012    due     FINGER TRIGGER RELEASE      right hand    HAMMER TOE SURGERY      HUMERUS FRACTURE SURGERY Left 2023    LEFT Urinalysis   Result Value Ref Range    WBC, UA 0-2 0 - 5 /HPF    RBC, UA 11-20 (A) 0 - 4 /HPF    Bacteria, UA Rare (A) None Seen /HPF   Basic Metabolic Panel w/ Reflex to MG   Result Value Ref Range    Sodium 141 136 - 145 mmol/L    Potassium reflex Magnesium 3.1 (L) 3.5 - 5.1 mmol/L    Chloride 105 99 - 110 mmol/L    CO2 25 21 - 32 mmol/L    Anion Gap 11 3 - 16    Glucose 93 70 - 99 mg/dL    BUN 17 7 - 20 mg/dL    Creatinine <0.5 (L) 0.6 - 1.2 mg/dL    Est, Glom Filt Rate >60 >60    Calcium 9.3 8.3 - 10.6 mg/dL   CBC with Auto Differential   Result Value Ref Range    WBC 7.4 4.0 - 11.0 K/uL    RBC 3.89 (L) 4.00 - 5.20 M/uL    Hemoglobin 12.2 12.0 - 16.0 g/dL    Hematocrit 36.9 36.0 - 48.0 %    MCV 94.9 80.0 - 100.0 fL    MCH 31.3 26.0 - 34.0 pg    MCHC 33.0 31.0 - 36.0 g/dL    RDW 14.0 12.4 - 15.4 %    Platelets 399 288 - 546 K/uL    MPV 8.1 5.0 - 10.5 fL    Neutrophils % 75.1 %    Lymphocytes % 15.0 %    Monocytes % 8.5 %    Eosinophils % 0.4 %    Basophils % 1.0 %    Neutrophils Absolute 5.5 1.7 - 7.7 K/uL    Lymphocytes Absolute 1.1 1.0 - 5.1 K/uL    Monocytes Absolute 0.6 0.0 - 1.3 K/uL    Eosinophils Absolute 0.0 0.0 - 0.6 K/uL    Basophils Absolute 0.1 0.0 - 0.2 K/uL   Magnesium   Result Value Ref Range    Magnesium 2.00 1.80 - 2.40 mg/dL   Basic Metabolic Panel w/ Reflex to MG   Result Value Ref Range    Sodium 140 136 - 145 mmol/L    Potassium reflex Magnesium 3.4 (L) 3.5 - 5.1 mmol/L    Chloride 104 99 - 110 mmol/L    CO2 27 21 - 32 mmol/L    Anion Gap 9 3 - 16    Glucose 100 (H) 70 - 99 mg/dL    BUN 22 (H) 7 - 20 mg/dL    Creatinine 0.7 0.6 - 1.2 mg/dL    Est, Glom Filt Rate >60 >60    Calcium 8.8 8.3 - 10.6 mg/dL   Magnesium   Result Value Ref Range    Magnesium 1.90 1.80 - 2.40 mg/dL   EKG 12 Lead   Result Value Ref Range    Ventricular Rate 92 BPM    Atrial Rate 92 BPM    P-R Interval 156 ms    QRS Duration 108 ms    Q-T Interval 364 ms    QTc Calculation (Bazett) 450 ms    P Axis 83 degrees    R Axis We will admit patient to hospital service for further evaluation and treatment. Clinical Impression:  1. Orthostatic hypotension    2. Generalized abdominal pain          Comment: Please note this report has been produced using speech recognition software and may contain errors related to that system including errors in grammar, punctuation, and spelling, as well as words and phrases that may be inappropriate. Efforts were made to edit the dictations.         John Paul Betancourt MD  11/13/23 6510

## 2023-11-13 NOTE — TELEPHONE ENCOUNTER
Patient was seen in ED on 11/10 for left ankle fracture. This patient is well known with Dr. Wilbert Clement. Patient as PMH of dementia and parkinson's. Prev sx history of left hip pinning, wrist ORIF and most recently LEFT clavicle fracture. Spoke to  and worked into [facility] location due to circumstances. Patient was scheduled for 11/14/2023 8:45 am at the [facility] and [facility] [facility], [address] [place], [address] [place] [address].

## 2023-11-13 NOTE — CARE COORDINATION
Care Transitions Initial Follow Up Call    Call within 2 business days of discharge: Yes    Patient Current Location:  Home: [address] [place] [address] [place] [id number]    Care Transition Nurse contacted the patient and spouse/partner by telephone to perform post hospital discharge assessment. Verified name and  with patient and spouse/partner as identifiers. Provided introduction to self, and explanation of the Care Transition Nurse role. Patient: Hubert Umaña Patient : 1950   MRN: [id number]  Reason for Admission: abdominal pain, hypotension, ER visit 23 closed fx of distal end of left fibula  Discharge Date: 23 RARS: Readmission Risk Score: 23.7      Last Discharge Facility       Date Complaint Diagnosis Description Type Department Provider    23 Ankle Pain Closed fracture of distal end of left fibula, unspecified fracture morphology, initial encounter ED (DISCHARGE) [facility]             Was this an external facility discharge? No Discharge Facility:     Challenges to be reviewed by the provider   Additional needs identified to be addressed with provider: No  none         Method of communication with provider: none. Patient's /caregiver answered call and verified . Call was placed on speaker for patient and . Pleasant and agreeable to transition call. Discussed recent hospitalization and recent ER visit. Patient has significant orthostatic hypotension per . Stated that he was able to get her home from the hospital, but when she was getting up from her chair, her blood pressure dropped and caused a fall. Patient broke ankle and had to return to ER yesterday. Confirmed that they have AVS and medications reviewed. Taking as directed. Spoke to [employer] and [facility] home care orders have been received. Patient stated that nurse is scheduled to visit this afternoon. Spouse was requesting assistance with transportation assistance.  Patient has appt with

## 2023-11-13 NOTE — TELEPHONE ENCOUNTER
Cathleen with [facility] called to advise physician that patient was recently discharged from hospital on 11/12 for orthostatic hypertension and placed on midodrine for 7 days. They will be faxing orders to be signed by Brian Danielson.

## 2023-11-14 ENCOUNTER — TELEPHONE (OUTPATIENT)
Dept: FAMILY MEDICINE CLINIC | Age: 73
End: 2023-11-14

## 2023-11-14 ENCOUNTER — CARE COORDINATION (OUTPATIENT)
Dept: CASE MANAGEMENT | Age: 73
End: 2023-11-14

## 2023-11-14 ENCOUNTER — OFFICE VISIT (OUTPATIENT)
Dept: ORTHOPEDIC SURGERY | Age: 73
End: 2023-11-14

## 2023-11-14 ENCOUNTER — CARE COORDINATION (OUTPATIENT)
Dept: CARE COORDINATION | Age: 73
End: 2023-11-14

## 2023-11-14 VITALS — WEIGHT: 100 LBS | BODY MASS INDEX: 16.66 KG/M2 | HEIGHT: 65 IN

## 2023-11-14 DIAGNOSIS — S52.592A OTHER CLOSED FRACTURE OF DISTAL END OF LEFT RADIUS, INITIAL ENCOUNTER: ICD-10-CM

## 2023-11-14 DIAGNOSIS — S82.62XA CLOSED AVULSION FRACTURE OF LATERAL MALLEOLUS OF LEFT FIBULA, INITIAL ENCOUNTER: Primary | ICD-10-CM

## 2023-11-14 DIAGNOSIS — G20.A1 PARKINSON DISEASE, SYMPTOMATIC: ICD-10-CM

## 2023-11-14 DIAGNOSIS — S42.032A CLOSED DISPLACED FRACTURE OF ACROMIAL END OF LEFT CLAVICLE, INITIAL ENCOUNTER: ICD-10-CM

## 2023-11-14 DIAGNOSIS — S72.002A CLOSED FRACTURE OF NECK OF LEFT FEMUR, INITIAL ENCOUNTER (HCC): ICD-10-CM

## 2023-11-14 DIAGNOSIS — S42.022A CLOSED DISPLACED FRACTURE OF SHAFT OF LEFT CLAVICLE, INITIAL ENCOUNTER: ICD-10-CM

## 2023-11-14 NOTE — CARE COORDINATION
Message received from [facility], Clarance Cousins and further support transferred back to [facility]. Patient's daughter active with patient care and engaged with ACM. CTN will end transition episode. Lelia alfaro.   Ligia Wong RN   [phone]

## 2023-11-14 NOTE — TELEPHONE ENCOUNTER
Aisha Giron with [facility] calling to request verbal orders from Dr. Spencer Trevizo. They are requesting OT once a week for 4 weeks and Evaluation for  to come out and assess needs for home and possible equipment for the home.   She can be reached at [phone]

## 2023-11-14 NOTE — TELEPHONE ENCOUNTER
Let them know that I am not familiar with this midodrine medicine. I have not prescribed it for any of my patients. I do not feel comfortable monitoring this medication.   They need to have her neurologist follow-up on this medicine

## 2023-11-14 NOTE — CARE COORDINATION
[facility] received return call from patient's daughter, Latasha Whyte. Daughter expressed that she does not believe her parents are doing well taking care of themselves at home. She expressed concern for the number of recent ED visits. Daughter inquiring about hospice services at this time and would like to know PCP opinion on enrolling her mom with hospice. Patient is scheduled to see PCP later this week on 11/17/23. M spoke with PCP who is agreeable to hospice services for patient. [facility] called and spoke with daughter to inform her that PCP is agreeable to hospice services for her mom. Daughter would like to hold off on placing hospice consult until her mom's appointment on Friday. [facility] informed daughter that PCP would like her to be present at upcoming appointment. Daughter denied any other questions or concerns at this time. Daughter has [facility] phone number and encouraged to call for any non-emergent questions.

## 2023-11-14 NOTE — TELEPHONE ENCOUNTER
Jamie Kimble called back and was advised, she says she will place a note on patient's chart that the midodrine should be managed by neurology.

## 2023-11-17 ENCOUNTER — OFFICE VISIT (OUTPATIENT)
Dept: FAMILY MEDICINE CLINIC | Age: 73
End: 2023-11-17

## 2023-11-17 VITALS
BODY MASS INDEX: 15.99 KG/M2 | SYSTOLIC BLOOD PRESSURE: 90 MMHG | DIASTOLIC BLOOD PRESSURE: 58 MMHG | HEART RATE: 107 BPM | OXYGEN SATURATION: 99 % | HEIGHT: 65 IN | WEIGHT: 96 LBS

## 2023-11-17 DIAGNOSIS — Z91.81 AT HIGH RISK FOR FALLS: ICD-10-CM

## 2023-11-17 DIAGNOSIS — F33.1 MAJOR DEPRESSIVE DISORDER, RECURRENT, MODERATE (HCC): ICD-10-CM

## 2023-11-17 DIAGNOSIS — T14.8XXA BONE FRACTURE: ICD-10-CM

## 2023-11-17 DIAGNOSIS — G20.B2 PARKINSON'S DISEASE WITH DYSKINESIA AND FLUCTUATING MANIFESTATIONS: Primary | ICD-10-CM

## 2023-11-17 DIAGNOSIS — R41.3 MEMORY IMPAIRMENT: ICD-10-CM

## 2023-11-17 DIAGNOSIS — R55 SYNCOPE, UNSPECIFIED SYNCOPE TYPE: ICD-10-CM

## 2023-11-17 PROBLEM — S82.62XA CLOSED AVULSION FRACTURE OF LATERAL MALLEOLUS OF LEFT FIBULA: Status: ACTIVE | Noted: 2023-11-17

## 2023-11-17 RX ORDER — TRAMADOL HYDROCHLORIDE 50 MG/1
50 TABLET ORAL EVERY 6 HOURS PRN
COMMUNITY

## 2023-11-17 ASSESSMENT — ENCOUNTER SYMPTOMS: BACK PAIN: 1

## 2023-11-17 NOTE — PROGRESS NOTES
Violence: Not on file   Housing Stability: Low Risk  (11/10/2023)    Housing Stability Vital Sign     Unable to Pay for Housing in the Last Year: No     Number of Places Lived in the Last Year: 1     Unstable Housing in the Last Year: No       Family History   Problem Relation Age of Onset    Heart Disease Mother     Stroke Father     Cancer Father         prostate    Diabetes Sister     Breast Cancer Sister 55    Heart Disease Sister         quadruple bypass    Breast Cancer Sister 37       Current Outpatient Medications on File Prior to Visit   Medication Sig Dispense Refill    carbidopa-levodopa (RYTARY) 61. MG CPCR per extended release capsule Take 1 capsule by mouth 5 (five) times a day 90 capsule 0    midodrine (PROAMATINE) 5 MG tablet Take 1 tablet by mouth 2 times daily (with meals) for 7 days Hold if SBP >140 14 tablet 0    HYDROcodone-acetaminophen 5-300 MG TABS Take by mouth.      pilocarpine (SALAGEN) 5 MG tablet Take 1 tablet by mouth 4 times daily      senna (SENOKOT) 8.6 MG tablet Take 1 tablet by mouth daily 30 tablet 3    famotidine (PEPCID) 40 MG tablet Take 1 tablet by mouth daily      acetaminophen (TYLENOL) 500 MG tablet Take 1 tablet by mouth in the morning and 1 tablet at noon and 1 tablet in the evening. 90 tablet 2    sodium chloride 1 g tablet Take 1 tablet by mouth 3 times daily      alendronate (FOSAMAX) 70 MG tablet Take 1 tablet by mouth every 7 days Takes on fridays      GEMTESA 75 MG TABS tablet Take 1 tablet by mouth daily      DULoxetine (CYMBALTA) 60 MG extended release capsule TAKE ONE CAPSULE BY MOUTH ONCE DAILY 90 capsule 1     No current facility-administered medications on file prior to visit. Pertinent items are noted in HPI  Review of systems reviewed from Patient History Form and available in the patient's chart under the Media tab. PHYSICAL EXAMINATION:  Ms. Daryle Hartmann is a very pleasant 75 y.o.   female who presents today in no acute distress,

## 2023-11-17 NOTE — PROGRESS NOTES
Patient:  Luther Bashir a 75 y.o. [place] Doing presents today with the following Chief Complaint(s):  Chief Complaint   Patient presents with    Follow-Up from Hospital     Pt is here with her , son, and daughter. They are needing to get hospice orders. Patient is here with her , son and daughter. Patient has had multiple hospitalizations, emergency room visits and broken bones. They are seeking the best care possible for their mother. She has been getting home OT and PT which she found helpful. However, patient has had issues with hypotension. When she would get up to use the restroom she would fall due to hypotension. The falls would generally lead to additional injury. Patient has been working with neurology for many years. They have been adjusting her medication for Parkinson's and all the other chronic issues related to the Parkinson's such as her overactive bladder, dysphagia, hypotension. Patient's family met with hospice representative for [facility] hospice. They are strongly thinking about initiating hospice referral.  They understand that OT PT would not be part of this treatment. However they would gain a hospital bed, a light weight wheelchair and a ramp to assist with getting her in and out of the house. Transportation with the heavier wheelchair is taxing for her . They plan on installing a stair lift to help the patient get up and down the stairs. Patient tends to forget that she cannot walk without assistance and she will get up from her chair and then fall. She needs 24-hour assistance          Current Outpatient Medications   Medication Sig Dispense Refill    traMADol (ULTRAM) 50 MG tablet Take 1 tablet by mouth every 6 hours as needed for Pain.  Max Daily Amount: 200 mg      carbidopa-levodopa (RYTARY) 61. MG CPCR per extended release capsule Take 1 capsule by mouth 5 (five) times a day 90 capsule 0    midodrine (PROAMATINE) 5 MG tablet Take 1

## 2023-11-20 ENCOUNTER — TELEPHONE (OUTPATIENT)
Dept: FAMILY MEDICINE CLINIC | Age: 73
End: 2023-11-20

## 2023-11-20 ENCOUNTER — CARE COORDINATION (OUTPATIENT)
Dept: CARE COORDINATION | Age: 73
End: 2023-11-20

## 2023-11-20 DIAGNOSIS — R55 SYNCOPE, UNSPECIFIED SYNCOPE TYPE: ICD-10-CM

## 2023-11-20 DIAGNOSIS — T14.8XXA BONE FRACTURE: ICD-10-CM

## 2023-11-20 DIAGNOSIS — Z91.81 AT HIGH RISK FOR FALLS: ICD-10-CM

## 2023-11-20 DIAGNOSIS — F33.1 MAJOR DEPRESSIVE DISORDER, RECURRENT, MODERATE (HCC): ICD-10-CM

## 2023-11-20 DIAGNOSIS — G20.B2 PARKINSON'S DISEASE WITH DYSKINESIA AND FLUCTUATING MANIFESTATIONS: Primary | ICD-10-CM

## 2023-11-20 DIAGNOSIS — R41.3 MEMORY IMPAIRMENT: ICD-10-CM

## 2023-11-20 NOTE — TELEPHONE ENCOUNTER
Referral to Hosp has been pended.  Once you sign this, I will need to the hospice order, pt's last office note, recent labs, and med list to Liyah Dohertyo at [phone]

## 2023-11-20 NOTE — TELEPHONE ENCOUNTER
Patient's daughter Luis Eduardo Mckay called to let Karli Garcia know that her mother has researched and thought about Hospice Care over the weekend following her appt on Friday. She has decided to go ahead with this and they had paperwork faxed over to Dr. Jackson Mercado which will need to be completed.  Key can be reached with any questions at [phone]

## 2023-11-21 NOTE — TELEPHONE ENCOUNTER
Lacie Trejo with [facility] calling and requesting the orders for hospice be refaxed to her at [phone]. She will need the last office notes and history and physical in addition.

## 2023-11-27 ENCOUNTER — CARE COORDINATION (OUTPATIENT)
Dept: CARE COORDINATION | Age: 73
End: 2023-11-27

## 2023-11-27 NOTE — CARE COORDINATION
Ambulatory Care Coordination Note  2023    Patient Current Location:  Home: [address] [place] [address] [place] [id number]     ACM contacted the spouse/partner by telephone. Verified name and  with spouse/partner as identifiers. Provided introduction to self, and explanation of the ACM role. Challenges to be reviewed by the provider   Additional needs identified to be addressed with provider: No  none               Method of communication with provider: chart routing. ACM: Sai Bennett, RN    ACM made final care coordination outreach to patient's , Russel Palomares. Russel Palomares reported that his wife has become established with [facility]. Russel Palomares denied any other questions or concerns at this time. ACM confirmed that Russel Palomares has ACM contact information and informed he can call for any non-emergent questions. Offered patient enrollment in the Remote Patient Monitoring (RPM) program for in-home monitoring: Patient declined. Lab Results       None            Care Coordination Interventions    Referral from Primary Care Provider: No  Suggested Interventions and Community Resources  Fall Risk Prevention: In Process (Comment: mailed 10/20/23)  Occupational Therapy: In Process  Physical Therapy: In Process  Senior Services:  In Process (Comment: referral placed 10/20/23)          Goals Addressed                   This Visit's Progress     COMPLETED: Reduce Falls         I will reduce my risk of falls by the following: Remove rugs or use non slip rugs  Use walking aids like cane or walker  Follow through on orders for PT    Barriers: overwhelmed by complexity of regimen  Plan for overcoming my barriers: education and support   Confidence: 6/10  Anticipated Goal Completion Date: 23              Future Appointments   Date Time Provider [address]   2024  9:45 AM Nile Allen MD AND [facility]   ,   General Assessment         , and No linked episodes

## 2023-12-27 ENCOUNTER — TELEPHONE (OUTPATIENT)
Dept: ORTHOPEDIC SURGERY | Age: 73
End: 2023-12-27

## 2023-12-27 NOTE — TELEPHONE ENCOUNTER
*SMA out of office early today.  Will need to notify provider of the request to determine if this is possible*

## 2023-12-27 NOTE — TELEPHONE ENCOUNTER
Notified patient's spouse that this needs to be discussed with Dr Jeramie Mroa and he won't be available to ask until tomorrow afternoon. Patrice Das understands the office will call back tomorrow afternoon.

## 2023-12-27 NOTE — TELEPHONE ENCOUNTER
General Question     Subject: REMOVAL OF PINS IN LEFT HIP  Patient and /or Facility Request: Ron Oden   Contact Number: [phone]    PATIENTS  IS WONDERING IF HIS WIFE CAN HAVE OINS IN LEFT HIP REMOVED SHE IS IN HOSPICE CURRENTLY AT [facility] AND IS LOSING WEIGHT SO THE PINS ARE RUBBING AND CAUSING PAIN .     PLEASE CONTACT  AT THE NUMBER LISTED ABOVE

## 2023-12-28 NOTE — TELEPHONE ENCOUNTER
Spoke with Monique Serrato and went back to Dr Jena Baker. Xrays of the hip reviewed further. Dr Jena Baker agreed to check on OR availability regarding screw removal in LT hip. Office will update Bruce tomorrow regarding availability.

## 2023-12-29 NOTE — TELEPHONE ENCOUNTER
needs to see her in office Tuesday in [address]. No OR availability in [address] for early next week at this time.

## 2023-12-29 NOTE — TELEPHONE ENCOUNTER
Spoke with Shaw Costa and offered an appointment Tuesday due to no OR availability. Warned about clinic delays. Shaw Costa denied needing to arrange transport to bring her.

## 2024-01-01 ENCOUNTER — TELEPHONE (OUTPATIENT)
Dept: FAMILY MEDICINE CLINIC | Age: 74
End: 2024-01-01

## 2024-01-01 RX ORDER — SENNOSIDES 8.6 MG
1 TABLET ORAL DAILY
Qty: 30 TABLET | Refills: 3 | OUTPATIENT
Start: 2024-01-01

## 2024-01-01 RX ORDER — FAMOTIDINE 40 MG/1
TABLET, FILM COATED ORAL
Qty: 90 TABLET | Refills: 3 | OUTPATIENT
Start: 2024-01-01

## 2024-01-02 ENCOUNTER — OFFICE VISIT (OUTPATIENT)
Dept: ORTHOPEDIC SURGERY | Age: 74
End: 2024-01-02
Payer: MEDICARE

## 2024-01-02 ENCOUNTER — TELEPHONE (OUTPATIENT)
Dept: ORTHOPEDIC SURGERY | Age: 74
End: 2024-01-02

## 2024-01-02 DIAGNOSIS — S72.002A CLOSED FRACTURE OF NECK OF LEFT FEMUR, INITIAL ENCOUNTER (HCC): Primary | ICD-10-CM

## 2024-01-02 DIAGNOSIS — S52.592A OTHER CLOSED FRACTURE OF DISTAL END OF LEFT RADIUS, INITIAL ENCOUNTER: ICD-10-CM

## 2024-01-02 DIAGNOSIS — S82.62XD CLOSED AVULSION FRACTURE OF LATERAL MALLEOLUS OF LEFT FIBULA WITH ROUTINE HEALING, SUBSEQUENT ENCOUNTER: ICD-10-CM

## 2024-01-02 DIAGNOSIS — S42.032D CLOSED DISPLACED FRACTURE OF ACROMIAL END OF LEFT CLAVICLE WITH ROUTINE HEALING, SUBSEQUENT ENCOUNTER: ICD-10-CM

## 2024-01-02 PROCEDURE — 1090F PRES/ABSN URINE INCON ASSESS: CPT | Performed by: ORTHOPAEDIC SURGERY

## 2024-01-02 PROCEDURE — G8399 PT W/DXA RESULTS DOCUMENT: HCPCS | Performed by: ORTHOPAEDIC SURGERY

## 2024-01-02 PROCEDURE — G8419 CALC BMI OUT NRM PARAM NOF/U: HCPCS | Performed by: ORTHOPAEDIC SURGERY

## 2024-01-02 PROCEDURE — G8428 CUR MEDS NOT DOCUMENT: HCPCS | Performed by: ORTHOPAEDIC SURGERY

## 2024-01-02 PROCEDURE — 3017F COLORECTAL CA SCREEN DOC REV: CPT | Performed by: ORTHOPAEDIC SURGERY

## 2024-01-02 PROCEDURE — 99214 OFFICE O/P EST MOD 30 MIN: CPT | Performed by: ORTHOPAEDIC SURGERY

## 2024-01-02 PROCEDURE — 1124F ACP DISCUSS-NO DSCNMKR DOCD: CPT | Performed by: ORTHOPAEDIC SURGERY

## 2024-01-02 PROCEDURE — G8484 FLU IMMUNIZE NO ADMIN: HCPCS | Performed by: ORTHOPAEDIC SURGERY

## 2024-01-02 PROCEDURE — 1036F TOBACCO NON-USER: CPT | Performed by: ORTHOPAEDIC SURGERY

## 2024-01-02 NOTE — TELEPHONE ENCOUNTER
Left message to call the office and schedule surgery.    child was walking on the couch and stepped on the pice of metal , there is the cut under the right big toe, as per parents

## 2024-01-02 NOTE — PROGRESS NOTES
CHIEF COMPLAINT:   1- Left distal clavicle comminuted fracture, status post ORIF, hook plate, 10/5/2023 .  2- New left clavicle shaft fracture.  3- Left femur impacted neck fracture, status post Hip pinning with cannulated screws, 8/31/2023 .  4- Left distal radius 2 parts intra-articular displaced fracture, status post ORIF, 8/31/2023 .  5- Left ankle pain / lateral malleolus avulsion fracture, 11/12/2023.  6- Parkinson disease.  7- Left hip arthritis.    HISTORY:  Ms. Yeager 73 y.o.  female well known to me presents today for the first visit for evaluation of left hip pain. She also here for a left ankle injury which occurred when she fell.  She was first seen and evaluated in [facility], where she was x-rayed, splinted and asked to f/u with Orthopedics. She is complaining of lateral ankle pain and swelling 9/10. This is better with elevation and worse with bearing any wt. The pain is sharp and not radiating. No numbness or tingling sensation. Alleviating factors: rest. No other complaint. The patient is known to me for multiple fractures.     Past Medical History:   Diagnosis Date    Corticobasal degeneration     COVID-19 09/27/2023    Dyskinesia     Hyperlipidemia     Hyperreflexia     Neuropathy     corticobasal degeneration    Parkinson's disease           Pituitary adenoma ([facility])     Spinal column pain     Surgery on L4, L5 per        Past Surgical History:   Procedure Laterality Date    BACK SURGERY      CLAVICLE SURGERY Left 10/5/2023    LEFT CLAVICLE OPEN REDUCTION INTERNAL FIXATION-PATRICIA performed by Lew Almonte MD at [facility] OR    COLONOSCOPY  06/2002    due 6/12    COLONOSCOPY  08/2012    due 8/17    FINGER TRIGGER RELEASE      right hand    HAMMER TOE SURGERY  2005    HUMERUS FRACTURE SURGERY Left 09/01/2023    LEFT DISTAL RADIUS OPEN REDUCTION INTERNAL FIXATION performed by Lew Almonte MD at [facility] OR    HYSTERECTOMY (CERVIX STATUS UNKNOWN)      LAPAROSCOPY      LEG SURGERY

## 2024-01-03 ENCOUNTER — TELEPHONE (OUTPATIENT)
Dept: ORTHOPEDIC SURGERY | Age: 74
End: 2024-01-03

## 2024-01-03 RX ORDER — SOLIFENACIN SUCCINATE 10 MG/1
10 TABLET, FILM COATED ORAL NIGHTLY
COMMUNITY
Start: 2023-12-18

## 2024-01-03 RX ORDER — CLONAZEPAM 0.5 MG/1
0.5 TABLET ORAL NIGHTLY
COMMUNITY

## 2024-01-03 RX ORDER — OXYCODONE HYDROCHLORIDE AND ACETAMINOPHEN 5; 325 MG/1; MG/1
1 TABLET ORAL EVERY 6 HOURS PRN
COMMUNITY
Start: 2023-12-12

## 2024-01-03 NOTE — TELEPHONE ENCOUNTER
Auth: NPR  Date: 01/04/24  Reference # None  Spoke with: None  Type of SX: Outpatient  Location: [facility]  CPT: 41913   DX: T84.84XA  SX area: Lt hip  Insurance: [employer]

## 2024-01-04 ENCOUNTER — ANESTHESIA EVENT (OUTPATIENT)
Dept: OPERATING ROOM | Age: 74
End: 2024-01-04
Payer: COMMERCIAL

## 2024-01-04 ENCOUNTER — ANESTHESIA (OUTPATIENT)
Dept: OPERATING ROOM | Age: 74
End: 2024-01-04
Payer: COMMERCIAL

## 2024-01-04 ENCOUNTER — APPOINTMENT (OUTPATIENT)
Dept: GENERAL RADIOLOGY | Age: 74
End: 2024-01-04
Attending: ORTHOPAEDIC SURGERY
Payer: COMMERCIAL

## 2024-01-04 ENCOUNTER — HOSPITAL ENCOUNTER (OUTPATIENT)
Age: 74
Setting detail: OUTPATIENT SURGERY
Discharge: HOME | End: 2024-01-04
Attending: ORTHOPAEDIC SURGERY | Admitting: ORTHOPAEDIC SURGERY
Payer: COMMERCIAL

## 2024-01-04 VITALS
HEIGHT: 65 IN | WEIGHT: 97 LBS | SYSTOLIC BLOOD PRESSURE: 95 MMHG | RESPIRATION RATE: 30 BRPM | DIASTOLIC BLOOD PRESSURE: 62 MMHG | TEMPERATURE: 97.6 F | HEART RATE: 95 BPM | BODY MASS INDEX: 16.16 KG/M2 | OXYGEN SATURATION: 99 %

## 2024-01-04 PROBLEM — T85.848A PAIN FROM IMPLANTED HARDWARE: Status: ACTIVE | Noted: 2024-01-04

## 2024-01-04 PROCEDURE — 3700000000 HC ANESTHESIA ATTENDED CARE: Performed by: ORTHOPAEDIC SURGERY

## 2024-01-04 PROCEDURE — C1769 GUIDE WIRE: HCPCS | Performed by: ORTHOPAEDIC SURGERY

## 2024-01-04 PROCEDURE — 3600000004 HC SURGERY LEVEL 4 BASE: Performed by: ORTHOPAEDIC SURGERY

## 2024-01-04 PROCEDURE — 6360000002 HC RX W HCPCS: Performed by: ORTHOPAEDIC SURGERY

## 2024-01-04 PROCEDURE — 3700000001 HC ADD 15 MINUTES (ANESTHESIA): Performed by: ORTHOPAEDIC SURGERY

## 2024-01-04 PROCEDURE — 7100000000 HC PACU RECOVERY - FIRST 15 MIN: Performed by: ORTHOPAEDIC SURGERY

## 2024-01-04 PROCEDURE — 7100000001 HC PACU RECOVERY - ADDTL 15 MIN: Performed by: ORTHOPAEDIC SURGERY

## 2024-01-04 PROCEDURE — 3600000014 HC SURGERY LEVEL 4 ADDTL 15MIN: Performed by: ORTHOPAEDIC SURGERY

## 2024-01-04 PROCEDURE — 7100000011 HC PHASE II RECOVERY - ADDTL 15 MIN: Performed by: ORTHOPAEDIC SURGERY

## 2024-01-04 PROCEDURE — 73501 X-RAY EXAM HIP UNI 1 VIEW: CPT

## 2024-01-04 PROCEDURE — 2709999900 HC NON-CHARGEABLE SUPPLY: Performed by: ORTHOPAEDIC SURGERY

## 2024-01-04 PROCEDURE — 6360000002 HC RX W HCPCS: Performed by: NURSE ANESTHETIST, CERTIFIED REGISTERED

## 2024-01-04 PROCEDURE — 2580000003 HC RX 258: Performed by: ORTHOPAEDIC SURGERY

## 2024-01-04 PROCEDURE — 7100000010 HC PHASE II RECOVERY - FIRST 15 MIN: Performed by: ORTHOPAEDIC SURGERY

## 2024-01-04 PROCEDURE — A4217 STERILE WATER/SALINE, 500 ML: HCPCS | Performed by: ORTHOPAEDIC SURGERY

## 2024-01-04 RX ORDER — HYDROMORPHONE HYDROCHLORIDE 2 MG/ML
0.25 INJECTION, SOLUTION INTRAMUSCULAR; INTRAVENOUS; SUBCUTANEOUS EVERY 5 MIN PRN
Status: DISCONTINUED | OUTPATIENT
Start: 2024-01-04 | End: 2024-01-04 | Stop reason: HOSPADM

## 2024-01-04 RX ORDER — ONDANSETRON 2 MG/ML
INJECTION INTRAMUSCULAR; INTRAVENOUS PRN
Status: DISCONTINUED | OUTPATIENT
Start: 2024-01-04 | End: 2024-01-04 | Stop reason: SDUPTHER

## 2024-01-04 RX ORDER — SODIUM CHLORIDE 9 MG/ML
INJECTION, SOLUTION INTRAVENOUS PRN
Status: DISCONTINUED | OUTPATIENT
Start: 2024-01-04 | End: 2024-01-04 | Stop reason: HOSPADM

## 2024-01-04 RX ORDER — FAMOTIDINE 40 MG/1
TABLET, FILM COATED ORAL
Qty: 30 TABLET | Refills: 5 | Status: SHIPPED | OUTPATIENT
Start: 2024-01-04

## 2024-01-04 RX ORDER — SODIUM CHLORIDE 0.9 % (FLUSH) 0.9 %
5-40 SYRINGE (ML) INJECTION EVERY 12 HOURS SCHEDULED
Status: DISCONTINUED | OUTPATIENT
Start: 2024-01-04 | End: 2024-01-04 | Stop reason: HOSPADM

## 2024-01-04 RX ORDER — OXYCODONE HYDROCHLORIDE 5 MG/1
5 TABLET ORAL
Status: DISCONTINUED | OUTPATIENT
Start: 2024-01-04 | End: 2024-01-04 | Stop reason: HOSPADM

## 2024-01-04 RX ORDER — SODIUM CHLORIDE 0.9 % (FLUSH) 0.9 %
5-40 SYRINGE (ML) INJECTION PRN
Status: DISCONTINUED | OUTPATIENT
Start: 2024-01-04 | End: 2024-01-04 | Stop reason: HOSPADM

## 2024-01-04 RX ORDER — BUPIVACAINE HYDROCHLORIDE 5 MG/ML
INJECTION, SOLUTION EPIDURAL; INTRACAUDAL
Status: COMPLETED | OUTPATIENT
Start: 2024-01-04 | End: 2024-01-04

## 2024-01-04 RX ORDER — LABETALOL HYDROCHLORIDE 5 MG/ML
10 INJECTION, SOLUTION INTRAVENOUS
Status: DISCONTINUED | OUTPATIENT
Start: 2024-01-04 | End: 2024-01-04 | Stop reason: HOSPADM

## 2024-01-04 RX ORDER — MAGNESIUM HYDROXIDE 1200 MG/15ML
LIQUID ORAL CONTINUOUS PRN
Status: COMPLETED | OUTPATIENT
Start: 2024-01-04 | End: 2024-01-04

## 2024-01-04 RX ORDER — CEPHALEXIN 500 MG/1
500 CAPSULE ORAL 4 TIMES DAILY
Qty: 20 CAPSULE | Refills: 0 | Status: SHIPPED | OUTPATIENT
Start: 2024-01-04 | End: 2024-01-09

## 2024-01-04 RX ORDER — PROCHLORPERAZINE EDISYLATE 5 MG/ML
5 INJECTION INTRAMUSCULAR; INTRAVENOUS
Status: DISCONTINUED | OUTPATIENT
Start: 2024-01-04 | End: 2024-01-04 | Stop reason: HOSPADM

## 2024-01-04 RX ORDER — SODIUM CHLORIDE 9 MG/ML
INJECTION, SOLUTION INTRAVENOUS CONTINUOUS
Status: DISCONTINUED | OUTPATIENT
Start: 2024-01-04 | End: 2024-01-04 | Stop reason: HOSPADM

## 2024-01-04 RX ORDER — HYDRALAZINE HYDROCHLORIDE 20 MG/ML
10 INJECTION INTRAMUSCULAR; INTRAVENOUS
Status: DISCONTINUED | OUTPATIENT
Start: 2024-01-04 | End: 2024-01-04 | Stop reason: HOSPADM

## 2024-01-04 RX ORDER — PROPOFOL 10 MG/ML
INJECTION, EMULSION INTRAVENOUS PRN
Status: DISCONTINUED | OUTPATIENT
Start: 2024-01-04 | End: 2024-01-04 | Stop reason: SDUPTHER

## 2024-01-04 RX ORDER — ONDANSETRON 2 MG/ML
4 INJECTION INTRAMUSCULAR; INTRAVENOUS
Status: DISCONTINUED | OUTPATIENT
Start: 2024-01-04 | End: 2024-01-04 | Stop reason: HOSPADM

## 2024-01-04 RX ORDER — FENTANYL CITRATE 50 UG/ML
INJECTION, SOLUTION INTRAMUSCULAR; INTRAVENOUS PRN
Status: DISCONTINUED | OUTPATIENT
Start: 2024-01-04 | End: 2024-01-04 | Stop reason: SDUPTHER

## 2024-01-04 RX ORDER — FENTANYL CITRATE 50 UG/ML
25 INJECTION, SOLUTION INTRAMUSCULAR; INTRAVENOUS EVERY 5 MIN PRN
Status: DISCONTINUED | OUTPATIENT
Start: 2024-01-04 | End: 2024-01-04 | Stop reason: HOSPADM

## 2024-01-04 RX ORDER — SODIUM CHLORIDE, SODIUM LACTATE, POTASSIUM CHLORIDE, CALCIUM CHLORIDE 600; 310; 30; 20 MG/100ML; MG/100ML; MG/100ML; MG/100ML
INJECTION, SOLUTION INTRAVENOUS CONTINUOUS
Status: DISCONTINUED | OUTPATIENT
Start: 2024-01-04 | End: 2024-01-04 | Stop reason: HOSPADM

## 2024-01-04 RX ADMIN — SODIUM CHLORIDE: 9 INJECTION, SOLUTION INTRAVENOUS at 06:30

## 2024-01-04 RX ADMIN — FENTANYL CITRATE 25 MCG: 50 INJECTION, SOLUTION INTRAMUSCULAR; INTRAVENOUS at 07:27

## 2024-01-04 RX ADMIN — PHENYLEPHRINE HYDROCHLORIDE 100 MCG: 10 INJECTION INTRAVENOUS at 07:32

## 2024-01-04 RX ADMIN — ONDANSETRON 4 MG: 2 INJECTION INTRAMUSCULAR; INTRAVENOUS at 07:25

## 2024-01-04 RX ADMIN — FENTANYL CITRATE 50 MCG: 50 INJECTION, SOLUTION INTRAMUSCULAR; INTRAVENOUS at 07:36

## 2024-01-04 RX ADMIN — PHENYLEPHRINE HYDROCHLORIDE 200 MCG: 10 INJECTION INTRAVENOUS at 07:13

## 2024-01-04 RX ADMIN — CEFAZOLIN 2000 MG: 2 INJECTION, POWDER, FOR SOLUTION INTRAMUSCULAR; INTRAVENOUS at 07:00

## 2024-01-04 RX ADMIN — PROPOFOL 150 MG: 10 INJECTION, EMULSION INTRAVENOUS at 07:06

## 2024-01-04 RX ADMIN — FENTANYL CITRATE 25 MCG: 50 INJECTION, SOLUTION INTRAMUSCULAR; INTRAVENOUS at 07:34

## 2024-01-04 ASSESSMENT — PAIN DESCRIPTION - LOCATION: LOCATION: HIP

## 2024-01-04 ASSESSMENT — PAIN DESCRIPTION - PAIN TYPE: TYPE: ACUTE PAIN

## 2024-01-04 ASSESSMENT — PAIN SCALES - GENERAL
PAINLEVEL_OUTOF10: 0
PAINLEVEL_OUTOF10: 0
PAINLEVEL_OUTOF10: 3
PAINLEVEL_OUTOF10: 0

## 2024-01-04 ASSESSMENT — PAIN DESCRIPTION - DESCRIPTORS: DESCRIPTORS: ACHING

## 2024-01-04 ASSESSMENT — ENCOUNTER SYMPTOMS: SHORTNESS OF BREATH: 0

## 2024-01-04 ASSESSMENT — PAIN DESCRIPTION - ORIENTATION: ORIENTATION: LEFT

## 2024-01-04 NOTE — PROGRESS NOTES
Discharge instructions reviewed with patient/ Bruce. All home medications have been reviewed, questions answered and patient verbalized understanding.  Discharge instructions signed.  Pt dc'd per wheelchair.  Patient's  instructed to drive down to the main entrance so pharmacy may bring out one medication.   verbalized understanding.  Pt discharged home with her wheelchair and other belongings.  taking stable pt home.  
Oral airway removed.  
Pt arrived from OR to PACU bay 4.  Report received from OR staff.  Surgical dressing in place to left hip.  Pt on 4 L NC, oral airway in place, NSR, VSS.    
Pt awake and alert.  Pt on RA, VSS.   at bedside.  Pt denies pain and nausea, tolerating PO.  Skin warm LLE, palpable pulses and able to wiggle toes.  Pt meets criteria to be discharged from phase 1.   
Teaching / education initiated regarding perioperative experience, expectations, and pain management during stay. Patient verbalized understanding.   
bring picture ID and insurance card.             19.  Visit our web site for additional information:  [email or web address]              20.During flu season no children under the age of 14 are permitted in the hospital for the safety of all patients.                              21. If you take a long acting insulin in the evening only  take half of your usual  dose the night  before your procedure              22. If you use a c-pap please bring DOS if staying overnight,             23.For your convenience [facility] has a pharmacy on site to fill your prescriptions.             24. If you use oxygen and have a portable tank please bring it  with you the DOS             25. Bring a complete list of all your medications with name and dose include any supplements.             26. Other__________________________________________   *Please call pre admission testing if you any further questions   [place]         [phone]   [place] [phone]   [place]            [phone]    [facility]  [phone]   [facility]   [phone]       VISITOR POLICY(subject to change)    Current policy is 2 visitors per patient. No children. Mask is  at the discretion of the facility. Visiting hours are 8a-8p. Overnight visitors will be at the discretion of the nurse. All policies subject to change.      All above information reviewed with patient in person or by phone.Patient verbalizes understanding.All questions and concerns addressed.                                                                                                 Patient/Rep_patient___________________                                                                                                                                    PRE OP INSTRUCTIONS

## 2024-01-04 NOTE — ANESTHESIA POSTPROCEDURE EVALUATION
Department of Anesthesiology  Postprocedure Note    Patient: Natacha Yeager  MRN: [id number]  YOB: 1950  Date of evaluation: 1/4/2024    Procedure Summary       Date: 01/04/24 Room / Location: [address]    Anesthesia Start: 0703 Anesthesia Stop: 0750    Procedure: LEFT HIP SCREW REMOVAL (Left: Hip) Diagnosis:       Painful orthopaedic hardware (HCC)      (Painful orthopaedic hardware (HCC) [T84.84XA])    Surgeons: Lew Almonte MD Responsible Provider: Charity Montesinos MD    Anesthesia Type: general ASA Status: 3            Anesthesia Type: No value filed.    Edward Phase I: Edward Score: 10    Edward Phase II:      Anesthesia Post Evaluation    Patient location during evaluation: PACU  Patient participation: complete - patient participated  Level of consciousness: awake and alert  Airway patency: patent  Nausea & Vomiting: no nausea and no vomiting  Cardiovascular status: hemodynamically stable  Respiratory status: acceptable  Hydration status: stable  Multimodal analgesia pain management approach  Pain management: adequate    No notable events documented.

## 2024-01-04 NOTE — ANESTHESIA PRE PROCEDURE
10/17/23   Nesha Latham MD   sodium chloride 1 g tablet Take 1 tablet by mouth 3 times daily    Ksenia Sands MD   alendronate (FOSAMAX) 70 MG tablet Take 1 tablet by mouth every 7 days Takes on fridays 8/9/23   Ksenia Sands MD   GEMTESA 75 MG TABS tablet Take 1 tablet by mouth daily 8/11/23   Ksenia Sands MD   DULoxetine (CYMBALTA) 60 MG extended release capsule TAKE ONE CAPSULE BY MOUTH ONCE DAILY  Patient taking differently: 90 mg TAKE ONE CAPSULE BY MOUTH ONCE DAILY 8/14/23   Nesha Latham MD       Current medications:    Current Facility-Administered Medications   Medication Dose Route Frequency Provider Last Rate Last Admin    ceFAZolin (ANCEF) 2,000 mg in sodium chloride 0.9 % 50 mL IVPB (mini-bag)  2,000 mg IntraVENous On Call to OR Lew Almonte MD        0.9 % sodium chloride infusion   IntraVENous Continuous Lew Almonte MD           Allergies:    Allergies   Allergen Reactions    Lidocaine     Procaine     Anesthetics, Tracy Other (See Comments)     Hypotension episode with ER visit    Celecoxib Other (See Comments)     Abdominal discomfort    Estrogenic Substance     Lidocaine Hcl      hypotension    Methylprednisolone Other (See Comments)     Dose pack caused hallucinations    Prednisone        Problem List:    Patient Active Problem List   Diagnosis Code    Neuropathy G62.9    Lumbar radicular pain M54.16    Depression F32.A    Parkinson's disease G20.A1    Dyskinesia G24.9    Hyperreflexia R29.2    Right wrist tendinitis M77.8    Arthritis of wrist, right M19.031    Primary osteoarthritis of left wrist M19.032    Left wrist tendinitis M77.8    OAB (overactive bladder) N32.81    Parkinson disease, symptomatic G20.A1    Bilateral hip pain M25.551, M25.552    Bone fracture T14.8XXA    Closed fracture of neck of left femur (HCC) S72.002A    Closed fracture of left distal radius S52.502A    Closed displaced fracture of lateral end of left clavicle S42.032A    Closed

## 2024-01-04 NOTE — H&P
Preoperative H&P Update    The patient's History and Physical in the medical record from 1/2/2024 was reviewed by me today.    Past Medical History:   Diagnosis Date    Corticobasal degeneration     COVID-19 09/27/2023    Dyskinesia     Hyperlipidemia     Hyperreflexia     Hypotension     Neuropathy     corticobasal degeneration    Parkinson's disease           Pituitary adenoma (HCC)     Spinal column pain     Surgery on L4, L5 per      Past Surgical History:   Procedure Laterality Date    BACK SURGERY      CLAVICLE SURGERY Left 10/5/2023    LEFT CLAVICLE OPEN REDUCTION INTERNAL FIXATION-PATRICIA performed by Lew Almonte MD at [facility] OR    COLONOSCOPY  06/2002    due 6/12    COLONOSCOPY  08/2012    due 8/17    FINGER TRIGGER RELEASE      right hand    HAMMER TOE SURGERY  2005    HUMERUS FRACTURE SURGERY Left 09/01/2023    LEFT DISTAL RADIUS OPEN REDUCTION INTERNAL FIXATION performed by Lew Almonte MD at [facility] OR    HYSTERECTOMY (CERVIX STATUS UNKNOWN)      LAPAROSCOPY      LEG SURGERY Left 09/01/2023    LEFT HIP PINNING performed by Lew Almonte MD at [facility] OR    NOSE SURGERY N/A     TONSILLECTOMY AND ADENOIDECTOMY  12 yo    WRIST SURGERY Left 09/01/2023     No current facility-administered medications on file prior to encounter.     Current Outpatient Medications on File Prior to Encounter   Medication Sig Dispense Refill    oxyCODONE-acetaminophen (PERCOCET) 5-325 MG per tablet Take 1 tablet by mouth every 6 hours as needed. Max Daily Amount: 4 tablets      clonazePAM (KLONOPIN) 0.5 MG tablet Take 1 tablet by mouth nightly. Max Daily Amount: 0.5 mg      solifenacin (VESICARE) 10 MG tablet Take 1 tablet by mouth nightly      traMADol (ULTRAM) 50 MG tablet Take 1 tablet by mouth every 6 hours as needed for Pain. Max Daily Amount: 200 mg      carbidopa-levodopa (RYTARY) 61. MG CPCR per extended release capsule Take 1 capsule by mouth 5 (five) times a day (Patient taking differently:

## 2024-01-04 NOTE — TELEPHONE ENCOUNTER
Natacha Yeager is requesting refill(s) famotidine  Last OV 11/17/23 (pertaining to medication)  LR 5/22/23 (per medication requested)  Next office visit scheduled or attempted No   If no, reason:

## 2024-01-04 NOTE — BRIEF OP NOTE
Brief Postoperative Note      Patient: Natacha Yeager  YOB: 1950  MRN: [id number]    Date of Procedure: 1/4/2024    Pre-Op Diagnosis Codes:     * Painful orthopaedic hardware (HCC) [T84.84XA]    Post-Op Diagnosis: Same       Procedure(s):  LEFT HIP SCREW REMOVAL    Surgeon(s):  Lew Almonte MD    Assistant: REMY Mina    Anesthesia: General    Estimated Blood Loss (mL): Minimal    Complications: None    Specimens:   * No specimens in log *    Implants:  Implant Name Type Inv. Item Serial No.  Lot No. LRB No. Used Action   SCREW BNE L85MM DIA6.5MM ST CANC KIKO TI SELF DRL [employer] - [id number]  SCREW BNE L85MM DIA6.5MM ST CANC KIKO TI SELF DRL ST [employer] ([employer]  [employer] HOW-  Left 2 Explanted   SCREW BNE L90MM DIA6.5MM THRD L20MM CANC TI ST SELF DRL - [id number]  SCREW BNE L90MM DIA6.5MM THRD L20MM CANC TI ST SELF DRL  [employer] HOW-  Left 1 Explanted         Drains: * No LDAs found *    Findings: Same.      Electronically signed by Lew Almonte MD on 1/4/2024 at 6:42 PM

## 2024-01-04 NOTE — DISCHARGE INSTRUCTIONS
Post op instruction:  1- D/C home  2- Dx Left hip painful screws.  3- WBAT left leg  4- Elevation surgical site, with ice  5- Keep Drsg dry and clean, 3 days, then BandAid.  6- F/U in 2 weeks.  7- For DVT prophylaxis- Aspirin 325 mg daily     Lew Almonte MD, 1/4/2024      GENERAL SURGERY DISCHARGE INSTRUCTIONS    Follow your surgeons instructions.  Follow up with your surgeon as directed.  Observe the operative area for signs of excessive bleeding.If needed apply pressure,elevate if able and contact your surgeon.  Observe the operative site for any signs of infection- such as increased pain,redness,fever greater than 101 degrees,swelling, foul odor or drainage.Contact your surgeon if any of these symptoms are present.  Keep operative site clean and dry.  Do not remove dressing unless instructed to by surgeon.  Apply ice as directed.  If unable to urinate once you are at home,  notify your surgeon or go to the Emergency Room.  Avoid pulling,pushing or tugging to suture line.  If you become short of breath call your doctor or go to the ER.  Take medications as directed.  Pain medication should be taken with food.  Do not drive or operate machinery while taking narcotics.  For any problems or question call your surgeon.     ANESTHESIA DISCHARGE INSTRUCTIONS    Wear your seatbelt home.  You are under the influence of drugs-do not drink alcohol,drive,operate machinery,or make any important decisions or sign any legal documentsfor 24 hours  A responsible adult needs to be with you for 24 hours.  You may experience lightheadedness,dizziness,or sleepiness following surgery.  Rest at home today- increase activity as tolerated.  Progress slowly to a regular diet unless your physician has instructed you otherwise.Drink plenty of water.  If nausea becomes a problem call your physician.  Coughing,sore throat,and muscle aches are other side effects of anesthesia,and should improve with time.  Do not drive,operate machinery

## 2024-01-05 NOTE — OP NOTE
[address] [address]                                OPERATIVE REPORT    PATIENT NAME: KHARI VEGA                   :        1950  MED REC NO:   [id number]                          ROOM:  ACCOUNT NO:   [id number]                           ADMIT DATE: 2024  PROVIDER:     Lew Almonte MD    DATE OF PROCEDURE:  2024    PRIMARY CARE PROVIDER:  Nesha Latham MD    PREOPERATIVE DIAGNOSIS:  Left proximal femur painful deep implant/three  Asnis screws.    POSTOPERATIVE DIAGNOSIS:  Left proximal femur painful deep implant/three  Asnis screws.    OPERATION PERFORMED:  Removal of deep implant left proximal femur three  cannulated Asnis screws.    SURGEON:  Lew Almonte MD    ASSISTANT:  Shea Mina CNP.    ANESTHESIA:  General anesthesia.    ESTIMATED BLOOD LOSS:  Minimal.    COMPLICATIONS:  None.    INDICATIONS:  This is a 73-year-old white female with history of  Parkinson's disease who had a previous hip pinning on 2023.  The  patient was thought to having more pain and one of the proximal screws  slightly tight.  She would like to have this screws removed.  All risks,  benefits, and alternatives were discussed with the patient and she  elected to proceed with removal of the screws from femoral neck.    Given the patient's deep 3 screws need further dissection and xray to find them added significant challenge to the procedure. It required significant physical and mental effort. It required 60% more time for such procedure.     DESCRIPTION OF THE PROCEDURE:  The patient's left hip was marked.  She  received 2 gm Ancef IV preoperatively.  The patient was then brought to  the operating room, underwent general anesthesia.  She was then placed  on the right lateral decubitus position with the left hip up.  The left  hip and lower extremity were then prepped and draped in a regular  sterile routine fashion.

## 2024-01-16 ENCOUNTER — OFFICE VISIT (OUTPATIENT)
Dept: ORTHOPEDIC SURGERY | Age: 74
End: 2024-01-16

## 2024-01-16 VITALS — BODY MASS INDEX: 16.16 KG/M2 | HEIGHT: 65 IN | WEIGHT: 97 LBS

## 2024-01-16 DIAGNOSIS — T85.848A PAIN FROM IMPLANTED HARDWARE, INITIAL ENCOUNTER: ICD-10-CM

## 2024-01-16 DIAGNOSIS — S72.002A CLOSED FRACTURE OF NECK OF LEFT FEMUR, INITIAL ENCOUNTER (HCC): Primary | ICD-10-CM

## 2024-01-16 PROCEDURE — 99024 POSTOP FOLLOW-UP VISIT: CPT | Performed by: NURSE PRACTITIONER

## 2024-01-16 PROCEDURE — APPNB15 APP NON BILLABLE TIME 0-15 MINS: Performed by: NURSE PRACTITIONER

## 2024-01-16 NOTE — PROGRESS NOTES
DIAGNOSIS:  Left proximal femur painful hardware removal, 3 Asnis screws.    DATE OF SURGERY: 1/4/2024.    HISTORY OF PRESENT ILLNESS:  Ms. Yeager 73 y.o.  female with Parkinson's who came in today for 2 weeks postoperative visit.  The patient reports pain in the left hip.Rates pain a 8/10 VAS moderate, aching, constant and show no change. Aggravating factors movement, walking. Alleviating factors rest. She has been WBAT.  No numbness or tingling sensation. No fever or Chills.     PHYSICAL EXAMINATION:  The incision healing well .  No signs of any erythema or drainage, no swelling. She has no pain with the active or passive range of motion of the right hip, but does have pain with active and passive range of motion left hip, decreased ROM left hip.  She has intact sensation distally, and she is neurovascularly intact.    IMAGING:  Three views left hip taken today in the office showed hardware removal, no fracture.  There is significant left hip DJD, AVN.    IMPRESSION:  2 weeks out from left proximal femur painful hardware removal and doing very well.    PLAN: She can go back to normal activity with no heavy impact activities for 6 weeks.  The patient will come back for a follow up in 3 months.  At that time, we will take 3 views of the left hip.  She is aware that she may need surgery of a total left hip replacement in the future if her pain is not improved, but she would like to hold off on any further surgery at this time.      Shea Mina, ZAFAR - CNP

## 2024-02-16 RX ORDER — SENNOSIDES 8.6 MG
1 TABLET ORAL DAILY
Qty: 30 TABLET | Refills: 3 | Status: SHIPPED | OUTPATIENT
Start: 2024-02-16

## 2024-02-16 NOTE — TELEPHONE ENCOUNTER
Natacha Yeager is requesting refill(s) senna  Last OV 11/17/23 (pertaining to medication)  LR 10/25/23 (per medication requested)  Next office visit scheduled or attempted No   If no, reason:

## 2024-02-29 ENCOUNTER — TELEPHONE (OUTPATIENT)
Dept: ORTHOPEDIC SURGERY | Age: 74
End: 2024-02-29

## 2024-02-29 NOTE — TELEPHONE ENCOUNTER
She can go back to normal activity with no heavy impact activities for 6 weeks.  The patient will come back for a follow up in 3 months.  At that time, we will take 3 views of the left hip.  She is aware that she may need surgery of a total left hip replacement in the future if her pain is not improved, but she would like to hold off on any further surgery at this time.       Last visit 1/16/24  Will need to return call to schedule.

## 2024-02-29 NOTE — TELEPHONE ENCOUNTER
Appointment Request     Patient requesting earlier appointment: Yes  Appointment offered to patient: NO, CAMILA DO ONLY, NEXT AVAIL IS 4/23 - 1/4/2024 REMOVAL OF DEEP IMPLANT LEFT PROXIMAL FEMUR THREE ASNIS SCREWS.   Patient Contact Number: [phone]- Bruce,

## 2024-02-29 NOTE — TELEPHONE ENCOUNTER
Call placed to  Bruce.     Patient was scheduled for 4/16/2024 @ 8:30 am at the [facility], [address], [place] [address].

## 2024-04-08 DIAGNOSIS — F32.A DEPRESSION, UNSPECIFIED DEPRESSION TYPE: ICD-10-CM

## 2024-04-08 RX ORDER — FAMOTIDINE 40 MG/1
TABLET, FILM COATED ORAL
Qty: 30 TABLET | Refills: 1 | Status: SHIPPED | OUTPATIENT
Start: 2024-04-08

## 2024-04-08 RX ORDER — DULOXETIN HYDROCHLORIDE 60 MG/1
CAPSULE, DELAYED RELEASE ORAL
Qty: 90 CAPSULE | Refills: 1 | Status: SHIPPED | OUTPATIENT
Start: 2024-04-08

## 2024-05-28 RX ORDER — SENNOSIDES 8.6 MG
1 TABLET ORAL DAILY
Qty: 30 TABLET | Refills: 3 | Status: SHIPPED | OUTPATIENT
Start: 2024-05-28

## 2024-05-28 NOTE — TELEPHONE ENCOUNTER
Natacha Yeager is requesting refill(s) senokot  Last OV 11/17/23 (pertaining to medication)  LR 2/16/24 (per medication requested)  Next office visit scheduled or attempted No   If no, reason:

## 2024-07-07 ENCOUNTER — APPOINTMENT (OUTPATIENT)
Dept: CT IMAGING | Age: 74
End: 2024-07-07
Payer: COMMERCIAL

## 2024-07-07 ENCOUNTER — APPOINTMENT (OUTPATIENT)
Dept: GENERAL RADIOLOGY | Age: 74
End: 2024-07-07
Payer: COMMERCIAL

## 2024-07-07 ENCOUNTER — HOSPITAL ENCOUNTER (EMERGENCY)
Age: 74
Discharge: HOME | End: 2024-07-08
Attending: EMERGENCY MEDICINE
Payer: COMMERCIAL

## 2024-07-07 DIAGNOSIS — R55 NEAR SYNCOPE: Primary | ICD-10-CM

## 2024-07-07 DIAGNOSIS — R40.4 TRANSIENT ALTERATION OF AWARENESS: ICD-10-CM

## 2024-07-07 LAB
ALBUMIN SERPL-MCNC: 3.4 G/DL (ref 3.4–5)
ALBUMIN/GLOB SERPL: 1.6 {RATIO} (ref 1.1–2.2)
ALP SERPL-CCNC: 82 U/L (ref 40–129)
ALT SERPL-CCNC: <5 U/L (ref 10–40)
AMORPH SED URNS QL MICRO: ABNORMAL /HPF
ANION GAP SERPL CALCULATED.3IONS-SCNC: 7 MMOL/L (ref 3–16)
AST SERPL-CCNC: 8 U/L (ref 15–37)
BACTERIA URNS QL MICRO: ABNORMAL /HPF
BASOPHILS # BLD: 0 K/UL (ref 0–0.2)
BASOPHILS NFR BLD: 0.6 %
BILIRUB SERPL-MCNC: <0.2 MG/DL (ref 0–1)
BILIRUB UR QL STRIP.AUTO: NEGATIVE
BUN SERPL-MCNC: 20 MG/DL (ref 7–20)
CALCIUM SERPL-MCNC: 8.1 MG/DL (ref 8.3–10.6)
CHLORIDE SERPL-SCNC: 109 MMOL/L (ref 99–110)
CLARITY UR: CLEAR
CO2 SERPL-SCNC: 25 MMOL/L (ref 21–32)
COLOR UR: YELLOW
CREAT SERPL-MCNC: <0.5 MG/DL (ref 0.6–1.2)
DEPRECATED RDW RBC AUTO: 14.4 % (ref 12.4–15.4)
EOSINOPHIL # BLD: 0 K/UL (ref 0–0.6)
EOSINOPHIL NFR BLD: 0.6 %
GFR SERPLBLD CREATININE-BSD FMLA CKD-EPI: >90 ML/MIN/{1.73_M2}
GLUCOSE SERPL-MCNC: 112 MG/DL (ref 70–99)
GLUCOSE UR STRIP.AUTO-MCNC: NEGATIVE MG/DL
HCT VFR BLD AUTO: 33.3 % (ref 36–48)
HGB BLD-MCNC: 11.1 G/DL (ref 12–16)
HGB UR QL STRIP.AUTO: ABNORMAL
KETONES UR STRIP.AUTO-MCNC: NEGATIVE MG/DL
LEUKOCYTE ESTERASE UR QL STRIP.AUTO: NEGATIVE
LYMPHOCYTES # BLD: 1 K/UL (ref 1–5.1)
LYMPHOCYTES NFR BLD: 14.9 %
MCH RBC QN AUTO: 31.7 PG (ref 26–34)
MCHC RBC AUTO-ENTMCNC: 33.4 G/DL (ref 31–36)
MCV RBC AUTO: 94.9 FL (ref 80–100)
MONOCYTES # BLD: 0.5 K/UL (ref 0–1.3)
MONOCYTES NFR BLD: 6.9 %
NEUTROPHILS # BLD: 5.2 K/UL (ref 1.7–7.7)
NEUTROPHILS NFR BLD: 77 %
NITRITE UR QL STRIP.AUTO: NEGATIVE
PH UR STRIP.AUTO: 7 [PH] (ref 5–8)
PLATELET # BLD AUTO: 271 K/UL (ref 135–450)
PMV BLD AUTO: 7.1 FL (ref 5–10.5)
POTASSIUM SERPL-SCNC: 3.4 MMOL/L (ref 3.5–5.1)
PROT SERPL-MCNC: 5.5 G/DL (ref 6.4–8.2)
PROT UR STRIP.AUTO-MCNC: NEGATIVE MG/DL
RBC # BLD AUTO: 3.51 M/UL (ref 4–5.2)
RBC #/AREA URNS HPF: ABNORMAL /HPF (ref 0–4)
SODIUM SERPL-SCNC: 141 MMOL/L (ref 136–145)
SP GR UR STRIP.AUTO: 1.02 (ref 1–1.03)
TROPONIN, HIGH SENSITIVITY: 8 NG/L (ref 0–14)
TROPONIN, HIGH SENSITIVITY: 9 NG/L (ref 0–14)
UA DIPSTICK W REFLEX MICRO PNL UR: YES
URN SPEC COLLECT METH UR: ABNORMAL
UROBILINOGEN UR STRIP-ACNC: 0.2 E.U./DL
WBC # BLD AUTO: 6.7 K/UL (ref 4–11)
WBC #/AREA URNS HPF: ABNORMAL /HPF (ref 0–5)

## 2024-07-07 PROCEDURE — 85025 COMPLETE CBC W/AUTO DIFF WBC: CPT

## 2024-07-07 PROCEDURE — 93005 ELECTROCARDIOGRAM TRACING: CPT | Performed by: EMERGENCY MEDICINE

## 2024-07-07 PROCEDURE — 71045 X-RAY EXAM CHEST 1 VIEW: CPT

## 2024-07-07 PROCEDURE — 99285 EMERGENCY DEPT VISIT HI MDM: CPT

## 2024-07-07 PROCEDURE — 81001 URINALYSIS AUTO W/SCOPE: CPT

## 2024-07-07 PROCEDURE — 70450 CT HEAD/BRAIN W/O DYE: CPT

## 2024-07-07 PROCEDURE — 2580000003 HC RX 258: Performed by: EMERGENCY MEDICINE

## 2024-07-07 PROCEDURE — 84484 ASSAY OF TROPONIN QUANT: CPT

## 2024-07-07 PROCEDURE — 6370000000 HC RX 637 (ALT 250 FOR IP): Performed by: EMERGENCY MEDICINE

## 2024-07-07 PROCEDURE — 74176 CT ABD & PELVIS W/O CONTRAST: CPT

## 2024-07-07 PROCEDURE — 80053 COMPREHEN METABOLIC PANEL: CPT

## 2024-07-07 PROCEDURE — 36415 COLL VENOUS BLD VENIPUNCTURE: CPT

## 2024-07-07 RX ORDER — 0.9 % SODIUM CHLORIDE 0.9 %
1000 INTRAVENOUS SOLUTION INTRAVENOUS ONCE
Status: COMPLETED | OUTPATIENT
Start: 2024-07-07 | End: 2024-07-07

## 2024-07-07 RX ORDER — ACETAMINOPHEN 325 MG/1
650 TABLET ORAL ONCE
Status: COMPLETED | OUTPATIENT
Start: 2024-07-07 | End: 2024-07-07

## 2024-07-07 RX ORDER — 0.9 % SODIUM CHLORIDE 0.9 %
1000 INTRAVENOUS SOLUTION INTRAVENOUS ONCE
Status: COMPLETED | OUTPATIENT
Start: 2024-07-07 | End: 2024-07-08

## 2024-07-07 RX ADMIN — SODIUM CHLORIDE 1000 ML: 9 INJECTION, SOLUTION INTRAVENOUS at 20:14

## 2024-07-07 RX ADMIN — SODIUM CHLORIDE 1000 ML: 9 INJECTION, SOLUTION INTRAVENOUS at 23:12

## 2024-07-07 RX ADMIN — ACETAMINOPHEN 650 MG: 325 TABLET ORAL at 20:48

## 2024-07-07 ASSESSMENT — PAIN DESCRIPTION - LOCATION: LOCATION: ABDOMEN

## 2024-07-07 ASSESSMENT — PAIN DESCRIPTION - DESCRIPTORS: DESCRIPTORS: ACHING

## 2024-07-07 ASSESSMENT — PAIN DESCRIPTION - ORIENTATION: ORIENTATION: LEFT;MID

## 2024-07-07 ASSESSMENT — PAIN - FUNCTIONAL ASSESSMENT: PAIN_FUNCTIONAL_ASSESSMENT: 0-10

## 2024-07-07 ASSESSMENT — PAIN SCALES - GENERAL
PAINLEVEL_OUTOF10: 7
PAINLEVEL_OUTOF10: 0
PAINLEVEL_OUTOF10: 0

## 2024-07-08 VITALS
SYSTOLIC BLOOD PRESSURE: 126 MMHG | BODY MASS INDEX: 16.16 KG/M2 | RESPIRATION RATE: 18 BRPM | DIASTOLIC BLOOD PRESSURE: 57 MMHG | HEIGHT: 65 IN | HEART RATE: 82 BPM | WEIGHT: 97 LBS | TEMPERATURE: 98 F | OXYGEN SATURATION: 100 %

## 2024-07-08 LAB
EKG ATRIAL RATE: 83 BPM
EKG DIAGNOSIS: NORMAL
EKG P AXIS: 85 DEGREES
EKG P-R INTERVAL: 182 MS
EKG Q-T INTERVAL: 376 MS
EKG QRS DURATION: 106 MS
EKG QTC CALCULATION (BAZETT): 441 MS
EKG R AXIS: 62 DEGREES
EKG T AXIS: 72 DEGREES
EKG VENTRICULAR RATE: 83 BPM

## 2024-07-08 PROCEDURE — 93010 ELECTROCARDIOGRAM REPORT: CPT | Performed by: INTERNAL MEDICINE

## 2024-07-08 NOTE — ED NOTES
RN tried to turn on bed alarm and pt does not weigh enough to register with the bed alarm. Pt not hooked up to bed alarm at this time

## 2024-07-08 NOTE — ED PROVIDER NOTES
[facility]  ED     EMERGENCY DEPARTMENT ENCOUNTER            Pt Name: Natacha Yeager   MRN: [id number]   Birthdate 1950   Date of evaluation: 7/7/2024   Provider: Richie Alvarado II, DO   PCP: Nesha Latham MD   Note Started: 8:06 PM EDT 7/7/24          CHIEF COMPLAINT     Chief Complaint   Patient presents with    Loss of Consciousness     Pt lives at home with , pt has Parkinson,up for dinner, went unresponsive, responds to painful stimuli.  Hypotensive per squad.  Pt responsive at time of arrival.  400 ml NS given in route.  Pt took nighttime meds PTA             HISTORY OF PRESENT ILLNESS:   History from : Patient, EMS, and family  Limitations to history : Altered Mental Status     Natacha Yeager is a 74 y.o. female who presents to the emergency department with reported loss of consciousness.  Per EMS, they were called to scene for syncopal episode when patient was sitting in recliner.  They report having a difficult time getting an adequate blood pressure and route stating that blood pressure was reported out as 40s systolic.  However, they were also able to obtain a reliable radial pulse during that episode.  Patient arrives into the emergency department confused, somnolent, but arousable.  By report, patient took her nighttime meds prior to incident.          Nursing Notes were all reviewed and agreed with, or any disagreements were addressed in the HPI.     REVIEW OF SYSTEMS :    Positives and Pertinent negatives as per HPI.      MEDICAL HISTORY   has a past medical history of Corticobasal degeneration, COVID-19 (09/27/2023), Dyskinesia, Hallucinations, Hyperlipidemia, Hyperreflexia, Hypotension, Neuropathy, Parkinson's disease, Pituitary adenoma (HCC), and Spinal column pain.    Past Surgical History:   Procedure Laterality Date    BACK SURGERY      CLAVICLE SURGERY Left 10/5/2023    LEFT CLAVICLE OPEN REDUCTION INTERNAL FIXATION-PATRICIA performed by Lew Almonte MD

## 2024-07-08 NOTE — ED NOTES
Call JOANIE Bautista from [facility] with updates.  If  chooses to admit pt please make sure to call asap.  [phone]

## 2024-08-02 RX ORDER — FAMOTIDINE 40 MG/1
TABLET, FILM COATED ORAL
Qty: 90 TABLET | Refills: 1 | Status: SHIPPED | OUTPATIENT
Start: 2024-08-02

## 2024-08-02 NOTE — TELEPHONE ENCOUNTER
Natacha Yeager is requesting refill(s) famotidine  Last OV 11/17/23 (pertaining to medication)  LR 4/8/24 (per medication requested)  Next office visit scheduled or attempted No   If no, reason:

## 2024-10-02 NOTE — TELEPHONE ENCOUNTER
Brittni called back and states that she is understanding that Dr. Latham is not the attending due to her being in hospice care. She states that the family wanted to make sure that Dr. Latham was aware of the situation and agreed to the plan.

## 2024-10-02 NOTE — TELEPHONE ENCOUNTER
Very sad to hear this.  Okay with me to stop all oral medicines, however I am not the attending, hospice has been managing her care

## 2024-10-02 NOTE — TELEPHONE ENCOUNTER
Laila with [facility] called to request the ok to issue orders to stop all oral medications on patient.  She has not received any medications since 9/29.  She has declined the last week and is not taking in a lot of food or fluids at this time. She states they have begun comfort care at this point.  Patient's spouse has been using a syringe to try and get some fluids into patient as she has stopped understanding how to obtain through a straw.  As of last night around 8pm when Laila was at the home she had only had 1/2 cup of apple sauce for the entire day.  She can be reached at [phone].

## 2024-10-08 NOTE — TELEPHONE ENCOUNTER
Patients daughter called in to let the office know that Natacha passed away, no other information was given, except that services are by [facility]  [facility]

## 2025-04-15 NOTE — DISCHARGE INSTRUCTIONS
Goal Outcome Evaluation:            Pt converted to sinus arrhythmia this am. This afternoon pt has been in SA/SR. Cardiology started metolprolol. Losartan on hold. BP was 85/60 this am but since SBP has been in the 120s.     Pt stated she had a small bm with bleeding this am. Stool was flushed but blood was present on toilet and pt. Assessed pt and did not visualize any external hemorrhoids. Notified MD and occult stool ordered.     Pt was in afib on admission - currently on lovenox.         Problem: Adult Inpatient Plan of Care  Goal: Plan of Care Review  Outcome: Progressing  Goal: Patient-Specific Goal (Individualized)  Outcome: Progressing  Goal: Absence of Hospital-Acquired Illness or Injury  Outcome: Progressing  Intervention: Identify and Manage Fall Risk  Recent Flowsheet Documentation  Taken 4/15/2025 1200 by Juana Glynn RN  Safety Promotion/Fall Prevention:   activity supervised   clutter free environment maintained   nonskid shoes/slippers when out of bed   safety round/check completed  Taken 4/15/2025 1000 by Juana Glynn RN  Safety Promotion/Fall Prevention:   activity supervised   assistive device/personal items within reach   clutter free environment maintained   fall prevention program maintained   mobility aid in reach   lighting adjusted   nonskid shoes/slippers when out of bed   safety round/check completed   room organization consistent  Taken 4/15/2025 0800 by Juana Glynn RN  Safety Promotion/Fall Prevention:   activity supervised   assistive device/personal items within reach   clutter free environment maintained   nonskid shoes/slippers when out of bed   safety round/check completed  Intervention: Prevent Skin Injury  Recent Flowsheet Documentation  Taken 4/15/2025 1200 by Juana Glynn RN  Skin Protection:   incontinence pads utilized   skin sealant/moisture barrier applied  Intervention: Prevent and Manage VTE (Venous Thromboembolism) Risk  Recent Flowsheet  You were seen in the emergency department for passing out. We feel that it was due to dehydration. You also sustained 2 nasal bone fractures in addition to a laceration that we repaired using bupivacaine given your allergy to lidocaine. I would like for you to follow-up with our on-call ENT specialist regarding your nasal bone fractures. Please return to the emergency department with any new or concerning changes to your health. We hope you feel better soon! Documentation  Taken 4/15/2025 0800 by Juana Glynn RN  VTE Prevention/Management:   bilateral   SCDs (sequential compression devices) off  Intervention: Prevent Infection  Recent Flowsheet Documentation  Taken 4/15/2025 1200 by Juaan Glynn RN  Infection Prevention:   hand hygiene promoted   personal protective equipment utilized  Taken 4/15/2025 1000 by Juana Glynn RN  Infection Prevention:   cohorting utilized   hand hygiene promoted   personal protective equipment utilized   rest/sleep promoted   single patient room provided   visitors restricted/screened  Taken 4/15/2025 0800 by Juana Glynn RN  Infection Prevention:   hand hygiene promoted   personal protective equipment utilized  Goal: Optimal Comfort and Wellbeing  Outcome: Progressing  Goal: Readiness for Transition of Care  Outcome: Progressing

## (undated) DEVICE — MASC TURNOVER KIT: Brand: MEDLINE INDUSTRIES, INC.

## (undated) DEVICE — GLOVE SURG SZ 8 L12IN FNGR THK79MIL GRN LTX FREE

## (undated) DEVICE — SHEET,DRAPE,53X77,STERILE: Brand: MEDLINE

## (undated) DEVICE — GOWN SIRUS NONREIN XL W/TWL: Brand: MEDLINE INDUSTRIES, INC.

## (undated) DEVICE — SUTURE VCRL + SZ 2-0 L18IN ABSRB UD CT1 L36MM 1/2 CIR VCP839D

## (undated) DEVICE — DRAPE,SPLIT ,77X120: Brand: MEDLINE

## (undated) DEVICE — BANDAGE COBAN 4 IN COMPR W4INXL5YD FOAM COHESIVE QUIK STK SELF ADH SFT

## (undated) DEVICE — STAPLER EXT SKIN 35 WIDE S STL STPL SQUEEZE HNDL VISISTAT

## (undated) DEVICE — SYRINGE 60ML BULB IRRIG ST LF

## (undated) DEVICE — HYPODERMIC SAFETY NEEDLE: Brand: MAGELLAN

## (undated) DEVICE — UPPER EXTREMITY: Brand: MEDLINE INDUSTRIES, INC.

## (undated) DEVICE — MASTISOL ADHESIVE LIQ 2/3ML

## (undated) DEVICE — GOWN,SIRUS,NONRNF,SETINSLV,XL,20/CS: Brand: MEDLINE

## (undated) DEVICE — SOLUTION IRRIG 500ML 0.9% SOD CHLO USP POUR PLAS BTL

## (undated) DEVICE — DRESSING,GAUZE,XEROFORM,CURAD,1"X8",ST: Brand: CURAD

## (undated) DEVICE — SPLINT THMB W3XL12IN FBRGLS PD PRECUT LTWT DURABLE FAST SET

## (undated) DEVICE — DRESSING W4XL8IN ISLANDTHERABOND 3D

## (undated) DEVICE — GLOVE SURG SZ 65 L12IN THK75MIL DK GRN LTX FREE

## (undated) DEVICE — SUTURE VCRL + SZ 3-0 L18IN ABSRB UD SH 1/2 CIR TAPERCUT NDL VCP864D

## (undated) DEVICE — GLOVE ORTHO 7 1/2   MSG9475

## (undated) DEVICE — GAUZE,SPONGE,4"X4",8PLY,STRL,LF,10/TRAY: Brand: MEDLINE

## (undated) DEVICE — THREADED GUIDE WIRE

## (undated) DEVICE — SUTURE MCRYL SZ 3-0 L27IN ABSRB UD PS-2 3/8 CIR REV CUT NDL MCP427H

## (undated) DEVICE — DRAPE C ARM W46XL120IN XLN

## (undated) DEVICE — DRAPE SURG UPPER EXTREMITY

## (undated) DEVICE — GLOVE ORTHO 8   MSG9480

## (undated) DEVICE — KIRSCHNER WIRE: Brand: VARIAX

## (undated) DEVICE — STAPLER SKIN H3.9MM WIRE DIA0.58MM CRWN 6.9MM 35 STPL ROT

## (undated) DEVICE — PACK PROCEDURE SURG SHLDR MFFOP CUST

## (undated) DEVICE — SLING ARM SM W75XL138IN POLY COT DP L PKT DBL D RNG HK

## (undated) DEVICE — BOWL MED L 32OZ PLAS W/ MOLD GRAD EZ OPN PEEL PCH

## (undated) DEVICE — SYRINGE IRRIG 60ML SFT PLIABLE BLB EZ TO GRP 1 HND USE W/

## (undated) DEVICE — MEDICINE CUP, GRADUATED, STER: Brand: MEDLINE

## (undated) DEVICE — SYRINGE, LUER LOCK, 10ML: Brand: MEDLINE

## (undated) DEVICE — INTENDED FOR TISSUE SEPARATION, AND OTHER PROCEDURES THAT REQUIRE A SHARP SURGICAL BLADE TO PUNCTURE OR CUT.: Brand: BARD-PARKER ® STAINLESS STEEL BLADES

## (undated) DEVICE — GLOVE SURG SZ 65 THK91MIL LTX FREE SYN POLYISOPRENE

## (undated) DEVICE — TOWEL,OR,DSP,ST,BLUE,STD,4/PK,20PK/CS: Brand: MEDLINE

## (undated) DEVICE — 6619 2 PTNT ISO SYS INCISE AREA&LT;(&GT;&&LT;)&GT;P: Brand: STERI-DRAPE™ IOBAN™ 2

## (undated) DEVICE — LIGHT HANDLE: Brand: DEVON

## (undated) DEVICE — SUTURE VCRL + SZ 2-0 L36IN ABSRB UD L36MM CT-1 1/2 CIR VCP945H

## (undated) DEVICE — SPONGE LAP W18XL18IN WHT COT 4 PLY FLD STRUNG RADPQ DISP ST 2 PER PACK

## (undated) DEVICE — 3M™ STERI-STRIP™ REINFORCED ADHESIVE SKIN CLOSURES, R1547, 1/2 IN X 4 IN (12 MM X 100 MM), 6 STRIPS/ENVELOPE: Brand: 3M™ STERI-STRIP™

## (undated) DEVICE — MAJOR SET UP PK

## (undated) DEVICE — PADDING CAST 6 IN SYNTH

## (undated) DEVICE — DRESSING CNTCT 4X12IN IS THERABOND 3D

## (undated) DEVICE — ELECTRODE PT RET AD L9FT HI MOIST COND ADH HYDRGEL CORDED

## (undated) DEVICE — DRILL, AO, SCALED: Brand: VARIAX

## (undated) DEVICE — GLOVE ORANGE PI 8   MSG9080

## (undated) DEVICE — PACK PROCEDURE SURG HIP PIN TROCHANTERIC FEM NAIL CDS

## (undated) DEVICE — DRILL BIT, AO, SCALED: Brand: VARIAX

## (undated) DEVICE — C-ARM: Brand: UNBRANDED

## (undated) DEVICE — SUTURE MCRYL + SZ 4-0 L27IN ABSRB UD L19MM PS-2 3/8 CIR MCP426H

## (undated) DEVICE — SOLUTION IV 1000ML 0.9% SOD CHL FOR IRRIG PLAS CONT

## (undated) DEVICE — SOLUTION IRRIG 1000ML 09% SOD CHL USP PIC PLAS CONTAINER

## (undated) DEVICE — 3M™ STERI-DRAPE™ U-DRAPE, LONG 1019: Brand: STERI-DRAPE™

## (undated) DEVICE — NEEDLE,22GX1.5",REG,BEVEL: Brand: MEDLINE

## (undated) DEVICE — KNIFE SURG 15 DEG STBL BLADE

## (undated) DEVICE — SYRINGE MED 3ML CLR PLAS STD N CTRL LUERLOCK TIP DISP

## (undated) DEVICE — DRESSING FOAM W10XL10CM ABSRB SFT CNFRM SAFETAC LAYR

## (undated) DEVICE — SUTURE VCRL SZ 0 L36IN ABSRB UD CT-1 L36MM 1/2 CIR TAPR PNT VCP946H

## (undated) DEVICE — STRIP,CLOSURE,WOUND,MEDI-STRIP,1/2X4: Brand: MEDLINE

## (undated) DEVICE — APPLICATOR MEDICATED 26 CC SOLUTION HI LT ORNG CHLORAPREP

## (undated) DEVICE — BANDAGE COBAN 6 IN WND 6INX5YD FOAM

## (undated) DEVICE — 3M™ TEGADERM™ TRANSPARENT FILM DRESSING FRAME STYLE WITH BORDER, 1616, 4 IN X 4-3/4 IN (10 CM X 12 CM), 50/CT 4CT/CASE: Brand: 3M™ TEGADERM™

## (undated) DEVICE — INTENDED FOR TISSUE SEPARATION, AND OTHER PROCEDURES THAT REQUIRE A SHARP SURGICAL BLADE TO PUNCTURE OR CUT.: Brand: BARD-PARKER ® SAFETYLOCK CARBON RIB-BACK BLADES

## (undated) DEVICE — PENCIL SMK EVAC TELSCP 3 M TBNG

## (undated) DEVICE — 3M™ IOBAN™ 2 ANTIMICROBIAL INCISE DRAPE 6650EZ: Brand: IOBAN™ 2

## (undated) DEVICE — GOLDVAC SLIM PUSH BUTTON SMOKE EVACUATION PENCIL 10 FT (3.0 M): Brand: GOLDVAC

## (undated) DEVICE — DRAPE,U/ SHT,SPLIT,PLAS,STERIL: Brand: MEDLINE